# Patient Record
Sex: FEMALE | Race: BLACK OR AFRICAN AMERICAN | NOT HISPANIC OR LATINO
[De-identification: names, ages, dates, MRNs, and addresses within clinical notes are randomized per-mention and may not be internally consistent; named-entity substitution may affect disease eponyms.]

---

## 2017-01-19 ENCOUNTER — RX RENEWAL (OUTPATIENT)
Age: 67
End: 2017-01-19

## 2017-08-23 ENCOUNTER — APPOINTMENT (OUTPATIENT)
Dept: RHEUMATOLOGY | Facility: CLINIC | Age: 67
End: 2017-08-23
Payer: MEDICARE

## 2017-08-23 VITALS
HEIGHT: 68 IN | HEART RATE: 81 BPM | WEIGHT: 206 LBS | OXYGEN SATURATION: 98 % | BODY MASS INDEX: 31.22 KG/M2 | DIASTOLIC BLOOD PRESSURE: 81 MMHG | SYSTOLIC BLOOD PRESSURE: 139 MMHG

## 2017-08-23 PROCEDURE — 99214 OFFICE O/P EST MOD 30 MIN: CPT

## 2018-02-28 ENCOUNTER — APPOINTMENT (OUTPATIENT)
Dept: RHEUMATOLOGY | Facility: CLINIC | Age: 68
End: 2018-02-28
Payer: MEDICARE

## 2018-02-28 VITALS
WEIGHT: 204 LBS | DIASTOLIC BLOOD PRESSURE: 92 MMHG | HEART RATE: 77 BPM | OXYGEN SATURATION: 98 % | BODY MASS INDEX: 30.92 KG/M2 | HEIGHT: 68 IN | SYSTOLIC BLOOD PRESSURE: 145 MMHG

## 2018-02-28 PROCEDURE — 99214 OFFICE O/P EST MOD 30 MIN: CPT

## 2018-02-28 RX ORDER — BUDESONIDE AND FORMOTEROL FUMARATE DIHYDRATE 160; 4.5 UG/1; UG/1
160-4.5 AEROSOL RESPIRATORY (INHALATION)
Qty: 102 | Refills: 0 | Status: DISCONTINUED | COMMUNITY
Start: 2017-12-26

## 2018-02-28 RX ORDER — CEFUROXIME AXETIL 500 MG/1
500 TABLET ORAL
Qty: 20 | Refills: 0 | Status: DISCONTINUED | COMMUNITY
Start: 2017-11-16

## 2018-02-28 RX ORDER — AZITHROMYCIN 250 MG/1
250 TABLET, FILM COATED ORAL
Qty: 6 | Refills: 0 | Status: DISCONTINUED | COMMUNITY
Start: 2017-12-26

## 2018-02-28 RX ORDER — BUTALBITAL, ACETAMINOPHEN AND CAFFEINE 325; 50; 40 MG/1; MG/1; MG/1
50-325-40 TABLET ORAL
Qty: 10 | Refills: 0 | Status: DISCONTINUED | COMMUNITY
Start: 2018-02-13

## 2018-02-28 RX ORDER — ALBUTEROL SULFATE 90 UG/1
108 (90 BASE) AEROSOL, METERED RESPIRATORY (INHALATION)
Qty: 85 | Refills: 0 | Status: DISCONTINUED | COMMUNITY
Start: 2017-12-26

## 2018-03-20 ENCOUNTER — APPOINTMENT (OUTPATIENT)
Dept: FAMILY MEDICINE | Facility: CLINIC | Age: 68
End: 2018-03-20
Payer: MEDICARE

## 2018-03-20 VITALS
SYSTOLIC BLOOD PRESSURE: 136 MMHG | WEIGHT: 206 LBS | HEART RATE: 75 BPM | OXYGEN SATURATION: 97 % | BODY MASS INDEX: 31.22 KG/M2 | DIASTOLIC BLOOD PRESSURE: 80 MMHG | HEIGHT: 68 IN

## 2018-03-20 DIAGNOSIS — Z23 ENCOUNTER FOR IMMUNIZATION: ICD-10-CM

## 2018-03-20 DIAGNOSIS — K44.9 DIAPHRAGMATIC HERNIA W/OUT OBSTRUCTION OR GANGRENE: ICD-10-CM

## 2018-03-20 DIAGNOSIS — G43.909 MIGRAINE, UNSPECIFIED, NOT INTRACTABLE, W/OUT STATUS MIGRAINOSUS: ICD-10-CM

## 2018-03-20 PROCEDURE — G0439: CPT

## 2018-03-20 PROCEDURE — G0444 DEPRESSION SCREEN ANNUAL: CPT

## 2018-03-20 PROCEDURE — G0009: CPT

## 2018-03-20 PROCEDURE — 90670 PCV13 VACCINE IM: CPT

## 2018-03-20 RX ORDER — AMLODIPINE BESYLATE 5 MG/1
5 TABLET ORAL
Qty: 180 | Refills: 0 | Status: DISCONTINUED | COMMUNITY
Start: 2016-11-04 | End: 2018-03-20

## 2018-06-19 ENCOUNTER — APPOINTMENT (OUTPATIENT)
Dept: FAMILY MEDICINE | Facility: CLINIC | Age: 68
End: 2018-06-19
Payer: MEDICARE

## 2018-06-19 VITALS
HEIGHT: 68 IN | BODY MASS INDEX: 30.77 KG/M2 | WEIGHT: 203 LBS | SYSTOLIC BLOOD PRESSURE: 118 MMHG | HEART RATE: 89 BPM | DIASTOLIC BLOOD PRESSURE: 70 MMHG

## 2018-06-19 PROCEDURE — 99495 TRANSJ CARE MGMT MOD F2F 14D: CPT

## 2018-06-19 RX ORDER — NIFEDIPINE 30 MG/1
30 TABLET, FILM COATED, EXTENDED RELEASE ORAL
Qty: 14 | Refills: 0 | Status: DISCONTINUED | COMMUNITY
Start: 2018-03-02 | End: 2018-06-19

## 2018-06-25 NOTE — HISTORY OF PRESENT ILLNESS
[Post-hospitalization from ___ Hospital] : Post-hospitalization from [unfilled] Hospital [Admitted on: ___] : The patient was admitted on [unfilled] [Discharged on ___] : discharged on [unfilled] [FreeTextEntry2] : 67 y.o here for follow up \par patient had midabdominal/epigastric pain localizing under left breast\par had some difficulty breathing and shortness of breath\par went to [facility] ER\par had elevated /103\par syncopized in ER after 2 SL nitro\par \par she was ultimately iagnosed with CHF\par she has some meds changed \par off bystolic and nifedipine\par \par now on coreg 12.5 mg bis\par hydralazine 10 mg tid\par and isosorbide 10 mg tid\par \par

## 2018-06-26 ENCOUNTER — APPOINTMENT (OUTPATIENT)
Dept: FAMILY MEDICINE | Facility: CLINIC | Age: 68
End: 2018-06-26
Payer: MEDICARE

## 2018-06-26 VITALS
WEIGHT: 203 LBS | OXYGEN SATURATION: 96 % | DIASTOLIC BLOOD PRESSURE: 72 MMHG | SYSTOLIC BLOOD PRESSURE: 124 MMHG | HEART RATE: 80 BPM | HEIGHT: 68 IN | BODY MASS INDEX: 30.77 KG/M2 | TEMPERATURE: 98 F

## 2018-06-26 VITALS — DIASTOLIC BLOOD PRESSURE: 70 MMHG | SYSTOLIC BLOOD PRESSURE: 108 MMHG

## 2018-06-26 DIAGNOSIS — R09.82 POSTNASAL DRIP: ICD-10-CM

## 2018-06-26 DIAGNOSIS — Z87.09 PERSONAL HISTORY OF OTHER DISEASES OF THE RESPIRATORY SYSTEM: ICD-10-CM

## 2018-06-26 DIAGNOSIS — Z87.891 PERSONAL HISTORY OF NICOTINE DEPENDENCE: ICD-10-CM

## 2018-06-26 PROCEDURE — 99214 OFFICE O/P EST MOD 30 MIN: CPT | Mod: 25

## 2018-06-26 PROCEDURE — 69210 REMOVE IMPACTED EAR WAX UNI: CPT

## 2018-06-26 NOTE — PHYSICAL EXAM
[No Acute Distress] : no acute distress [Well Nourished] : well nourished [Well Developed] : well developed [EOMI] : extraocular movements intact [Supple] : supple [No Lymphadenopathy] : no lymphadenopathy [No Respiratory Distress] : no respiratory distress  [Clear to Auscultation] : lungs were clear to auscultation bilaterally [No Accessory Muscle Use] : no accessory muscle use [Normal Rate] : normal rate  [Regular Rhythm] : with a regular rhythm [Normal S1, S2] : normal S1 and S2 [No Edema] : there was no peripheral edema [No Extremity Clubbing/Cyanosis] : no extremity clubbing/cyanosis [No CVA Tenderness] : no CVA  tenderness [Grossly Normal Strength/Tone] : grossly normal strength/tone [No Rash] : no rash [Normal Gait] : normal gait [Coordination Grossly Intact] : coordination grossly intact [No Focal Deficits] : no focal deficits [Normal Affect] : the affect was normal [Normal Mood] : the mood was normal [Normal Insight/Judgement] : insight and judgment were intact [de-identified] : +PND post pharynx,  +cerumen impaction B/L auditory canals

## 2018-06-26 NOTE — PLAN
[FreeTextEntry1] : aggressive hydration!!!!!!!\par \par OTC Nasacort AQ  2 sp ea nostril qd \par \par see med orders\par \par \par pt unable to afford strongly recommended HAD at this time \par \par B/L ear irrigation performed

## 2018-06-26 NOTE — REVIEW OF SYSTEMS
[Hoarseness] : hoarseness [Cough] : cough [Negative] : Heme/Lymph [FreeTextEntry2] : see HPI  [FreeTextEntry4] : see HPI  [FreeTextEntry6] : see HPI

## 2018-06-26 NOTE — HISTORY OF PRESENT ILLNESS
[FreeTextEntry8] : cough/congestion/ loss of voice x3days \par \par 3 day of "laryngitis,"  +cough c yellow phlegm,   reports minimal relief c OTC Robitussin, no f/c/s no N/V/D +good appetite, +nasal congestion

## 2018-07-25 ENCOUNTER — RX RENEWAL (OUTPATIENT)
Age: 68
End: 2018-07-25

## 2018-07-25 ENCOUNTER — OTHER (OUTPATIENT)
Age: 68
End: 2018-07-25

## 2018-08-29 ENCOUNTER — APPOINTMENT (OUTPATIENT)
Dept: RHEUMATOLOGY | Facility: CLINIC | Age: 68
End: 2018-08-29
Payer: MEDICARE

## 2018-08-29 VITALS
WEIGHT: 200 LBS | HEIGHT: 68 IN | BODY MASS INDEX: 30.31 KG/M2 | SYSTOLIC BLOOD PRESSURE: 136 MMHG | OXYGEN SATURATION: 95 % | HEART RATE: 75 BPM | DIASTOLIC BLOOD PRESSURE: 84 MMHG

## 2018-08-29 DIAGNOSIS — Z81.8 FAMILY HISTORY OF OTHER MENTAL AND BEHAVIORAL DISORDERS: ICD-10-CM

## 2018-08-29 PROCEDURE — 99214 OFFICE O/P EST MOD 30 MIN: CPT

## 2018-10-11 ENCOUNTER — APPOINTMENT (OUTPATIENT)
Dept: FAMILY MEDICINE | Facility: CLINIC | Age: 68
End: 2018-10-11
Payer: MEDICARE

## 2018-10-11 ENCOUNTER — TRANSCRIPTION ENCOUNTER (OUTPATIENT)
Age: 68
End: 2018-10-11

## 2018-10-11 VITALS
DIASTOLIC BLOOD PRESSURE: 80 MMHG | HEIGHT: 68 IN | WEIGHT: 200 LBS | OXYGEN SATURATION: 96 % | SYSTOLIC BLOOD PRESSURE: 134 MMHG | HEART RATE: 84 BPM | BODY MASS INDEX: 30.31 KG/M2

## 2018-10-11 DIAGNOSIS — M25.569 PAIN IN UNSPECIFIED KNEE: ICD-10-CM

## 2018-10-11 PROCEDURE — 99214 OFFICE O/P EST MOD 30 MIN: CPT

## 2018-10-11 RX ORDER — AZITHROMYCIN 250 MG/1
250 TABLET, FILM COATED ORAL
Qty: 1 | Refills: 0 | Status: DISCONTINUED | COMMUNITY
Start: 2018-06-26 | End: 2018-10-11

## 2018-11-30 ENCOUNTER — APPOINTMENT (OUTPATIENT)
Dept: RHEUMATOLOGY | Facility: CLINIC | Age: 68
End: 2018-11-30
Payer: MEDICARE

## 2018-11-30 VITALS
HEIGHT: 68 IN | HEART RATE: 77 BPM | BODY MASS INDEX: 29.86 KG/M2 | DIASTOLIC BLOOD PRESSURE: 83 MMHG | WEIGHT: 197 LBS | SYSTOLIC BLOOD PRESSURE: 133 MMHG | OXYGEN SATURATION: 98 %

## 2018-11-30 DIAGNOSIS — E04.1 NONTOXIC SINGLE THYROID NODULE: ICD-10-CM

## 2018-11-30 DIAGNOSIS — M65.9 SYNOVITIS AND TENOSYNOVITIS, UNSPECIFIED: ICD-10-CM

## 2018-11-30 PROCEDURE — 99214 OFFICE O/P EST MOD 30 MIN: CPT | Mod: 25

## 2018-11-30 PROCEDURE — 20550 NJX 1 TENDON SHEATH/LIGAMENT: CPT

## 2019-04-01 ENCOUNTER — OTHER (OUTPATIENT)
Age: 69
End: 2019-04-01

## 2019-04-01 ENCOUNTER — APPOINTMENT (OUTPATIENT)
Dept: RHEUMATOLOGY | Facility: CLINIC | Age: 69
End: 2019-04-01

## 2019-04-12 ENCOUNTER — RX RENEWAL (OUTPATIENT)
Age: 69
End: 2019-04-12

## 2019-06-03 ENCOUNTER — APPOINTMENT (OUTPATIENT)
Dept: RHEUMATOLOGY | Facility: CLINIC | Age: 69
End: 2019-06-03
Payer: MEDICARE

## 2019-06-03 ENCOUNTER — RX RENEWAL (OUTPATIENT)
Age: 69
End: 2019-06-03

## 2019-06-03 VITALS
HEART RATE: 81 BPM | DIASTOLIC BLOOD PRESSURE: 85 MMHG | BODY MASS INDEX: 29.86 KG/M2 | SYSTOLIC BLOOD PRESSURE: 144 MMHG | WEIGHT: 197 LBS | HEIGHT: 68 IN | OXYGEN SATURATION: 97 %

## 2019-06-03 DIAGNOSIS — M54.5 LOW BACK PAIN: ICD-10-CM

## 2019-06-03 DIAGNOSIS — Z79.52 LONG TERM (CURRENT) USE OF SYSTEMIC STEROIDS: ICD-10-CM

## 2019-06-03 DIAGNOSIS — M25.50 PAIN IN UNSPECIFIED JOINT: ICD-10-CM

## 2019-06-03 PROCEDURE — 99214 OFFICE O/P EST MOD 30 MIN: CPT

## 2019-06-04 PROBLEM — Z79.52 CURRENT USE OF STEROID MEDICATION: Status: ACTIVE | Noted: 2019-06-04

## 2019-06-04 PROBLEM — M54.5 LOW BACK PAIN, EPISODIC: Status: ACTIVE | Noted: 2019-06-04

## 2019-06-04 NOTE — PHYSICAL EXAM
[General Appearance - Well Nourished] : well nourished [General Appearance - Well Developed] : well developed [Sclera] : the sclera and conjunctiva were normal [Respiration, Rhythm And Depth] : normal respiratory rhythm and effort [Neck Appearance] : the appearance of the neck was normal [Oropharynx] : the oropharynx was normal [Edema] : there was no peripheral edema [Auscultation Breath Sounds / Voice Sounds] : lungs were clear to auscultation bilaterally [Full Pulse] : the pedal pulses are present [Heart Sounds] : normal S1 and S2 [Cervical Lymph Nodes Enlarged Anterior Bilaterally] : anterior cervical [Abdomen Tenderness] : non-tender [No Spinal Tenderness] : no spinal tenderness [Supraclavicular Lymph Nodes Enlarged Bilaterally] : supraclavicular [Abnormal Walk] : normal gait [Musculoskeletal - Swelling] : no joint swelling seen [Motor Tone] : muscle strength and tone were normal [] : no rash [Deep Tendon Reflexes (DTR)] : deep tendon reflexes were 2+ and symmetric [FreeTextEntry1] : FROM neck, shoulders, elbows, wrists, hands, hips, knees, ankles and feet, including the small joints of the hands and feet without any evidence of inflammatory arthritis , No inflammatory arthritis or synovitis noted. No tender points noted. tightness hamstrings b/l [Motor Exam] : the motor exam was normal [Oriented To Time, Place, And Person] : oriented to person, place, and time

## 2019-06-04 NOTE — ASSESSMENT
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 \par \par \par rheumatoid arthritis-\par \par She was on combination of humira and MTX but was taken off the humira after her stroke. Concern was raised at that time that her neurological symptoms may have been exacerbated by humira. She did have a couple of TIAs several years ago but was APL negative. \par . She is currently on methotrexate 15 mg weekly monotherapy. \par - recent RA flare that responded to oral steroids\par \par Low back pain-\par to use oral prednisone for next 2 weeks, 10 mg for 1st week and then 5 mg for 2nd week\par -she is going to [place] for next 2 weeks\par - to have MRI LS spine when she returns, she has a h/o lumbar arthritis\par \par COPD-\par I ordered a CT chest, it shows stable emphysema.\par -She no longer smokes for many years.\par - under care of pul\par \par steroid use-\par Risks and benefits of steroids were d/w patient including but not limited to weight gain, diabetes, HTN, avascular necrosis, osteoporosis, glaucoma, cataract, infections and immunosuppression.\par \par Methotrexate use-\par -she is aware to hold medication while on antibiotics.\par \par \par She is aware to call if her sx worsen. followup 4 months.

## 2019-06-04 NOTE — HISTORY OF PRESENT ILLNESS
[FreeTextEntry1] : followup for rheumatoid arthritis. Precious returns after 6 months  today.  She is currently using methotrexate at 15 mg once a week. \par She called me several days ago with RA flare and is on tapering doses of steroids. \par She is currently on  methotrexate monotherapy at 15 mg. She is doing well. She did get a CT chest from May that showed emphysema unchanged from several years ago. It was noted that she had thyroid nodules, she is seeing pul now. \par She also c/o back pain and wants to know what she should do. \par She rates her joint pain at a 3/10 except her lower back which is a 10/10\par . She denies any recent infections. She saw her primary care physician. She denies fevers or chills or night sweats. She has a good appetite.\par She is otherwise up-to-date on her age-appropriate screenings. She denies fatigue.

## 2019-06-04 NOTE — REVIEW OF SYSTEMS
[Chills] : no chills [Fever] : no fever [Feeling Poorly] : not feeling poorly [Dry Eyes] : no dryness of the eyes [Feeling Tired] : not feeling tired [Shortness Of Breath] : no shortness of breath [Chest Pain] : no chest pain [Palpitations] : no palpitations [Cough] : no cough [SOB on Exertion] : no shortness of breath during exertion [Abdominal Pain] : no abdominal pain [Arthralgias] : arthralgias [Joint Swelling] : no joint swelling [Joint Pain] : joint pain [Joint Stiffness] : no joint stiffness [Skin Lesions] : no skin lesions [Difficulty Walking] : no difficulty walking [Depression] : no depression [Anxiety] : no anxiety [Negative] : Heme/Lymph

## 2019-06-19 ENCOUNTER — OTHER (OUTPATIENT)
Age: 69
End: 2019-06-19

## 2019-07-08 ENCOUNTER — APPOINTMENT (OUTPATIENT)
Dept: RHEUMATOLOGY | Facility: CLINIC | Age: 69
End: 2019-07-08

## 2019-09-03 NOTE — DISCUSSION/SUMMARY
[Hospital: _____] : Hospital:  [ED] : a call from ED [Follow Up Appointment] : follow up appointment with provider [Patient] : a call from patient [Home] : patient was discharged to home [FreeTextEntry3] : Pt scheduled for f/u appt on september 10th  [FreeTextEntry1] : Pt states she was discharged home on amlodipine for SBP >115. Pt reports compliance with hypertensive meds and amlodipine. Pt states cardiologist discontinued hydralazine however she is still taking carvedilol and isosorbide. Pt reports heaviness in head, Pt has an appt with neurologist today 9/3/19 and appt with cardiologist on friday. BP readings today have been Systolic <160 and diastolic <100. f/u appt with CHANDLER Gonsalez scheduled for 9/10/19

## 2019-09-10 ENCOUNTER — APPOINTMENT (OUTPATIENT)
Dept: FAMILY MEDICINE | Facility: CLINIC | Age: 69
End: 2019-09-10
Payer: MEDICARE

## 2019-09-10 VITALS
BODY MASS INDEX: 29.1 KG/M2 | HEIGHT: 68 IN | TEMPERATURE: 97.5 F | SYSTOLIC BLOOD PRESSURE: 108 MMHG | HEART RATE: 73 BPM | WEIGHT: 192 LBS | OXYGEN SATURATION: 98 % | DIASTOLIC BLOOD PRESSURE: 76 MMHG

## 2019-09-10 VITALS — DIASTOLIC BLOOD PRESSURE: 80 MMHG | SYSTOLIC BLOOD PRESSURE: 138 MMHG

## 2019-09-10 DIAGNOSIS — K21.9 GASTRO-ESOPHAGEAL REFLUX DISEASE W/OUT ESOPHAGITIS: ICD-10-CM

## 2019-09-10 PROCEDURE — 99214 OFFICE O/P EST MOD 30 MIN: CPT

## 2019-09-10 RX ORDER — PREDNISONE 10 MG/1
10 TABLET ORAL TWICE DAILY
Qty: 60 | Refills: 0 | Status: COMPLETED | COMMUNITY
Start: 2019-06-03 | End: 2019-09-10

## 2019-09-10 RX ORDER — AMLODIPINE BESYLATE 2.5 MG/1
2.5 TABLET ORAL
Refills: 0 | Status: COMPLETED | COMMUNITY

## 2019-09-10 NOTE — PHYSICAL EXAM
[No Acute Distress] : no acute distress [Well Nourished] : well nourished [Well Developed] : well developed [Well-Appearing] : well-appearing [EOMI] : extraocular movements intact [No JVD] : no jugular venous distention [Normal Outer Ear/Nose] : the outer ears and nose were normal in appearance [No Accessory Muscle Use] : no accessory muscle use [No Respiratory Distress] : no respiratory distress  [Normal Rate] : normal rate  [Clear to Auscultation] : lungs were clear to auscultation bilaterally [Regular Rhythm] : with a regular rhythm [Normal S1, S2] : normal S1 and S2 [No Carotid Bruits] : no carotid bruits [No Edema] : there was no peripheral edema [Non-distended] : non-distended [No CVA Tenderness] : no CVA  tenderness [Grossly Normal Strength/Tone] : grossly normal strength/tone [No Rash] : no rash [Coordination Grossly Intact] : coordination grossly intact [Normal Gait] : normal gait [No Focal Deficits] : no focal deficits [Normal Affect] : the affect was normal [Normal Insight/Judgement] : insight and judgment were intact [Normal Mood] : the mood was normal

## 2019-09-11 NOTE — ASSESSMENT
[FreeTextEntry1] : Cardiac w/u performed c Dr. Zarate  [facility] \par \par Nephrology consult c Dr. Foreman scheduled 10/4/19 \par \par Cardio consult to be obtained \par d/c summary x 2 from [facility]'s to be obtained c\par \par close f/u,  daily home BP monitoring to continue,  pt states checking BP q 2 hrs \par \par Addendum Note:   after review of hospital d/c summary,   +ve  R MOLLY recommend f/u c Dr. Pittman [phone] \par (Vascular)

## 2019-09-11 NOTE — HISTORY OF PRESENT ILLNESS
[Post-hospitalization from ___ Hospital] : Post-hospitalization from [unfilled] Hospital [Discharged on ___] : discharged on [unfilled] [Admitted on: ___] : The patient was admitted on [unfilled] [FreeTextEntry2] : Pt was admitted into [facility] twice x Elevated blood pressure readings, pt is currently on amlodipine \par \par 69 yo female presents to office s/p [facility] Admission  on 8/26/19-8/29/19 secondary to elevated BP.  pt states home BP= 169/115.   pt states had blurred vision, fatigue, and heaviness in head.   pt called EMS and relative.    pt states does not recall events until waking up in ICU the next am.  BP in hospital was "220" systolic and "100"  diastolic.   \par pt states was NOT Dx'ed c CVA  and/or MI.    BP w/u performed over 4 days at [facility]\par \par pt states was d/c'ed  on 8/29/19 and 2 days later similar symptoms occurred, pt transported via son.     pt admitted x 2 days   8/29/19-8/31/19 \par

## 2019-09-11 NOTE — HEALTH RISK ASSESSMENT
[Patient declined bone density test] : Patient declined bone density test [Patient reported colonoscopy was normal] : Patient reported colonoscopy was normal [MammogramDate] : 11/18 [MammogramComments] : Uday [ColonoscopyComments] : Cologaurd  [ColonoscopyDate] : 06/18

## 2019-09-23 ENCOUNTER — RX RENEWAL (OUTPATIENT)
Age: 69
End: 2019-09-23

## 2019-09-25 ENCOUNTER — RX RENEWAL (OUTPATIENT)
Age: 69
End: 2019-09-25

## 2019-11-08 ENCOUNTER — APPOINTMENT (OUTPATIENT)
Dept: FAMILY MEDICINE | Facility: CLINIC | Age: 69
End: 2019-11-08
Payer: MEDICARE

## 2019-11-08 VITALS
HEIGHT: 68 IN | SYSTOLIC BLOOD PRESSURE: 104 MMHG | OXYGEN SATURATION: 96 % | HEART RATE: 77 BPM | WEIGHT: 191 LBS | DIASTOLIC BLOOD PRESSURE: 62 MMHG | TEMPERATURE: 97.7 F | BODY MASS INDEX: 28.95 KG/M2

## 2019-11-08 DIAGNOSIS — Z86.69 PERSONAL HISTORY OF OTHER DISEASES OF THE NERVOUS SYSTEM AND SENSE ORGANS: ICD-10-CM

## 2019-11-08 DIAGNOSIS — H61.20 IMPACTED CERUMEN, UNSPECIFIED EAR: ICD-10-CM

## 2019-11-08 PROCEDURE — 99214 OFFICE O/P EST MOD 30 MIN: CPT

## 2019-11-08 NOTE — END OF VISIT
[FreeTextEntry3] : Medical record entries made by the scribe today today, were at my direction and personally dictated to them by me, Dr. Sarah Gardner on Nov 08, 2019. I have reviewed the chart and agree that the record accurately reflects my personal performance of the history, physical exam, assessment, and plan.\par \par

## 2019-11-08 NOTE — PLAN
[FreeTextEntry1] : - Likely cold in eye\par - Eyedrops ordered in case of worsening \par - Augmentin 500 mg ordered for 10 days \par - Take with food/probiotics\par - Use Debrox drops to loosen cerumen\par - RTO for ear lavage next week

## 2019-11-08 NOTE — HISTORY OF PRESENT ILLNESS
[FreeTextEntry8] : JAVED is a 69 year female here c/o RT eye redness/itching/white mucus. States last night she was itching her eye and white mucus was coming out. States this morning she woke up and there was discharge in the eye. Had a sinus infection x2 weeks ago. Was rx Augmentin 500 mg for x5 days. Still feels congested. States she got sick after flu shot in October. \par \par

## 2019-11-08 NOTE — ADDENDUM
[FreeTextEntry1] : I, Estella Weinberg acting as a scribe for Dr. Sarah Gardner on Nov 08, 2019  at 10:19 AM\par

## 2019-11-08 NOTE — REVIEW OF SYSTEMS
[Discharge] : discharge [Redness] : redness [Itching] : itching [Nasal Discharge] : nasal discharge [Negative] : Psychiatric [FreeTextEntry4] : congestion

## 2019-11-08 NOTE — PHYSICAL EXAM
[No Acute Distress] : no acute distress [Well Nourished] : well nourished [Well Developed] : well developed [Normal Sclera/Conjunctiva] : normal sclera/conjunctiva [Well-Appearing] : well-appearing [PERRL] : pupils equal round and reactive to light [EOMI] : extraocular movements intact [Normal Outer Ear/Nose] : the outer ears and nose were normal in appearance [Normal Oropharynx] : the oropharynx was normal [No JVD] : no jugular venous distention [No Lymphadenopathy] : no lymphadenopathy [Supple] : supple [Thyroid Normal, No Nodules] : the thyroid was normal and there were no nodules present [No Respiratory Distress] : no respiratory distress  [No Accessory Muscle Use] : no accessory muscle use [Clear to Auscultation] : lungs were clear to auscultation bilaterally [Normal Rate] : normal rate  [Regular Rhythm] : with a regular rhythm [No Murmur] : no murmur heard [Normal S1, S2] : normal S1 and S2 [No Abdominal Bruit] : a ~M bruit was not heard ~T in the abdomen [No Carotid Bruits] : no carotid bruits [No Varicosities] : no varicosities [Pedal Pulses Present] : the pedal pulses are present [No Edema] : there was no peripheral edema [No Palpable Aorta] : no palpable aorta [No Extremity Clubbing/Cyanosis] : no extremity clubbing/cyanosis [Soft] : abdomen soft [Non Tender] : non-tender [No Masses] : no abdominal mass palpated [Non-distended] : non-distended [No HSM] : no HSM [Normal Posterior Cervical Nodes] : no posterior cervical lymphadenopathy [Normal Bowel Sounds] : normal bowel sounds [Normal Anterior Cervical Nodes] : no anterior cervical lymphadenopathy [No CVA Tenderness] : no CVA  tenderness [No Spinal Tenderness] : no spinal tenderness [No Joint Swelling] : no joint swelling [Grossly Normal Strength/Tone] : grossly normal strength/tone [No Rash] : no rash [Coordination Grossly Intact] : coordination grossly intact [No Focal Deficits] : no focal deficits [Deep Tendon Reflexes (DTR)] : deep tendon reflexes were 2+ and symmetric [Normal Gait] : normal gait [Normal Insight/Judgement] : insight and judgment were intact [Normal Affect] : the affect was normal [de-identified] : \par yellow mucus mixed with crusted blood b/l

## 2019-11-15 ENCOUNTER — APPOINTMENT (OUTPATIENT)
Dept: FAMILY MEDICINE | Facility: CLINIC | Age: 69
End: 2019-11-15
Payer: MEDICARE

## 2019-11-15 VITALS
WEIGHT: 191 LBS | HEART RATE: 80 BPM | DIASTOLIC BLOOD PRESSURE: 80 MMHG | HEIGHT: 68 IN | OXYGEN SATURATION: 98 % | BODY MASS INDEX: 28.95 KG/M2 | SYSTOLIC BLOOD PRESSURE: 128 MMHG

## 2019-11-15 PROCEDURE — 69210 REMOVE IMPACTED EAR WAX UNI: CPT

## 2019-11-15 PROCEDURE — 36415 COLL VENOUS BLD VENIPUNCTURE: CPT

## 2019-11-15 PROCEDURE — 99214 OFFICE O/P EST MOD 30 MIN: CPT | Mod: 25

## 2019-11-15 NOTE — END OF VISIT
[FreeTextEntry3] : Medical record entries made by the scribe today today, were at my direction and personally dictated to them by me, Dr. Sarah Gardner on Nov 15, 2019. I have reviewed the chart and agree that the record accurately reflects my personal performance of the history, physical exam, assessment, and plan.\par \par

## 2019-11-15 NOTE — PHYSICAL EXAM
[No Acute Distress] : no acute distress [Well Nourished] : well nourished [Well Developed] : well developed [Well-Appearing] : well-appearing [Normal Sclera/Conjunctiva] : normal sclera/conjunctiva [PERRL] : pupils equal round and reactive to light [EOMI] : extraocular movements intact [Normal Outer Ear/Nose] : the outer ears and nose were normal in appearance [Normal Oropharynx] : the oropharynx was normal [No JVD] : no jugular venous distention [Supple] : supple [No Lymphadenopathy] : no lymphadenopathy [Thyroid Normal, No Nodules] : the thyroid was normal and there were no nodules present [No Respiratory Distress] : no respiratory distress  [Clear to Auscultation] : lungs were clear to auscultation bilaterally [No Accessory Muscle Use] : no accessory muscle use [Normal Rate] : normal rate  [Regular Rhythm] : with a regular rhythm [Normal S1, S2] : normal S1 and S2 [No Murmur] : no murmur heard [No Carotid Bruits] : no carotid bruits [No Abdominal Bruit] : a ~M bruit was not heard ~T in the abdomen [No Varicosities] : no varicosities [Pedal Pulses Present] : the pedal pulses are present [No Edema] : there was no peripheral edema [No Palpable Aorta] : no palpable aorta [No Extremity Clubbing/Cyanosis] : no extremity clubbing/cyanosis [Soft] : abdomen soft [Non Tender] : non-tender [Non-distended] : non-distended [No Masses] : no abdominal mass palpated [No HSM] : no HSM [Normal Bowel Sounds] : normal bowel sounds [Normal Posterior Cervical Nodes] : no posterior cervical lymphadenopathy [Normal Anterior Cervical Nodes] : no anterior cervical lymphadenopathy [No CVA Tenderness] : no CVA  tenderness [No Spinal Tenderness] : no spinal tenderness [No Joint Swelling] : no joint swelling [Grossly Normal Strength/Tone] : grossly normal strength/tone [No Rash] : no rash [Coordination Grossly Intact] : coordination grossly intact [No Focal Deficits] : no focal deficits [Normal Gait] : normal gait [Deep Tendon Reflexes (DTR)] : deep tendon reflexes were 2+ and symmetric [Normal Insight/Judgement] : insight and judgment were intact [Normal Affect] : the affect was normal

## 2019-11-15 NOTE — PLAN
[FreeTextEntry1] : - B/l ear lavage in office today \par - Reports minor hearing improvement \par - Encouraged hearing aids\par - Encouraged to use Debrox drops x1 week

## 2019-11-15 NOTE — HISTORY OF PRESENT ILLNESS
[FreeTextEntry1] : f/u x b/l ear lavage [de-identified] : JAVED is a 69 year female here for f/u. Pt is here for b/l ear lavage. Has been using Debrox drops to soften cerumen. Mood is good. Pt has no c/o. Pt reports she has b/l hearing loss. Used to wear hearing aids but has not in awhile. States that they are very expensive.

## 2019-11-15 NOTE — ADDENDUM
[FreeTextEntry1] : I, Estella Weinberg acting as a scribe for Dr. Sarah Gardner on Nov 15, 2019  at 2:38 PM\par

## 2020-01-03 ENCOUNTER — RX RENEWAL (OUTPATIENT)
Age: 70
End: 2020-01-03

## 2020-03-29 ENCOUNTER — RX RENEWAL (OUTPATIENT)
Age: 70
End: 2020-03-29

## 2020-05-21 RX ORDER — SOFT LENS DISINFECTANT
SOLUTION, NON-ORAL MISCELLANEOUS
Qty: 1 | Refills: 0 | Status: ACTIVE | COMMUNITY
Start: 2020-05-21 | End: 1900-01-01

## 2020-05-21 RX ORDER — NEBULIZER ACCESSORIES
KIT MISCELLANEOUS
Qty: 1 | Refills: 5 | Status: ACTIVE | COMMUNITY
Start: 2020-05-21 | End: 1900-01-01

## 2020-05-27 ENCOUNTER — APPOINTMENT (OUTPATIENT)
Dept: FAMILY MEDICINE | Facility: CLINIC | Age: 70
End: 2020-05-27
Payer: MEDICARE

## 2020-05-27 ENCOUNTER — LABORATORY RESULT (OUTPATIENT)
Age: 70
End: 2020-05-27

## 2020-05-27 VITALS
WEIGHT: 186 LBS | OXYGEN SATURATION: 99 % | TEMPERATURE: 98 F | SYSTOLIC BLOOD PRESSURE: 132 MMHG | HEIGHT: 68 IN | BODY MASS INDEX: 28.19 KG/M2 | HEART RATE: 69 BPM | DIASTOLIC BLOOD PRESSURE: 88 MMHG

## 2020-05-27 DIAGNOSIS — Z20.828 CONTACT WITH AND (SUSPECTED) EXPOSURE TO OTHER VIRAL COMMUNICABLE DISEASES: ICD-10-CM

## 2020-05-27 DIAGNOSIS — I31.9 DISEASE OF PERICARDIUM, UNSPECIFIED: ICD-10-CM

## 2020-05-27 PROCEDURE — 99496 TRANSJ CARE MGMT HIGH F2F 7D: CPT

## 2020-05-27 PROCEDURE — 99358 PROLONG SERVICE W/O CONTACT: CPT | Mod: CS

## 2020-05-27 NOTE — END OF VISIT
[FreeTextEntry3] : Medical record entries made by the scribe today today, were at my direction and personally dictated to them by me, Dr. Sarah Gardner on May 27, 2020. I have reviewed the chart and agree that the record accurately reflects my personal performance of the history, physical exam, assessment, and plan.\par \par

## 2020-05-27 NOTE — ADDENDUM
[FreeTextEntry1] : I, Estella Weinberg acting as a scribe for Dr. Sarah Gardner on May 27, 2020  at 10:13 AM\par \par \par \par I spent a total of 20 minutes reviewing the discharge summary from [facility] including consult notes, labs and imaging

## 2020-05-27 NOTE — PHYSICAL EXAM
[No Acute Distress] : no acute distress [Well Developed] : well developed [Well Nourished] : well nourished [Well-Appearing] : well-appearing [Normal Sclera/Conjunctiva] : normal sclera/conjunctiva [PERRL] : pupils equal round and reactive to light [EOMI] : extraocular movements intact [Normal Oropharynx] : the oropharynx was normal [Normal Outer Ear/Nose] : the outer ears and nose were normal in appearance [No JVD] : no jugular venous distention [Supple] : supple [No Lymphadenopathy] : no lymphadenopathy [No Respiratory Distress] : no respiratory distress  [Thyroid Normal, No Nodules] : the thyroid was normal and there were no nodules present [Clear to Auscultation] : lungs were clear to auscultation bilaterally [No Accessory Muscle Use] : no accessory muscle use [Normal Rate] : normal rate  [Normal S1, S2] : normal S1 and S2 [Regular Rhythm] : with a regular rhythm [No Murmur] : no murmur heard [No Carotid Bruits] : no carotid bruits [No Abdominal Bruit] : a ~M bruit was not heard ~T in the abdomen [No Varicosities] : no varicosities [Pedal Pulses Present] : the pedal pulses are present [No Edema] : there was no peripheral edema [No Palpable Aorta] : no palpable aorta [Soft] : abdomen soft [No Extremity Clubbing/Cyanosis] : no extremity clubbing/cyanosis [Non Tender] : non-tender [No Masses] : no abdominal mass palpated [Non-distended] : non-distended [No HSM] : no HSM [Normal Posterior Cervical Nodes] : no posterior cervical lymphadenopathy [Normal Bowel Sounds] : normal bowel sounds [Normal Anterior Cervical Nodes] : no anterior cervical lymphadenopathy [No CVA Tenderness] : no CVA  tenderness [No Spinal Tenderness] : no spinal tenderness [Grossly Normal Strength/Tone] : grossly normal strength/tone [No Joint Swelling] : no joint swelling [No Rash] : no rash [Coordination Grossly Intact] : coordination grossly intact [No Focal Deficits] : no focal deficits [Normal Gait] : normal gait [Normal Affect] : the affect was normal [Deep Tendon Reflexes (DTR)] : deep tendon reflexes were 2+ and symmetric [Normal Insight/Judgement] : insight and judgment were intact

## 2020-05-27 NOTE — ASSESSMENT
[FreeTextEntry1] : Suspected COVID-19 due to lung CT results\par No cough/fever before going into hospital

## 2020-05-27 NOTE — HISTORY OF PRESENT ILLNESS
[Post-hospitalization from ___ Hospital] : Post-hospitalization from [unfilled] Hospital [Admitted on: ___] : The patient was admitted on [unfilled] [Discharged on ___] : discharged on [unfilled] [Discharge Summary] : discharge summary [Discharge Med List] : discharge medication list [Radiology Findings] : radiology findings [Pertinent Labs] : pertinent labs [Med Reconciliation] : medication reconciliation has been completed [Patient Contacted By: ____] : and contacted by [unfilled] [FreeTextEntry2] : JAVED is a 69 year female here for HFU. Pt reports she went to ER 5/17/20 due to CP/SOB. Pt had EKG, Doppler, CT, labs. All evaluations were negative for heart attack/blood clots. During evaluation, lungs showed ground glass opacities as well as multinodular thyroid that is pressing on her trachea. (-) COVID nasopharyngeal swab. She saw  in hospital and he felt it was pericarditis. She was discharged 5/20/20 with Colchicine and continuous home oxygen 2L via nasal cannula. States today is her last day on the medication and all pain is gone. Pt reports that she is following up with  6/15/20. Pt states she is experiencing thick yellow sputum when coughing. Has been doing nebulizer treatments as needed which brings great relief. States she lives with family who has been going out during pandemic. No one has been sick that she is aware of. Denies cough/fever before going in to hospital. \par Due to follow up with pulmonologist , will be scheduled.  \par \par FHx of thyroid cancer: Father had total thyroidectomy.

## 2020-05-27 NOTE — PLAN
[FreeTextEntry1] : - Advised to address thyroid nodule, US ordered \par - Pt to follow up with specialist to evaluate thyroid \par - COVID nasopharyngeal swab in office today \par - Follow up with pulm 6/1/20

## 2020-07-20 ENCOUNTER — APPOINTMENT (OUTPATIENT)
Dept: FAMILY MEDICINE | Facility: CLINIC | Age: 70
End: 2020-07-20
Payer: MEDICARE

## 2020-07-20 VITALS — DIASTOLIC BLOOD PRESSURE: 80 MMHG | SYSTOLIC BLOOD PRESSURE: 118 MMHG

## 2020-07-20 VITALS
OXYGEN SATURATION: 98 % | BODY MASS INDEX: 26.67 KG/M2 | WEIGHT: 176 LBS | TEMPERATURE: 97.7 F | HEART RATE: 78 BPM | HEIGHT: 68 IN | DIASTOLIC BLOOD PRESSURE: 82 MMHG | SYSTOLIC BLOOD PRESSURE: 120 MMHG

## 2020-07-20 DIAGNOSIS — Z86.79 PERSONAL HISTORY OF OTHER DISEASES OF THE CIRCULATORY SYSTEM: ICD-10-CM

## 2020-07-20 DIAGNOSIS — I70.1 ATHEROSCLEROSIS OF RENAL ARTERY: ICD-10-CM

## 2020-07-20 PROCEDURE — 99495 TRANSJ CARE MGMT MOD F2F 14D: CPT | Mod: 25

## 2020-07-20 PROCEDURE — 36415 COLL VENOUS BLD VENIPUNCTURE: CPT

## 2020-07-20 RX ORDER — AMLODIPINE BESYLATE 5 MG/1
5 TABLET ORAL DAILY
Qty: 90 | Refills: 1 | Status: DISCONTINUED | COMMUNITY
Start: 2019-09-10 | End: 2020-07-20

## 2020-07-20 RX ORDER — AMOXICILLIN AND CLAVULANATE POTASSIUM 500; 125 MG/1; MG/1
500-125 TABLET, FILM COATED ORAL TWICE DAILY
Qty: 20 | Refills: 0 | Status: DISCONTINUED | COMMUNITY
Start: 2019-11-08 | End: 2020-07-20

## 2020-07-20 RX ORDER — ISOSORBIDE DINITRATE 10 MG/1
10 TABLET ORAL
Qty: 150 | Refills: 0 | Status: DISCONTINUED | COMMUNITY
Start: 2018-06-08 | End: 2020-07-20

## 2020-07-20 NOTE — REVIEW OF SYSTEMS
[Negative] : Heme/Lymph [FreeTextEntry6] : chronic SOB  [FreeTextEntry7] : see HPI  [FreeTextEntry9] : arthralgias secondary to hx of RA

## 2020-07-20 NOTE — HISTORY OF PRESENT ILLNESS
[Post-hospitalization from ___ Hospital] : Post-hospitalization from [unfilled] Hospital [Admitted on: ___] : The patient was admitted on [unfilled] [Discharged on ___] : discharged on [unfilled] [FreeTextEntry2] : Pt was admitted x bowel obstruction \par \par 70 yo female s/p [facility] admission 6/20/20-7/9/20 secondary to SBO likely secondary to adhesions. \par SBO repaired laparoscopically.   Post-op f/u c Dr. Mitchell 7/16/20.  Pt denies abdominal pain at time of visit.  Last BM last night, however, decreased in volume.  Denies bloody/black stools  normal in consistency.     Average BM frequency qod with decreased volume,  however, pt admits to decreased dietary intake.  no N/V  \par \par no urinary complaints Denies CP unchanged chronic SOB secondary to hx of COPD

## 2020-07-20 NOTE — PHYSICAL EXAM
[Well Nourished] : well nourished [No Acute Distress] : no acute distress [Well Developed] : well developed [Well-Appearing] : well-appearing [EOMI] : extraocular movements intact [Normal Outer Ear/Nose] : the outer ears and nose were normal in appearance [No JVD] : no jugular venous distention [Clear to Auscultation] : lungs were clear to auscultation bilaterally [No Respiratory Distress] : no respiratory distress  [Normal Rate] : normal rate  [Regular Rhythm] : with a regular rhythm [Normal S1, S2] : normal S1 and S2 [No Edema] : there was no peripheral edema [No CVA Tenderness] : no CVA  tenderness [No Focal Deficits] : no focal deficits [Coordination Grossly Intact] : coordination grossly intact [Normal Mood] : the mood was normal [Normal Affect] : the affect was normal [Normal Insight/Judgement] : insight and judgment were intact [de-identified] : minimal accessory muscle use  [de-identified] : altered gait secondary to arthralgias

## 2020-07-21 LAB
ALBUMIN SERPL ELPH-MCNC: 3.8 G/DL
ALP BLD-CCNC: 108 U/L
ALT SERPL-CCNC: 10 U/L
ANION GAP SERPL CALC-SCNC: 14 MMOL/L
AST SERPL-CCNC: 16 U/L
BASOPHILS # BLD AUTO: 0.02 K/UL
BASOPHILS NFR BLD AUTO: 0.4 %
BILIRUB SERPL-MCNC: 0.2 MG/DL
BUN SERPL-MCNC: 9 MG/DL
CALCIUM SERPL-MCNC: 9.4 MG/DL
CHLORIDE SERPL-SCNC: 106 MMOL/L
CO2 SERPL-SCNC: 23 MMOL/L
CREAT SERPL-MCNC: 0.96 MG/DL
EOSINOPHIL # BLD AUTO: 0.18 K/UL
EOSINOPHIL NFR BLD AUTO: 3.9 %
GLUCOSE SERPL-MCNC: 112 MG/DL
HCT VFR BLD CALC: 32.4 %
HGB BLD-MCNC: 10.1 G/DL
IMM GRANULOCYTES NFR BLD AUTO: 0.2 %
LYMPHOCYTES # BLD AUTO: 1.23 K/UL
LYMPHOCYTES NFR BLD AUTO: 26.7 %
MAN DIFF?: NORMAL
MCHC RBC-ENTMCNC: 27 PG
MCHC RBC-ENTMCNC: 31.2 GM/DL
MCV RBC AUTO: 86.6 FL
MONOCYTES # BLD AUTO: 0.45 K/UL
MONOCYTES NFR BLD AUTO: 9.8 %
NEUTROPHILS # BLD AUTO: 2.71 K/UL
NEUTROPHILS NFR BLD AUTO: 59 %
PLATELET # BLD AUTO: 514 K/UL
POTASSIUM SERPL-SCNC: 4.4 MMOL/L
PROT SERPL-MCNC: 7.1 G/DL
RBC # BLD: 3.74 M/UL
RBC # FLD: 16 %
SODIUM SERPL-SCNC: 143 MMOL/L
WBC # FLD AUTO: 4.6 K/UL

## 2020-07-31 ENCOUNTER — APPOINTMENT (OUTPATIENT)
Dept: FAMILY MEDICINE | Facility: CLINIC | Age: 70
End: 2020-07-31
Payer: MEDICARE

## 2020-07-31 DIAGNOSIS — R71.0 PRECIPITOUS DROP IN HEMATOCRIT: ICD-10-CM

## 2020-07-31 PROCEDURE — 36415 COLL VENOUS BLD VENIPUNCTURE: CPT

## 2020-08-05 ENCOUNTER — TRANSCRIPTION ENCOUNTER (OUTPATIENT)
Age: 70
End: 2020-08-05

## 2020-08-05 LAB
BASOPHILS # BLD AUTO: 0.03 K/UL
BASOPHILS NFR BLD AUTO: 0.5 %
EOSINOPHIL # BLD AUTO: 0.25 K/UL
EOSINOPHIL NFR BLD AUTO: 4.5 %
HCT VFR BLD CALC: 31.5 %
HGB BLD-MCNC: 9.4 G/DL
IMM GRANULOCYTES NFR BLD AUTO: 0.2 %
LYMPHOCYTES # BLD AUTO: 1.29 K/UL
LYMPHOCYTES NFR BLD AUTO: 23.3 %
MAN DIFF?: NORMAL
MCHC RBC-ENTMCNC: 27 PG
MCHC RBC-ENTMCNC: 29.8 GM/DL
MCV RBC AUTO: 90.5 FL
MONOCYTES # BLD AUTO: 0.63 K/UL
MONOCYTES NFR BLD AUTO: 11.4 %
NEUTROPHILS # BLD AUTO: 3.33 K/UL
NEUTROPHILS NFR BLD AUTO: 60.1 %
PLATELET # BLD AUTO: 417 K/UL
RBC # BLD: 3.48 M/UL
RBC # FLD: 17 %
WBC # FLD AUTO: 5.54 K/UL

## 2020-08-07 ENCOUNTER — APPOINTMENT (OUTPATIENT)
Dept: FAMILY MEDICINE | Facility: CLINIC | Age: 70
End: 2020-08-07
Payer: MEDICARE

## 2020-08-07 PROCEDURE — 36415 COLL VENOUS BLD VENIPUNCTURE: CPT

## 2020-08-10 ENCOUNTER — APPOINTMENT (OUTPATIENT)
Dept: FAMILY MEDICINE | Facility: CLINIC | Age: 70
End: 2020-08-10
Payer: MEDICARE

## 2020-08-10 VITALS
TEMPERATURE: 97.6 F | HEIGHT: 68 IN | SYSTOLIC BLOOD PRESSURE: 120 MMHG | HEART RATE: 83 BPM | WEIGHT: 172 LBS | OXYGEN SATURATION: 98 % | BODY MASS INDEX: 26.07 KG/M2 | DIASTOLIC BLOOD PRESSURE: 82 MMHG

## 2020-08-10 DIAGNOSIS — H61.23 IMPACTED CERUMEN, BILATERAL: ICD-10-CM

## 2020-08-10 DIAGNOSIS — H91.93 UNSPECIFIED HEARING LOSS, BILATERAL: ICD-10-CM

## 2020-08-10 LAB
BASOPHILS # BLD AUTO: 0.03 K/UL
BASOPHILS NFR BLD AUTO: 0.5 %
EOSINOPHIL # BLD AUTO: 0.13 K/UL
EOSINOPHIL NFR BLD AUTO: 2.1 %
HCT VFR BLD CALC: 28.9 %
HGB BLD-MCNC: 8.8 G/DL
IMM GRANULOCYTES NFR BLD AUTO: 0.2 %
LYMPHOCYTES # BLD AUTO: 1.42 K/UL
LYMPHOCYTES NFR BLD AUTO: 22.9 %
MAN DIFF?: NORMAL
MCHC RBC-ENTMCNC: 26.9 PG
MCHC RBC-ENTMCNC: 30.4 GM/DL
MCV RBC AUTO: 88.4 FL
MONOCYTES # BLD AUTO: 0.54 K/UL
MONOCYTES NFR BLD AUTO: 8.7 %
NEUTROPHILS # BLD AUTO: 4.08 K/UL
NEUTROPHILS NFR BLD AUTO: 65.6 %
PLATELET # BLD AUTO: 452 K/UL
RBC # BLD: 3.27 M/UL
RBC # FLD: 16.7 %
WBC # FLD AUTO: 6.21 K/UL

## 2020-08-10 PROCEDURE — 36415 COLL VENOUS BLD VENIPUNCTURE: CPT

## 2020-08-10 PROCEDURE — 99214 OFFICE O/P EST MOD 30 MIN: CPT | Mod: 25

## 2020-08-10 PROCEDURE — 69210 REMOVE IMPACTED EAR WAX UNI: CPT

## 2020-08-10 NOTE — HISTORY OF PRESENT ILLNESS
[FreeTextEntry8] : 70 yo female presents to office c Hx of chronic ear wax buildup requesting removal/irrigation.  pt also admits to feeling \par cold more easily as well as bruising easily.  Pt c recent H/H check on 8/7/20 showing continual drop.  \par \par Pt denies gross hematuria, hemoptysis, hematemesis, bloody stools, and/or melena.  States stools are dark but NOT black \par Pt denies epistaxis \par \par **pt presents to office using supplemental O2 currently on 2L via NC**

## 2020-08-10 NOTE — PHYSICAL EXAM
[No Acute Distress] : no acute distress [EOMI] : extraocular movements intact [No JVD] : no jugular venous distention [Normal Rate] : normal rate  [Regular Rhythm] : with a regular rhythm [No Edema] : there was no peripheral edema [Normal S1, S2] : normal S1 and S2 [No CVA Tenderness] : no CVA  tenderness [Non-distended] : non-distended [Coordination Grossly Intact] : coordination grossly intact [No Rash] : no rash [No Focal Deficits] : no focal deficits [Normal Gait] : normal gait [Normal Affect] : the affect was normal [Normal Insight/Judgement] : insight and judgment were intact [Normal Mood] : the mood was normal [de-identified] : +ve cerumen impaction B/L  [de-identified] : decreased clear BS B/L

## 2020-08-10 NOTE — ASSESSMENT
[FreeTextEntry1] : B/L ear irrigation and cerumen removal performed \par \par pt scheduled for B/L HAD placement tomorrow 8/11/20  \par \par see lab orders \par Start OTC FeSO4 325mg bid c Vit C 1000mg qd effective immediately \par Hematology and GI consults scheduled on pt's behalf at time of visit \par \par pt advised to call 911 if SOB and/or weakness rapidly progresses

## 2020-08-11 LAB
ALBUMIN SERPL ELPH-MCNC: 4 G/DL
ALP BLD-CCNC: 126 U/L
ALT SERPL-CCNC: 12 U/L
ANION GAP SERPL CALC-SCNC: 12 MMOL/L
AST SERPL-CCNC: 24 U/L
BASOPHILS # BLD AUTO: 0.02 K/UL
BASOPHILS NFR BLD AUTO: 0.5 %
BILIRUB SERPL-MCNC: 0.2 MG/DL
BUN SERPL-MCNC: 14 MG/DL
CALCIUM SERPL-MCNC: 9.3 MG/DL
CHLORIDE SERPL-SCNC: 108 MMOL/L
CO2 SERPL-SCNC: 22 MMOL/L
CREAT SERPL-MCNC: 0.96 MG/DL
EOSINOPHIL # BLD AUTO: 0.15 K/UL
EOSINOPHIL NFR BLD AUTO: 3.8 %
FERRITIN SERPL-MCNC: 21 NG/ML
FOLATE SERPL-MCNC: >20 NG/ML
GLUCOSE SERPL-MCNC: 123 MG/DL
HCT VFR BLD CALC: 28.4 %
HGB BLD-MCNC: 8.6 G/DL
IMM GRANULOCYTES NFR BLD AUTO: 0.3 %
IRON SATN MFR SERPL: 14 %
IRON SERPL-MCNC: 42 UG/DL
LYMPHOCYTES # BLD AUTO: 0.75 K/UL
LYMPHOCYTES NFR BLD AUTO: 19 %
MAN DIFF?: NORMAL
MCHC RBC-ENTMCNC: 26.6 PG
MCHC RBC-ENTMCNC: 30.3 GM/DL
MCV RBC AUTO: 87.9 FL
MONOCYTES # BLD AUTO: 0.49 K/UL
MONOCYTES NFR BLD AUTO: 12.4 %
NEUTROPHILS # BLD AUTO: 2.52 K/UL
NEUTROPHILS NFR BLD AUTO: 64 %
PLATELET # BLD AUTO: 400 K/UL
POTASSIUM SERPL-SCNC: 4.3 MMOL/L
PROT SERPL-MCNC: 7 G/DL
RBC # BLD: 3.23 M/UL
RBC # FLD: 16.4 %
SODIUM SERPL-SCNC: 141 MMOL/L
TIBC SERPL-MCNC: 306 UG/DL
TRANSFERRIN SERPL-MCNC: 276 MG/DL
UIBC SERPL-MCNC: 264 UG/DL
VIT B12 SERPL-MCNC: 357 PG/ML
WBC # FLD AUTO: 3.94 K/UL

## 2020-08-12 DIAGNOSIS — Z98.890 OTHER SPECIFIED POSTPROCEDURAL STATES: ICD-10-CM

## 2020-08-17 ENCOUNTER — APPOINTMENT (OUTPATIENT)
Dept: GASTROENTEROLOGY | Facility: CLINIC | Age: 70
End: 2020-08-17
Payer: MEDICARE

## 2020-08-17 ENCOUNTER — OUTPATIENT (OUTPATIENT)
Dept: OUTPATIENT SERVICES | Facility: HOSPITAL | Age: 70
LOS: 1 days | Discharge: HOME | End: 2020-08-17

## 2020-08-17 VITALS
SYSTOLIC BLOOD PRESSURE: 136 MMHG | OXYGEN SATURATION: 92 % | DIASTOLIC BLOOD PRESSURE: 82 MMHG | WEIGHT: 168 LBS | HEIGHT: 68 IN | BODY MASS INDEX: 25.46 KG/M2 | TEMPERATURE: 96.8 F | HEART RATE: 87 BPM | RESPIRATION RATE: 14 BRPM

## 2020-08-17 DIAGNOSIS — D64.9 ANEMIA, UNSPECIFIED: ICD-10-CM

## 2020-08-17 PROCEDURE — 99204 OFFICE O/P NEW MOD 45 MIN: CPT

## 2020-08-17 NOTE — PHYSICAL EXAM
[General Appearance - Alert] : alert [General Appearance - In No Acute Distress] : in no acute distress [General Appearance - Well Nourished] : well nourished [General Appearance - Well Developed] : well developed [Sclera] : the sclera and conjunctiva were normal [Outer Ear] : the ears and nose were normal in appearance [Neck Appearance] : the appearance of the neck was normal [] : no respiratory distress [Respiration, Rhythm And Depth] : normal respiratory rhythm and effort [Exaggerated Use Of Accessory Muscles For Inspiration] : no accessory muscle use [Auscultation Breath Sounds / Voice Sounds] : lungs were clear to auscultation bilaterally [Heart Rate And Rhythm] : heart rate was normal and rhythm regular [Apical Impulse] : the apical impulse was normal [Heart Sounds] : normal S1 and S2 [Bowel Sounds] : normal bowel sounds [Abdomen Soft] : soft [Abdomen Tenderness] : non-tender [Normal Sphincter Tone] : normal sphincter tone [No Rectal Mass] : no rectal mass [Femoral Lymph Nodes Enlarged Bilaterally] : femoral [Skin Color & Pigmentation] : normal skin color and pigmentation [Abnormal Walk] : normal gait [Oriented To Time, Place, And Person] : oriented to person, place, and time [Affect] : the affect was normal [Impaired Insight] : insight and judgment were intact [Internal Hemorrhoid] : no internal hemorrhoids [FreeTextEntry1] : p [External Hemorrhoid] : no external hemorrhoids [Occult Blood Positive] : stool was negative for occult blood

## 2020-08-17 NOTE — HISTORY OF PRESENT ILLNESS
[de-identified] : Patient is here for evaluation of anemia. The patient has an extensive history of oxygen-dependent COPD since May. He is currently on 3 L. She has a history of hypertension, renal artery stenosis, rheumatoid arthritis, pacemaker, pericarditis, and TIA. She has required a segmental stent and states she is on high-dose Plavix at 150 mg. She has a remote history of heartburn which is controlled on Pepcid.\par       The patient was recently admitted to [facility] from June 21 of July 19. She states she presented with diarrhea vomiting abdominal pain. She was found to have a small bowel obstruction secondary to adhesions from a colectomy hysterectomy. Her history it sounds that she needed a laparoscopic lysis of adhesions. Unknown if there is a small bowel resection. Prior to her small bowel obstruction she was getting heartburn and regurgitation 3 years. Symptoms were mild to moderate. Symptoms occurred intermittently. He was eating. Controlled with Pepcid. She has not had any heartburn or regurgitation since his recent surgery. The patient states she has not had a colonoscopy in many years. She has no constipation diarrhea. No melena no rectal bleeding. Stools have been thoracocentesis started iron pills for anemia. She has no weight loss or change in bowel habits. She is has a long-standing history of intermittent anemia as a child. States that she needed injections as a child for her anemia.\par     Most recently the patient had a hemoglobin of 10.1 on July 21. On July 10 she had a hemoglobin of 8.6. MCV is normal. Iron studies are normal.No family history of colon cancer colon polyps.

## 2020-08-17 NOTE — ASSESSMENT
[FreeTextEntry1] : A/P\par New onset anemia - recent SIA in july 2020\par midl GERD, no lower GI symptoms\par COPD- now on O2 since may\par TIA, PPM, cerebral stent - on plavix\par  OP report, pathology D/C summary and CT report requested from [facility]\par pepcid changed to prilosec 20 mg qd\par Needs EGD / Colon at [facility]\par needs clearance from pulmonary, cardiology and neurology. Need to see if plavix  can be stopped 5 days befoe procedures.  ( DR quinteros cardiology, Dr loya pulmonary, Dr magana neruology)\par \par  I discussed the risks and benefits of colonoscopy and patient was given opportunity to ask questions. Colonoscopy to r/o colon cancer, polyps, AVM's. Patient is medically optimized for the procedure\par  I discussed the risks and benefits of EGD and patient was given opportunity to ask questions. EGD to r/o Chacko's  esophagus, PUD, mass, AVM'S. Pt is medically optimized for EGD\par

## 2020-08-19 ENCOUNTER — OUTPATIENT (OUTPATIENT)
Dept: OUTPATIENT SERVICES | Facility: HOSPITAL | Age: 70
LOS: 1 days | End: 2020-08-19

## 2020-08-19 ENCOUNTER — APPOINTMENT (OUTPATIENT)
Dept: CT IMAGING | Facility: CLINIC | Age: 70
End: 2020-08-19

## 2020-08-19 DIAGNOSIS — D64.9 ANEMIA, UNSPECIFIED: ICD-10-CM

## 2020-08-19 DIAGNOSIS — K56.609 UNSPECIFIED INTESTINAL OBSTRUCTION, UNSPECIFIED AS TO PARTIAL VERSUS COMPLETE OBSTRUCTION: ICD-10-CM

## 2020-08-19 DIAGNOSIS — Z98.890 OTHER SPECIFIED POSTPROCEDURAL STATES: ICD-10-CM

## 2020-08-20 ENCOUNTER — APPOINTMENT (OUTPATIENT)
Dept: FAMILY MEDICINE | Facility: CLINIC | Age: 70
End: 2020-08-20
Payer: MEDICARE

## 2020-08-20 VITALS
TEMPERATURE: 98.2 F | SYSTOLIC BLOOD PRESSURE: 128 MMHG | HEART RATE: 73 BPM | BODY MASS INDEX: 25.46 KG/M2 | WEIGHT: 168 LBS | OXYGEN SATURATION: 98 % | HEIGHT: 68 IN | DIASTOLIC BLOOD PRESSURE: 80 MMHG

## 2020-08-20 PROCEDURE — 99214 OFFICE O/P EST MOD 30 MIN: CPT | Mod: 25

## 2020-08-20 PROCEDURE — 36415 COLL VENOUS BLD VENIPUNCTURE: CPT

## 2020-08-20 RX ORDER — PEAK FLOW METER
EACH MISCELLANEOUS
Qty: 1 | Refills: 0 | Status: COMPLETED | COMMUNITY
Start: 2020-05-21

## 2020-08-20 RX ORDER — OFLOXACIN 3 MG/ML
0.3 SOLUTION/ DROPS OPHTHALMIC
Qty: 1 | Refills: 1 | Status: COMPLETED | COMMUNITY
Start: 2019-11-08 | End: 2020-08-20

## 2020-08-20 RX ORDER — IPRATROPIUM BROMIDE AND ALBUTEROL SULFATE 2.5; .5 MG/3ML; MG/3ML
0.5-2.5 (3) SOLUTION RESPIRATORY (INHALATION)
Qty: 360 | Refills: 0 | Status: COMPLETED | COMMUNITY
Start: 2020-05-19

## 2020-08-20 RX ORDER — TOPIRAMATE 50 MG/1
50 TABLET, FILM COATED ORAL
Qty: 60 | Refills: 0 | Status: COMPLETED | COMMUNITY
Start: 2019-12-10

## 2020-08-20 RX ORDER — COLCHICINE 0.6 MG/1
0.6 TABLET, FILM COATED ORAL
Qty: 21 | Refills: 0 | Status: COMPLETED | COMMUNITY
Start: 2020-06-15

## 2020-08-20 RX ORDER — ALBUTEROL SULFATE 2.5 MG/3ML
(2.5 MG/3ML) SOLUTION RESPIRATORY (INHALATION)
Qty: 300 | Refills: 0 | Status: COMPLETED | COMMUNITY
Start: 2020-07-23

## 2020-08-20 NOTE — HISTORY OF PRESENT ILLNESS
[FreeTextEntry1] : follow up [de-identified] : 68 yo female for f/u on anemia.  pt is s/p GI consult c Dr. Tucker,  EGD/colonoscopy to be scheduled pending Neuro/Pulmonary/Cardiac clearance.  Pt scheduled for Haem/Onc eval earlier today, HOWEVER, forgot about appt.

## 2020-08-20 NOTE — PHYSICAL EXAM
[No Acute Distress] : no acute distress [Well Nourished] : well nourished [Well Developed] : well developed [Well-Appearing] : well-appearing [Normal Sclera/Conjunctiva] : normal sclera/conjunctiva [Normal Outer Ear/Nose] : the outer ears and nose were normal in appearance [No JVD] : no jugular venous distention [No Respiratory Distress] : no respiratory distress  [No Accessory Muscle Use] : no accessory muscle use [Clear to Auscultation] : lungs were clear to auscultation bilaterally [Regular Rhythm] : with a regular rhythm [Normal Rate] : normal rate  [Normal S1, S2] : normal S1 and S2 [No Carotid Bruits] : no carotid bruits [No Edema] : there was no peripheral edema [Non-distended] : non-distended [Normal Posterior Cervical Nodes] : no posterior cervical lymphadenopathy [Normal Anterior Cervical Nodes] : no anterior cervical lymphadenopathy [No CVA Tenderness] : no CVA  tenderness [Grossly Normal Strength/Tone] : grossly normal strength/tone [No Rash] : no rash [Coordination Grossly Intact] : coordination grossly intact [No Focal Deficits] : no focal deficits [Normal Gait] : normal gait [Normal Affect] : the affect was normal [Normal Mood] : the mood was normal [Normal Insight/Judgement] : insight and judgment were intact

## 2020-08-20 NOTE — ASSESSMENT
[FreeTextEntry1] : see lab orders \par \par await specialist(s) clearance for EGD/Colonoscopy \par \par +ve compliance c FeSO4 325mg bid c Vit C 1000mg qd  and increase dietary intake of Fe

## 2020-08-21 ENCOUNTER — APPOINTMENT (OUTPATIENT)
Dept: CT IMAGING | Facility: CLINIC | Age: 70
End: 2020-08-21
Payer: MEDICARE

## 2020-08-21 ENCOUNTER — OUTPATIENT (OUTPATIENT)
Dept: OUTPATIENT SERVICES | Facility: HOSPITAL | Age: 70
LOS: 1 days | End: 2020-08-21

## 2020-08-21 DIAGNOSIS — K56.609 UNSPECIFIED INTESTINAL OBSTRUCTION, UNSPECIFIED AS TO PARTIAL VERSUS COMPLETE OBSTRUCTION: ICD-10-CM

## 2020-08-21 PROCEDURE — 74177 CT ABD & PELVIS W/CONTRAST: CPT | Mod: 26

## 2020-08-25 LAB
BASOPHILS # BLD AUTO: 0.03 K/UL
BASOPHILS NFR BLD AUTO: 0.6 %
EOSINOPHIL # BLD AUTO: 0.24 K/UL
EOSINOPHIL NFR BLD AUTO: 5.1 %
HCT VFR BLD CALC: 27.3 %
HGB BLD-MCNC: 8.1 G/DL
IMM GRANULOCYTES NFR BLD AUTO: 0.2 %
LYMPHOCYTES # BLD AUTO: 1.09 K/UL
LYMPHOCYTES NFR BLD AUTO: 23.1 %
MAN DIFF?: NORMAL
MCHC RBC-ENTMCNC: 27.2 PG
MCHC RBC-ENTMCNC: 29.7 GM/DL
MCV RBC AUTO: 91.6 FL
MONOCYTES # BLD AUTO: 0.73 K/UL
MONOCYTES NFR BLD AUTO: 15.5 %
NEUTROPHILS # BLD AUTO: 2.61 K/UL
NEUTROPHILS NFR BLD AUTO: 55.5 %
PLATELET # BLD AUTO: 414 K/UL
RBC # BLD: 2.98 M/UL
RBC # FLD: 19.1 %
WBC # FLD AUTO: 4.71 K/UL

## 2020-08-26 ENCOUNTER — TRANSCRIPTION ENCOUNTER (OUTPATIENT)
Age: 70
End: 2020-08-26

## 2020-09-15 ENCOUNTER — APPOINTMENT (OUTPATIENT)
Dept: HEMATOLOGY ONCOLOGY | Facility: CLINIC | Age: 70
End: 2020-09-15
Payer: MEDICARE

## 2020-09-15 ENCOUNTER — RESULT REVIEW (OUTPATIENT)
Age: 70
End: 2020-09-15

## 2020-09-15 VITALS
HEART RATE: 82 BPM | OXYGEN SATURATION: 98 % | TEMPERATURE: 96.9 F | WEIGHT: 166.05 LBS | SYSTOLIC BLOOD PRESSURE: 167 MMHG | DIASTOLIC BLOOD PRESSURE: 89 MMHG | HEIGHT: 68 IN | BODY MASS INDEX: 25.17 KG/M2

## 2020-09-15 LAB
BASOPHILS # BLD AUTO: 0 K/UL — SIGNIFICANT CHANGE UP (ref 0–0.2)
BASOPHILS NFR BLD AUTO: 0.7 % — SIGNIFICANT CHANGE UP (ref 0–2)
EOSINOPHIL # BLD AUTO: 0.2 K/UL — SIGNIFICANT CHANGE UP (ref 0–0.5)
EOSINOPHIL NFR BLD AUTO: 4.7 % — SIGNIFICANT CHANGE UP (ref 0–6)
HAPTOGLOB SERPL-MCNC: 109 MG/DL
HCT VFR BLD CALC: 34.2 % — LOW (ref 34.5–45)
HGB BLD-MCNC: 10.1 G/DL — LOW (ref 11.5–15.5)
LYMPHOCYTES # BLD AUTO: 0.7 K/UL — LOW (ref 1–3.3)
LYMPHOCYTES # BLD AUTO: 15.3 % — SIGNIFICANT CHANGE UP (ref 13–44)
MCHC RBC-ENTMCNC: 26.6 PG — LOW (ref 27–34)
MCHC RBC-ENTMCNC: 29.6 G/DL — LOW (ref 32–36)
MCV RBC AUTO: 89.9 FL — SIGNIFICANT CHANGE UP (ref 80–100)
MONOCYTES # BLD AUTO: 0.4 K/UL — SIGNIFICANT CHANGE UP (ref 0–0.9)
MONOCYTES NFR BLD AUTO: 8.3 % — SIGNIFICANT CHANGE UP (ref 2–14)
NEUTROPHILS # BLD AUTO: 3.1 K/UL — SIGNIFICANT CHANGE UP (ref 1.8–7.4)
NEUTROPHILS NFR BLD AUTO: 70.9 % — SIGNIFICANT CHANGE UP (ref 43–77)
PLATELET # BLD AUTO: 414 K/UL — HIGH (ref 150–400)
RBC # BLD: 3.66 M/UL
RBC # BLD: 3.81 M/UL — SIGNIFICANT CHANGE UP (ref 3.8–5.2)
RBC # FLD: 16 % — HIGH (ref 10.3–14.5)
RETICS # AUTO: 2.2 %
RETICS AGGREG/RBC NFR: 82 K/UL
WBC # BLD: 4.4 K/UL — SIGNIFICANT CHANGE UP (ref 3.8–10.5)
WBC # FLD AUTO: 4.4 K/UL — SIGNIFICANT CHANGE UP (ref 3.8–10.5)

## 2020-09-15 PROCEDURE — 99203 OFFICE O/P NEW LOW 30 MIN: CPT

## 2020-09-15 NOTE — REVIEW OF SYSTEMS
[Fever] : no fever [Fatigue] : fatigue [Shortness Of Breath] : shortness of breath [Night Sweats] : no night sweats [Joint Pain] : joint pain [FreeTextEntry7] : GERD [Negative] : Heme/Lymph [FreeTextEntry6] : COPD, with portable nasal O2 [FreeTextEntry9] : Rheumatoid arthritis affects mainly the low back

## 2020-09-15 NOTE — HISTORY OF PRESENT ILLNESS
[de-identified] : This 70-year-old black woman is referred for anemia.  On August 20 her hemoglobin was 8.1, hematocrit 27.3, MCV 91.  White count was 4.7 and platelets 414,000\par Serum ferritin was 21 serum iron 42 and total iron-binding capacity 306.\par She has been seen by gastroenterology.  A long hospitalization at [facility] this summer for small bowel obstruction secondary to adhesions from a prior colectomy and hysterectomy included laparoscopic lysis of adhesions.\par She has been on oral iron, tolerating it well, and in our office today the hemoglobin was back to 10.1, hematocrit 34.2.\par There is no history of GI bleeding.\par The past history is pertinent for rheumatoid arthritis and medications include methotrexate 15 mg weekly.

## 2020-09-15 NOTE — ASSESSMENT
[FreeTextEntry1] : Normochromic normocytic anemia, with a normal reticulocyte count and MCV.\par Etiology here appears to be multifactorial, with anemia of chronic disease secondary to rheumatoid arthritis, and a recent response to oral iron consistent with iron deficiency.\par Suggest continuing oral iron.\par No additional hematologic work-up or therapy required\par Available PRN

## 2020-10-09 ENCOUNTER — APPOINTMENT (OUTPATIENT)
Dept: RHEUMATOLOGY | Facility: CLINIC | Age: 70
End: 2020-10-09
Payer: MEDICARE

## 2020-10-09 VITALS
OXYGEN SATURATION: 99 % | WEIGHT: 170 LBS | TEMPERATURE: 97.9 F | HEART RATE: 84 BPM | BODY MASS INDEX: 25.85 KG/M2 | DIASTOLIC BLOOD PRESSURE: 74 MMHG | SYSTOLIC BLOOD PRESSURE: 122 MMHG

## 2020-10-09 PROCEDURE — 99214 OFFICE O/P EST MOD 30 MIN: CPT

## 2020-10-09 NOTE — REVIEW OF SYSTEMS
[Fever] : no fever [Chills] : no chills [Feeling Poorly] : not feeling poorly [Feeling Tired] : not feeling tired [Dry Eyes] : no dryness of the eyes [Chest Pain] : no chest pain [Palpitations] : no palpitations [Shortness Of Breath] : no shortness of breath [Cough] : no cough [SOB on Exertion] : no shortness of breath during exertion [Abdominal Pain] : no abdominal pain [Arthralgias] : arthralgias [Joint Pain] : joint pain [Joint Swelling] : no joint swelling [Joint Stiffness] : no joint stiffness [Skin Lesions] : no skin lesions [Difficulty Walking] : no difficulty walking [Anxiety] : no anxiety [Depression] : no depression [Negative] : Heme/Lymph

## 2020-10-09 NOTE — ASSESSMENT
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 \par \par rheumatoid arthritis-\par \par She was on combination of humira and MTX but was taken off the humira after her stroke. Concern was raised at that time that her neurological symptoms may have been exacerbated by humira. She did have a couple of TIAs several years ago but was APL negative. \par . She is currently on methotrexate 15 mg weekly monotherapy. \par - recent labs are wnl\par \par \par COPD-\par -She no longer smokes for many years.\par - under care of pul\par \par Methotrexate use-\par -she is aware to hold medication while on antibiotics.\par To have BMD\par \par She is aware to call if her sx worsen. followup 6 months.

## 2020-10-09 NOTE — HISTORY OF PRESENT ILLNESS
[FreeTextEntry1] : followup for rheumatoid arthritis. Precious returns after 1 year  today.  She is currently using methotrexate at 15 mg once a week. \par \par She is currently on  methotrexate monotherapy at 15 mg. She is doing well. She did get a CT chest  that showed emphysema unchanged from several years ago. It was noted that she had thyroid nodules, she sees endo. \par She did have SBO secondary to adhesions states she was at [facility] and r/o for malignancy. She has seen oncology as well. \par She rates her joint pain at a 3/10 except her lower back which is a 10/10\par . She denies any recent infections. She saw her primary care physician. She denies fevers or chills or night sweats. She has a good appetite.\par She is otherwise up-to-date on her age-appropriate screenings. She denies fatigue.

## 2020-10-09 NOTE — QUALITY MEASURES
[TB screening was performed 12] : patient underwent TB screening that was performed 12 months prior to receiving their first course of biologic DMARD

## 2020-10-09 NOTE — PHYSICAL EXAM
[General Appearance - Well Nourished] : well nourished [General Appearance - Well Developed] : well developed [Sclera] : the sclera and conjunctiva were normal [Oropharynx] : the oropharynx was normal [Neck Appearance] : the appearance of the neck was normal [Respiration, Rhythm And Depth] : normal respiratory rhythm and effort [Auscultation Breath Sounds / Voice Sounds] : lungs were clear to auscultation bilaterally [Heart Sounds] : normal S1 and S2 [Full Pulse] : the pedal pulses are present [Edema] : there was no peripheral edema [Abdomen Tenderness] : non-tender [Cervical Lymph Nodes Enlarged Anterior Bilaterally] : anterior cervical [Supraclavicular Lymph Nodes Enlarged Bilaterally] : supraclavicular [No Spinal Tenderness] : no spinal tenderness [Abnormal Walk] : normal gait [Musculoskeletal - Swelling] : no joint swelling seen [Motor Tone] : muscle strength and tone were normal [FreeTextEntry1] : FROM neck, shoulders, elbows, wrists, hands, hips, knees, ankles and feet, including the small joints of the hands and feet without any evidence of inflammatory arthritis , No inflammatory arthritis or synovitis noted. No tender points noted. tightness hamstrings b/l [] : no rash [Deep Tendon Reflexes (DTR)] : deep tendon reflexes were 2+ and symmetric [Motor Exam] : the motor exam was normal [Oriented To Time, Place, And Person] : oriented to person, place, and time

## 2020-10-15 ENCOUNTER — TRANSCRIPTION ENCOUNTER (OUTPATIENT)
Age: 70
End: 2020-10-15

## 2020-10-15 DIAGNOSIS — Z12.39 ENCOUNTER FOR OTHER SCREENING FOR MALIGNANT NEOPLASM OF BREAST: ICD-10-CM

## 2020-10-20 ENCOUNTER — INPATIENT (INPATIENT)
Facility: HOSPITAL | Age: 70
LOS: 3 days | Discharge: HOME | DRG: 378 | End: 2020-10-24
Attending: HOSPITALIST | Admitting: INTERNAL MEDICINE
Payer: MEDICARE

## 2020-10-20 VITALS
TEMPERATURE: 98 F | HEIGHT: 68 IN | HEART RATE: 100 BPM | WEIGHT: 164.91 LBS | OXYGEN SATURATION: 100 % | SYSTOLIC BLOOD PRESSURE: 132 MMHG | RESPIRATION RATE: 18 BRPM | DIASTOLIC BLOOD PRESSURE: 74 MMHG

## 2020-10-20 DIAGNOSIS — Z90.710 ACQUIRED ABSENCE OF BOTH CERVIX AND UTERUS: Chronic | ICD-10-CM

## 2020-10-20 DIAGNOSIS — K92.1 MELENA: ICD-10-CM

## 2020-10-20 DIAGNOSIS — Z87.09 PERSONAL HISTORY OF OTHER DISEASES OF THE RESPIRATORY SYSTEM: ICD-10-CM

## 2020-10-20 DIAGNOSIS — Z86.79 PERSONAL HISTORY OF OTHER DISEASES OF THE CIRCULATORY SYSTEM: ICD-10-CM

## 2020-10-20 DIAGNOSIS — K62.5 HEMORRHAGE OF ANUS AND RECTUM: ICD-10-CM

## 2020-10-20 DIAGNOSIS — I10 ESSENTIAL (PRIMARY) HYPERTENSION: ICD-10-CM

## 2020-10-20 DIAGNOSIS — Z90.49 ACQUIRED ABSENCE OF OTHER SPECIFIED PARTS OF DIGESTIVE TRACT: Chronic | ICD-10-CM

## 2020-10-20 DIAGNOSIS — Z98.890 OTHER SPECIFIED POSTPROCEDURAL STATES: Chronic | ICD-10-CM

## 2020-10-20 DIAGNOSIS — Z95.0 PRESENCE OF CARDIAC PACEMAKER: ICD-10-CM

## 2020-10-20 LAB
ABO RH CONFIRMATION: SIGNIFICANT CHANGE UP
ALBUMIN SERPL ELPH-MCNC: 3.4 G/DL — SIGNIFICANT CHANGE UP (ref 3.3–5.2)
ALP SERPL-CCNC: 104 U/L — SIGNIFICANT CHANGE UP (ref 40–120)
ALT FLD-CCNC: 14 U/L — SIGNIFICANT CHANGE UP
ANION GAP SERPL CALC-SCNC: 10 MMOL/L — SIGNIFICANT CHANGE UP (ref 5–17)
ANISOCYTOSIS BLD QL: SLIGHT — SIGNIFICANT CHANGE UP
APTT BLD: 22.9 SEC — LOW (ref 27.5–35.5)
AST SERPL-CCNC: 18 U/L — SIGNIFICANT CHANGE UP
BASOPHILS # BLD AUTO: 0.02 K/UL — SIGNIFICANT CHANGE UP (ref 0–0.2)
BASOPHILS NFR BLD AUTO: 0.4 % — SIGNIFICANT CHANGE UP (ref 0–2)
BILIRUB SERPL-MCNC: 0.3 MG/DL — LOW (ref 0.4–2)
BLD GP AB SCN SERPL QL: SIGNIFICANT CHANGE UP
BUN SERPL-MCNC: 19 MG/DL — SIGNIFICANT CHANGE UP (ref 8–20)
CALCIUM SERPL-MCNC: 8.5 MG/DL — LOW (ref 8.6–10.2)
CHLORIDE SERPL-SCNC: 108 MMOL/L — HIGH (ref 98–107)
CO2 SERPL-SCNC: 23 MMOL/L — SIGNIFICANT CHANGE UP (ref 22–29)
CREAT SERPL-MCNC: 0.85 MG/DL — SIGNIFICANT CHANGE UP (ref 0.5–1.3)
ELLIPTOCYTES BLD QL SMEAR: SLIGHT — SIGNIFICANT CHANGE UP
EOSINOPHIL # BLD AUTO: 0.07 K/UL — SIGNIFICANT CHANGE UP (ref 0–0.5)
EOSINOPHIL NFR BLD AUTO: 1.4 % — SIGNIFICANT CHANGE UP (ref 0–6)
GLUCOSE SERPL-MCNC: 114 MG/DL — HIGH (ref 70–99)
HCT VFR BLD CALC: 21.9 % — LOW (ref 34.5–45)
HGB BLD-MCNC: 6.7 G/DL — CRITICAL LOW (ref 11.5–15.5)
HYPOCHROMIA BLD QL: SLIGHT — SIGNIFICANT CHANGE UP
IMM GRANULOCYTES NFR BLD AUTO: 0.2 % — SIGNIFICANT CHANGE UP (ref 0–1.5)
INR BLD: 1.21 RATIO — HIGH (ref 0.88–1.16)
LACTATE BLDV-MCNC: 1.9 MMOL/L — SIGNIFICANT CHANGE UP (ref 0.5–2)
LYMPHOCYTES # BLD AUTO: 0.91 K/UL — LOW (ref 1–3.3)
LYMPHOCYTES # BLD AUTO: 18.1 % — SIGNIFICANT CHANGE UP (ref 13–44)
MACROCYTES BLD QL: SLIGHT — SIGNIFICANT CHANGE UP
MANUAL SMEAR VERIFICATION: SIGNIFICANT CHANGE UP
MCHC RBC-ENTMCNC: 28.2 PG — SIGNIFICANT CHANGE UP (ref 27–34)
MCHC RBC-ENTMCNC: 30.6 GM/DL — LOW (ref 32–36)
MCV RBC AUTO: 92 FL — SIGNIFICANT CHANGE UP (ref 80–100)
MONOCYTES # BLD AUTO: 0.42 K/UL — SIGNIFICANT CHANGE UP (ref 0–0.9)
MONOCYTES NFR BLD AUTO: 8.4 % — SIGNIFICANT CHANGE UP (ref 2–14)
NEUTROPHILS # BLD AUTO: 3.59 K/UL — SIGNIFICANT CHANGE UP (ref 1.8–7.4)
NEUTROPHILS NFR BLD AUTO: 71.5 % — SIGNIFICANT CHANGE UP (ref 43–77)
OVALOCYTES BLD QL SMEAR: SLIGHT — SIGNIFICANT CHANGE UP
PLAT MORPH BLD: NORMAL — SIGNIFICANT CHANGE UP
PLATELET # BLD AUTO: 316 K/UL — SIGNIFICANT CHANGE UP (ref 150–400)
POIKILOCYTOSIS BLD QL AUTO: SLIGHT — SIGNIFICANT CHANGE UP
POLYCHROMASIA BLD QL SMEAR: SLIGHT — SIGNIFICANT CHANGE UP
POTASSIUM SERPL-MCNC: 4.5 MMOL/L — SIGNIFICANT CHANGE UP (ref 3.5–5.3)
POTASSIUM SERPL-SCNC: 4.5 MMOL/L — SIGNIFICANT CHANGE UP (ref 3.5–5.3)
PROT SERPL-MCNC: 5.9 G/DL — LOW (ref 6.6–8.7)
PROTHROM AB SERPL-ACNC: 13.9 SEC — HIGH (ref 10.6–13.6)
RBC # BLD: 2.38 M/UL — LOW (ref 3.8–5.2)
RBC # FLD: 15.5 % — HIGH (ref 10.3–14.5)
RBC BLD AUTO: ABNORMAL
SCHISTOCYTES BLD QL AUTO: SLIGHT — SIGNIFICANT CHANGE UP
SODIUM SERPL-SCNC: 140 MMOL/L — SIGNIFICANT CHANGE UP (ref 135–145)
TROPONIN T SERPL-MCNC: <0.01 NG/ML — SIGNIFICANT CHANGE UP (ref 0–0.06)
WBC # BLD: 5.02 K/UL — SIGNIFICANT CHANGE UP (ref 3.8–10.5)
WBC # FLD AUTO: 5.02 K/UL — SIGNIFICANT CHANGE UP (ref 3.8–10.5)

## 2020-10-20 PROCEDURE — 99223 1ST HOSP IP/OBS HIGH 75: CPT

## 2020-10-20 PROCEDURE — 74174 CTA ABD&PLVS W/CONTRAST: CPT | Mod: 26

## 2020-10-20 PROCEDURE — 71045 X-RAY EXAM CHEST 1 VIEW: CPT | Mod: 26

## 2020-10-20 PROCEDURE — 99285 EMERGENCY DEPT VISIT HI MDM: CPT

## 2020-10-20 RX ORDER — ONDANSETRON 8 MG/1
4 TABLET, FILM COATED ORAL ONCE
Refills: 0 | Status: COMPLETED | OUTPATIENT
Start: 2020-10-20 | End: 2020-10-20

## 2020-10-20 RX ORDER — ONDANSETRON 8 MG/1
4 TABLET, FILM COATED ORAL EVERY 6 HOURS
Refills: 0 | Status: DISCONTINUED | OUTPATIENT
Start: 2020-10-20 | End: 2020-10-24

## 2020-10-20 RX ORDER — PANTOPRAZOLE SODIUM 20 MG/1
40 TABLET, DELAYED RELEASE ORAL
Refills: 0 | Status: DISCONTINUED | OUTPATIENT
Start: 2020-10-20 | End: 2020-10-23

## 2020-10-20 RX ORDER — CARVEDILOL PHOSPHATE 80 MG/1
12.5 CAPSULE, EXTENDED RELEASE ORAL EVERY 12 HOURS
Refills: 0 | Status: DISCONTINUED | OUTPATIENT
Start: 2020-10-20 | End: 2020-10-24

## 2020-10-20 RX ORDER — SODIUM CHLORIDE 9 MG/ML
1000 INJECTION INTRAMUSCULAR; INTRAVENOUS; SUBCUTANEOUS
Refills: 0 | Status: COMPLETED | OUTPATIENT
Start: 2020-10-20 | End: 2020-10-21

## 2020-10-20 RX ORDER — ALBUTEROL 90 UG/1
2 AEROSOL, METERED ORAL EVERY 6 HOURS
Refills: 0 | Status: DISCONTINUED | OUTPATIENT
Start: 2020-10-20 | End: 2020-10-24

## 2020-10-20 RX ORDER — SODIUM CHLORIDE 9 MG/ML
500 INJECTION, SOLUTION INTRAVENOUS ONCE
Refills: 0 | Status: COMPLETED | OUTPATIENT
Start: 2020-10-20 | End: 2020-10-20

## 2020-10-20 RX ADMIN — SODIUM CHLORIDE 500 MILLILITER(S): 9 INJECTION, SOLUTION INTRAVENOUS at 15:38

## 2020-10-20 RX ADMIN — PANTOPRAZOLE SODIUM 40 MILLIGRAM(S): 20 TABLET, DELAYED RELEASE ORAL at 17:08

## 2020-10-20 RX ADMIN — ONDANSETRON 4 MILLIGRAM(S): 8 TABLET, FILM COATED ORAL at 14:38

## 2020-10-20 RX ADMIN — SODIUM CHLORIDE 500 MILLILITER(S): 9 INJECTION, SOLUTION INTRAVENOUS at 14:38

## 2020-10-20 NOTE — ED ADULT NURSE REASSESSMENT NOTE - NS ED NURSE REASSESS COMMENT FT1
GI consult at bedside at this time. pt discussing plan of care. all questions and concerns addressed at this time.

## 2020-10-20 NOTE — H&P ADULT - NSHPPHYSICALEXAM_GEN_ALL_CORE
General: Well developed female lying in bed not in distress.   HEENT: AT, NC. PERRL. intact EOM. no throat erythema or exudate.   Neck: supple. no JVD.   Chest: CTA bilaterally, no w /r/r.   Heart: S1,S2. RRR. no heart murmur. no edema.   Abdomen: soft. non-tender. non-distended. + BS.   rectal : deferred by pt, had one done by er physician, melena reported , pt. has reported BRB with clots.   Ext: no calf tenderness, ROM of all ext. intact. distal pulses intact.   Neuro: AAO x3. no focal weakness. no speech disorder. Cns ii to xii intact. motor 5/5, sensory intact, reflexes 2 +.   Skin: no rash noted, facial skin pallor noted, no diaphoresis.   Psych : normal affect, no si/hi.

## 2020-10-20 NOTE — H&P ADULT - ASSESSMENT
In summary pt. is a 71 y/o female with h/o HTN, Renal artery stenosis, TIA's and Placement of a left cerebral artery stent in 2014 ( done in [place] ) and on chronic Aspirin / Plavix ( as per pt. she is on 150 mg of plavix ) since then,  COPD on chronic home O2.  RA and Osteopenia, HLD and S/p Cardiac arrest and placement of PPM about 2016, No h/o cardiac stents came to the ER  as she developed sudden onset of foul smelling diarrhea associated with  painless rectal  bleed, BRB with clots, noted to be anemic Hb 6.7 will be admitted for blood transfusion , GI evaluated pt., recommending 2 units of prbc, will need cardio, pulmonology and neurology clearance before colonoscopy/ EGD.

## 2020-10-20 NOTE — H&P ADULT - NSICDXPASTMEDICALHX_GEN_ALL_CORE_FT
PAST MEDICAL HISTORY:  COPD without exacerbation     H/O cerebral artery stenosis left cerebral artery stent per history in 2014.    HTN (hypertension)     Pacemaker     Rheumatoid arthritis

## 2020-10-20 NOTE — ED ADULT NURSE REASSESSMENT NOTE - NS ED NURSE REASSESS COMMENT FT1
assumed care of pt @ 1930, report received from Yadira TAVERAS. pt AOx4 in no acute distress. pt receiving PRBC transfusion, consent scanned in chart. pt denies any s/sx of blood transfusion reaction at this time, Vital Signs Stable. respirations even and unlabored, O2 sat WNL on 3L NC. pt denies any chest discomfort. pt having frequent dark stools x2 days. pt denies any abd pain, abd soft and nondistended. denies nausea/vomiting. skin appears pale. admission dispo received. all questions and concerns addressed. safety maintained.

## 2020-10-20 NOTE — H&P ADULT - PROBLEM SELECTOR PLAN 1
Possible diverticular bleed, ct angio abdomen with no active Gi bleed or bowl inflammation.   hold asa / plavix for now, GI recommending same.   blood loss anemia, will give 2 units of prbc and follow repeat cbc.  stool studies and c-diff for reported diarrhea.

## 2020-10-20 NOTE — ED ADULT NURSE NOTE - NSIMPLEMENTINTERV_GEN_ALL_ED
Implemented All Fall Risk Interventions:  [place] to call system. Call bell, personal items and telephone within reach. Instruct patient to call for assistance. Room bathroom lighting operational. Non-slip footwear when patient is off stretcher. Physically safe environment: no spills, clutter or unnecessary equipment. Stretcher in lowest position, wheels locked, appropriate side rails in place. Provide visual cue, wrist band, yellow gown, etc. Monitor gait and stability. Monitor for mental status changes and reorient to person, place, and time. Review medications for side effects contributing to fall risk. Reinforce activity limits and safety measures with patient and family.

## 2020-10-20 NOTE — CONSULT NOTE ADULT - ASSESSMENT
Diarrhea followed by profuse rectal bleeding.  No colitis on CT Scan.  No active bleeding on CTA.  Possible diverticular bleed.  Unlikely hemorrhoidal or neoplasia.    Symptomatic anemia.  Consider transfusion of 2 units PRBC's.  Monitor Hgb.  Transfuse to maintain Hgb>8.    Hold Aspirin/ Plavix for now.  Will need Cardiac, Pulmonary and neurology consults for clearance prior to Colonoscopy/ EGD.  OK for CLD.  Last doses of aspirin / Plavix were yesterday 10/19/20.    Send stool for C Diff and C/s.  Colonoscopy,, procedure, risks and benefits were reviewed with pt who understands and agrees to proceed.    Clear liquid diet for now.

## 2020-10-20 NOTE — ED ADULT NURSE NOTE - ED STAT RN HANDOFF DETAILS
pt handed off to DARCY Ritchie in stable condition. Pt oriented to unit, plan of care explained. RN reeducated pt on call bell system. no apparent distress noted at this time.

## 2020-10-20 NOTE — ED PROVIDER NOTE - OBJECTIVE STATEMENT
70 year old female with PMH intracranial stent on plavix, iron def anemia presents with bloody diarrhea. Pt reports tat her Sx strated 5 days ago with diarrhea. She states that over the past 2 days the diarrhea has become frankly bloody. She denies abd pain, rectal pain, chest syed, SOB, fever, chills. However, she states that she is now fatigued, mildly SOB on exertion, and lightheaded upon standing. She denies feeling off balance, slurred speech, facial droop, blurry vision.

## 2020-10-20 NOTE — ED ADULT TRIAGE NOTE - CHIEF COMPLAINT QUOTE
pt BIBA for reports of rectal bleeding x few days, pt reports BRBPR. no abd pain no distress, intermittent dizziness that started today. pt on Plavix.

## 2020-10-20 NOTE — H&P ADULT - NSICDXPASTSURGICALHX_GEN_ALL_CORE_FT
PAST SURGICAL HISTORY:  H/O exploratory laparotomy     S/P cholecystectomy     S/P hysterectomy

## 2020-10-20 NOTE — ED ADULT NURSE NOTE - OBJECTIVE STATEMENT
assumed pt care at 1355. Pt A&OX 4 states she has been having multiple episodes of BRB stools x 1 week. Pt also c/o feeling weak. Pt denies any chest pain, SOB, N/V, HA, dizziness, blurred vision. numbness/tingling,  symptoms. No s/s of respiratory distress noted- respirations even & unlabored. Safety maintained. Will continue to monitor.

## 2020-10-20 NOTE — H&P ADULT - PROBLEM SELECTOR PLAN 5
s/p pacemaker, no h/o cad or coronary artery stents, follows with dr. Arguelles [facility] cardiology will request for clearance for possible colonoscopy/ EGD.

## 2020-10-20 NOTE — H&P ADULT - HISTORY OF PRESENT ILLNESS
pt. is a 71 y/o female with h/o HTN, Renal artery stenosis, TIA's and Placement of a left cerebral artery stent in 2014 and on chronic Aspirin / Plavix since then,  COPD on chronic home O2.  RA and Osteopenia, HLD and S/p Cardiac arrest and placement of PPM about 2016, No h/o cardiac stents came to the ER for painless rectal  bleed, BRB with clots, several episodes for past 2 days, no n/v. no hematemesis, no hemoptysis no dizziness, no syncopy, no cp, no HA, no sob. no cough, no fever. pt. is a 69 y/o female with h/o HTN, Renal artery stenosis, TIA's and Placement of a left cerebral artery stent in 2014 ( done in [place] ) and on chronic Aspirin / Plavix ( as per pt. she is on 150 mg of plavix ) since then,  COPD on chronic home O2.  RA and Osteopenia, HLD and S/p Cardiac arrest and placement of PPM about 2016, No h/o cardiac stents came to the ER for painless rectal  bleed, BRB with clots, several episodes for past 2 days, no n/v. no hematemesis, no hemoptysis no dizziness, no syncopy, no cp, no HA, no sob. no cough, no fever. no active rectal bleed at the time of admission.   As per pt. she is on methotrexate for her RA by mouth weekly and last dose was 2 days ago. pt. is a 71 y/o female with h/o HTN, Renal artery stenosis, TIA's and Placement of a left cerebral artery stent in 2014 ( done in [place] ) and on chronic Aspirin / Plavix ( as per pt. she is on 150 mg of plavix ) since then,  COPD on chronic home O2.  RA and Osteopenia, HLD and S/p Cardiac arrest and placement of PPM about 2016, No h/o cardiac stents came to the ER  as she developed sudden onset of foul smelling diarrhea associated with  painless rectal  bleed, BRB with clots, several episodes for past 2 days, no n/v. no hematemesis, no hemoptysis no dizziness, no syncopy, no cp, no HA, no sob. no cough, no fever. no active rectal bleed or diarrhea at the time of admission. no recent antibiotic use reported.    As per pt. she is on methotrexate for her RA by mouth weekly and last dose was 2 days ago. pt. is a 71 y/o female with h/o HTN, Renal artery stenosis, TIA's and Placement of a left cerebral artery stent in 2014 ( done in [place] ) and on chronic Aspirin / Plavix ( as per pt. she is on 150 mg of plavix ) since then,  COPD on chronic home O2.  RA and Osteopenia, HLD and S/p Cardiac arrest and placement of PPM about 2016, No h/o cardiac stents came to the ER  as she developed sudden onset of foul smelling diarrhea associated with  painless rectal  bleed, BRB with clots, several episodes for past 2 days, no n/v. no hematemesis, no hemoptysis no dizziness, no syncopy, no cp, no HA, no sob. no cough, no fever. no active rectal bleed or diarrhea at the time of admission. no recent antibiotic use reported, no recent travel, no sick contact.   As per pt. she is on methotrexate for her RA by mouth weekly and last dose was 2 days ago. pt. is a 71 y/o female with h/o HTN, Renal artery stenosis, TIA's and Placement of a left cerebral artery stent in 2014 ( done in [place] ) and on chronic Aspirin / Plavix ( as per pt. she is on 150 mg of plavix ) since then,  COPD on chronic home O2.  RA and Osteopenia, HLD and S/p Cardiac arrest and placement of PPM about 2016, No h/o cardiac stents came to the ER  as she developed sudden onset of foul smelling diarrhea associated with  painless rectal  bleed, BRB with clots, several episodes for past 2 days, no n/v. no hematemesis, no hemoptysis no dizziness, no syncopy, no cp, no HA, no sob. no cough, no fever. no active rectal bleed or diarrhea at the time of admission. no recent antibiotic use reported, no recent travel, no sick contact. never had colonoscopy in the past.   As per pt. she is on methotrexate for her RA by mouth weekly and last dose was 2 days ago.

## 2020-10-20 NOTE — H&P ADULT - PROBLEM SELECTOR PLAN 3
asa/ plavix on hold for now due to rectal bleed and blood loss anemia, will request for neurology clearance [place] for colonoscopy/ EGD as recommended by GI.

## 2020-10-20 NOTE — H&P ADULT - PROBLEM SELECTOR PLAN 4
not in copd exacerbation, o2 support and albuterol mdi when covid-19 negative can be switched to albuterol neb prn. will request for pulmonology clearance.

## 2020-10-20 NOTE — CONSULT NOTE ADULT - SUBJECTIVE AND OBJECTIVE BOX
HPI:  71 yo F with HTN, Renal artery stenosis.  Distant TIA's and Placement of a left cerebral artery stent in 2014 and on chronic Aspirin / Plavix ever since.  Known to have COPD on chronic home O2.  RA and Osteopenia, HLD and S/p Cardiac arrest and placement of PPM about 2016.  No hx of CAD or cardiac stents.  Multiple prior surgeries:  GB, Hyst, Breast biopsies,   Ex Lap for SBO and SIA in 6/30/2020 at [facility].  No p[rior colonoscopy exams.  Neg FH for CRCa.  + FH for thyroid (Father) and prostate ca.    2 day ago developed sudden onset of diarrhea, foul smelling which eventually is accompanied by passage of blood: BRB c Clots several episodes.  Had initially noted vague lower abdominal discomfort.  No real focal pain.  Denies fever, Travel or recent antibiotics.  No Hx of covid.  No DM.  No prior colonoscopy.  Known to have a chronic Hiatus hernia.    On chronic Aspirin, Plavix, Hydralazine, Atorvastatin, Coreg, Omeprazole, Iron, Folate,  Vit C,  MTX,  Coreg; Imdur, Fioricet.  Inhalers.        PAST MEDICAL & SURGICAL HISTORY:  RA Stenosis; HTN; HLD; COPD, TIA,  Cerebral stent.  Diverticulosis.  Multi nodular thyroid. HH.     GB, Hyst, Thyroid bx, Exlap for SBO/ SIA.          ROS:  No Heartburn, regurgitation, dysphagia, odynophagia.  No dyspepsia  No abdominal pain.    No Nausea, vomiting.  No Bleeding.  No hematemesis.   + diarrhea.    + hematochezia.  No weight loss, anorexia.  No edema.      MEDICATIONS  (STANDING):  pantoprazole  Injectable 40 milliGRAM(s) IV Push two times a day    MEDICATIONS  (PRN):      Allergies  No Known Allergies        SOCIAL HISTORY:  Distant smoker    FAMILY HISTORY:  Prostate Ca;  Thyroid Ca.        Vital Signs Last 24 Hrs  T(C): 36.6 (20 Oct 2020 18:51), Max: 36.9 (20 Oct 2020 16:04)  T(F): 97.8 (20 Oct 2020 18:51), Max: 98.4 (20 Oct 2020 16:04)  HR: 88 (20 Oct 2020 18:51) (77 - 100)  BP: 100/66 (20 Oct 2020 18:51) (100/66 - 132/74)  BP(mean): --  RR: 20 (20 Oct 2020 18:51) (18 - 20)  SpO2: 97% (20 Oct 2020 18:51) (97% - 100%)    PHYSICAL EXAM:    GENERAL: NAD, well-groomed, well-developed  HEAD:  Atraumatic, Normocephalic  EYES: EOMI, PERRLA, conjunctiva and sclera clear  ENMT: No tonsillar erythema, exudates, or enlargement; Moist mucous membranes, Good dentition, No lesions  NECK: Supple, No JVD, Normal thyroid  CHEST/LUNG: Clear to percussion bilaterally; No rales, rhonchi, wheezing, or rubs  HEART: Regular rate and rhythm; No murmurs, rubs, or gallops  ABDOMEN: Soft, Nontender, Nondistended; Bowel sounds present;  Multiple old scars.  No HSM.  No MTR  ADRIAN:  Done earlier by ED staff>  Melena;  Pt reporting brown stool mixed with BRB and Clots.    EXTREMITIES:  2+ Peripheral Pulses, No clubbing, cyanosis, or edema  LYMPH: No lymphadenopathy noted  SKIN: No rashes or lesions      LABS:                        6.7    5.02  )-----------( 316      ( 20 Oct 2020 14:42 )             21.9     10-20    140  |  108<H>  |  19.0  ----------------------------<  114<H>  4.5   |  23.0  |  0.85    Ca    8.5<L>      20 Oct 2020 14:42    TPro  5.9<L>  /  Alb  3.4  /  TBili  0.3<L>  /  DBili  x   /  AST  18  /  ALT  14  /  AlkPhos  104  10-20    PT/INR - ( 20 Oct 2020 14:42 )   PT: 13.9 sec;   INR: 1.21 ratio         PTT - ( 20 Oct 2020 14:42 )  PTT:22.9 sec       LIVER FUNCTIONS - ( 20 Oct 2020 14:42 )  Alb: 3.4 g/dL / Pro: 5.9 g/dL / ALK PHOS: 104 U/L / ALT: 14 U/L / AST: 18 U/L / GGT: x               RADIOLOGY & ADDITIONAL STUDIES:  CTA A/P:  Diverticulosis coli.  No active bleeding.  S/p Rain, Hyst.  RLQ clips.

## 2020-10-21 LAB
ANION GAP SERPL CALC-SCNC: 11 MMOL/L — SIGNIFICANT CHANGE UP (ref 5–17)
BASOPHILS # BLD AUTO: 0.02 K/UL — SIGNIFICANT CHANGE UP (ref 0–0.2)
BASOPHILS NFR BLD AUTO: 0.3 % — SIGNIFICANT CHANGE UP (ref 0–2)
BUN SERPL-MCNC: 20 MG/DL — SIGNIFICANT CHANGE UP (ref 8–20)
C DIFF BY PCR RESULT: SIGNIFICANT CHANGE UP
C DIFF TOX GENS STL QL NAA+PROBE: SIGNIFICANT CHANGE UP
CALCIUM SERPL-MCNC: 8.3 MG/DL — LOW (ref 8.6–10.2)
CHLORIDE SERPL-SCNC: 110 MMOL/L — HIGH (ref 98–107)
CO2 SERPL-SCNC: 23 MMOL/L — SIGNIFICANT CHANGE UP (ref 22–29)
CREAT SERPL-MCNC: 0.98 MG/DL — SIGNIFICANT CHANGE UP (ref 0.5–1.3)
CULTURE RESULTS: SIGNIFICANT CHANGE UP
EOSINOPHIL # BLD AUTO: 0.04 K/UL — SIGNIFICANT CHANGE UP (ref 0–0.5)
EOSINOPHIL NFR BLD AUTO: 0.6 % — SIGNIFICANT CHANGE UP (ref 0–6)
GLUCOSE SERPL-MCNC: 98 MG/DL — SIGNIFICANT CHANGE UP (ref 70–99)
HCT VFR BLD CALC: 25.5 % — LOW (ref 34.5–45)
HCT VFR BLD CALC: 26.1 % — LOW (ref 34.5–45)
HCV AB S/CO SERPL IA: 0.14 S/CO — SIGNIFICANT CHANGE UP (ref 0–0.99)
HCV AB SERPL-IMP: SIGNIFICANT CHANGE UP
HGB BLD-MCNC: 8.1 G/DL — LOW (ref 11.5–15.5)
HGB BLD-MCNC: 8.2 G/DL — LOW (ref 11.5–15.5)
IMM GRANULOCYTES NFR BLD AUTO: 0.4 % — SIGNIFICANT CHANGE UP (ref 0–1.5)
LYMPHOCYTES # BLD AUTO: 1.24 K/UL — SIGNIFICANT CHANGE UP (ref 1–3.3)
LYMPHOCYTES # BLD AUTO: 18.2 % — SIGNIFICANT CHANGE UP (ref 13–44)
MCHC RBC-ENTMCNC: 29.2 PG — SIGNIFICANT CHANGE UP (ref 27–34)
MCHC RBC-ENTMCNC: 31.4 GM/DL — LOW (ref 32–36)
MCV RBC AUTO: 92.9 FL — SIGNIFICANT CHANGE UP (ref 80–100)
MONOCYTES # BLD AUTO: 0.68 K/UL — SIGNIFICANT CHANGE UP (ref 0–0.9)
MONOCYTES NFR BLD AUTO: 10 % — SIGNIFICANT CHANGE UP (ref 2–14)
NEUTROPHILS # BLD AUTO: 4.82 K/UL — SIGNIFICANT CHANGE UP (ref 1.8–7.4)
NEUTROPHILS NFR BLD AUTO: 70.5 % — SIGNIFICANT CHANGE UP (ref 43–77)
PLATELET # BLD AUTO: 257 K/UL — SIGNIFICANT CHANGE UP (ref 150–400)
POTASSIUM SERPL-MCNC: 4.4 MMOL/L — SIGNIFICANT CHANGE UP (ref 3.5–5.3)
POTASSIUM SERPL-SCNC: 4.4 MMOL/L — SIGNIFICANT CHANGE UP (ref 3.5–5.3)
RBC # BLD: 2.81 M/UL — LOW (ref 3.8–5.2)
RBC # FLD: 14.8 % — HIGH (ref 10.3–14.5)
SARS-COV-2 IGG SERPL QL IA: NEGATIVE — SIGNIFICANT CHANGE UP
SARS-COV-2 IGM SERPL IA-ACNC: 6.13 AU/ML — SIGNIFICANT CHANGE UP
SARS-COV-2 RNA SPEC QL NAA+PROBE: SIGNIFICANT CHANGE UP
SODIUM SERPL-SCNC: 144 MMOL/L — SIGNIFICANT CHANGE UP (ref 135–145)
SPECIMEN SOURCE: SIGNIFICANT CHANGE UP
WBC # BLD: 6.83 K/UL — SIGNIFICANT CHANGE UP (ref 3.8–10.5)
WBC # FLD AUTO: 6.83 K/UL — SIGNIFICANT CHANGE UP (ref 3.8–10.5)

## 2020-10-21 PROCEDURE — 99223 1ST HOSP IP/OBS HIGH 75: CPT

## 2020-10-21 PROCEDURE — 99233 SBSQ HOSP IP/OBS HIGH 50: CPT

## 2020-10-21 PROCEDURE — 93010 ELECTROCARDIOGRAM REPORT: CPT

## 2020-10-21 PROCEDURE — 99232 SBSQ HOSP IP/OBS MODERATE 35: CPT

## 2020-10-21 PROCEDURE — 99222 1ST HOSP IP/OBS MODERATE 55: CPT

## 2020-10-21 RX ORDER — SODIUM CHLORIDE 9 MG/ML
1000 INJECTION INTRAMUSCULAR; INTRAVENOUS; SUBCUTANEOUS
Refills: 0 | Status: DISCONTINUED | OUTPATIENT
Start: 2020-10-21 | End: 2020-10-24

## 2020-10-21 RX ORDER — ATORVASTATIN CALCIUM 80 MG/1
80 TABLET, FILM COATED ORAL AT BEDTIME
Refills: 0 | Status: DISCONTINUED | OUTPATIENT
Start: 2020-10-21 | End: 2020-10-24

## 2020-10-21 RX ADMIN — SODIUM CHLORIDE 40 MILLILITER(S): 9 INJECTION INTRAMUSCULAR; INTRAVENOUS; SUBCUTANEOUS at 11:15

## 2020-10-21 RX ADMIN — PANTOPRAZOLE SODIUM 40 MILLIGRAM(S): 20 TABLET, DELAYED RELEASE ORAL at 18:14

## 2020-10-21 RX ADMIN — SODIUM CHLORIDE 100 MILLILITER(S): 9 INJECTION INTRAMUSCULAR; INTRAVENOUS; SUBCUTANEOUS at 02:00

## 2020-10-21 RX ADMIN — ATORVASTATIN CALCIUM 80 MILLIGRAM(S): 80 TABLET, FILM COATED ORAL at 21:15

## 2020-10-21 RX ADMIN — PANTOPRAZOLE SODIUM 40 MILLIGRAM(S): 20 TABLET, DELAYED RELEASE ORAL at 06:00

## 2020-10-21 NOTE — CONSULT NOTE ADULT - SUBJECTIVE AND OBJECTIVE BOX
PULMONARY CONSULT NOTE      JAVED CHIN  MRN-[id number]    Patient is a 70y old  Female who presents with a chief complaint of Anemia, symptomatic. (20 Oct 2020 21:03)      HISTORY OF PRESENT ILLNESS:  70F PMH HTN, renal artery stenosis, TIA s/p L cerebral artery stent (2014) on ASA/Plavix, COPD on 2-3L home O2 who presents with BRBPR. Last episode the AM of admission to ED. Denies lightheadedness. Denying SOB, cough. She is a 1-1.5ppd smoker x 40 years, quit in 2014. She is on Breo ellipta at home. No fevers, no chills. No N/V. No chest pain.    Pulmonary consulted for clearance for colonoscopy.    MEDICATIONS  (STANDING):  atorvastatin 80 milliGRAM(s) Oral at bedtime  carvedilol 12.5 milliGRAM(s) Oral every 12 hours  pantoprazole  Injectable 40 milliGRAM(s) IV Push two times a day  sodium chloride 0.9%. 1000 milliLiter(s) (40 mL/Hr) IV Continuous <Continuous>    MEDICATIONS  (PRN):  ALBUTerol    90 MICROgram(s) HFA Inhaler 2 Puff(s) Inhalation every 6 hours PRN Shortness of Breath and/or Wheezing  ondansetron Injectable 4 milliGRAM(s) IV Push every 6 hours PRN Nausea and/or Vomiting    Allergies    No Known Allergies    Intolerances      PAST MEDICAL & SURGICAL HISTORY:  H/O cerebral artery stenosis  left cerebral artery stent per history in 2014.    Pacemaker    HTN (hypertension)    COPD without exacerbation    Rheumatoid arthritis    H/O exploratory laparotomy    S/P cholecystectomy    S/P hysterectomy      FAMILY HISTORY:  FH: dementia  mother          SOCIAL HISTORY  Smoking History:   1-1.5ppd x 40 years, quit in 2014    REVIEW OF SYSTEMS:  CONSTITUTIONAL:  No fevers, chills  HEENT:  No headache, blurry vision, epistaxis, rhinorrhea  CARDIOVASCULAR:  No chest pain, no palpitations  RESPIRATORY:  As per HPI  GASTROINTESTINAL:  No abdominal pain, N/V/D, + BRBPR  GENITOURINARY:  No dysuria, frequency or urgency  NEUROLOGIC:  No seizures or headaches  PSYCHIATRIC:  No disorder of thought or mood      Vital Signs Last 24 Hrs  T(C): 36.4 (21 Oct 2020 11:24), Max: 36.9 (20 Oct 2020 16:04)  T(F): 97.5 (21 Oct 2020 11:24), Max: 98.4 (20 Oct 2020 16:04)  HR: 75 (21 Oct 2020 11:24) (75 - 91)  BP: 107/70 (21 Oct 2020 11:24) (100/66 - 121/75)  BP(mean): --  RR: 16 (21 Oct 2020 11:24) (15 - 20)  SpO2: 100% (21 Oct 2020 11:24) (96% - 100%)      PHYSICAL EXAMINATION:  GENERAL: In no apparent distress  HEENT: NC/AT  NECK: Supple, non-tender   LUNGS: CTA B/L, good inspiratory effort, non-labored breathing  CV: +S1, S2  ABDOMEN: Soft  EXTREMITIES: No rashes, lesions, edema  NEUROLOGIC: Grossly non-focal  PSYCH: Normal affect      LABS:                        8.2    6.83  )-----------( 257      ( 21 Oct 2020 03:16 )             26.1     10-21    144  |  110<H>  |  20.0  ----------------------------<  98  4.4   |  23.0  |  0.98    Ca    8.3<L>      21 Oct 2020 03:16    TPro  5.9<L>  /  Alb  3.4  /  TBili  0.3<L>  /  DBili  x   /  AST  18  /  ALT  14  /  AlkPhos  104  10-20    PT/INR - ( 20 Oct 2020 14:42 )   PT: 13.9 sec;   INR: 1.21 ratio         PTT - ( 20 Oct 2020 14:42 )  PTT:22.9 sec      CARDIAC MARKERS ( 20 Oct 2020 14:42 )  x     / <0.01 ng/mL / x     / x     / x                    MICROBIOLOGY:  Clostridium difficile Toxin by PCR (10.21.20 @ 01:31)    Clostridium difficile Toxin by PCR: RESULT INTERPRETATION:    Not detected - No Clostridium difficile toxins detected by amplified DNA  PCR.    C. difficile PCR test results should be interpreted only with  consideration of the patient's clinical situation and history.  This test  willdetect the presence of toxigenic C. difficile.  However it cannot be  used as the sole criteria for the diagnosis of antibiotic associated  diarrhea, antibiotic associated colitis, or pseudomembranous colitis.  Colonization with C. difficile may exceed 20% in hospital patients, the  majority of whom are without Toxigenic Clostridium Difficile disease.  Testing is generally not recommended in children below the age of 1 year,  as up to half of healthy infants are asymptomatically colonized with C.  difficile.  In addition, C. difficile PCR testing cannot be used as a  "test of cure" as dead organism nucleic acids will persist and be  detected after treatment.  Successful treatment of C. difficile disease  is determined by resolution of clinical symptoms.    C Diff by PCR Result: NotDetec          RADIOLOGY & ADDITIONAL STUDIES:    COVID-19 PCR . (10.20.20 @ 20:31)    COVID-19 PCR: NotDetec: Testing is performed using polymerase chain reaction (PCR) or  transcription mediated amplification (TMA). This COVID-19 (SARS-CoV-2)  nucleic acid amplification test was validated by [employer] and is  in use under the FDA Emergency Use Authorization (EUA) for clinical labs  CLIA-certified to perform high complexity testing. Test results should be  correlated with clinical presentation, patient history, and epidemiology.

## 2020-10-21 NOTE — CONSULT NOTE ADULT - ATTENDING COMMENTS
A total of 50 minutes were spent on the entire encounter. Greater than 50% of the time was spent reviewing labs, notes, orders, radiographic studies, as well as counseling and coordinating care with the relevant multidisciplinary team, including with the primary and consulting providers.

## 2020-10-21 NOTE — PROGRESS NOTE ADULT - SUBJECTIVE AND OBJECTIVE BOX
Pt seen and examined f/u for bloody diarrhea    This morning she has no abdominal pain but still with blood in stool. Rectal exam by me just now shows soft very dark brwon stool with fair amount of dark marroon blood admixed. She got 2 units blood last night and hgb now 8.2. CTA negative for active bleed or colitis. On clear liquids and IV pantoprazole  REVIEW OF SYSTEMS:    CONSTITUTIONAL: No fever, weight loss, or fatigue  EYES: No eye pain, visual disturbances, or discharge  ENMT:  No difficulty hearing, tinnitus, vertigo; No sinus or throat pain  RESPIRATORY: No cough, wheezing, chills or hemoptysis; No shortness of breath  CARDIOVASCULAR: No chest pain, palpitations, dizziness, or leg swelling  GASTROINTESTINAL: as above    MEDICATIONS:  MEDICATIONS  (STANDING):  carvedilol 12.5 milliGRAM(s) Oral every 12 hours  pantoprazole  Injectable 40 milliGRAM(s) IV Push two times a day    MEDICATIONS  (PRN):  ALBUTerol    90 MICROgram(s) HFA Inhaler 2 Puff(s) Inhalation every 6 hours PRN Shortness of Breath and/or Wheezing  ondansetron Injectable 4 milliGRAM(s) IV Push every 6 hours PRN Nausea and/or Vomiting      Allergies    No Known Allergies    Intolerances        Vital Signs Last 24 Hrs  T(C): 36.8 (21 Oct 2020 04:31), Max: 36.9 (20 Oct 2020 16:04)  T(F): 98.2 (21 Oct 2020 04:31), Max: 98.4 (20 Oct 2020 16:04)  HR: 79 (21 Oct 2020 04:31) (77 - 100)  BP: 115/77 (21 Oct 2020 04:31) (100/66 - 132/74)  BP(mean): --  RR: 16 (21 Oct 2020 04:31) (15 - 20)  SpO2: 100% (21 Oct 2020 04:31) (96% - 100%)      PHYSICAL EXAM:    General: Well developed; well nourished; in no acute distress  HEENT: MMM, conjunctiva and sclera clear  Gastrointestinal:Abdomen: Soft non-tender non-distended; Normal bowel sounds; No hepatosplenomegaly  Extremities: no cyanosis, clubbing or edema.  Skin: Warm and dry. No obvious rash  ADRIAN: some soft very dark brown stool with maroon colored blood admixed    LABS:      CBC Full  -  ( 21 Oct 2020 03:16 )  WBC Count : 6.83 K/uL  RBC Count : 2.81 M/uL  Hemoglobin : 8.2 g/dL  Hematocrit : 26.1 %  Platelet Count - Automated : 257 K/uL  Mean Cell Volume : 92.9 fl  Mean Cell Hemoglobin : 29.2 pg  Mean Cell Hemoglobin Concentration : 31.4 gm/dL  Auto Neutrophil # : 4.82 K/uL  Auto Lymphocyte # : 1.24 K/uL  Auto Monocyte # : 0.68 K/uL  Auto Eosinophil # : 0.04 K/uL  Auto Basophil # : 0.02 K/uL  Auto Neutrophil % : 70.5 %  Auto Lymphocyte % : 18.2 %  Auto Monocyte % : 10.0 %  Auto Eosinophil % : 0.6 %  Auto Basophil % : 0.3 %    10-21    144  |  110<H>  |  20.0  ----------------------------<  98  4.4   |  23.0  |  0.98    Ca    8.3<L>      21 Oct 2020 03:16    TPro  5.9<L>  /  Alb  3.4  /  TBili  0.3<L>  /  DBili  x   /  AST  18  /  ALT  14  /  AlkPhos  104  10-20    PT/INR - ( 20 Oct 2020 14:42 )   PT: 13.9 sec;   INR: 1.21 ratio         PTT - ( 20 Oct 2020 14:42 )  PTT:22.9 sec

## 2020-10-21 NOTE — CONSULT NOTE ADULT - SUBJECTIVE AND OBJECTIVE BOX
[facility]                                                               Office: [address] [address]                                                              Telephone: [phone]. Fax:[phone]                                                                        CARDIOLOGY CONSULTATION NOTE                                                                                            Chief complaint:  bright red blood per rectum      HPI:  70F h/o HTN, HLD, recurrent TIA, bilateral vertebral artery stenosis (L-occluded, R-stented 2014 remains on DAPT with clopidogrel 150mg per outside neurologist at [place]), bradyarrthymic cardiac arrest after LHC (in [place]) s/p [employer] PPM in 2016, renal artery stenosis and COPD (home oxygen) s/p recently hospitalized at [facility] 3 months ago for PE rule out and Ex-Lap for SBO, had TTE done reported hyperdynamic LV systolic function, planning for outpatient endoscopies/colonoscopies, p/w foul smelling diarrhea and bright red blood per rectum, Hb 6.7, cardiology consulted for preprocedural cardiac risk evaluation.     Patient follows with [facility] cardiology group (Dr. Zarate) was just seen recently, baseline denies any chest pain or shortness of breath. Reportedly told by outside neurologist to keep on higher dose of clopidogrel as reportedly prior cerebral angiogram with abnormal blood flow? Reportedly been having intermittent blood in the stool but not as severe as this past Sunday. Currently on clear liquid diet.         REVIEW OF SYMPTOMS:     CONSTITUTIONAL: No fever, weight loss, or fatigue  ENMT:  No difficulty hearing, tinnitus, vertigo; No sinus or throat pain  NECK: No pain or stiffness  CARDIOVASCULAR: as per HPI  RESPIRATORY: No Dyspnea on exertion, Shortness of breath, cough, wheezing  : No dysuria, no hematuria   GI: No dark color stool, no melena, + diarrhea, no constipation, no abdominal pain   NEURO: No headache, no dizziness, no slurred speech   MUSCULOSKELETAL: No joint pain or swelling; No muscle, back, or extremity pain  PSYCH: No agitation, no anxiety.    ALL OTHER REVIEW OF SYSTEMS ARE NEGATIVE.      PREVIOUS DIAGNOSTIC TESTING    ECHO FINDINGS:  per record done outside 3 months ago normal LV EF    STRESS FINDINGS:      CATHETERIZATION FINDINGS:   prior done in 2015      ALLERGIES: Allergies    No Known Allergies        PAST MEDICAL HISTORY  see above      PAST SURGICAL HISTORY  vertebral artery stenting  Ex-Lap   cholecystectomy  Hysterectomy    FAMILY HISTORY:  No CAD       SOCIAL HISTORY:    CIGARETTES:   former smoker  ALCOHOL: denies  DRUGS: denies       CURRENT MEDICATIONS:  MEDICATIONS  (STANDING):  carvedilol 12.5 milliGRAM(s) Oral every 12 hours  pantoprazole  Injectable 40 milliGRAM(s) IV Push two times a day  sodium chloride 0.9%. 1000 milliLiter(s) (40 mL/Hr) IV Continuous <Continuous>    MEDICATIONS  (PRN):  ALBUTerol    90 MICROgram(s) HFA Inhaler 2 Puff(s) Inhalation every 6 hours PRN Shortness of Breath and/or Wheezing  ondansetron Injectable 4 milliGRAM(s) IV Push every 6 hours PRN Nausea and/or Vomiting         HOME MEDICATIONS:  reviewed      Vital Signs Last 24 Hrs  T(C): 36.9 (10-21-20 @ 08:12), Max: 36.9 (10-20-20 @ 16:04)  T(F): 98.4 (10-21-20 @ 08:12), Max: 98.4 (10-20-20 @ 16:04)  HR: 91 (10-21-20 @ 08:12) (77 - 100)  BP: 110/70 (10-21-20 @ 08:12) (100/66 - 132/74)  BP(mean): --  RR: 16 (10-21-20 @ 08:12) (15 - 20)  SpO2: 100% (10-21-20 @ 08:12) (96% - 100%)  I&O's Summary      Appearance: Comfortable. No acute distress  HEENT:  Head and neck: Atraumatic. Normocephalic.  Normal oral mucosa, PERRL, Neck is supple. No JVD, No carotid bruit.   Neurologic: A&Ox 3, no focal deficits. EOMI, Cranial nerves are intact.  Lymphatic: No cervical lymphadenopathy  Cardiovascular: Normal S1 S2, RRR, No murmur, rubs/gallops. No JVD, No edema, L-chest PPM site intact  Respiratory: Lungs clear to auscultation  Gastrointestinal:  Soft, Non-tender, + BS  Lower Extremities: No edema  Psychiatry: Patient is calm. No agitation. Mood & affect appropriate  Skin: No rashes/ecchymoses/cyanosis/ulcers visualized on the face, hands or feet.      Labs:                         8.2    6.83  )-----------( 257      ( 21 Oct 2020 03:16 )             26.1     10-21    144  |  110<H>  |  20.0  ----------------------------<  98  4.4   |  23.0  |  0.98    Ca    8.3<L>      21 Oct 2020 03:16    TPro  5.9<L>  /  Alb  3.4  /  TBili  0.3<L>  /  DBili  x   /  AST  18  /  ALT  14  /  AlkPhos  104  10-20     20 Oct 2020 14:42 Troponin <0.01 ng/mL / Creatine Kinase x     /  CKMB x     / CPK Mass Assay % x                  TELEMETRY: Siinus 70s with 5 beats PATs  ECG:  Sinus 78

## 2020-10-21 NOTE — CONSULT NOTE ADULT - ASSESSMENT
70F PMH HTN, renal artery stenosis, TIA s/p L cerebral artery stent (2014) on ASA/Plavix, COPD on 2-3L home O2 who presents with BRBPR, pulmonary consulted for optimization    Impression:  - BRBPR  - COPD on 2-3L home O2 - not in exacerbation  - H/o TIA s/p L cerebral artery stent (2014) on DAPT    Recommendations:  - DuoNebs Q6H PRN  - Monitor O2 requirements, she is on 2-3L home O2  - Goal SpO2 88-92% in COPD patients  - Recommend ritchie-operative DuoNebs  - Extubate to BPAP if requires intubation  - Pt is on Breo ellipta at home; if not available in-house, may do Symbicort while in-house instaed  - Pt is otherwise moderate pulmonary risk for procedures  - No pulmonary contraindication for endoscopy at this time  - Rest of care as per primary team  - Pts outpatient pulmonologist is Dr. Kolby Hilton  - Thank you for this consult    Merlin Gupta M.D.  Pulmonary & Critical Care Medicine  [facility]  Pulmonary Medicine at [place]  [address] [place]  [place], [place] [address]  T: [phone]  F: [phone]

## 2020-10-21 NOTE — CONSULT NOTE ADULT - ASSESSMENT
The patient is a 70y Female who is followed by neurology because she needs neurology clearance for colonoscopy    Intracranial stenosis and stent  placed in 2014 in [place]  followed locally by Dr Willard at [place]  Was told that she is at high risk of stent thrombosis if off DAPT  Is off now because of LGIB with drop in Hgb and need for RBC transfusion    She is cleared for colonoscopy, should be done ASAP to find and treat source of bleeding so ASA/plavix can be restarted  Her blood pressure should not be allowed to drop during the procedure, hypotension will possibly increase risk of CVA  She should remain on atorvastatin 80 mg daily and resume ASA 81/plavix 150 ASAP in order to prevent stent thrombosis and stroke    will follow with you    Kenroy Brownlee MD PhD   [id number]

## 2020-10-21 NOTE — CONSULT NOTE ADULT - SUBJECTIVE AND OBJECTIVE BOX
[facility]                                     Neurology at [place]                                 Torrie Baker, & Rambo                                  [address]                                        [place], [place], [address]                                             [phone]    CC:  HPI:  The patient is a 70y Female who presented with LGIB, s/p pRBC transfusion.  She had a stent placed in her cerebral vasculature in 2014, I assume on right ICA or MCA as it was done for recurrent left sided weakness/TIAs and what sounds like severe intracranial stenosis.  She has been on ASA 81 and plavix 150 since.  She says she was told that she needs to be on higher dose of plavix by Dr Willard at [facility] because at 75 mg daily she had signs of stent thrombosis.  She needs a colonsocopy to determine the cause of bleeding and neurology is asked to provide clearance    PAST MEDICAL & SURGICAL HISTORY:  H/O cerebral artery stenosis  left cerebral artery stent per history in 2014.    Pacemaker    HTN (hypertension)    COPD without exacerbation    Rheumatoid arthritis    H/O exploratory laparotomy    S/P cholecystectomy    S/P hysterectomy        MEDICATIONS  (STANDING):  atorvastatin 80 milliGRAM(s) Oral at bedtime  carvedilol 12.5 milliGRAM(s) Oral every 12 hours  pantoprazole  Injectable 40 milliGRAM(s) IV Push two times a day  sodium chloride 0.9%. 1000 milliLiter(s) (40 mL/Hr) IV Continuous <Continuous>    MEDICATIONS  (PRN):  ALBUTerol    90 MICROgram(s) HFA Inhaler 2 Puff(s) Inhalation every 6 hours PRN Shortness of Breath and/or Wheezing  ondansetron Injectable 4 milliGRAM(s) IV Push every 6 hours PRN Nausea and/or Vomiting      Allergies    No Known Allergies    Intolerances        SOCIAL HISTORY:  no tob,   no alcohol   no drugs    FAMILY HISTORY:  FH: dementia  mother  no stroke in parents      ROS: 14 point ROS negative other than what is present in HPI or below  LGIB    Vital Signs Last 24 Hrs  T(C): 36.4 (21 Oct 2020 11:24), Max: 36.9 (20 Oct 2020 16:04)  T(F): 97.5 (21 Oct 2020 11:24), Max: 98.4 (20 Oct 2020 16:04)  HR: 75 (21 Oct 2020 11:24) (75 - 100)  BP: 107/70 (21 Oct 2020 11:24) (100/66 - 132/74)  BP(mean): --  RR: 16 (21 Oct 2020 11:24) (15 - 20)  SpO2: 100% (21 Oct 2020 11:24) (96% - 100%)      General: NAD    Detailed Neurologic Exam:    Mental status: The patient is awake and alert and has normal attention span.  The patient is fully oriented in 3 spheres. The patient is oriented to current events. The patient is able to name objects, follow commands, repeat sentences.    Cranial nerves: Pupils equal and react symmetrically to light. There is no visual field deficit to confrontation. Extraocular motion is full with no nystagmus. There is no ptosis. Facial sensation is intact. Facial musculature is symmetric. Palate elevates symmetrically. Shoulder shrug is normal. Tongue is midline.    Motor: There is normal bulk and tone.  There is no tremor.  Strength is 5/5 in the right arm and leg.   Strength is 5/5 in the left arm and leg.    Sensation: Intact to light touch and pin in 4 extremities    Reflexes: 2+ throughout and plantar responses are flexor.    Cerebellar: There is no dysmetria on finger to nose testing.    Gait : deferred    LABS:                         8.2    6.83  )-----------( 257      ( 21 Oct 2020 03:16 )             26.1       10-21    144  |  110<H>  |  20.0  ----------------------------<  98  4.4   |  23.0  |  0.98    Ca    8.3<L>      21 Oct 2020 03:16    TPro  5.9<L>  /  Alb  3.4  /  TBili  0.3<L>  /  DBili  x   /  AST  18  /  ALT  14  /  AlkPhos  104  10-20      PT/INR - ( 20 Oct 2020 14:42 )   PT: 13.9 sec;   INR: 1.21 ratio         PTT - ( 20 Oct 2020 14:42 )  PTT:22.9 sec    RADIOLOGY & ADDITIONAL STUDIES (independently reviewed unless otherwise noted):  no neuro studies

## 2020-10-21 NOTE — PROGRESS NOTE ADULT - SUBJECTIVE AND OBJECTIVE BOX
JAVED CHIN Patient is a 70y old  Female who presents with a chief complaint of Anemia, symptomatic. (20 Oct 2020 21:03)     HPI:  pt. is a 69 y/o female with h/o HTN, Renal artery stenosis, TIA's and Placement of a left cerebral artery stent in 2014 ( done in [place] ) and on chronic Aspirin / Plavix ( as per pt. she is on 150 mg of plavix ) since then,  COPD on chronic home O2.  RA and Osteopenia, HLD and S/p Cardiac arrest and placement of PPM about 2016, No h/o cardiac stents came to the ER  as she developed sudden onset of foul smelling diarrhea associated with  painless rectal  bleed, BRB with clots, several episodes for past 2 days, no n/v. no hematemesis, no hemoptysis no dizziness, no syncopy, no cp, no HA, no sob. no cough, no fever. no active rectal bleed or diarrhea at the time of admission. no recent antibiotic use reported, no recent travel, no sick contact. never had colonoscopy in the past.   As per pt. she is on methotrexate for her RA by mouth weekly and last dose was 2 days ago. (20 Oct 2020 22:14)    The patient was seen and evaluated   The patient is in no acute distress.  Denied any fever chest pain, palpitations, shortness of breath, abdominal pain, fever, dysuria, cough, edema   Complains of the painless rectal bleeding  ,     I&O's Summary    Allergies    No Known Allergies    Intolerances      HEALTH ISSUES - PROBLEM Dx:  Pacemaker  Pacemaker    History of COPD  History of COPD    H/O cerebral artery stenosis  H/O cerebral artery stenosis    Essential hypertension  Essential hypertension    Rectal bleeding  Rectal bleeding          PAST MEDICAL & SURGICAL HISTORY:  H/O cerebral artery stenosis  left cerebral artery stent per history in 2014.    Pacemaker    HTN (hypertension)    COPD without exacerbation    Rheumatoid arthritis    H/O exploratory laparotomy    S/P cholecystectomy    S/P hysterectomy            Vital Signs Last 24 Hrs  T(C): 36.7 (21 Oct 2020 15:22), Max: 36.9 (21 Oct 2020 08:12)  T(F): 98.1 (21 Oct 2020 15:22), Max: 98.4 (21 Oct 2020 08:12)  HR: 95 (21 Oct 2020 15:22) (75 - 95)  BP: 129/68 (21 Oct 2020 15:22) (107/70 - 129/68)  BP(mean): --  RR: 20 (21 Oct 2020 15:22) (15 - 20)  SpO2: 94% (21 Oct 2020 15:22) (94% - 100%)T(C): 36.7 (10-21-20 @ 15:22), Max: 36.9 (10-21-20 @ 08:12)  HR: 95 (10-21-20 @ 15:22) (75 - 95)  BP: 129/68 (10-21-20 @ 15:22) (107/70 - 129/68)  RR: 20 (10-21-20 @ 15:22) (15 - 20)  SpO2: 94% (10-21-20 @ 15:22) (94% - 100%)  Wt(kg): --    PHYSICAL EXAM:    GENERAL: NAD, well-groomed, well-developed  HEAD:  Atraumatic, Normocephalic  EYES: EOMI, PERRL  NERVOUS SYSTEM:  Alert & Oriented X3,  Moves upper and lower extremities; CNS-II-XII  CHEST/LUNG: Clear to auscultation bilaterally; No rales, rhonchi, wheezing,   HEART: Regular rate and rhythm; No murmurs,   ABDOMEN: Soft, Nontender, Nondistended; Bowel sounds present  EXTREMITIES:  Peripheral Pulses, No  cyanosis, or edemaP  psychiatry- mood and affect approprite, Insight and judgement intact     ALBUTerol    90 MICROgram(s) HFA Inhaler 2 Puff(s) Inhalation every 6 hours PRN  atorvastatin 80 milliGRAM(s) Oral at bedtime  carvedilol 12.5 milliGRAM(s) Oral every 12 hours  ondansetron Injectable 4 milliGRAM(s) IV Push every 6 hours PRN  pantoprazole  Injectable 40 milliGRAM(s) IV Push two times a day  sodium chloride 0.9%. 1000 milliLiter(s) IV Continuous <Continuous>      LABS:                          8.1    x     )-----------( x        ( 21 Oct 2020 19:24 )             25.5     10-21    144  |  110<H>  |  20.0  ----------------------------<  98  4.4   |  23.0  |  0.98    Ca    8.3<L>      21 Oct 2020 03:16    TPro  5.9<L>  /  Alb  3.4  /  TBili  0.3<L>  /  DBili  x   /  AST  18  /  ALT  14  /  AlkPhos  104  10-20    LIVER FUNCTIONS - ( 20 Oct 2020 14:42 )  Alb: 3.4 g/dL / Pro: 5.9 g/dL / ALK PHOS: 104 U/L / ALT: 14 U/L / AST: 18 U/L / GGT: x           PT/INR - ( 20 Oct 2020 14:42 )   PT: 13.9 sec;   INR: 1.21 ratio         PTT - ( 20 Oct 2020 14:42 )  PTT:22.9 sec  CARDIAC MARKERS ( 20 Oct 2020 14:42 )  x     / <0.01 ng/mL / x     / x     / x            CAPILLARY BLOOD GLUCOSE          RADIOLOGY & ADDITIONAL TESTS:      Consultant notes reviewed    Case discussed with consultant/provider/ family /patient

## 2020-10-21 NOTE — PROGRESS NOTE ADULT - ASSESSMENT
70F h/o HTN, HLD, recurrent TIA, bilateral vertebral artery stenosis (L-occluded, R-stented 2014 remains on DAPT with clopidogrel 150mg per outside neurologist at [place]), bradyarrthymic cardiac arrest after LHC (in [place]) s/p Medtr[employer] PPM in 2016, renal artery stenosis and COPD (home oxygen) s/p recently hospitalized at [facility] 3 months ago for PE rule out and Ex-Lap for SBO, had TTE done reported hyperdynamic LV systolic function, planning for outpatient endoscopies/colonoscopies, p/w foul smelling diarrhea and bright red blood per rectum, Hb 6.7, cardiology consulted for pre procedural cardiac risk evaluation.     Denies cardiac complains, no unstable cardiac issues for the overall low risk procedure, no further cardiac testing indicated for now.     1. Acute blood loss anemia- GI endoscopies without cardiac contraindication. Replete Hb >8; Aspirin/clopidogrel been on hold,      2. h/o PPM- not pacing on monitor, device interrogation with dual chamber device with 4 episodes of SVT longest 2 minutes yesterday .     3. HTN- continue carvedilol 12.5mg BID give also with episodes of SVT.     4. HLD/TIA- continue high dose atorvastatin 80mg.    Rectal bleeding.  Plan: Diverticular bleed or UGI source but no active bleed on CTA.  No Plavix for 48 hours so far.   diet and therapy. Will need EGD/colonoscopy but need cardiac, pulmonary and neuro clearance prior. Monitor H/H.

## 2020-10-21 NOTE — CONSULT NOTE ADULT - ASSESSMENT
70F h/o HTN, HLD, recurrent TIA, bilateral vertebral artery stenosis (L-occluded, R-stented 2014 remains on DAPT with clopidogrel 150mg per outside neurologist at [place]), bradyarrthymic cardiac arrest after C (in [place]) s/p Medtronic PPM in 2016, renal artery stenosis and COPD (home oxygen) s/p recently hospitalized at [facility] 3 months ago for PE rule out and Ex-Lap for SBO, had TTE done reported hyperdynamic LV systolic function, planning for outpatient endoscopies/colonoscopies, p/w foul smelling diarrhea and bright red blood per rectum, Hb 6.7, cardiology consulted for preprocedural cardiac risk evaluation.     Denies cardiac complains, no unstable cardiac issues for the overall low risk procedure, no further cardiac testing indicated for now.     1. Acute blood loss anemia- proceed with GI endoscopies without cardiac contraindication. Replete Hb >8; Aspirin/clopidogrel been on hold, defer decision on resumption and if need clopidogre dose change to neurology given prior vertebral artery stent.     2. h/o PPM- not pacing on monitor, device interrogation with dual chamber device with 4 episodes of SVT longest 2 minutes yesterday .     3. HTN- continue carvedilol 12.5mg BID give also with episodes of SVT.     4. HLD/TIA- not on any statin, clarify home  medication.       Siddharth Lee DO, [facility]  Faculty Non-Invasive Cardiologist  [phone]   70F h/o HTN, HLD, recurrent TIA, bilateral vertebral artery stenosis (L-occluded, R-stented 2014 remains on DAPT with clopidogrel 150mg per outside neurologist at [place]), bradyarrthymic cardiac arrest after C (in [place]) s/p Medtronic PPM in 2016, renal artery stenosis and COPD (home oxygen) s/p recently hospitalized at [facility] 3 months ago for PE rule out and Ex-Lap for SBO, had TTE done reported hyperdynamic LV systolic function, planning for outpatient endoscopies/colonoscopies, p/w foul smelling diarrhea and bright red blood per rectum, Hb 6.7, cardiology consulted for preprocedural cardiac risk evaluation.     Denies cardiac complains, no unstable cardiac issues for the overall low risk procedure, no further cardiac testing indicated for now.     1. Acute blood loss anemia- proceed with GI endoscopies without cardiac contraindication. Replete Hb >8; Aspirin/clopidogrel been on hold, defer decision on resumption and if need clopidogrel dose change to neurology given prior vertebral artery stent.     2. h/o PPM- not pacing on monitor, device interrogation with dual chamber device with 4 episodes of SVT longest 2 minutes yesterday .     3. HTN- continue carvedilol 12.5mg BID give also with episodes of SVT.     4. HLD/TIA- continue high dose atorvastatin 80mg.      Siddharth Lee DO, [facility]  Faculty Non-Invasive Cardiologist  [phone]

## 2020-10-21 NOTE — PROGRESS NOTE ADULT - PROBLEM SELECTOR PLAN 1
unclear if diverticular bleed or UGI source but no active bleed on  CTA. No plavix for 48 hours so far. Suggest continue current diet and therapy. Will need EGD/colonoscopy but need cardiac, pulmonary and neuro clearance prior. Moniter H/H

## 2020-10-22 ENCOUNTER — TRANSCRIPTION ENCOUNTER (OUTPATIENT)
Age: 70
End: 2020-10-22

## 2020-10-22 LAB
ANION GAP SERPL CALC-SCNC: 11 MMOL/L — SIGNIFICANT CHANGE UP (ref 5–17)
BUN SERPL-MCNC: 23 MG/DL — HIGH (ref 8–20)
CALCIUM SERPL-MCNC: 8.4 MG/DL — LOW (ref 8.6–10.2)
CHLORIDE SERPL-SCNC: 109 MMOL/L — HIGH (ref 98–107)
CO2 SERPL-SCNC: 21 MMOL/L — LOW (ref 22–29)
CREAT SERPL-MCNC: 0.82 MG/DL — SIGNIFICANT CHANGE UP (ref 0.5–1.3)
CULTURE RESULTS: SIGNIFICANT CHANGE UP
GLUCOSE SERPL-MCNC: 91 MG/DL — SIGNIFICANT CHANGE UP (ref 70–99)
HCT VFR BLD CALC: 22.2 % — LOW (ref 34.5–45)
HGB BLD-MCNC: 7 G/DL — CRITICAL LOW (ref 11.5–15.5)
MAGNESIUM SERPL-MCNC: 1.7 MG/DL — SIGNIFICANT CHANGE UP (ref 1.6–2.6)
MCHC RBC-ENTMCNC: 29.8 PG — SIGNIFICANT CHANGE UP (ref 27–34)
MCHC RBC-ENTMCNC: 31.5 GM/DL — LOW (ref 32–36)
MCV RBC AUTO: 94.5 FL — SIGNIFICANT CHANGE UP (ref 80–100)
PHOSPHATE SERPL-MCNC: 3.5 MG/DL — SIGNIFICANT CHANGE UP (ref 2.4–4.7)
PLATELET # BLD AUTO: 233 K/UL — SIGNIFICANT CHANGE UP (ref 150–400)
POTASSIUM SERPL-MCNC: 4.1 MMOL/L — SIGNIFICANT CHANGE UP (ref 3.5–5.3)
POTASSIUM SERPL-SCNC: 4.1 MMOL/L — SIGNIFICANT CHANGE UP (ref 3.5–5.3)
RBC # BLD: 2.35 M/UL — LOW (ref 3.8–5.2)
RBC # FLD: 14.9 % — HIGH (ref 10.3–14.5)
SODIUM SERPL-SCNC: 141 MMOL/L — SIGNIFICANT CHANGE UP (ref 135–145)
SPECIMEN SOURCE: SIGNIFICANT CHANGE UP
WBC # BLD: 7.1 K/UL — SIGNIFICANT CHANGE UP (ref 3.8–10.5)
WBC # FLD AUTO: 7.1 K/UL — SIGNIFICANT CHANGE UP (ref 3.8–10.5)

## 2020-10-22 PROCEDURE — 99233 SBSQ HOSP IP/OBS HIGH 50: CPT

## 2020-10-22 PROCEDURE — 99232 SBSQ HOSP IP/OBS MODERATE 35: CPT

## 2020-10-22 PROCEDURE — 43235 EGD DIAGNOSTIC BRUSH WASH: CPT

## 2020-10-22 RX ORDER — SOD SULF/SODIUM/NAHCO3/KCL/PEG
4000 SOLUTION, RECONSTITUTED, ORAL ORAL ONCE
Refills: 0 | Status: COMPLETED | OUTPATIENT
Start: 2020-10-22 | End: 2020-10-22

## 2020-10-22 RX ORDER — LIDOCAINE 4 G/100G
1 CREAM TOPICAL DAILY
Refills: 0 | Status: DISCONTINUED | OUTPATIENT
Start: 2020-10-22 | End: 2020-10-24

## 2020-10-22 RX ADMIN — PANTOPRAZOLE SODIUM 40 MILLIGRAM(S): 20 TABLET, DELAYED RELEASE ORAL at 17:18

## 2020-10-22 RX ADMIN — SODIUM CHLORIDE 40 MILLILITER(S): 9 INJECTION INTRAMUSCULAR; INTRAVENOUS; SUBCUTANEOUS at 23:21

## 2020-10-22 RX ADMIN — LIDOCAINE 1 PATCH: 4 CREAM TOPICAL at 22:32

## 2020-10-22 RX ADMIN — ATORVASTATIN CALCIUM 80 MILLIGRAM(S): 80 TABLET, FILM COATED ORAL at 22:33

## 2020-10-22 RX ADMIN — CARVEDILOL PHOSPHATE 12.5 MILLIGRAM(S): 80 CAPSULE, EXTENDED RELEASE ORAL at 17:18

## 2020-10-22 RX ADMIN — LIDOCAINE 1 PATCH: 4 CREAM TOPICAL at 15:42

## 2020-10-22 RX ADMIN — Medication 4000 MILLILITER(S): at 17:48

## 2020-10-22 RX ADMIN — PANTOPRAZOLE SODIUM 40 MILLIGRAM(S): 20 TABLET, DELAYED RELEASE ORAL at 05:34

## 2020-10-22 NOTE — BRIEF OPERATIVE NOTE - OPERATION/FINDINGS
There was  a 2 cm hiatal hernia. GE junction otherwise normal. Cardia, fundus body normal. There was mild antral erythema. D1, D2 normal. No biopsies done as Plavix was just held

## 2020-10-22 NOTE — BRIEF OPERATIVE NOTE - NSICDXBRIEFPOSTOP_GEN_ALL_CORE_FT
POST-OP DIAGNOSIS:  Hiatal hernia 22-Oct-2020 10:13:11  Shanon Tucker  Antral gastritis 22-Oct-2020 10:12:55  Shanon Tucker

## 2020-10-22 NOTE — PROGRESS NOTE ADULT - SUBJECTIVE AND OBJECTIVE BOX
JAVED CHIN Patient is a 70y old  Female who presents with a chief complaint of GI bleed (22 Oct 2020 15:18)     HPI:  pt. is a 69 y/o female with h/o HTN, Renal artery stenosis, TIA's and Placement of a left cerebral artery stent in 2014 ( done in [place] ) and on chronic Aspirin / Plavix ( as per pt. she is on 150 mg of plavix ) since then,  COPD on chronic home O2.  RA and Osteopenia, HLD and S/p Cardiac arrest and placement of PPM about 2016, No h/o cardiac stents came to the ER  as she developed sudden onset of foul smelling diarrhea associated with  painless rectal  bleed, BRB with clots, several episodes for past 2 days, no n/v. no hematemesis, no hemoptysis no dizziness, no syncopy, no cp, no HA, no sob. no cough, no fever. no active rectal bleed or diarrhea at the time of admission. no recent antibiotic use reported, no recent travel, no sick contact. never had colonoscopy in the past.   As per pt. she is on methotrexate for her RA by mouth weekly and last dose was 2 days ago. (20 Oct 2020 22:14)    The patient was seen and evaluated post EGD- aware of results- drinking prep for colnoscopy  The patient is in no acute distress.  Denied any fever chest pain, palpitations, shortness of breath, abdominal pain, fever, dysuria, cough, edema       I&O's Summary    Allergies    No Known Allergies    Intolerances      HEALTH ISSUES - PROBLEM Dx:  Pacemaker  Pacemaker    History of COPD  History of COPD    H/O cerebral artery stenosis  H/O cerebral artery stenosis    Essential hypertension  Essential hypertension    Rectal bleeding  Rectal bleeding          PAST MEDICAL & SURGICAL HISTORY:  H/O cerebral artery stenosis  left cerebral artery stent per history in 2014.    Pacemaker    HTN (hypertension)    COPD without exacerbation    Rheumatoid arthritis    H/O exploratory laparotomy    S/P cholecystectomy    S/P hysterectomy            Vital Signs Last 24 Hrs  T(C): 36.6 (22 Oct 2020 15:43), Max: 36.8 (22 Oct 2020 04:25)  T(F): 97.9 (22 Oct 2020 15:43), Max: 98.2 (22 Oct 2020 04:25)  HR: 85 (22 Oct 2020 15:43) (70 - 86)  BP: 125/74 (22 Oct 2020 15:43) (101/64 - 144/82)  BP(mean): --  RR: 18 (22 Oct 2020 15:31) (18 - 20)  SpO2: 99% (22 Oct 2020 15:31) (98% - 100%)T(C): 36.6 (10-22-20 @ 15:43), Max: 36.8 (10-22-20 @ 04:25)  HR: 85 (10-22-20 @ 15:43) (70 - 86)  BP: 125/74 (10-22-20 @ 15:43) (101/64 - 144/82)  RR: 18 (10-22-20 @ 15:31) (18 - 20)  SpO2: 99% (10-22-20 @ 15:31) (98% - 100%)  Wt(kg): --    PHYSICAL EXAM:    GENERAL: NAD, well-groomed, well-developed  HEAD:  Atraumatic, Normocephalic  EYES: EOMI, PERRL  NERVOUS SYSTEM:  Alert & Oriented X3,  Moves upper and lower extremities; CNS-II-XII  CHEST/LUNG: Clear to auscultation bilaterally; No rales, rhonchi, wheezing,   HEART: Regular rate and rhythm; No murmurs,   ABDOMEN: Soft, Nontender, Nondistended; Bowel sounds present  EXTREMITIES:  Peripheral Pulses, No  cyanosis, or edema  psychiatry- mood and affect approprite, Insight and judgement intact     ALBUTerol    90 MICROgram(s) HFA Inhaler 2 Puff(s) Inhalation every 6 hours PRN  atorvastatin 80 milliGRAM(s) Oral at bedtime  carvedilol 12.5 milliGRAM(s) Oral every 12 hours  lidocaine   Patch 1 Patch Transdermal daily  ondansetron Injectable 4 milliGRAM(s) IV Push every 6 hours PRN  pantoprazole  Injectable 40 milliGRAM(s) IV Push two times a day  polyethylene glycol/electrolyte Solution. 4000 milliLiter(s) Oral once  sodium chloride 0.9%. 1000 milliLiter(s) IV Continuous <Continuous>      LABS:                          7.0    7.10  )-----------( 233      ( 22 Oct 2020 07:59 )             22.2     10-22    141  |  109<H>  |  23.0<H>  ----------------------------<  91  4.1   |  21.0<L>  |  0.82    Ca    8.4<L>      22 Oct 2020 07:59  Phos  3.5     10-22  Mg     1.7     10-22                CAPILLARY BLOOD GLUCOSE          RADIOLOGY & ADDITIONAL TESTS:      Consultant notes reviewed    Case discussed with consultant/provider/ family /patient

## 2020-10-22 NOTE — PROGRESS NOTE ADULT - SUBJECTIVE AND OBJECTIVE BOX
[place] [facility]                                                               Office: [address] [address]                                                              Telephone: [phone]. Fax:[phone]                                                                             PROGRESS NOTE  Reason for follow up: PSVT  Overnight: No new events.   Update: reports still passing clots through rectum and Hb dropped 7.0, CTA no active bleeding source, underwent EGD found with hiatal hernia and no upper GI bleeding source; pending bowel prep for colonoscopy tomorrow; short episode of PSVT asymptomatic.       Review of symptoms:   Cardiac:  No chest pain. No dyspnea. No palpitations.  Respiratory: No cough. No dyspnea  Gastrointestinal: No diarrhea. No abdominal pain. No bleeding.     Past medical history: No updates.   	  Vital Signs Last 24 Hrs  T(C): 36.4 (10-22-20 @ 14:40), Max: 36.8 (10-22-20 @ 04:25)  T(F): 97.6 (10-22-20 @ 14:40), Max: 98.2 (10-22-20 @ 04:25)  HR: 86 (10-22-20 @ 14:40) (70 - 95)  BP: 144/82 (10-22-20 @ 14:40) (101/64 - 144/82)  BP(mean): --  RR: 18 (10-22-20 @ 14:40) (18 - 20)  SpO2: 98% (10-22-20 @ 14:40) (94% - 100%)  I&O's Summary        PHYSICAL EXAM:  Appearance: Comfortable. No acute distress  HEENT:  Head and neck: Atraumatic. Normocephalic.  Normal oral mucosa, PERRL, Neck is supple. No JVD, No carotid bruit.   Neurologic: A&Ox 3, no focal deficits. EOMI, Cranial nerves are intact.  Lymphatic: No cervical lymphadenopathy  Cardiovascular: Normal S1 S2, No murmur, rubs/gallops. No JVD, No edema; L-PPM site intact  Respiratory: Lungs clear to auscultation  Gastrointestinal:  Soft, Non-tender, + BS  Lower Extremities: No edema  Psychiatry: Patient is calm. No agitation. Mood & affect appropriate  Skin: No rashes/ecchymoses/cyanosis/ulcers visualized on the face, hands or feet.      CURRENT MEDICATIONS:  MEDICATIONS  (STANDING):  atorvastatin 80 milliGRAM(s) Oral at bedtime  carvedilol 12.5 milliGRAM(s) Oral every 12 hours  lidocaine   Patch 1 Patch Transdermal daily  pantoprazole  Injectable 40 milliGRAM(s) IV Push two times a day  polyethylene glycol/electrolyte Solution. 4000 milliLiter(s) Oral once  sodium chloride 0.9%. 1000 milliLiter(s) (40 mL/Hr) IV Continuous <Continuous>    MEDICATIONS  (PRN):  ALBUTerol    90 MICROgram(s) HFA Inhaler 2 Puff(s) Inhalation every 6 hours PRN Shortness of Breath and/or Wheezing  ondansetron Injectable 4 milliGRAM(s) IV Push every 6 hours PRN Nausea and/or Vomiting      DIAGNOSTIC TESTING:  [ ] Echocardiogram:   [ ]  Catheterization:  [ ] Stress Test:      Labs:                        7.0    7.10  )-----------( 233      ( 22 Oct 2020 07:59 )             22.2     10-22    141  |  109<H>  |  23.0<H>  ----------------------------<  91  4.1   |  21.0<L>  |  0.82    Ca    8.4<L>      22 Oct 2020 07:59  Phos  3.5     10-22  Mg     1.7     10-22       20 Oct 2020 14:42 Troponin <0.01 ng/mL / Creatine Kinase x     /  CKMB x     / CPK Mass Assay % x          TELEMETRY: sinus rhythm, PSVT 8-12 beats

## 2020-10-22 NOTE — PROGRESS NOTE ADULT - ASSESSMENT
The patient is a 70y Female who is followed by neurology because she needs neurology clearance for colonoscopy    Intracranial stenosis and stent  placed in 2014 in [place]  followed locally by Dr Willard at [place]  Was told that she is at high risk of stent thrombosis if off DAPT  Is off now because of LGIB with drop in Hgb and need for RBC transfusion    She is cleared for colonoscopy, should be done ASAP to find and treat source of bleeding so ASA/plavix can be restarted  Her blood pressure should not be allowed to drop during the procedure, hypotension will possibly increase risk of CVA  She should remain on atorvastatin 80 mg daily and resume ASA 81/plavix 150 ASAP in order to prevent stent thrombosis and stroke    will follow with you    Kenroy Brownlee MD PhD   [id number]

## 2020-10-22 NOTE — PROGRESS NOTE ADULT - ASSESSMENT
70F h/o HTN, HLD, recurrent TIA, bilateral vertebral artery stenosis (L-occluded, R-stented 2014 remains on DAPT with clopidogrel 150mg per outside neurologist at [place]), bradyarrthymic cardiac arrest after C (in [place]) s/p Medtronic PPM in 2016, renal artery stenosis and COPD (home oxygen) s/p recently hospitalized at [facility] 3 months ago for PE rule out and Ex-Lap for SBO, had TTE done reported hyperdynamic LV systolic function, planning for outpatient endoscopies/colonoscopies, p/w foul smelling diarrhea and bright red blood per rectum, Hb 6.7, cardiology consulted for preprocedural cardiac risk evaluation.     Denies cardiac complains, no unstable cardiac issues for the overall low risk procedure, no further cardiac testing indicated for now.     1. Acute blood loss anemia- proceed with colonoscopy without cardiac contraindication. Replete Hb >7; Aspirin/clopidogrel been on hold, need to resume clopidogrel as soon as possible per neurology given prior vertebral artery stent with unilateral occlusion      2. h/o PPM- not pacing on monitor, device interrogation with dual chamber device with episodes of PSVT and noted on telemetry as well.     3. HTN- continue carvedilol 12.5mg BID give also with episodes of PSVT.     4. HLD/TIA- continue high dose atorvastatin 80mg.    Siddharth Lee DO, [facility]  Faculty Non-Invasive Cardiologist

## 2020-10-22 NOTE — PROGRESS NOTE ADULT - SUBJECTIVE AND OBJECTIVE BOX
[facility]                                     Neurology at [place]                                 Torrie Baker, & Rambo                                  [address]                                        [place], [place], [address]                                             [phone]    CC:  HPI:  The patient is a 70y Female who presented with LGIB, s/p pRBC transfusion.  She had a stent placed in her cerebral vasculature in 2014, I assume on right ICA or MCA as it was done for recurrent left sided weakness/TIAs and what sounds like severe intracranial stenosis.  She has been on ASA 81 and plavix 150 since.  She says she was told that she needs to be on higher dose of plavix by Dr Willard at [place] because at 75 mg daily she had signs of stent thrombosis.  She needs a colonsocopy to determine the cause of bleeding and neurology is asked to provide clearance    Interval history: no new neuro problems    Review of systems (neurology): Denies headache or dizziness. Denies weakness/numbness.  Denies speech/language deficits. Denies diplopia/blurred vision.  Denies confusion    MEDICATIONS  (STANDING):  atorvastatin 80 milliGRAM(s) Oral at bedtime  carvedilol 12.5 milliGRAM(s) Oral every 12 hours  lidocaine   Patch 1 Patch Transdermal daily  pantoprazole  Injectable 40 milliGRAM(s) IV Push two times a day  polyethylene glycol/electrolyte Solution. 4000 milliLiter(s) Oral once  sodium chloride 0.9%. 1000 milliLiter(s) (40 mL/Hr) IV Continuous <Continuous>    MEDICATIONS  (PRN):  ALBUTerol    90 MICROgram(s) HFA Inhaler 2 Puff(s) Inhalation every 6 hours PRN Shortness of Breath and/or Wheezing  ondansetron Injectable 4 milliGRAM(s) IV Push every 6 hours PRN Nausea and/or Vomiting      Vital Signs Last 24 Hrs  T(C): 36.8 (22 Oct 2020 16:51), Max: 36.8 (22 Oct 2020 04:25)  T(F): 98.2 (22 Oct 2020 16:51), Max: 98.2 (22 Oct 2020 04:25)  HR: 76 (22 Oct 2020 16:51) (70 - 86)  BP: 136/84 (22 Oct 2020 16:51) (101/64 - 144/82)  BP(mean): --  RR: 18 (22 Oct 2020 16:51) (18 - 20)  SpO2: 99% (22 Oct 2020 16:51) (98% - 100%)      Detailed Neurologic Exam:    Mental status: The patient is awake and alert and has normal attention span.  The patient is fully oriented in 3 spheres. The patient is oriented to current events. The patient is able to name objects, follow commands, repeat sentences.    Cranial nerves: Pupils equal and react symmetrically to light. There is no visual field deficit to confrontation. Extraocular motion is full with no nystagmus. There is no ptosis. Facial sensation is intact. Facial musculature is symmetric. Palate elevates symmetrically. Shoulder shrug is normal. Tongue is midline.    Motor: There is normal bulk and tone.  There is no tremor.  Strength is 5/5 in the right arm and leg.   Strength is 5/5 in the left arm and leg.    Sensation: Intact to light touch and pin in 4 extremities    Reflexes: 2+ throughout and plantar responses are flexor.    Cerebellar: There is no dysmetria on finger to nose testing.    Gait : deferred    LABS:                           7.0    7.10  )-----------( 233      ( 22 Oct 2020 07:59 )             22.2     10-22    141  |  109<H>  |  23.0<H>  ----------------------------<  91  4.1   |  21.0<L>  |  0.82    Ca    8.4<L>      22 Oct 2020 07:59  Phos  3.5     10-22  Mg     1.7     10-22        RADIOLOGY & ADDITIONAL STUDIES (independently reviewed unless otherwise noted):  no neuro studies

## 2020-10-22 NOTE — PROGRESS NOTE ADULT - ASSESSMENT
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     Denies cardiac complains, no unstable cardiac issues for the overall low risk procedure, no further cardiac testing indicated for now.     1. Acute blood loss anemia- GI endoscopies- showed gastritis and Hiatal hernia - colonoscopy in AM     NO  cardiac contraindication. Replete Hb >8; Aspirin/clopidogrel been on hold,      2. h/o PPM- not pacing on monitor, device interrogation with dual chamber device with 4 episodes of SVT longest 2 minutes yesterday .     3. HTN- continue carvedilol 12.5mg BID give also with episodes of SVT.     4. HLD/TIA- continue high dose atorvastatin 80mg.    Rectal bleeding.  Plan: Diverticular bleed or UGI source but no active bleed on CTA.  No Plavix for 48 hours so far.   diet and therapy. Will need EGD/colonoscopy but need cardiac, pulmonary and neuro clearance prior. Monitor H/H.

## 2020-10-22 NOTE — BRIEF OPERATIVE NOTE - COMMENTS
A/P    Mild antral gastritis , hiatal hernia. No bleeding noted    Hgb 7 . needs 2 units PRBC     Will do colonoscopy in am

## 2020-10-22 NOTE — BRIEF OPERATIVE NOTE - NSICDXBRIEFPREOP_GEN_ALL_CORE_FT
PRE-OP DIAGNOSIS:  Acute blood loss anemia 22-Oct-2020 10:12:42  Shanon Tucker  Rectal bleeding 22-Oct-2020 10:12:33  Shanon Tucker

## 2020-10-23 ENCOUNTER — RESULT REVIEW (OUTPATIENT)
Age: 70
End: 2020-10-23

## 2020-10-23 DIAGNOSIS — J44.9 CHRONIC OBSTRUCTIVE PULMONARY DISEASE, UNSPECIFIED: ICD-10-CM

## 2020-10-23 LAB
CULTURE RESULTS: SIGNIFICANT CHANGE UP
HCT VFR BLD CALC: 30.3 % — LOW (ref 34.5–45)
HGB BLD-MCNC: 10 G/DL — LOW (ref 11.5–15.5)
SPECIMEN SOURCE: SIGNIFICANT CHANGE UP

## 2020-10-23 PROCEDURE — 99232 SBSQ HOSP IP/OBS MODERATE 35: CPT

## 2020-10-23 PROCEDURE — 99233 SBSQ HOSP IP/OBS HIGH 50: CPT

## 2020-10-23 PROCEDURE — 45385 COLONOSCOPY W/LESION REMOVAL: CPT

## 2020-10-23 PROCEDURE — 88305 TISSUE EXAM BY PATHOLOGIST: CPT | Mod: 26

## 2020-10-23 RX ORDER — ASPIRIN/CALCIUM CARB/MAGNESIUM 324 MG
1 TABLET ORAL
Qty: 0 | Refills: 0 | DISCHARGE

## 2020-10-23 RX ORDER — ATORVASTATIN CALCIUM 80 MG/1
1 TABLET, FILM COATED ORAL
Qty: 30 | Refills: 0
Start: 2020-10-23 | End: 2020-11-21

## 2020-10-23 RX ORDER — PANTOPRAZOLE SODIUM 20 MG/1
40 TABLET, DELAYED RELEASE ORAL
Refills: 0 | Status: DISCONTINUED | OUTPATIENT
Start: 2020-10-23 | End: 2020-10-24

## 2020-10-23 RX ORDER — LIDOCAINE 4 G/100G
1 CREAM TOPICAL
Qty: 10 | Refills: 0
Start: 2020-10-23 | End: 2020-11-01

## 2020-10-23 RX ORDER — CLOPIDOGREL BISULFATE 75 MG/1
0 TABLET, FILM COATED ORAL
Qty: 0 | Refills: 0 | DISCHARGE

## 2020-10-23 RX ORDER — ISOSORBIDE MONONITRATE 60 MG/1
0 TABLET, EXTENDED RELEASE ORAL
Qty: 0 | Refills: 0 | DISCHARGE

## 2020-10-23 RX ADMIN — ATORVASTATIN CALCIUM 80 MILLIGRAM(S): 80 TABLET, FILM COATED ORAL at 21:48

## 2020-10-23 RX ADMIN — LIDOCAINE 1 PATCH: 4 CREAM TOPICAL at 04:14

## 2020-10-23 RX ADMIN — PANTOPRAZOLE SODIUM 40 MILLIGRAM(S): 20 TABLET, DELAYED RELEASE ORAL at 18:14

## 2020-10-23 RX ADMIN — LIDOCAINE 1 PATCH: 4 CREAM TOPICAL at 19:58

## 2020-10-23 RX ADMIN — CARVEDILOL PHOSPHATE 12.5 MILLIGRAM(S): 80 CAPSULE, EXTENDED RELEASE ORAL at 05:33

## 2020-10-23 RX ADMIN — PANTOPRAZOLE SODIUM 40 MILLIGRAM(S): 20 TABLET, DELAYED RELEASE ORAL at 05:33

## 2020-10-23 RX ADMIN — LIDOCAINE 1 PATCH: 4 CREAM TOPICAL at 13:06

## 2020-10-23 NOTE — PROGRESS NOTE ADULT - SUBJECTIVE AND OBJECTIVE BOX
PULMONARY PROGRESS NOTE      JAVED CHINMARIYA-[id number]    Patient is a 70y old  Female who presents with a chief complaint of GI bleed (22 Oct 2020 15:18)      INTERVAL HPI/OVERNIGHT EVENTS:    MEDICATIONS  (STANDING):  atorvastatin 80 milliGRAM(s) Oral at bedtime  carvedilol 12.5 milliGRAM(s) Oral every 12 hours  lidocaine   Patch 1 Patch Transdermal daily  pantoprazole  Injectable 40 milliGRAM(s) IV Push two times a day  sodium chloride 0.9%. 1000 milliLiter(s) (40 mL/Hr) IV Continuous <Continuous>      MEDICATIONS  (PRN):  ALBUTerol    90 MICROgram(s) HFA Inhaler 2 Puff(s) Inhalation every 6 hours PRN Shortness of Breath and/or Wheezing  ondansetron Injectable 4 milliGRAM(s) IV Push every 6 hours PRN Nausea and/or Vomiting      Allergies    No Known Allergies    Intolerances        PAST MEDICAL & SURGICAL HISTORY:  H/O cerebral artery stenosis  left cerebral artery stent per history in 2014.    Pacemaker    HTN (hypertension)    COPD without exacerbation    Rheumatoid arthritis    H/O exploratory laparotomy    S/P cholecystectomy    S/P hysterectomy        SOCIAL HISTORY  Smoking History:       REVIEW OF SYSTEMS:    CONSTITUTIONAL:  No distress    HEENT:  Eyes:  No diplopia or blurred vision. ENT:  No earache, sore throat or runny nose.    CARDIOVASCULAR:  No pressure, squeezing, tightness, heaviness or aching about the chest; no palpitations.    RESPIRATORY:  No cough, shortness of breath, PND or orthopnea. Mild SOBOE    GASTROINTESTINAL:  No nausea, vomiting or diarrhea.    GENITOURINARY:  No dysuria, frequency or urgency.    MUSCULOSKELETAL:  No joint pain    SKIN:  No new lesions.    NEUROLOGIC:  No paresthesias, fasciculations, seizures or weakness.    PSYCHIATRIC:  No disorder of thought or mood.    ENDOCRINE:  No heat or cold intolerance, polyuria or polydipsia.    HEMATOLOGICAL:  No easy bruising or bleeding.     Vital Signs Last 24 Hrs  T(C): 36.7 (23 Oct 2020 15:20), Max: 36.8 (22 Oct 2020 16:51)  T(F): 98 (23 Oct 2020 15:20), Max: 98.2 (22 Oct 2020 16:51)  HR: 80 (23 Oct 2020 15:20) (61 - 87)  BP: 129/76 (23 Oct 2020 15:20) (93/54 - 142/83)  BP(mean): --  RR: 18 (23 Oct 2020 07:30) (18 - 18)  SpO2: 99% (23 Oct 2020 07:30) (99% - 100%)    PHYSICAL EXAMINATION:    GENERAL: The patient is awake and alert in no apparent distress.     HEENT: Head is normocephalic and atraumatic. Extraocular muscles are intact. Mucous membranes are moist.    NECK: Supple.    LUNGS: Clear to auscultation without wheezing, rales or rhonchi; respirations unlabored    HEART: Regular rate and rhythm without murmur.    ABDOMEN: Soft, nontender, and nondistended.      EXTREMITIES: Without any cyanosis, clubbing, rash, lesions or edema.    NEUROLOGIC: Grossly intact.    SKIN: No ulceration or induration present.      LABS:                        10.0   x     )-----------( x        ( 23 Oct 2020 01:10 )             30.3     10-22    141  |  109<H>  |  23.0<H>  ----------------------------<  91  4.1   |  21.0<L>  |  0.82    Ca    8.4<L>      22 Oct 2020 07:59  Phos  3.5     10-22  Mg     1.7     10-22                          MICROBIOLOGY:    RADIOLOGY & ADDITIONAL STUDIES:

## 2020-10-23 NOTE — PROGRESS NOTE ADULT - ASSESSMENT
70F PMH HTN, renal artery stenosis, TIA s/p L cerebral artery stent (2014) on ASA/Plavix, COPD on 2-3L home O2 who presents with BRBPR, pulmonary consulted for clearance.   She had her colonoscopy which was uncomplicated from a pulmonary standpoint    Recommend continue drug nebs with duoneb until discharge  Resume Breo at home and f/u by primary pulmonary physician

## 2020-10-23 NOTE — PROGRESS NOTE ADULT - ASSESSMENT
70F h/o HTN, HLD, recurrent TIA, bilateral vertebral artery stenosis (L-occluded, R-stented 2014 remains on DAPT with clopidogrel 150mg per outside neurologist at [place]), bradyarrhythmic cardiac arrest after C (in [place]) s/p Medtronic PPM in 2016, renal artery stenosis and COPD (home oxygen) s/p recently hospitalized at [facility] 3 months ago for PE rule out and Ex-Lap for SBO, had TTE done reported hyperdynamic LV systolic function, planning for outpatient endoscopies/colonoscopies, p/w foul smelling diarrhea and bright red blood per rectum, Hb 6.7, cardiology consulted for pre procedural cardiac risk evaluation.     Denies cardiac complains, no unstable cardiac issues for the overall low risk procedure, no further cardiac testing indicated for now.     1. Acute blood loss anemia- GI endoscopies- showed gastritis and Hiatal hernia - colonoscopy -full of blood suctioned blood- as per Dr anderson- possible Dc in AM ,restart Motrin on Sunday and Plavix in one week on Friday     2. h/o PPM- not pacing on monitor, device interrogation with dual chamber device with 4 episodes of SVT longest 2 minutes yesterday .     3. HTN- continue carvedilol 12.5mg BID give also with episodes of SVT.     4. HLD/TIA- continue high dose atorvastatin 80mg.    Rectal bleeding.  Plan: Diverticular bleed

## 2020-10-23 NOTE — BRIEF OPERATIVE NOTE - OPERATION/FINDINGS
Old blood throughout the entire colon.  Scope passed to the cecum.  TI never entered.  No active bleeding.  Descending colon sessile 1 cm polyp removed with hot snare and recovered.  Sigmoid colon 1.5 cm pedunculated polyp removed with hot snare and recovered.  Both sites clean/ dry.  Left sided diverticulosis,  no AVM's or colitis or hemorrhoids.

## 2020-10-23 NOTE — PROGRESS NOTE ADULT - SUBJECTIVE AND OBJECTIVE BOX
JAVED CHIN Patient is a 70y old  Female who presents with a chief complaint of GI bleed (22 Oct 2020 15:18)     HPI:  pt. is a 69 y/o female with h/o HTN, Renal artery stenosis, TIA's and Placement of a left cerebral artery stent in 2014 ( done in [place] ) and on chronic Aspirin / Plavix ( as per pt. she is on 150 mg of plavix ) since then,  COPD on chronic home O2.  RA and Osteopenia, HLD and S/p Cardiac arrest and placement of PPM about 2016, No h/o cardiac stents came to the ER  as she developed sudden onset of foul smelling diarrhea associated with  painless rectal  bleed, BRB with clots, several episodes for past 2 days, no n/v. no hematemesis, no hemoptysis no dizziness, no syncopy, no cp, no HA, no sob. no cough, no fever. no active rectal bleed or diarrhea at the time of admission. no recent antibiotic use reported, no recent travel, no sick contact. never had colonoscopy in the past.   As per pt. she is on methotrexate for her RA by mouth weekly and last dose was 2 days ago. (20 Oct 2020 22:14)    The patient was seen and evaluated   The patient is in no acute distress.  Denied any fever chest pain, palpitations, shortness of breath, abdominal pain, fever, dysuria, cough, edema   Complains of     I&O's Summary    Allergies    No Known Allergies    Intolerances      HEALTH ISSUES - PROBLEM Dx:  Pacemaker  Pacemaker    History of COPD  History of COPD    H/O cerebral artery stenosis  H/O cerebral artery stenosis    Essential hypertension  Essential hypertension    Rectal bleeding  Rectal bleeding          PAST MEDICAL & SURGICAL HISTORY:  H/O cerebral artery stenosis  left cerebral artery stent per history in 2014.    Pacemaker    HTN (hypertension)    COPD without exacerbation    Rheumatoid arthritis    H/O exploratory laparotomy    S/P cholecystectomy    S/P hysterectomy            Vital Signs Last 24 Hrs  T(C): 36.6 (23 Oct 2020 07:30), Max: 36.8 (22 Oct 2020 16:51)  T(F): 97.8 (23 Oct 2020 07:30), Max: 98.2 (22 Oct 2020 16:51)  HR: 61 (23 Oct 2020 07:30) (61 - 87)  BP: 106/62 (23 Oct 2020 08:17) (93/54 - 142/83)  BP(mean): --  RR: 18 (23 Oct 2020 07:30) (18 - 18)  SpO2: 99% (23 Oct 2020 07:30) (99% - 100%)T(C): 36.6 (10-23-20 @ 07:30), Max: 36.8 (10-22-20 @ 16:51)  HR: 61 (10-23-20 @ 07:30) (61 - 87)  BP: 106/62 (10-23-20 @ 08:17) (93/54 - 142/83)  RR: 18 (10-23-20 @ 07:30) (18 - 18)  SpO2: 99% (10-23-20 @ 07:30) (99% - 100%)  Wt(kg): --    PHYSICAL EXAM:    GENERAL: NAD,tired post colonoscopy   HEAD:  Atraumatic, Normocephalic  EYES: EOMI, PERRL  NERVOUS SYSTEM:  Alert & Oriented X3,  Moves upper and lower extremities; CNS-II-XII  CHEST/LUNG: Clear to auscultation bilaterally; No rales, rhonchi, wheezing,   HEART: Regular rate and rhythm; No murmurs,   ABDOMEN: Soft, Nontender, Nondistended; Bowel sounds present  EXTREMITIES:  Peripheral Pulses, No  cyanosis, or edema  psychiatry- mood and affect approprite, Insight and judgement intact     ALBUTerol    90 MICROgram(s) HFA Inhaler 2 Puff(s) Inhalation every 6 hours PRN  atorvastatin 80 milliGRAM(s) Oral at bedtime  carvedilol 12.5 milliGRAM(s) Oral every 12 hours  lidocaine   Patch 1 Patch Transdermal daily  ondansetron Injectable 4 milliGRAM(s) IV Push every 6 hours PRN  pantoprazole  Injectable 40 milliGRAM(s) IV Push two times a day  sodium chloride 0.9%. 1000 milliLiter(s) IV Continuous <Continuous>      LABS:                          10.0   x     )-----------( x        ( 23 Oct 2020 01:10 )             30.3     10-22    141  |  109<H>  |  23.0<H>  ----------------------------<  91  4.1   |  21.0<L>  |  0.82    Ca    8.4<L>      22 Oct 2020 07:59  Phos  3.5     10-22  Mg     1.7     10-22                CAPILLARY BLOOD GLUCOSE          RADIOLOGY & ADDITIONAL TESTS:      Consultant notes reviewed    Case discussed with consultant/provider/ family /patient

## 2020-10-23 NOTE — BRIEF OPERATIVE NOTE - COMMENTS
Stable.  Tolerated well.  Hold DAPT x 1 week.  Check path;  Transfuse to maintain hgb at 8.  High fiber diet.

## 2020-10-23 NOTE — BRIEF OPERATIVE NOTE - NSICDXBRIEFPOSTOP_GEN_ALL_CORE_FT
POST-OP DIAGNOSIS:  Polyp of descending colon 23-Oct-2020 11:20:41  Bud Loera  Polyp, sigmoid colon 23-Oct-2020 11:20:30  Bud Loera  Diverticulosis of colon 23-Oct-2020 11:20:14  Bud Loera

## 2020-10-24 ENCOUNTER — TRANSCRIPTION ENCOUNTER (OUTPATIENT)
Age: 70
End: 2020-10-24

## 2020-10-24 VITALS
SYSTOLIC BLOOD PRESSURE: 115 MMHG | DIASTOLIC BLOOD PRESSURE: 70 MMHG | TEMPERATURE: 97 F | OXYGEN SATURATION: 96 % | HEART RATE: 80 BPM | RESPIRATION RATE: 18 BRPM

## 2020-10-24 PROCEDURE — 99232 SBSQ HOSP IP/OBS MODERATE 35: CPT

## 2020-10-24 PROCEDURE — 85730 THROMBOPLASTIN TIME PARTIAL: CPT

## 2020-10-24 PROCEDURE — 86901 BLOOD TYPING SEROLOGIC RH(D): CPT

## 2020-10-24 PROCEDURE — 86900 BLOOD TYPING SEROLOGIC ABO: CPT

## 2020-10-24 PROCEDURE — 99285 EMERGENCY DEPT VISIT HI MDM: CPT | Mod: 25

## 2020-10-24 PROCEDURE — 86803 HEPATITIS C AB TEST: CPT

## 2020-10-24 PROCEDURE — 96361 HYDRATE IV INFUSION ADD-ON: CPT

## 2020-10-24 PROCEDURE — 84100 ASSAY OF PHOSPHORUS: CPT

## 2020-10-24 PROCEDURE — P9016: CPT

## 2020-10-24 PROCEDURE — P9040: CPT

## 2020-10-24 PROCEDURE — 86850 RBC ANTIBODY SCREEN: CPT

## 2020-10-24 PROCEDURE — U0003: CPT

## 2020-10-24 PROCEDURE — 80053 COMPREHEN METABOLIC PANEL: CPT

## 2020-10-24 PROCEDURE — 87177 OVA AND PARASITES SMEARS: CPT

## 2020-10-24 PROCEDURE — 83605 ASSAY OF LACTIC ACID: CPT

## 2020-10-24 PROCEDURE — 99239 HOSP IP/OBS DSCHRG MGMT >30: CPT

## 2020-10-24 PROCEDURE — 85018 HEMOGLOBIN: CPT

## 2020-10-24 PROCEDURE — 86769 SARS-COV-2 COVID-19 ANTIBODY: CPT

## 2020-10-24 PROCEDURE — 74174 CTA ABD&PLVS W/CONTRAST: CPT

## 2020-10-24 PROCEDURE — 83735 ASSAY OF MAGNESIUM: CPT

## 2020-10-24 PROCEDURE — 87507 IADNA-DNA/RNA PROBE TQ 12-25: CPT

## 2020-10-24 PROCEDURE — 85014 HEMATOCRIT: CPT

## 2020-10-24 PROCEDURE — 80048 BASIC METABOLIC PNL TOTAL CA: CPT

## 2020-10-24 PROCEDURE — 96374 THER/PROPH/DIAG INJ IV PUSH: CPT

## 2020-10-24 PROCEDURE — 86923 COMPATIBILITY TEST ELECTRIC: CPT

## 2020-10-24 PROCEDURE — 84484 ASSAY OF TROPONIN QUANT: CPT

## 2020-10-24 PROCEDURE — 82272 OCCULT BLD FECES 1-3 TESTS: CPT

## 2020-10-24 PROCEDURE — 85610 PROTHROMBIN TIME: CPT

## 2020-10-24 PROCEDURE — 87045 FECES CULTURE AEROBIC BACT: CPT

## 2020-10-24 PROCEDURE — 71045 X-RAY EXAM CHEST 1 VIEW: CPT

## 2020-10-24 PROCEDURE — 36415 COLL VENOUS BLD VENIPUNCTURE: CPT

## 2020-10-24 PROCEDURE — 36430 TRANSFUSION BLD/BLD COMPNT: CPT

## 2020-10-24 PROCEDURE — 87046 STOOL CULTR AEROBIC BACT EA: CPT

## 2020-10-24 PROCEDURE — 99233 SBSQ HOSP IP/OBS HIGH 50: CPT

## 2020-10-24 PROCEDURE — 87493 C DIFF AMPLIFIED PROBE: CPT

## 2020-10-24 PROCEDURE — 93005 ELECTROCARDIOGRAM TRACING: CPT

## 2020-10-24 PROCEDURE — 88305 TISSUE EXAM BY PATHOLOGIST: CPT

## 2020-10-24 PROCEDURE — 85025 COMPLETE CBC W/AUTO DIFF WBC: CPT

## 2020-10-24 PROCEDURE — 96375 TX/PRO/DX INJ NEW DRUG ADDON: CPT

## 2020-10-24 PROCEDURE — 85027 COMPLETE CBC AUTOMATED: CPT

## 2020-10-24 RX ORDER — HYDRALAZINE HCL 50 MG
0 TABLET ORAL
Qty: 0 | Refills: 0 | DISCHARGE

## 2020-10-24 RX ORDER — OMEPRAZOLE 10 MG/1
0 CAPSULE, DELAYED RELEASE ORAL
Qty: 0 | Refills: 0 | DISCHARGE

## 2020-10-24 RX ORDER — PANTOPRAZOLE SODIUM 20 MG/1
1 TABLET, DELAYED RELEASE ORAL
Qty: 30 | Refills: 0
Start: 2020-10-24 | End: 2020-11-07

## 2020-10-24 RX ADMIN — CARVEDILOL PHOSPHATE 12.5 MILLIGRAM(S): 80 CAPSULE, EXTENDED RELEASE ORAL at 05:41

## 2020-10-24 RX ADMIN — LIDOCAINE 1 PATCH: 4 CREAM TOPICAL at 01:35

## 2020-10-24 RX ADMIN — PANTOPRAZOLE SODIUM 40 MILLIGRAM(S): 20 TABLET, DELAYED RELEASE ORAL at 05:41

## 2020-10-24 NOTE — DISCHARGE NOTE NURSING/CASE MANAGEMENT/SOCIAL WORK - PATIENT PORTAL LINK FT
You can access the FollowMyHealth Patient Portal offered by [facility] by registering at the following website: [email or web address]. By joining [employer]’s FollowMyHealth portal, you will also be able to view your health information using other applications (apps) compatible with our system.

## 2020-10-24 NOTE — PROGRESS NOTE ADULT - ASSESSMENT
The patient is a 70y Female who is followed by neurology because she needs neurology clearance for colonoscopy    Intracranial stenosis and stent  placed in 2014 in [place]  followed locally by Dr Willard at [place]  Was told that she is at high risk of stent thrombosis if off DAPT  Is off now because of LGIB with drop in Hgb and need for RBC transfusion    She is s/p colonoscopy with no clear active bleeding noted.  She should remain on atorvastatin 80 mg daily  She will need to restart ASA/plavix as soon as cleared by GI  She will need to follow up with Dr Willard for her repeat cerebral angiogram    She is neuro cleared to be d/c home    Thank you for allowing me to participate in the care of your patient    Kenroy Brownlee MD, PhD   [id number]

## 2020-10-24 NOTE — DISCHARGE NOTE PROVIDER - CARE PROVIDERS DIRECT ADDRESSES
,[email or web address],DirectAddress_Unknown,DirectAddress_Unknown

## 2020-10-24 NOTE — DISCHARGE NOTE PROVIDER - PROVIDER TOKENS
PROVIDER:[TOKEN:[[id number]]],FREE:[LAST:[PMD],PHONE:[(   )    -],FAX:[(   )    -],ADDRESS:[in 1 week, please call the office and get your appiotment]],FREE:[LAST:[Pulmonary and Cardiology office],PHONE:[(   )    -],FAX:[(   )    -],ADDRESS:[As scheduled]]

## 2020-10-24 NOTE — DISCHARGE NOTE PROVIDER - NSDCFUADDINST_GEN_ALL_CORE_FT
please show your XRay report to your primary medical doctor given to you and get your TSH level checked and CT scan of the chest to better under stand the situation.   your CT of the abdomen showed 2.2 cm indeterminate right adrenal nodule.  Characterization with abdominal MRI is suggested, she was given report to show to PCP.

## 2020-10-24 NOTE — DISCHARGE NOTE PROVIDER - NSDCMRMEDTOKEN_GEN_ALL_CORE_FT
albuterol:   Aspirin Enteric Coated 81 mg oral delayed release tablet: 1 tab(s) orally once a day restart on sunday in two days   atorvastatin 80 mg oral tablet: 1 tab(s) orally once a day (at bedtime)  Breo Ellipta 100 mcg-25 mcg/inh inhalation powder: 1 puff(s) inhaled once a day  Coreg 25 mg oral tablet: 1 tab(s) orally 2 times a day  DuoNeb 0.5 mg-2.5 mg/3 mL inhalation solution: 3 milliliter(s) inhaled 4 times a day, As Needed  ferrous sulfate 324 mg (65 mg elemental iron) oral delayed release tablet: 1 tab(s) orally 2 times a day  folic acid:   lidocaine 5% topical film: Apply topically to affected area once a day MDD:1  methotrexate:   pantoprazole 40 mg oral delayed release tablet: 1 tab(s) orally 2 times a day  Plavix: 75 milligram(s) orally once a day restart in one week  Vitamin C 100 mg oral tablet, chewable: 1 tab(s) orally once a day

## 2020-10-24 NOTE — DISCHARGE NOTE PROVIDER - NSDCCPCAREPLAN_GEN_ALL_CORE_FT
PRINCIPAL DISCHARGE DIAGNOSIS  Diagnosis: Gastrointestinal hemorrhage with melena  Assessment and Plan of Treatment:       SECONDARY DISCHARGE DIAGNOSES  Diagnosis: Acute blood loss anemia  Assessment and Plan of Treatment:     Diagnosis: Diverticulosis  Assessment and Plan of Treatment:

## 2020-10-24 NOTE — PROGRESS NOTE ADULT - PROBLEM SELECTOR PLAN 1
Resolved. Likely diverticular in etiology. OK for discharge home today from a GI standpoint with OPT f/u in our office in 2 to 3 weeks. Repeat colonoscopy in 3 to 5 years for continued colon polyp surveillance. Signing off. Reconsult in house as needed. Thank you. Resolved. Likely diverticular in etiology. OK for discharge home today from a GI standpoint with OPT f/u with Dr. Loera in our office in 2 to 3 weeks. Repeat colonoscopy in 3 to 5 years for continued colon polyp surveillance. Signing off. Reconsult in house as needed. Thank you.

## 2020-10-24 NOTE — DISCHARGE NOTE PROVIDER - CARE PROVIDER_API CALL
Bud Loera  GASTROENTEROLOGY  [address], [address]  [place] [address]  Phone: [phone]  Fax: [phone]  Follow Up Time:     PMD,   in 1 week, please call the office and get your appiotment  Phone: (   )    -  Fax: (   )    -  Follow Up Time:     Pulmonary and Cardiology office,   As scheduled  Phone: (   )    -  Fax: (   )    -  Follow Up Time:

## 2020-10-24 NOTE — DISCHARGE NOTE PROVIDER - HOSPITAL COURSE
70 F h/o HTN, HLD, recurrent TIA, bilateral vertebral artery stenosis (L-occluded, R-stented 2014 remains on DAPT with clopidogrel 150mg per outside neurologist at [place]), bradyarrhythmic cardiac arrest after LHC (in [place]) s/p Medtronic PPM in 2016, renal artery stenosis and COPD (home oxygen) s/p recently hospitalized at [facility] 3 months ago for PE rule out and Ex-Lap for SBO, had TTE done reported hyperdynamic LV systolic function, planning for outpatient endoscopies/colonoscopies, p/w foul smelling diarrhea and bright red blood per rectum, Hb 6.7, she was admitted under medicine and was typed and cross matched and was started on IV fluids and Protonix and was transfused with PRBCs, she got total of 4 units of PRBCs, her H7H monitored, her H&H improved, she was seen and followed by GI and she is s/p GI endoscopies- showed gastritis and Hiatal hernia and Colonoscopy showed old blood through out the colon,  desc Colon polyp;   2) Sigmoid colon polyp, polyps removed and sent for the biopsy, that will be followed by  GI team as out patient, she did not have mush of the bleeding over the course, GI recommended restart Motrin on Sunday and Plavix in one week on Friday, as per GI today  Likely diverticular in etiology, OK for discharge home today from a GI standpoint with OPT f/u with Dr. Loera in our office in 2 to 3 weeks. Repeat colonoscopy in 3 to 5 years for continued colon polyp surveillance.   Patient has Hx of  PPM- not pacing on monitor, device interrogation with dual chamber device with 4 episodes of SVT longest 2 minutes, as per Cardiology continue carvedilol 12.5mg BID give also with episodes of PSVT, patient was also continued with statins for Hx of HLD/TIA- continue high dose atorvastatin 80mg.  patient's CXR noted to have some deviation of trachea, as per patient she has Hx of thyroid nodular goiter and she follows with her Doctors and she is well aware of her situation, she was told to get her TSH level checked and CT scan of the chest to better under stand the situation.   hr previous CT of the abdomen showed 2.2 cm indeterminate right adrenal nodule.  Characterization with abdominal MRI is suggested, she was given report to show to PCP.     Patient is doing ok, her symptoms has been resolved, she is being discharged home in a stable condition.    Vital Signs Last 24 Hrs  T(C): 36.3 (24 Oct 2020 07:30), Max: 36.7 (23 Oct 2020 15:20)  T(F): 97.3 (24 Oct 2020 07:30), Max: 98 (23 Oct 2020 15:20)  HR: 80 (24 Oct 2020 07:30) (75 - 96)  BP: 115/70 (24 Oct 2020 07:30) (102/76 - 150/60)  RR: 18 (24 Oct 2020 07:30) (18 - 18)  SpO2: 96% (24 Oct 2020 07:30) (96% - 97%)    PHYSICAL EXAM:    GENERAL: Elderly female looking comfortable   HEENT: PERRL, +EOMI  NECK: soft, Supple, No JVD,   CHEST/LUNG: Clear to auscultate bilaterally; No wheezing  HEART: S1S2+, Regular rate and rhythm; No murmurs, rubs, or gallops  ABDOMEN: Soft, Nontender, Nondistended; Bowel sounds present  EXTREMITIES:  2+ Peripheral Pulses, No edema  SKIN: No rashes or lesions  NEURO: AAOX3, no focal deficits, no motor r sensory loss  PSYCH: normal mood    Total time spent 36minutes

## 2020-10-24 NOTE — PROGRESS NOTE ADULT - SUBJECTIVE AND OBJECTIVE BOX
Pt seen and examined. No recurrent hematochezia with a rise in her Hb to 10 grams this AM. She is s/p a colonoscopy with polypectomy of two sessile adenomatous polyps in the left colon yesterday. She had colonic diverticulosis and old blood in the colon and likely had a diverticular bleed, Tolerating solid food w/o recurrent hematochezia. No GI complaints presently.    REVIEW OF SYSTEMS:  Constitutional: No fever, weight loss or fatigue  Cardiovascular: No chest pain, palpitations, dizziness or leg swelling  Gastrointestinal: As noted above. No abdominal or epigastric pain. No nausea, vomiting or hematemesis; No diarrhea or constipation. No melena or hematochezia.  Skin: No itching, burning, rashes or lesions       MEDICATIONS:  MEDICATIONS  (STANDING):  atorvastatin 80 milliGRAM(s) Oral at bedtime  carvedilol 12.5 milliGRAM(s) Oral every 12 hours  lidocaine   Patch 1 Patch Transdermal daily  pantoprazole    Tablet 40 milliGRAM(s) Oral two times a day  sodium chloride 0.9%. 1000 milliLiter(s) (40 mL/Hr) IV Continuous <Continuous>    MEDICATIONS  (PRN):  ALBUTerol    90 MICROgram(s) HFA Inhaler 2 Puff(s) Inhalation every 6 hours PRN Shortness of Breath and/or Wheezing  ondansetron Injectable 4 milliGRAM(s) IV Push every 6 hours PRN Nausea and/or Vomiting      Allergies    No Known Allergies    Intolerances        Vital Signs Last 24 Hrs  T(C): 36.3 (24 Oct 2020 07:30), Max: 36.7 (23 Oct 2020 15:20)  T(F): 97.3 (24 Oct 2020 07:30), Max: 98 (23 Oct 2020 15:20)  HR: 80 (24 Oct 2020 07:30) (75 - 96)  BP: 115/70 (24 Oct 2020 07:30) (102/76 - 150/60)  BP(mean): --  RR: 18 (24 Oct 2020 07:30) (18 - 18)  SpO2: 96% (24 Oct 2020 07:30) (96% - 97%)      PHYSICAL EXAM:    General: Well developed; well nourished; in no acute distress  HEENT: MMM, conjunctiva pink and sclera anicteric.  Lungs: clear to auscultation bilaterally.  Cor: RRR S1, S2 only.  Gastrointestinal: Abdomen: Soft non-tender non-distended; Normal bowel sounds; No hepatosplenomegaly.  Extremities: no cyanosis, clubbing or edema.  Skin: Warm and dry. No obvious rash  Neuro: Pt. a + o x 3.    LABS:  CBC Full  -  ( 23 Oct 2020 01:10 )  WBC Count : x  RBC Count : x  Hemoglobin : 10.0 g/dL  Hematocrit : 30.3 %  Platelet Count - Automated : x  Mean Cell Volume : x  Mean Cell Hemoglobin : x  Mean Cell Hemoglobin Concentration : x  Auto Neutrophil # : x  Auto Lymphocyte # : x  Auto Monocyte # : x  Auto Eosinophil # : x  Auto Basophil # : x  Auto Neutrophil % : x  Auto Lymphocyte % : x  Auto Monocyte % : x  Auto Eosinophil % : x  Auto Basophil % : x                            RADIOLOGY & ADDITIONAL STUDIES (The following images were personally reviewed):

## 2020-10-24 NOTE — DISCHARGE NOTE NURSING/CASE MANAGEMENT/SOCIAL WORK - NSDCPEPTCAREGIVEDUMATLIST _GEN_ALL_CORE
I sent her a message on esolidar regarding lab results. All labs looked fine. We will know more after the ultrasound is completed. No need for her to come back sooner unless symptoms are worsening.    Influenza Vaccination

## 2020-10-24 NOTE — PROGRESS NOTE ADULT - SUBJECTIVE AND OBJECTIVE BOX
[facility]                                     Neurology at [place]                                 Torrie Baker, & Rambo                                  [address]                                        [place], [place], [address]                                             [phone]    CC:  HPI:  The patient is a 70y Female who presented with LGIB, s/p pRBC transfusion.  She had a stent placed in her cerebral vasculature in 2014, I assume on right ICA or MCA as it was done for recurrent left sided weakness/TIAs and what sounds like severe intracranial stenosis.  She has been on ASA 81 and plavix 150 since.  She says she was told that she needs to be on higher dose of plavix by Dr Willard at [facility] because at 75 mg daily she had signs of stent thrombosis.  She needs a colonsocopy to determine the cause of bleeding and neurology is asked to provide clearance    Interval history: no new neuro problems, feels well    Review of systems (neurology): Denies headache or dizziness. Denies weakness/numbness.  Denies speech/language deficits. Denies diplopia/blurred vision.  Denies confusion    MEDICATIONS  (STANDING):  atorvastatin 80 milliGRAM(s) Oral at bedtime  carvedilol 12.5 milliGRAM(s) Oral every 12 hours  lidocaine   Patch 1 Patch Transdermal daily  pantoprazole    Tablet 40 milliGRAM(s) Oral two times a day  sodium chloride 0.9%. 1000 milliLiter(s) (40 mL/Hr) IV Continuous <Continuous>    MEDICATIONS  (PRN):  ALBUTerol    90 MICROgram(s) HFA Inhaler 2 Puff(s) Inhalation every 6 hours PRN Shortness of Breath and/or Wheezing  ondansetron Injectable 4 milliGRAM(s) IV Push every 6 hours PRN Nausea and/or Vomiting      Vital Signs Last 24 Hrs  T(C): 36.3 (24 Oct 2020 07:30), Max: 36.7 (23 Oct 2020 15:20)  T(F): 97.3 (24 Oct 2020 07:30), Max: 98 (23 Oct 2020 15:20)  HR: 80 (24 Oct 2020 07:30) (75 - 96)  BP: 115/70 (24 Oct 2020 07:30) (102/76 - 150/60)  BP(mean): --  RR: 18 (24 Oct 2020 07:30) (18 - 18)  SpO2: 96% (24 Oct 2020 07:30) (96% - 97%)    Detailed Neurologic Exam:    Mental status: The patient is awake and alert and has normal attention span.  The patient is fully oriented in 3 spheres. The patient is oriented to current events. The patient is able to name objects, follow commands, repeat sentences.    Cranial nerves: Pupils equal and react symmetrically to light. There is no visual field deficit to confrontation. Extraocular motion is full with no nystagmus. There is no ptosis. Facial sensation is intact. Facial musculature is symmetric. Palate elevates symmetrically. Shoulder shrug is normal. Tongue is midline.    Motor: There is normal bulk and tone.  There is no tremor.  Strength is 5/5 in the right arm and leg.   Strength is 5/5 in the left arm and leg.    Sensation: Intact to light touch and pin in 4 extremities    Reflexes: 2+ throughout and plantar responses are flexor.    Cerebellar: There is no dysmetria on finger to nose testing.    Gait : deferred    LABS:                           10.0   x     )-----------( x        ( 23 Oct 2020 01:10 )             30.3       RADIOLOGY & ADDITIONAL STUDIES (independently reviewed unless otherwise noted):  no neuro studies

## 2020-10-26 PROBLEM — I10 ESSENTIAL (PRIMARY) HYPERTENSION: Chronic | Status: ACTIVE | Noted: 2020-10-20

## 2020-10-26 PROBLEM — J44.9 CHRONIC OBSTRUCTIVE PULMONARY DISEASE, UNSPECIFIED: Chronic | Status: ACTIVE | Noted: 2020-10-20

## 2020-10-26 PROBLEM — Z95.0 PRESENCE OF CARDIAC PACEMAKER: Chronic | Status: ACTIVE | Noted: 2020-10-20

## 2020-10-26 PROBLEM — M06.9 RHEUMATOID ARTHRITIS, UNSPECIFIED: Chronic | Status: ACTIVE | Noted: 2020-10-20

## 2020-10-26 PROBLEM — Z86.79 PERSONAL HISTORY OF OTHER DISEASES OF THE CIRCULATORY SYSTEM: Chronic | Status: ACTIVE | Noted: 2020-10-20

## 2020-10-27 ENCOUNTER — NON-APPOINTMENT (OUTPATIENT)
Age: 70
End: 2020-10-27

## 2020-10-27 LAB — SURGICAL PATHOLOGY STUDY: SIGNIFICANT CHANGE UP

## 2020-10-30 ENCOUNTER — APPOINTMENT (OUTPATIENT)
Dept: FAMILY MEDICINE | Facility: CLINIC | Age: 70
End: 2020-10-30
Payer: MEDICARE

## 2020-10-30 VITALS
BODY MASS INDEX: 25.46 KG/M2 | DIASTOLIC BLOOD PRESSURE: 74 MMHG | OXYGEN SATURATION: 98 % | SYSTOLIC BLOOD PRESSURE: 122 MMHG | TEMPERATURE: 97.6 F | HEIGHT: 68 IN | WEIGHT: 168 LBS | HEART RATE: 85 BPM

## 2020-10-30 DIAGNOSIS — E27.8 OTHER SPECIFIED DISORDERS OF ADRENAL GLAND: ICD-10-CM

## 2020-10-30 DIAGNOSIS — Z87.19 PERSONAL HISTORY OF OTHER DISEASES OF THE DIGESTIVE SYSTEM: ICD-10-CM

## 2020-10-30 PROCEDURE — 99496 TRANSJ CARE MGMT HIGH F2F 7D: CPT

## 2020-10-30 PROCEDURE — 99072 ADDL SUPL MATRL&STAF TM PHE: CPT

## 2020-10-30 NOTE — PLAN
[FreeTextEntry1] : - Blood work today \par - MRI ordered to evaluate adrenal nodule \par - Follow up with GI\par - Follow up with Neuro

## 2020-10-30 NOTE — END OF VISIT
[FreeTextEntry3] : Medical record entries made by the scribe today today, were at my direction and personally dictated to them by me, Dr. Sarah Gardner on Oct 30, 2020. I have reviewed the chart and agree that the record accurately reflects my personal performance of the history, physical exam, assessment, and plan.\par \par

## 2020-10-30 NOTE — HISTORY OF PRESENT ILLNESS
[Post-hospitalization from ___ Hospital] : Post-hospitalization from [unfilled] Hospital [Admitted on: ___] : The patient was admitted on [unfilled] [Discharged on ___] : discharged on [unfilled] [Discharge Summary] : discharge summary [Pertinent Labs] : pertinent labs [Radiology Findings] : radiology findings [Discharge Med List] : discharge medication list [Med Reconciliation] : medication reconciliation has been completed [Patient Contacted By: ____] : and contacted by [unfilled] [FreeTextEntry2] : JAVED is a 70 year female here for HFU. Pt was admitted to [facility] 10/20/20 due to rectal bleeding. Pt went by ambulance due to weakness. Evaluations included CT and colonoscopy which revealed GI bleed and 2 polyps which were removed in the process. Labs revealed HGB of 6. She was given 4 units of blood. Followed up with Pulmonology/Neurology in the hospital for evaluations. Pt is following up with GI  next week. Currently taking Plavix due to stent in head. Restarted it 10/25/20 with permission. Had some minor rectal bleeding that she was informed she would have for a few days. Denies melena currently. Reports that if she eats rich foods, she does experience diarrhea. Tries to eat bland in order to keep formed stools. Currently taking iron supplementation. When walking using 3L O2 instead of 2L. \par Follows up with Neurologist Dr. Jairo Limon. \par Pt reports LT axillary breast nodule, pending imaging.

## 2020-10-30 NOTE — ADDENDUM
[FreeTextEntry1] : I, Estella Weinberg acting as a scribe for Dr. Sarah Gardner on Oct 30, 2020  at 8:48 AM\par

## 2020-11-04 LAB
ALBUMIN SERPL ELPH-MCNC: 3.6 G/DL
ALP BLD-CCNC: 108 U/L
ALT SERPL-CCNC: 16 U/L
ANION GAP SERPL CALC-SCNC: 12 MMOL/L
AST SERPL-CCNC: 18 U/L
BASOPHILS # BLD AUTO: 0.03 K/UL
BASOPHILS NFR BLD AUTO: 0.5 %
BILIRUB SERPL-MCNC: <0.2 MG/DL
BUN SERPL-MCNC: 7 MG/DL
CALCIUM SERPL-MCNC: 8.9 MG/DL
CHLORIDE SERPL-SCNC: 110 MMOL/L
CO2 SERPL-SCNC: 22 MMOL/L
CREAT SERPL-MCNC: 0.98 MG/DL
EOSINOPHIL # BLD AUTO: 0.16 K/UL
EOSINOPHIL NFR BLD AUTO: 2.4 %
FERRITIN SERPL-MCNC: 39 NG/ML
GLUCOSE SERPL-MCNC: 98 MG/DL
HCT VFR BLD CALC: 29.1 %
HGB BLD-MCNC: 8.8 G/DL
IMM GRANULOCYTES NFR BLD AUTO: 0.3 %
IRON SATN MFR SERPL: 6 %
IRON SERPL-MCNC: 18 UG/DL
LYMPHOCYTES # BLD AUTO: 0.87 K/UL
LYMPHOCYTES NFR BLD AUTO: 13.2 %
MAN DIFF?: NORMAL
MCHC RBC-ENTMCNC: 28.8 PG
MCHC RBC-ENTMCNC: 30.2 GM/DL
MCV RBC AUTO: 95.1 FL
MONOCYTES # BLD AUTO: 0.53 K/UL
MONOCYTES NFR BLD AUTO: 8 %
NEUTROPHILS # BLD AUTO: 4.99 K/UL
NEUTROPHILS NFR BLD AUTO: 75.6 %
PLATELET # BLD AUTO: 428 K/UL
POTASSIUM SERPL-SCNC: 4.2 MMOL/L
PROT SERPL-MCNC: 5.9 G/DL
RBC # BLD: 3.06 M/UL
RBC # FLD: 15.4 %
SODIUM SERPL-SCNC: 144 MMOL/L
TIBC SERPL-MCNC: 282 UG/DL
UIBC SERPL-MCNC: 264 UG/DL
WBC # FLD AUTO: 6.6 K/UL

## 2020-11-05 ENCOUNTER — APPOINTMENT (OUTPATIENT)
Dept: FAMILY MEDICINE | Facility: CLINIC | Age: 70
End: 2020-11-05
Payer: MEDICARE

## 2020-11-05 VITALS
BODY MASS INDEX: 25.46 KG/M2 | HEART RATE: 85 BPM | WEIGHT: 168 LBS | DIASTOLIC BLOOD PRESSURE: 72 MMHG | TEMPERATURE: 98 F | HEIGHT: 68 IN | OXYGEN SATURATION: 96 % | SYSTOLIC BLOOD PRESSURE: 124 MMHG

## 2020-11-05 LAB
BASOPHILS # BLD AUTO: 0.02 K/UL
BASOPHILS NFR BLD AUTO: 0.3 %
EOSINOPHIL # BLD AUTO: 0.23 K/UL
EOSINOPHIL NFR BLD AUTO: 2.9 %
HCT VFR BLD CALC: 27.1 %
HGB BLD-MCNC: 8.5 G/DL
IMM GRANULOCYTES NFR BLD AUTO: 0.1 %
LYMPHOCYTES # BLD AUTO: 1.06 K/UL
LYMPHOCYTES NFR BLD AUTO: 13.5 %
MAN DIFF?: NORMAL
MCHC RBC-ENTMCNC: 28.5 PG
MCHC RBC-ENTMCNC: 31.4 GM/DL
MCV RBC AUTO: 90.9 FL
MONOCYTES # BLD AUTO: 0.63 K/UL
MONOCYTES NFR BLD AUTO: 8 %
NEUTROPHILS # BLD AUTO: 5.88 K/UL
NEUTROPHILS NFR BLD AUTO: 75.2 %
PLATELET # BLD AUTO: 468 K/UL
RBC # BLD: 2.98 M/UL
RBC # FLD: 15.1 %
WBC # FLD AUTO: 7.83 K/UL

## 2020-11-05 PROCEDURE — 99214 OFFICE O/P EST MOD 30 MIN: CPT

## 2020-11-05 PROCEDURE — 99072 ADDL SUPL MATRL&STAF TM PHE: CPT

## 2020-11-05 NOTE — ADDENDUM
[FreeTextEntry1] : I, Carol Rajan acting as a scribe for Dr. Sarah Gardner on Nov 05, 2020  at 11:29 AM\par

## 2020-11-05 NOTE — PLAN
[FreeTextEntry1] : \par - Repeat blood work today\par - Pt has appt with GI Dr. Tucker 11/16/2020 \par - Based on labs will discuss need for blood thinner with neurology and GI \par - Advised using  Ice 20 min 3x a day and using an ace wrap  \par

## 2020-11-05 NOTE — HISTORY OF PRESENT ILLNESS
[FreeTextEntry1] : follow up; discuss labs  [de-identified] : JAVED is a 70 year female here to follow up on low blood counts. Pt denies SOB, lightheadedness, dizziness. Reports she is still noticing blood in stools with associated abdominal cramping. States that stool is dark. Last BM was this morning. Notes she had diarrhea last night and had to take OTC medication. Takes a Iron supplement plus vitamin C. Pt is currently taking a blood thinner. Follows up with neurologist Dr. Jairo Limon. Reports she hit her left elbow while in the shower yesterday and noticed some swelling. \par \par

## 2020-11-05 NOTE — END OF VISIT
[FreeTextEntry3] : Medical record entries made by the scribe today today, were at my direction and personally dictated to them by me, Dr. Sarah Gardner on Nov 05, 2020. I have reviewed the chart and agree that the record accurately reflects my personal performance of the history, physical exam, assessment, and plan.\par

## 2020-11-06 ENCOUNTER — INPATIENT (INPATIENT)
Facility: HOSPITAL | Age: 70
LOS: 7 days | Discharge: HOME | DRG: 378 | End: 2020-11-14
Attending: STUDENT IN AN ORGANIZED HEALTH CARE EDUCATION/TRAINING PROGRAM | Admitting: INTERNAL MEDICINE
Payer: MEDICARE

## 2020-11-06 VITALS
TEMPERATURE: 98 F | RESPIRATION RATE: 18 BRPM | HEART RATE: 102 BPM | OXYGEN SATURATION: 100 % | DIASTOLIC BLOOD PRESSURE: 77 MMHG | HEIGHT: 68 IN | SYSTOLIC BLOOD PRESSURE: 136 MMHG

## 2020-11-06 DIAGNOSIS — K92.1 MELENA: ICD-10-CM

## 2020-11-06 DIAGNOSIS — K62.5 HEMORRHAGE OF ANUS AND RECTUM: ICD-10-CM

## 2020-11-06 DIAGNOSIS — Z98.890 OTHER SPECIFIED POSTPROCEDURAL STATES: Chronic | ICD-10-CM

## 2020-11-06 DIAGNOSIS — Z90.49 ACQUIRED ABSENCE OF OTHER SPECIFIED PARTS OF DIGESTIVE TRACT: Chronic | ICD-10-CM

## 2020-11-06 DIAGNOSIS — Z90.710 ACQUIRED ABSENCE OF BOTH CERVIX AND UTERUS: Chronic | ICD-10-CM

## 2020-11-06 LAB
ALBUMIN SERPL ELPH-MCNC: 3.1 G/DL — LOW (ref 3.3–5.2)
ALP SERPL-CCNC: 98 U/L — SIGNIFICANT CHANGE UP (ref 40–120)
ALT FLD-CCNC: 10 U/L — SIGNIFICANT CHANGE UP
ANION GAP SERPL CALC-SCNC: 10 MMOL/L — SIGNIFICANT CHANGE UP (ref 5–17)
ANISOCYTOSIS BLD QL: SLIGHT — SIGNIFICANT CHANGE UP
APPEARANCE UR: CLEAR — SIGNIFICANT CHANGE UP
AST SERPL-CCNC: 19 U/L — SIGNIFICANT CHANGE UP
BASOPHILS # BLD AUTO: 0 K/UL — SIGNIFICANT CHANGE UP (ref 0–0.2)
BASOPHILS NFR BLD AUTO: 0 % — SIGNIFICANT CHANGE UP (ref 0–2)
BILIRUB SERPL-MCNC: <0.2 MG/DL — LOW (ref 0.4–2)
BILIRUB UR-MCNC: NEGATIVE — SIGNIFICANT CHANGE UP
BLD GP AB SCN SERPL QL: SIGNIFICANT CHANGE UP
BUN SERPL-MCNC: 15 MG/DL — SIGNIFICANT CHANGE UP (ref 8–20)
CALCIUM SERPL-MCNC: 8.1 MG/DL — LOW (ref 8.6–10.2)
CHLORIDE SERPL-SCNC: 108 MMOL/L — HIGH (ref 98–107)
CO2 SERPL-SCNC: 24 MMOL/L — SIGNIFICANT CHANGE UP (ref 22–29)
COLOR SPEC: YELLOW — SIGNIFICANT CHANGE UP
CREAT SERPL-MCNC: 0.91 MG/DL — SIGNIFICANT CHANGE UP (ref 0.5–1.3)
DIFF PNL FLD: ABNORMAL
ELLIPTOCYTES BLD QL SMEAR: SLIGHT — SIGNIFICANT CHANGE UP
EOSINOPHIL # BLD AUTO: 0.22 K/UL — SIGNIFICANT CHANGE UP (ref 0–0.5)
EOSINOPHIL NFR BLD AUTO: 2.6 % — SIGNIFICANT CHANGE UP (ref 0–6)
EPI CELLS # UR: SIGNIFICANT CHANGE UP
GIANT PLATELETS BLD QL SMEAR: PRESENT — SIGNIFICANT CHANGE UP
GLUCOSE SERPL-MCNC: 89 MG/DL — SIGNIFICANT CHANGE UP (ref 70–99)
GLUCOSE UR QL: NEGATIVE MG/DL — SIGNIFICANT CHANGE UP
HCT VFR BLD CALC: 22.1 % — LOW (ref 34.5–45)
HGB BLD-MCNC: 7.1 G/DL — LOW (ref 11.5–15.5)
HYPOCHROMIA BLD QL: SLIGHT — SIGNIFICANT CHANGE UP
INR BLD: 1.14 RATIO — SIGNIFICANT CHANGE UP (ref 0.88–1.16)
KETONES UR-MCNC: NEGATIVE — SIGNIFICANT CHANGE UP
LEUKOCYTE ESTERASE UR-ACNC: ABNORMAL
LIDOCAIN IGE QN: 22 U/L — SIGNIFICANT CHANGE UP (ref 22–51)
LYMPHOCYTES # BLD AUTO: 1.17 K/UL — SIGNIFICANT CHANGE UP (ref 1–3.3)
LYMPHOCYTES # BLD AUTO: 13.8 % — SIGNIFICANT CHANGE UP (ref 13–44)
MACROCYTES BLD QL: SLIGHT — SIGNIFICANT CHANGE UP
MANUAL SMEAR VERIFICATION: SIGNIFICANT CHANGE UP
MCHC RBC-ENTMCNC: 29.5 PG — SIGNIFICANT CHANGE UP (ref 27–34)
MCHC RBC-ENTMCNC: 32.1 GM/DL — SIGNIFICANT CHANGE UP (ref 32–36)
MCV RBC AUTO: 91.7 FL — SIGNIFICANT CHANGE UP (ref 80–100)
MONOCYTES # BLD AUTO: 0.36 K/UL — SIGNIFICANT CHANGE UP (ref 0–0.9)
MONOCYTES NFR BLD AUTO: 4.3 % — SIGNIFICANT CHANGE UP (ref 2–14)
NEUTROPHILS # BLD AUTO: 6.72 K/UL — SIGNIFICANT CHANGE UP (ref 1.8–7.4)
NEUTROPHILS NFR BLD AUTO: 79.3 % — HIGH (ref 43–77)
NITRITE UR-MCNC: NEGATIVE — SIGNIFICANT CHANGE UP
OB PNL STL: POSITIVE
PH UR: 5 — SIGNIFICANT CHANGE UP (ref 5–8)
PLAT MORPH BLD: NORMAL — SIGNIFICANT CHANGE UP
PLATELET # BLD AUTO: 411 K/UL — HIGH (ref 150–400)
POLYCHROMASIA BLD QL SMEAR: SLIGHT — SIGNIFICANT CHANGE UP
POTASSIUM SERPL-MCNC: 4.2 MMOL/L — SIGNIFICANT CHANGE UP (ref 3.5–5.3)
POTASSIUM SERPL-SCNC: 4.2 MMOL/L — SIGNIFICANT CHANGE UP (ref 3.5–5.3)
PROT SERPL-MCNC: 5.8 G/DL — LOW (ref 6.6–8.7)
PROT UR-MCNC: NEGATIVE MG/DL — SIGNIFICANT CHANGE UP
PROTHROM AB SERPL-ACNC: 13.1 SEC — SIGNIFICANT CHANGE UP (ref 10.6–13.6)
RBC # BLD: 2.41 M/UL — LOW (ref 3.8–5.2)
RBC # FLD: 15.1 % — HIGH (ref 10.3–14.5)
RBC BLD AUTO: ABNORMAL
RBC CASTS # UR COMP ASSIST: SIGNIFICANT CHANGE UP /HPF (ref 0–4)
SARS-COV-2 RNA SPEC QL NAA+PROBE: SIGNIFICANT CHANGE UP
SODIUM SERPL-SCNC: 142 MMOL/L — SIGNIFICANT CHANGE UP (ref 135–145)
SP GR SPEC: 1.02 — SIGNIFICANT CHANGE UP (ref 1.01–1.02)
TROPONIN T SERPL-MCNC: <0.01 NG/ML — SIGNIFICANT CHANGE UP (ref 0–0.06)
UROBILINOGEN FLD QL: NEGATIVE MG/DL — SIGNIFICANT CHANGE UP
WBC # BLD: 8.47 K/UL — SIGNIFICANT CHANGE UP (ref 3.8–10.5)
WBC # FLD AUTO: 8.47 K/UL — SIGNIFICANT CHANGE UP (ref 3.8–10.5)
WBC UR QL: SIGNIFICANT CHANGE UP

## 2020-11-06 PROCEDURE — 99285 EMERGENCY DEPT VISIT HI MDM: CPT

## 2020-11-06 PROCEDURE — 71045 X-RAY EXAM CHEST 1 VIEW: CPT | Mod: 26

## 2020-11-06 PROCEDURE — 74174 CTA ABD&PLVS W/CONTRAST: CPT | Mod: 26

## 2020-11-06 PROCEDURE — 99223 1ST HOSP IP/OBS HIGH 75: CPT

## 2020-11-06 RX ORDER — IPRATROPIUM/ALBUTEROL SULFATE 18-103MCG
3 AEROSOL WITH ADAPTER (GRAM) INHALATION EVERY 4 HOURS
Refills: 0 | Status: DISCONTINUED | OUTPATIENT
Start: 2020-11-06 | End: 2020-11-14

## 2020-11-06 RX ORDER — ASCORBIC ACID 60 MG
500 TABLET,CHEWABLE ORAL DAILY
Refills: 0 | Status: DISCONTINUED | OUTPATIENT
Start: 2020-11-06 | End: 2020-11-14

## 2020-11-06 RX ORDER — ASPIRIN/CALCIUM CARB/MAGNESIUM 324 MG
81 TABLET ORAL DAILY
Refills: 0 | Status: DISCONTINUED | OUTPATIENT
Start: 2020-11-06 | End: 2020-11-14

## 2020-11-06 RX ORDER — CARVEDILOL PHOSPHATE 80 MG/1
25 CAPSULE, EXTENDED RELEASE ORAL EVERY 12 HOURS
Refills: 0 | Status: DISCONTINUED | OUTPATIENT
Start: 2020-11-06 | End: 2020-11-06

## 2020-11-06 RX ORDER — FOLIC ACID 0.8 MG
1 TABLET ORAL DAILY
Refills: 0 | Status: DISCONTINUED | OUTPATIENT
Start: 2020-11-06 | End: 2020-11-14

## 2020-11-06 RX ORDER — ATORVASTATIN CALCIUM 80 MG/1
80 TABLET, FILM COATED ORAL AT BEDTIME
Refills: 0 | Status: DISCONTINUED | OUTPATIENT
Start: 2020-11-06 | End: 2020-11-14

## 2020-11-06 RX ORDER — PANTOPRAZOLE SODIUM 20 MG/1
40 TABLET, DELAYED RELEASE ORAL
Refills: 0 | Status: DISCONTINUED | OUTPATIENT
Start: 2020-11-06 | End: 2020-11-09

## 2020-11-06 RX ORDER — SODIUM CHLORIDE 9 MG/ML
1000 INJECTION INTRAMUSCULAR; INTRAVENOUS; SUBCUTANEOUS
Refills: 0 | Status: DISCONTINUED | OUTPATIENT
Start: 2020-11-06 | End: 2020-11-09

## 2020-11-06 RX ORDER — ALBUTEROL 90 UG/1
2 AEROSOL, METERED ORAL EVERY 6 HOURS
Refills: 0 | Status: DISCONTINUED | OUTPATIENT
Start: 2020-11-06 | End: 2020-11-14

## 2020-11-06 RX ORDER — CLOPIDOGREL BISULFATE 75 MG/1
75 TABLET, FILM COATED ORAL DAILY
Refills: 0 | Status: DISCONTINUED | OUTPATIENT
Start: 2020-11-06 | End: 2020-11-06

## 2020-11-06 NOTE — ED PROVIDER NOTE - PHYSICAL EXAMINATION
General:     NAD, well-nourished, well-appearing  Head:     NC/AT, EOMI, oral mucosa moist  Neck:     trachea midline  Lungs:     CTA b/l, no w/r/r  CVS:     S1S2, RRR, no m/g/r  Abd:     +BS, s/nt/nd, no organomegaly  Rectal Exam:  dark maroon stool  Ext:    2+ radial and pedal pulses, no c/c/e  Neuro: AAOx3, no sensory/motor deficits

## 2020-11-06 NOTE — ED PROVIDER NOTE - PMH
COPD without exacerbation    H/O cerebral artery stenosis  left cerebral artery stent per history in 2014.  HTN (hypertension)    Pacemaker    Rheumatoid arthritis

## 2020-11-06 NOTE — H&P ADULT - NSICDXPASTSURGICALHX_GEN_ALL_CORE_FT
PAST SURGICAL HISTORY:  H/O exploratory laparotomy     S/P cholecystectomy     S/P hysterectomy     
6

## 2020-11-06 NOTE — CONSULT NOTE ADULT - PROBLEM SELECTOR RECOMMENDATION 9
recurrant and probably diverticular. Poor candidate at this time for plavix. Awaiting blood tests but if BUN and creatinine are OK would get CTA. Keep NPO except meds for now. Check PT, CBC, CMP and hold plavix. If CTA positive would get IR for embolization.

## 2020-11-06 NOTE — ED ADULT NURSE NOTE - CHIEF COMPLAINT QUOTE
Bloody diarrhea. HX of bowel obstruction. Had similar bleeding per rectum which required transfusion 3 weeks ago.  Hbg checked at doctor yesterday 8.5 down from her usual 10. Patient is [place].

## 2020-11-06 NOTE — CONSULT NOTE ADULT - SUBJECTIVE AND OBJECTIVE BOX
Patient is a 70y old  Female who presents with a chief complaint of loose bloody stools    HPI:      REVIEW OF SYSTEMS:    CONSTITUTIONAL: No fever, weight loss, or fatigue  EYES: No eye pain, visual disturbances, or discharge  ENMT:  No difficulty hearing, tinnitus, vertigo; No sinus or throat pain  NECK: No pain or stiffness  RESPIRATORY: No cough, wheezing, chills or hemoptysis; No shortness of breath  CARDIOVASCULAR: No chest pain, palpitations, dizziness, or leg swelling  GASTROINTESTINAL: No abdominal or epigastric pain. No nausea, vomiting, or hematemesis; No diarrhea or constipation. No melena or hematochezia.  NEUROLOGICAL: No headaches, memory loss, loss of strength, numbness, or tremors  SKIN: No itching, burning, rashes, or lesions   LYMPH NODES: No enlarged glands  MUSCULOSKELETAL: No joint pain or swelling; No muscle, back, or extremity pain  PSYCHIATRIC: No depression, anxiety, mood swings, or difficulty sleeping  HEME/LYMPH: No easy bruising, or bleeding gums  ALLERY AND IMMUNOLOGIC: No hives or eczema      PAST MEDICAL & SURGICAL HISTORY:  H/O cerebral artery stenosis  left cerebral artery stent per history in 2014.    Pacemaker    HTN (hypertension)    COPD without exacerbation    Rheumatoid arthritis    H/O exploratory laparotomy    S/P cholecystectomy    S/P hysterectomy        FAMILY HISTORY:  FH: dementia  mother        SOCIAL HISTORY:  Smoking Status: [ ] Current, [ ] Former, [ ] Never  Pack Years:  Alcohol Use:    Home Medications:  albuterol:  (20 Oct 2020 22:23)  Aspirin Enteric Coated 81 mg oral delayed release tablet: 1 tab(s) orally once a day restart on sunday in two days  (23 Oct 2020 14:19)  Breo Ellipta 100 mcg-25 mcg/inh inhalation powder: 1 puff(s) inhaled once a day (24 Oct 2020 11:48)  Coreg 25 mg oral tablet: 1 tab(s) orally 2 times a day (20 Oct 2020 22:22)  DuoNeb 0.5 mg-2.5 mg/3 mL inhalation solution: 3 milliliter(s) inhaled 4 times a day, As Needed (24 Oct 2020 11:48)  ferrous sulfate 324 mg (65 mg elemental iron) oral delayed release tablet: 1 tab(s) orally 2 times a day (24 Oct 2020 11:46)  folic acid:  (20 Oct 2020 22:22)  methotrexate:  (20 Oct 2020 22:22)  Plavix: 75 milligram(s) orally once a day restart in one week (23 Oct 2020 14:19)  Vitamin C 100 mg oral tablet, chewable: 1 tab(s) orally once a day (24 Oct 2020 11:46)      MEDICATIONS:  MEDICATIONS  (STANDING):    MEDICATIONS  (PRN):      Allergies    No Known Allergies    Intolerances        Vital Signs Last 24 Hrs  T(C): 36.7 (06 Nov 2020 14:21), Max: 36.7 (06 Nov 2020 14:21)  T(F): 98 (06 Nov 2020 14:21), Max: 98 (06 Nov 2020 14:21)  HR: 102 (06 Nov 2020 14:21) (102 - 102)  BP: 136/77 (06 Nov 2020 14:21) (136/77 - 136/77)  BP(mean): --  RR: 18 (06 Nov 2020 14:21) (18 - 18)  SpO2: 100% (06 Nov 2020 14:21) (100% - 100%)        PHYSICAL EXAM:    General: Well developed; well nourished; in no acute distress  HEENT: MMM, conjunctiva and sclera clear  Lungs: Clear  Heart: Rhythm Regular, No Murmurs  Gastrointestinal: Soft, non-tender non-distended; Normal bowel sounds; No rebound or guarding; No Organomegaly & No Masses  Extremities: Normal range of motion, No clubbing, cyanosis or edema  Neurological: Alert and oriented x3, Non-focal  Skin: Warm and dry. No obvious rash  Rectal: No Masses      LABS:                RADIOLOGY & ADDITIONAL STUDIES: Patient is a 70y old  Female who presents with a chief complaint of loose bloody stools    HPI: 71 yo F with HTN, Renal artery stenosis.  Distant TIA's and Placement of a left cerebral artery stent in 2014 and on chronic Aspirin / Plavix ever since.  Known to have COPD on chronic home O2.  RA and Osteopenia, HLD and S/p Cardiac arrest and placement of PPM about 2016.  No hx of CAD or cardiac stents.  Multiple prior surgeries:  GB, Hyst, Breast biopsies,   Ex Lap for SBO and SIA in 6/30/2020 at [facility].  Neg FH for CRCa.  + FH for thyroid (Father) and prostate ca.  She was admitted here two weeks ago for loose bloody stools exactly like this presentation. On 10/22 she underwent an EGD that showed a hiatal hernia and minimal antral gastritis. The next day she underwent a colonoscopy with the removal of a 1cm polyp from the descending colon and a 1.5cm polyp from the simoid colon. There were left sided diverticuli. There was old blood throughout the colon. Ever since she has continue to see small amount s of blood in the stool which became formed. But yesterday she again noted onset of loose stools, about 4-5 BMs yesterday and 4 today and each accompanied by  red blood tinged stool. She has some mild lower abdominal discomfort just before the BM only. No nausea or vomiting        REVIEW OF SYSTEMS:    CONSTITUTIONAL: No fever, weight loss, or fatigue  EYES: No eye pain, visual disturbances, or discharge  ENMT:  No difficulty hearing, tinnitus, vertigo; No sinus or throat pain  NECK: No pain or stiffness  RESPIRATORY: No cough, wheezing, chills or hemoptysis; No shortness of breath  CARDIOVASCULAR: No chest pain, palpitations, dizziness, or leg swelling  GASTROINTESTINAL: as above  NEUROLOGICAL: No headaches, memory loss, loss of strength, numbness, or tremors  SKIN: No itching, burning, rashes, or lesions   LYMPH NODES: No enlarged glands  MUSCULOSKELETAL: No joint pain or swelling; No muscle, back, or extremity pain  PSYCHIATRIC: No depression, anxiety, mood swings, or difficulty sleeping  HEME/LYMPH: No easy bruising, or bleeding gums  ALLERY AND IMMUNOLOGIC: No hives or eczema      PAST MEDICAL & SURGICAL HISTORY:  H/O cerebral artery stenosis  left cerebral artery stent per history in 2014.    Pacemaker    HTN (hypertension)    COPD without exacerbation    Rheumatoid arthritis    H/O exploratory laparotomy    S/P cholecystectomy    S/P hysterectomy        FAMILY HISTORY:  FH: dementia  mother        SOCIAL HISTORY:  Smoking Status: [ ] Current, [ ] Former, [ ] Never  Pack Years:  Alcohol Use:    Home Medications:  albuterol:  (20 Oct 2020 22:23)  Aspirin Enteric Coated 81 mg oral delayed release tablet: 1 tab(s) orally once a day restart on sunday in two days  (23 Oct 2020 14:19)  Breo Ellipta 100 mcg-25 mcg/inh inhalation powder: 1 puff(s) inhaled once a day (24 Oct 2020 11:48)  Coreg 25 mg oral tablet: 1 tab(s) orally 2 times a day (20 Oct 2020 22:22)  DuoNeb 0.5 mg-2.5 mg/3 mL inhalation solution: 3 milliliter(s) inhaled 4 times a day, As Needed (24 Oct 2020 11:48)  ferrous sulfate 324 mg (65 mg elemental iron) oral delayed release tablet: 1 tab(s) orally 2 times a day (24 Oct 2020 11:46)  folic acid:  (20 Oct 2020 22:22)  methotrexate:  (20 Oct 2020 22:22)  Plavix: 75 milligram(s) orally once a day restart in one week (23 Oct 2020 14:19)  Vitamin C 100 mg oral tablet, chewable: 1 tab(s) orally once a day (24 Oct 2020 11:46)      MEDICATIONS:  MEDICATIONS  (STANDING):    MEDICATIONS  (PRN):      Allergies    No Known Allergies    Intolerances        Vital Signs Last 24 Hrs  T(C): 36.7 (06 Nov 2020 14:21), Max: 36.7 (06 Nov 2020 14:21)  T(F): 98 (06 Nov 2020 14:21), Max: 98 (06 Nov 2020 14:21)  HR: 102 (06 Nov 2020 14:21) (102 - 102)  BP: 136/77 (06 Nov 2020 14:21) (136/77 - 136/77)  BP(mean): --  RR: 18 (06 Nov 2020 14:21) (18 - 18)  SpO2: 100% (06 Nov 2020 14:21) (100% - 100%)        PHYSICAL EXAM:    General: Well developed; well nourished; in no acute distress  HEENT: MMM, conjunctiva and sclera clear  Lungs: Clear  Heart: Rhythm Regular, No Murmurs  Gastrointestinal: Soft, non-tender non-distended; Normal bowel sounds; No rebound or guarding; No Organomegaly & No Masses  Extremities: Normal range of motion, No clubbing, cyanosis or edema  Neurological: Alert and oriented x3, Non-focal  Skin: Warm and dry. No obvious rash  Rectal: No Masses, brown dark red stained soft stool      LABS:                RADIOLOGY & ADDITIONAL STUDIES:

## 2020-11-06 NOTE — ED PROVIDER NOTE - NS ED ROS FT
Constitutional: (-) fever  (-)chills  (-)sweats  Eyes/ENT: (-)   Cardiovascular: (-) chest pain, (-) palpitations (-) edema   Respiratory: (-) cough, (-) shortness of breath   Gastrointestinal: (-)nausea  (-)vomiting, (-) diarrhea  (-) abdominal pain   +melena  :  (-)dysuria, (-)frequency, (-)urgency, (-)hematuria  Musculoskeletal: (-) neck pain, (-) back pain, (-) joint pain  Integumentary: (-) rash, (-) edema  Neurological: (-) headache, (-) altered mental status  (-)LOC

## 2020-11-06 NOTE — ED PROVIDER NOTE - OBJECTIVE STATEMENT
70yoF; with pmh signif for HTN, HLD, CAD on Aspirin and Plavix, s/p PPM, COPD, RA; now p/w dark stool. patient had colonoscopy about 2 weeks ago and returned to ED and found to have acute GI bleed from polypectomy site at that time. was transfused 2 units.  patient reports in the past 3-4 days, worsening dark stools with clots, x4-5 episodes daily. c/o mild guadarrama. denies cp/palp/diaphoresis/nausea.  denies abd pain.  PMH: HTN, HLD, CAD on Aspirin and Plavix, s/p PPM, COPD, RA  SOCIAL: No tobacco/illicit substance use

## 2020-11-06 NOTE — ED ADULT TRIAGE NOTE - CHIEF COMPLAINT QUOTE
Bloody diarrhea. HX of bowel obstruction. Had similar bleeding per rectum which required transfusion 3 weeks ago.  Hbg checked at doctor yesterday 8.5 down from her usual 10. Patient is [place].

## 2020-11-06 NOTE — ED PROVIDER NOTE - CLINICAL SUMMARY MEDICAL DECISION MAKING FREE TEXT BOX
patient with GI bleed, labs show drop of 3 points, will transfuse, paged GI(Dr. Tucker), signed out pending CT. patient with GI bleed, labs show drop of 3 points, will transfuse, paged GI(Dr. Tucker). found to have acute cecal bleed, spoke with IR who states to medically manage patient at this time and can call back if bleeding persistent. will admit to medicine for further treatement.

## 2020-11-06 NOTE — ED ADULT NURSE NOTE - OBJECTIVE STATEMENT
Pt is here with c/o diarrhea and blood in her stool since leaving [facility] , pt has hx of blood transfusion d/t rectal bleeding.  Pt also c/o abd pain and states that her hgb was 8.5 yesterday at her PMD offfice.

## 2020-11-06 NOTE — H&P ADULT - ASSESSMENT
incomplete 69 yo incomplete HTN, Renal artery stenosis, prior TIA's with Placement of a left cerebral artery stent, COPD using 2L home O2 at baseline, RA, Osteopenia, HLD and S/p Cardiac arrest s/p angiography with placement of PPM, recent admission 2 weeks ago for GI bleeding, presenting today and found to have recurrent lower GI, cecal bleeding complicated with acute blood loss anemia for which patient to be admitted     Admit to Medicine, Dr. Holguin  Monitored Bed  NPO   Vitals per routine  Ambulate as tolerated     Lower GI Bleed complicated Acute Blood Loss Anemia  Hb 7.1 noted in ED  type and screen  2 units prbcs and 2units plts ordered  repeat cbc post transfusion ordered  GI consult appreciated ---> plavix held  CTA abd/pelvis noted for active cecal bleeding --> IR notified and advised no acute intervention, medically manage at this time  protonix 40 iv bid ordered      COPD  not in acute exacerbation  c/w albuterol and duonebs prn  supplemental o2 via nasal cannula     Intracranial stenosis and stent  patient takes 150mg plavix with asa daily  holding plavix per GI recs   lipitor 80mg qhs    HTN  c/w home medications  coreg   if patient becomes hemodynamically unstable in setting of blood loss, will hold antihypertensive agents at that time.       DVT PPx: holding chemical AC in setting of active GI bleeding  GOC: Full Code 69 yo incomplete HTN, Renal artery stenosis, prior TIA's with Placement of a left cerebral artery stent, COPD using 2L home O2 at baseline, RA, Osteopenia, HLD and S/p Cardiac arrest s/p angiography with placement of PPM, recent admission 2 weeks ago for GI bleeding, presenting today and found to have recurrent lower GI, cecal bleeding complicated with acute blood loss anemia for which patient to be admitted     Admit to Medicine, Dr. Holguin  Monitored Bed  NPO   Vitals per routine  Ambulate as tolerated     Lower GI Bleed complicated Acute Blood Loss Anemia  Hb 7.1 noted in ED  type and screen  2 units prbcs ordered  repeat cbc post transfusion ordered  GI consult appreciated ---> plavix held  CTA abd/pelvis noted for active cecal bleeding --> IR notified and advised no acute intervention, medically manage at this time  protonix 40 iv bid ordered      COPD  not in acute exacerbation  c/w albuterol and duonebs prn  supplemental o2 via nasal cannula     Intracranial stenosis and stent  patient takes 150mg plavix with asa daily  holding plavix per GI recs   lipitor 80mg qhs    HTN  c/w home medications  coreg   if patient becomes hemodynamically unstable in setting of blood loss, will hold antihypertensive agents at that time.       DVT PPx: holding chemical AC in setting of active GI bleeding  GOC: Full Code

## 2020-11-06 NOTE — H&P ADULT - NSHPPHYSICALEXAM_GEN_ALL_CORE
Vital Signs Last 24 Hrs  T(F): 98 (06 Nov 2020 14:21), Max: 98 (06 Nov 2020 14:21)  HR: 102 (06 Nov 2020 14:21) (102 - 102)  BP: 136/77 (06 Nov 2020 14:21) (136/77 - 136/77)  RR: 18 (06 Nov 2020 14:21) (18 - 18)  SpO2: 100% (06 Nov 2020 14:21) (100% - 100%)    Physical Exam:  Constitutional: NAD, awake and alert, well-developed  Eyes: EOMI, PERRL  Mouth: moist oral mucosa, pallor noted   Neck: Soft and supple, No LAD  Respiratory: cta b/l no wheezing no rhonchi noted  Cardiovascular: +s1/s2 RRR, PPM palpated on chest wall, no edema b/l le  Gastrointestinal: soft nt nd bs+  Vascular: 2+ peripheral pulses, no calf tenderness.   Neurological: A/O x 3, sensation equal b/l. CN 2-12 intact. No focal deficits  Musculoskeletal: 5/5 strength b/l upper and lower extremities  Skin: exposed areas warm dry without any rashes or lesions noted.   Psych: normal affect, appropriate mood.

## 2020-11-06 NOTE — H&P ADULT - HISTORY OF PRESENT ILLNESS
71 yo female, who has a PMH of HTN, Renal artery stenosis, prior TIA's with Placement of a left cerebral artery stent in 2014 in [facility], and on chronic Aspirin / Plavix since. Also with COPD using 2L home O2 at baseline. Hx of RA and Osteopenia, HLD and S/p Cardiac arrest s/p angiography with placement of PPM about 2016.  No hx of CAD or cardiac stents.  She presents to ED this evening, transported by car, for episode of loose blood bowel movements today. 2 weeks ago, she was admitted for loose BMs with blood streaks noted on EGD to have a hiatal hernia and with colonoscopy to have polyp x1 in descending colon and x1 in sigmoid colon with diverticuli in left side and old blood noted throughout the colon as per notes. No active signs of blood         69 yo female, who has a PMH of HTN, Renal artery stenosis, prior TIA's with Placement of a left cerebral artery stent in 2014 in [facility], and on chronic Aspirin / Plavix since. Also with COPD using 2L home O2 at baseline. Hx of RA and Osteopenia, HLD and S/p Cardiac arrest s/p angiography with placement of PPM about 2016.  No hx of CAD or cardiac stents.  She presents to ED this evening, transported by car, for episode of loose blood bowel movements today. 2 weeks ago, she was admitted for loose BMs with blood streaks noted on EGD to have a hiatal hernia and with colonoscopy to have polyp x1 in descending colon and x1 in sigmoid colon with diverticuli in left side and old blood noted throughout the colon as per notes. No active signs of bleeding noted at that time. Since discharge, says that she had been experiencing small amounts of bleeding with her bowel movements and was advised that could happen following the polyp resections. Was restarted on her aspirin/plavix regimen 1 week after discharge. Says that today specifically, she had a raisin bagel which often causes her to have loose BMs at which point patient says that she started to experience loose BMs with bright red blood per rectum noted over bowel and upon wiping. This became alarming to her and she opted to come to ER after discussing with her son. Transported self to hospital.  Endorses mild dyspnea requiring increase in her home O2 use to 3L up from 2, especially upon exertion. Denied any fevers, chills, headaches, visual disturbances, sore throat, neck pain, chest pain, palpitations, abdominal pain, nausea, vomiting, dysuria, hematuria, numbness, weakness, lower extremity swelling, skin rashes.     In ED: Noted to have Hb 7.1, occult positive, 2 units prbcs and plts ordered. CTA abd/pelvis noted active bleed in cecum. GI consulted, advised holding plavix. IR consulted by ED, no acute intervention ---> medical management. Admission called to medicine.

## 2020-11-06 NOTE — H&P ADULT - NSHPSOCIALHISTORY_GEN_ALL_CORE
Lives at home with son and his family  Ex smoker, 40 pack years, quit in 2014  no etoh or drug use  baseline ADLs completed without use of RW or cane  uses 2L home O2

## 2020-11-07 DIAGNOSIS — J44.9 CHRONIC OBSTRUCTIVE PULMONARY DISEASE, UNSPECIFIED: ICD-10-CM

## 2020-11-07 DIAGNOSIS — D62 ACUTE POSTHEMORRHAGIC ANEMIA: ICD-10-CM

## 2020-11-07 DIAGNOSIS — I10 ESSENTIAL (PRIMARY) HYPERTENSION: ICD-10-CM

## 2020-11-07 DIAGNOSIS — K92.2 GASTROINTESTINAL HEMORRHAGE, UNSPECIFIED: ICD-10-CM

## 2020-11-07 LAB
ANION GAP SERPL CALC-SCNC: 11 MMOL/L — SIGNIFICANT CHANGE UP (ref 5–17)
BUN SERPL-MCNC: 15 MG/DL — SIGNIFICANT CHANGE UP (ref 8–20)
CALCIUM SERPL-MCNC: 8.6 MG/DL — SIGNIFICANT CHANGE UP (ref 8.6–10.2)
CHLORIDE SERPL-SCNC: 111 MMOL/L — HIGH (ref 98–107)
CO2 SERPL-SCNC: 23 MMOL/L — SIGNIFICANT CHANGE UP (ref 22–29)
CREAT SERPL-MCNC: 1 MG/DL — SIGNIFICANT CHANGE UP (ref 0.5–1.3)
GLUCOSE SERPL-MCNC: 92 MG/DL — SIGNIFICANT CHANGE UP (ref 70–99)
HCT VFR BLD CALC: 26.6 % — LOW (ref 34.5–45)
HCT VFR BLD CALC: 29.9 % — LOW (ref 34.5–45)
HGB BLD-MCNC: 8.2 G/DL — LOW (ref 11.5–15.5)
HGB BLD-MCNC: 9.5 G/DL — LOW (ref 11.5–15.5)
MCHC RBC-ENTMCNC: 28.8 PG — SIGNIFICANT CHANGE UP (ref 27–34)
MCHC RBC-ENTMCNC: 31.8 GM/DL — LOW (ref 32–36)
MCV RBC AUTO: 90.6 FL — SIGNIFICANT CHANGE UP (ref 80–100)
PLATELET # BLD AUTO: 367 K/UL — SIGNIFICANT CHANGE UP (ref 150–400)
POTASSIUM SERPL-MCNC: 4.3 MMOL/L — SIGNIFICANT CHANGE UP (ref 3.5–5.3)
POTASSIUM SERPL-SCNC: 4.3 MMOL/L — SIGNIFICANT CHANGE UP (ref 3.5–5.3)
RBC # BLD: 3.3 M/UL — LOW (ref 3.8–5.2)
RBC # FLD: 15.4 % — HIGH (ref 10.3–14.5)
SARS-COV-2 IGG SERPL QL IA: NEGATIVE — SIGNIFICANT CHANGE UP
SARS-COV-2 IGM SERPL IA-ACNC: 0.09 INDEX — SIGNIFICANT CHANGE UP
SODIUM SERPL-SCNC: 144 MMOL/L — SIGNIFICANT CHANGE UP (ref 135–145)
WBC # BLD: 5.92 K/UL — SIGNIFICANT CHANGE UP (ref 3.8–10.5)
WBC # FLD AUTO: 5.92 K/UL — SIGNIFICANT CHANGE UP (ref 3.8–10.5)

## 2020-11-07 PROCEDURE — 99233 SBSQ HOSP IP/OBS HIGH 50: CPT

## 2020-11-07 RX ORDER — ACETAMINOPHEN 500 MG
650 TABLET ORAL EVERY 6 HOURS
Refills: 0 | Status: DISCONTINUED | OUTPATIENT
Start: 2020-11-07 | End: 2020-11-14

## 2020-11-07 RX ORDER — CARVEDILOL PHOSPHATE 80 MG/1
12.5 CAPSULE, EXTENDED RELEASE ORAL EVERY 12 HOURS
Refills: 0 | Status: DISCONTINUED | OUTPATIENT
Start: 2020-11-07 | End: 2020-11-14

## 2020-11-07 RX ADMIN — SODIUM CHLORIDE 80 MILLILITER(S): 9 INJECTION INTRAMUSCULAR; INTRAVENOUS; SUBCUTANEOUS at 11:24

## 2020-11-07 RX ADMIN — SODIUM CHLORIDE 80 MILLILITER(S): 9 INJECTION INTRAMUSCULAR; INTRAVENOUS; SUBCUTANEOUS at 18:44

## 2020-11-07 RX ADMIN — Medication 500 MILLIGRAM(S): at 11:24

## 2020-11-07 RX ADMIN — PANTOPRAZOLE SODIUM 40 MILLIGRAM(S): 20 TABLET, DELAYED RELEASE ORAL at 05:38

## 2020-11-07 RX ADMIN — Medication 1 MILLIGRAM(S): at 11:24

## 2020-11-07 RX ADMIN — Medication 650 MILLIGRAM(S): at 21:09

## 2020-11-07 RX ADMIN — Medication 650 MILLIGRAM(S): at 22:00

## 2020-11-07 RX ADMIN — SODIUM CHLORIDE 80 MILLILITER(S): 9 INJECTION INTRAMUSCULAR; INTRAVENOUS; SUBCUTANEOUS at 05:39

## 2020-11-07 RX ADMIN — Medication 81 MILLIGRAM(S): at 11:25

## 2020-11-07 RX ADMIN — ATORVASTATIN CALCIUM 80 MILLIGRAM(S): 80 TABLET, FILM COATED ORAL at 21:08

## 2020-11-07 RX ADMIN — PANTOPRAZOLE SODIUM 40 MILLIGRAM(S): 20 TABLET, DELAYED RELEASE ORAL at 17:25

## 2020-11-07 RX ADMIN — CARVEDILOL PHOSPHATE 12.5 MILLIGRAM(S): 80 CAPSULE, EXTENDED RELEASE ORAL at 05:39

## 2020-11-07 RX ADMIN — CARVEDILOL PHOSPHATE 12.5 MILLIGRAM(S): 80 CAPSULE, EXTENDED RELEASE ORAL at 17:25

## 2020-11-07 NOTE — PROGRESS NOTE ADULT - PROBLEM SELECTOR PLAN 1
possibly cecal AVM or small ulcer-may have stopped as no BM since yesterday. Continue to hold plavix and moniter H/H. Continue clear liquids. Will plan on repeat colonoscopy on Monday as long as she remain stable. possibly cecal AVM or small ulcer-may have stopped as no BM since yesterday. Continue to hold plavix and moniter H/H. Continue clear liquids. Will plan on repeat colonoscopy on Monday as long as she remain stable. Will also give one unit platelets due to presence of dysfunctional platelets due to plavix.

## 2020-11-07 NOTE — PROGRESS NOTE ADULT - ASSESSMENT
71 yo with PMH significant for HTN, Renal artery stenosis, prior TIA's with Placement of a left cerebral artery stent in 2014, COPD with emphysema  using 2L home O2 at baseline, RA, Osteopenia, HLD and S/p Cardiac arrest s/p angiography with placement of PPM, recent admission 2 weeks ago for GI bleeding, presenting today and found to have recurrent lower GI wtih cecal bleeding on CTA abdomen/pelvis this time in ED.     #Lower GI Bleed  in setting of diverticulosis,  complicated by Acute Blood Loss Anemia, s/p 2 RBC units   - hgb stable 9.5/29 this morning, will continue to trende Cbc daily for now, unless she has again RBPR  - appreciate GI consult appreciated ---> colon prep tomorrow with colonoscopy on Monday  - holding plavix, c/w Aspirin  - c/w Protonix 40 iv bid for now    #COPD with emphysema on 2 l NC at home  -not in acute exacerbation  -c/w albuterol and duonebs prn  - supplemental o2 via nasal cannula, titrating down to home base line with SpO2 goal > 89    #Intracranial stenosis and stent placement in 2014  # CAD w/o angina  # HLD  # SSS s/p Pacemaker placement  -patient takes 150mg plavix with asa daily  -holding plavix per GI recs   -lipitor 80mg qhs, c/w Coreg 12.5 bid     #HTN  c/w home medications  coreg   if patient becomes hemodynamically unstable in setting of blood loss, will hold antihypertensive agents at that time.       DVT PPx: holding chemical AC in setting of active GI bleeding  GOC: Full Code  LOS - will stay till [place]-Tuesday for sure, given planned colonoscopy on Monday

## 2020-11-07 NOTE — PROGRESS NOTE ADULT - SUBJECTIVE AND OBJECTIVE BOX
Pt seen and examined f/u rectal bleeding    This AM she feels well with no complaints. No BM since arrival yesterday. Hgb was 7.1 and she got 2 units-repeat H/H pending. CTA showed active bleed in cecum but no abnormalities seen in cecum on recent colonopscopy.    REVIEW OF SYSTEMS:    CONSTITUTIONAL: No fever, weight loss, or fatigue  EYES: No eye pain, visual disturbances, or discharge  ENMT:  No difficulty hearing, tinnitus, vertigo; No sinus or throat pain  RESPIRATORY: No cough, wheezing, chills or hemoptysis; No shortness of breath  CARDIOVASCULAR: No chest pain, palpitations, dizziness, or leg swelling  GASTROINTESTINAL: No abdominal or epigastric pain. No nausea, vomiting, or hematemesis; No diarrhea or constipation. No melena or hematochezia.    MEDICATIONS:  MEDICATIONS  (STANDING):  ascorbic acid 500 milliGRAM(s) Oral daily  aspirin enteric coated 81 milliGRAM(s) Oral daily  atorvastatin 80 milliGRAM(s) Oral at bedtime  carvedilol 12.5 milliGRAM(s) Oral every 12 hours  folic acid 1 milliGRAM(s) Oral daily  pantoprazole  Injectable 40 milliGRAM(s) IV Push two times a day  sodium chloride 0.9%. 1000 milliLiter(s) (80 mL/Hr) IV Continuous <Continuous>    MEDICATIONS  (PRN):  acetaminophen   Tablet .. 650 milliGRAM(s) Oral every 6 hours PRN Temp greater or equal to 38C (100.4F), Mild Pain (1 - 3)  ALBUTerol    90 MICROgram(s) HFA Inhaler 2 Puff(s) Inhalation every 6 hours PRN Shortness of Breath and/or Wheezing  albuterol/ipratropium for Nebulization. 3 milliLiter(s) Nebulizer every 4 hours PRN Shortness of Breath and/or Wheezing      Allergies    No Known Allergies    Intolerances        Vital Signs Last 24 Hrs  T(C): 36.6 (2020 04:40), Max: 36.8 (2020 01:10)  T(F): 97.9 (2020 04:40), Max: 98.3 (2020 01:10)  HR: 67 (2020 05:25) (60 - 102)  BP: 126/78 (2020 05:25) (101/65 - 136/77)  BP(mean): 94 (2020 05:25) (77 - 94)  RR: 18 (2020 04:40) (18 - 18)  SpO2: 100% (2020 04:40) (99% - 100%)      PHYSICAL EXAM:    General: Well developed; well nourished; in no acute distress  HEENT: MMM, conjunctiva and sclera clear  Gastrointestinal:Abdomen: Soft non-tender non-distended; Normal bowel sounds; No hepatosplenomegaly  Extremities: no cyanosis, clubbing or edema.  Skin: Warm and dry. No obvious rash    LABS:      CBC Full  -  ( 2020 16:09 )  WBC Count : 8.47 K/uL  RBC Count : 2.41 M/uL  Hemoglobin : 7.1 g/dL  Hematocrit : 22.1 %  Platelet Count - Automated : 411 K/uL  Mean Cell Volume : 91.7 fl  Mean Cell Hemoglobin : 29.5 pg  Mean Cell Hemoglobin Concentration : 32.1 gm/dL  Auto Neutrophil # : 6.72 K/uL  Auto Lymphocyte # : 1.17 K/uL  Auto Monocyte # : 0.36 K/uL  Auto Eosinophil # : 0.22 K/uL  Auto Basophil # : 0.00 K/uL  Auto Neutrophil % : 79.3 %  Auto Lymphocyte % : 13.8 %  Auto Monocyte % : 4.3 %  Auto Eosinophil % : 2.6 %  Auto Basophil % : 0.0 %        142  |  108<H>  |  15.0  ----------------------------<  89  4.2   |  24.0  |  0.91    Ca    8.1<L>      2020 16:09    TPro  5.8<L>  /  Alb  3.1<L>  /  TBili  <0.2<L>  /  DBili  x   /  AST  19  /  ALT  10  /  AlkPhos  98  11-06    PT/INR - ( 2020 20:10 )   PT: 13.1 sec;   INR: 1.14 ratio               Urinalysis Basic - ( 2020 20:07 )    Color: Yellow / Appearance: Clear / S.025 / pH: x  Gluc: x / Ketone: Negative  / Bili: Negative / Urobili: Negative mg/dL   Blood: x / Protein: Negative mg/dL / Nitrite: Negative   Leuk Esterase: Trace / RBC: 0-2 /HPF / WBC 0-2   Sq Epi: x / Non Sq Epi: Occasional / Bacteria: x                RADIOLOGY & ADDITIONAL STUDIES (The following images were personally reviewed):  < from: CT Angio Abdomen and Pelvis w/ IV Cont (20 @ 21:17) >  INTERPRETATION:  CLINICAL INFORMATION: Bloody stools.    COMPARISON: CT abdomen pelvis 10/20/2020    PROCEDURE:  CT of the Abdomen and Pelvis was performed with and without intravenous contrast.  Precontrast, Arterial and Delayed phases were performed.  Intravenous contrast: 94 ml Omnipaque 350. 0 ml discarded.  Oral contrast: None.  Sagittal and coronal reformats were performed.    FINDINGS:  LOWERCHEST: Pacemaker lead tip in the right ventricle. Subsegmental right lower lobe atelectasis.    LIVER: Within normal limits.  BILE DUCTS: Nonspecific dilatation of the intra and extrahepatic biliary tree in the setting of prior cholecystectomy.  GALLBLADDER: Cholecystectomy.  SPLEEN: Within normal limits.  PANCREAS: Within normal limits.  ADRENALS: Within normal limits.  KIDNEYS/URETERS: Stable right adrenal adenoma. Nodular thickening of the adrenal glands.    BLADDER: Within normal limits.  REPRODUCTIVE ORGANS: Hysterectomy.    BOWEL: Focus of active hemorrhage in the cecum. Appendix is normal. No bowel obstruction. Diverticulosis coli.  PERITONEUM: No ascites.  VESSELS: Atherosclerotic changes.  RETROPERITONEUM/LYMPH NODES: No lymphadenopathy.  ABDOMINAL WALL: Within normal limits.  BONES: Within normal limits.    IMPRESSION:  Active GI bleeding in the cecum.    Findings were discussed with Dr. KEILA YANG [phone] 2020 9:29 PM by Dr. Guevara with read back confirmation.              ANGIE GUEVARA MD; Attending Radiologist  This document has been electronically signed. 2020  9:33PM    < end of copied text >

## 2020-11-07 NOTE — PROGRESS NOTE ADULT - SUBJECTIVE AND OBJECTIVE BOX
[facility] Division of Hospital Medicine    Chief Complaint:  LGIB    SUBJECTIVE: reports feeling better after trasfusion, no bloody or melena stool since yesterday     OVERNIGHT EVENTS: none reported     Patient denies chest pain, abd pain, N/V, fever, chills, dysuria or any other complaints. Endorses: on adn off GERD burning, base line SOB; All remainder ROS negative.      MEDICATIONS  (STANDING):  ascorbic acid 500 milliGRAM(s) Oral daily  aspirin enteric coated 81 milliGRAM(s) Oral daily  atorvastatin 80 milliGRAM(s) Oral at bedtime  carvedilol 12.5 milliGRAM(s) Oral every 12 hours  folic acid 1 milliGRAM(s) Oral daily  pantoprazole  Injectable 40 milliGRAM(s) IV Push two times a day  sodium chloride 0.9%. 1000 milliLiter(s) (80 mL/Hr) IV Continuous <Continuous>    MEDICATIONS  (PRN):  acetaminophen   Tablet .. 650 milliGRAM(s) Oral every 6 hours PRN Temp greater or equal to 38C (100.4F), Mild Pain (1 - 3)  ALBUTerol    90 MICROgram(s) HFA Inhaler 2 Puff(s) Inhalation every 6 hours PRN Shortness of Breath and/or Wheezing  albuterol/ipratropium for Nebulization. 3 milliLiter(s) Nebulizer every 4 hours PRN Shortness of Breath and/or Wheezing        I&O's Summary      PHYSICAL EXAM:  Vital Signs Last 24 Hrs  T(C): 36.4 (2020 07:30), Max: 36.8 (2020 01:10)  T(F): 97.6 (2020 07:30), Max: 98.3 (2020 01:10)  HR: 68 (2020 07:30) (60 - 102)  BP: 96/57 (2020 07:30) (96/57 - 136/77)  BP(mean): 94 (2020 05:25) (77 - 94)  RR: 18 (2020 07:30) (18 - 18)  SpO2: 100% (2020 07:30) (99% - 100%)        CONSTITUTIONAL: NAD, well-developed, well-groomed  ENMT: Moist oral mucosa, no pharyngeal injection or exudates; normal dentition; No JVD  RESPIRATORY: Normal respiratory effort; lungs are clear to auscultation bilaterally  CARDIOVASCULAR: Regular rate and rhythm, normal S1 and S2, no murmur/rub/gallop; No lower extremity edema; Peripheral pulses are 2+ bilaterally, L side chest  pacemaker   ABDOMEN: Nontender to palpation, normoactive bowel sounds, no rebound/guarding; No hepatosplenomegaly  MUSCLOSKELETAL:  Normal gait; no clubbing or cyanosis of digits; no joint swelling or tenderness to palpation  PSYCH: A+O to person, place, and time; affect appropriate  NEUROLOGY: CN 2-12 are intact and symmetric; no gross sensory deficits; was observed moving all 4 ext against gravity cooperating with exam.   SKIN: No rashes; no palpable lesions    LABS:                        9.5    5.92  )-----------( 367      ( 2020 07:07 )             29.9     11-07    144  |  111<H>  |  15.0  ----------------------------<  92  4.3   |  23.0  |  1.00    Ca    8.6      2020 07:07    TPro  5.8<L>  /  Alb  3.1<L>  /  TBili  <0.2<L>  /  DBili  x   /  AST  19  /  ALT  10  /  AlkPhos  98  11-06    PT/INR - ( 2020 20:10 )   PT: 13.1 sec;   INR: 1.14 ratio           CARDIAC MARKERS ( 2020 16:09 )  x     / <0.01 ng/mL / x     / x     / x          Urinalysis Basic - ( 2020 20:07 )    Color: Yellow / Appearance: Clear / S.025 / pH: x  Gluc: x / Ketone: Negative  / Bili: Negative / Urobili: Negative mg/dL   Blood: x / Protein: Negative mg/dL / Nitrite: Negative   Leuk Esterase: Trace / RBC: 0-2 /HPF / WBC 0-2   Sq Epi: x / Non Sq Epi: Occasional / Bacteria: x        CAPILLARY BLOOD GLUCOSE            RADIOLOGY & ADDITIONAL TESTS:  Results Reviewed:   Imaging Personally Reviewed:  Electrocardiogram Personally Reviewed:

## 2020-11-08 ENCOUNTER — TRANSCRIPTION ENCOUNTER (OUTPATIENT)
Age: 70
End: 2020-11-08

## 2020-11-08 LAB
ANION GAP SERPL CALC-SCNC: 8 MMOL/L — SIGNIFICANT CHANGE UP (ref 5–17)
BUN SERPL-MCNC: 22 MG/DL — HIGH (ref 8–20)
CALCIUM SERPL-MCNC: 8.4 MG/DL — LOW (ref 8.6–10.2)
CHLORIDE SERPL-SCNC: 111 MMOL/L — HIGH (ref 98–107)
CO2 SERPL-SCNC: 24 MMOL/L — SIGNIFICANT CHANGE UP (ref 22–29)
CREAT SERPL-MCNC: 0.89 MG/DL — SIGNIFICANT CHANGE UP (ref 0.5–1.3)
CULTURE RESULTS: SIGNIFICANT CHANGE UP
GLUCOSE SERPL-MCNC: 93 MG/DL — SIGNIFICANT CHANGE UP (ref 70–99)
HCT VFR BLD CALC: 23.9 % — LOW (ref 34.5–45)
HCT VFR BLD CALC: 33.5 % — LOW (ref 34.5–45)
HGB BLD-MCNC: 10.6 G/DL — LOW (ref 11.5–15.5)
HGB BLD-MCNC: 7.4 G/DL — LOW (ref 11.5–15.5)
MCHC RBC-ENTMCNC: 28.4 PG — SIGNIFICANT CHANGE UP (ref 27–34)
MCHC RBC-ENTMCNC: 29.4 PG — SIGNIFICANT CHANGE UP (ref 27–34)
MCHC RBC-ENTMCNC: 31 GM/DL — LOW (ref 32–36)
MCHC RBC-ENTMCNC: 31.6 GM/DL — LOW (ref 32–36)
MCV RBC AUTO: 91.6 FL — SIGNIFICANT CHANGE UP (ref 80–100)
MCV RBC AUTO: 92.8 FL — SIGNIFICANT CHANGE UP (ref 80–100)
PLATELET # BLD AUTO: 322 K/UL — SIGNIFICANT CHANGE UP (ref 150–400)
PLATELET # BLD AUTO: 361 K/UL — SIGNIFICANT CHANGE UP (ref 150–400)
POTASSIUM SERPL-MCNC: 4.4 MMOL/L — SIGNIFICANT CHANGE UP (ref 3.5–5.3)
POTASSIUM SERPL-SCNC: 4.4 MMOL/L — SIGNIFICANT CHANGE UP (ref 3.5–5.3)
RBC # BLD: 2.61 M/UL — LOW (ref 3.8–5.2)
RBC # BLD: 3.61 M/UL — LOW (ref 3.8–5.2)
RBC # FLD: 14.6 % — HIGH (ref 10.3–14.5)
RBC # FLD: 15.6 % — HIGH (ref 10.3–14.5)
SODIUM SERPL-SCNC: 143 MMOL/L — SIGNIFICANT CHANGE UP (ref 135–145)
SPECIMEN SOURCE: SIGNIFICANT CHANGE UP
WBC # BLD: 10.56 K/UL — HIGH (ref 3.8–10.5)
WBC # BLD: 6.31 K/UL — SIGNIFICANT CHANGE UP (ref 3.8–10.5)
WBC # FLD AUTO: 10.56 K/UL — HIGH (ref 3.8–10.5)
WBC # FLD AUTO: 6.31 K/UL — SIGNIFICANT CHANGE UP (ref 3.8–10.5)

## 2020-11-08 PROCEDURE — 99233 SBSQ HOSP IP/OBS HIGH 50: CPT

## 2020-11-08 RX ORDER — SOD SULF/SODIUM/NAHCO3/KCL/PEG
4000 SOLUTION, RECONSTITUTED, ORAL ORAL ONCE
Refills: 0 | Status: COMPLETED | OUTPATIENT
Start: 2020-11-08 | End: 2020-11-08

## 2020-11-08 RX ORDER — METHOTREXATE 2.5 MG/1
15 TABLET ORAL
Refills: 0 | Status: DISCONTINUED | OUTPATIENT
Start: 2020-11-08 | End: 2020-11-14

## 2020-11-08 RX ORDER — LIDOCAINE 4 G/100G
1 CREAM TOPICAL DAILY
Refills: 0 | Status: DISCONTINUED | OUTPATIENT
Start: 2020-11-08 | End: 2020-11-14

## 2020-11-08 RX ADMIN — SODIUM CHLORIDE 80 MILLILITER(S): 9 INJECTION INTRAMUSCULAR; INTRAVENOUS; SUBCUTANEOUS at 18:25

## 2020-11-08 RX ADMIN — LIDOCAINE 1 PATCH: 4 CREAM TOPICAL at 18:24

## 2020-11-08 RX ADMIN — Medication 1 MILLIGRAM(S): at 11:26

## 2020-11-08 RX ADMIN — Medication 81 MILLIGRAM(S): at 11:26

## 2020-11-08 RX ADMIN — CARVEDILOL PHOSPHATE 12.5 MILLIGRAM(S): 80 CAPSULE, EXTENDED RELEASE ORAL at 18:24

## 2020-11-08 RX ADMIN — Medication 650 MILLIGRAM(S): at 11:55

## 2020-11-08 RX ADMIN — ATORVASTATIN CALCIUM 80 MILLIGRAM(S): 80 TABLET, FILM COATED ORAL at 20:22

## 2020-11-08 RX ADMIN — PANTOPRAZOLE SODIUM 40 MILLIGRAM(S): 20 TABLET, DELAYED RELEASE ORAL at 05:18

## 2020-11-08 RX ADMIN — LIDOCAINE 1 PATCH: 4 CREAM TOPICAL at 20:28

## 2020-11-08 RX ADMIN — Medication 650 MILLIGRAM(S): at 05:17

## 2020-11-08 RX ADMIN — Medication 4000 MILLILITER(S): at 18:25

## 2020-11-08 RX ADMIN — Medication 500 MILLIGRAM(S): at 11:27

## 2020-11-08 RX ADMIN — PANTOPRAZOLE SODIUM 40 MILLIGRAM(S): 20 TABLET, DELAYED RELEASE ORAL at 18:24

## 2020-11-08 RX ADMIN — CARVEDILOL PHOSPHATE 12.5 MILLIGRAM(S): 80 CAPSULE, EXTENDED RELEASE ORAL at 05:18

## 2020-11-08 RX ADMIN — Medication 650 MILLIGRAM(S): at 11:25

## 2020-11-08 NOTE — PROGRESS NOTE ADULT - SUBJECTIVE AND OBJECTIVE BOX
[facility] Division of Hospital Medicine    Chief Complaint:  LGIB    SUBJECTIVE: reports feeling ok, no other complains except chronic shoulder pain in L shoulder    OVERNIGHT EVENTS: none reported     Patient denies chest pain, abd pain, N/V, fever, chills, dysuria or any other complaints. Endorses: on adn off GERD burning, base line SOB; All remainder ROS negative.      MEDICATIONS  (STANDING):  ascorbic acid 500 milliGRAM(s) Oral daily  aspirin enteric coated 81 milliGRAM(s) Oral daily  atorvastatin 80 milliGRAM(s) Oral at bedtime  carvedilol 12.5 milliGRAM(s) Oral every 12 hours  folic acid 1 milliGRAM(s) Oral daily  pantoprazole  Injectable 40 milliGRAM(s) IV Push two times a day  polyethylene glycol/electrolyte Solution. 4000 milliLiter(s) Oral once  sodium chloride 0.9%. 1000 milliLiter(s) (80 mL/Hr) IV Continuous <Continuous>    MEDICATIONS  (PRN):  acetaminophen   Tablet .. 650 milliGRAM(s) Oral every 6 hours PRN Temp greater or equal to 38C (100.4F), Mild Pain (1 - 3)  ALBUTerol    90 MICROgram(s) HFA Inhaler 2 Puff(s) Inhalation every 6 hours PRN Shortness of Breath and/or Wheezing  albuterol/ipratropium for Nebulization. 3 milliLiter(s) Nebulizer every 4 hours PRN Shortness of Breath and/or Wheezing          I&O's Summary      PHYSICAL EXAM:  Vital Signs Last 24 Hrs  T(C): 36.4 (2020 05:14), Max: 36.9 (2020 15:35)  T(F): 97.5 (2020 05:14), Max: 98.4 (2020 15:35)  HR: 62 (2020 05:14) (61 - 77)  BP: 124/64 (2020 05:14) (100/56 - 148/64)  BP(mean): 71 (2020 23:22) (71 - 71)  RR: 18 (2020 05:14) (16 - 18)  SpO2: 96% (2020 05:14) (94% - 100%)        CONSTITUTIONAL: NAD, well-developed, well-groomed  ENMT: Moist oral mucosa, no pharyngeal injection or exudates; normal dentition; No JVD  RESPIRATORY: Normal respiratory effort; lungs are clear to auscultation bilaterally  CARDIOVASCULAR: Regular rate and rhythm, normal S1 and S2, no murmur/rub/gallop; No lower extremity edema; Peripheral pulses are 2+ bilaterally, L side chest  pacemaker   ABDOMEN: Nontender to palpation, normoactive bowel sounds, no rebound/guarding; No hepatosplenomegaly  MUSCLOSKELETAL:  Normal gait; no clubbing or cyanosis of digits; no joint swelling or tenderness to palpation  PSYCH: A+O to person, place, and time; affect appropriate  NEUROLOGY: CN 2-12 are intact and symmetric; no gross sensory deficits; was observed moving all 4 ext against gravity cooperating with exam.   SKIN: No rashes; no palpable lesions  LABS:                        7.4    6.31  )-----------( 322      ( 2020 06:58 )             23.9     11-08    143  |  111<H>  |  22.0<H>  ----------------------------<  93  4.4   |  24.0  |  0.89    Ca    8.4<L>      2020 06:58    TPro  5.8<L>  /  Alb  3.1<L>  /  TBili  <0.2<L>  /  DBili  x   /  AST  19  /  ALT  10  /  AlkPhos  98  11-06    PT/INR - ( 2020 20:10 )   PT: 13.1 sec;   INR: 1.14 ratio           CARDIAC MARKERS ( 2020 16:09 )  x     / <0.01 ng/mL / x     / x     / x          Urinalysis Basic - ( 2020 20:07 )    Color: Yellow / Appearance: Clear / S.025 / pH: x  Gluc: x / Ketone: Negative  / Bili: Negative / Urobili: Negative mg/dL   Blood: x / Protein: Negative mg/dL / Nitrite: Negative   Leuk Esterase: Trace / RBC: 0-2 /HPF / WBC 0-2   Sq Epi: x / Non Sq Epi: Occasional / Bacteria: x        Culture - Urine (collected 2020 00:50)  Source: .Urine Clean Catch (Midstream)  Final Report (2020 08:13):    >=3 organisms. Probable collection contamination.      CAPILLARY BLOOD GLUCOSE            RADIOLOGY & ADDITIONAL TESTS:  Results Reviewed:   Imaging Personally Reviewed:  Electrocardiogram Personally Reviewed:

## 2020-11-08 NOTE — PROGRESS NOTE ADULT - SUBJECTIVE AND OBJECTIVE BOX
Pt seen and examined f/u painless hematochezia with positive CTA for cecal bleed on plavix    This morning the hgb has trended down to 7.4. No BM since yesterday morning which had still contained some blood. VSS. No abdominal pain, nausea or vomiting. Given one unit platelets yesterday.    REVIEW OF SYSTEMS:    CONSTITUTIONAL: No fever, weight loss, or fatigue  EYES: No eye pain, visual disturbances, or discharge  ENMT:  No difficulty hearing, tinnitus, vertigo; No sinus or throat pain  RESPIRATORY: No cough, wheezing, chills or hemoptysis; No shortness of breath  CARDIOVASCULAR: No chest pain, palpitations, dizziness, or leg swelling  GASTROINTESTINAL: as above    MEDICATIONS:  MEDICATIONS  (STANDING):  ascorbic acid 500 milliGRAM(s) Oral daily  aspirin enteric coated 81 milliGRAM(s) Oral daily  atorvastatin 80 milliGRAM(s) Oral at bedtime  carvedilol 12.5 milliGRAM(s) Oral every 12 hours  folic acid 1 milliGRAM(s) Oral daily  pantoprazole  Injectable 40 milliGRAM(s) IV Push two times a day  polyethylene glycol/electrolyte Solution. 4000 milliLiter(s) Oral once  sodium chloride 0.9%. 1000 milliLiter(s) (80 mL/Hr) IV Continuous <Continuous>    MEDICATIONS  (PRN):  acetaminophen   Tablet .. 650 milliGRAM(s) Oral every 6 hours PRN Temp greater or equal to 38C (100.4F), Mild Pain (1 - 3)  ALBUTerol    90 MICROgram(s) HFA Inhaler 2 Puff(s) Inhalation every 6 hours PRN Shortness of Breath and/or Wheezing  albuterol/ipratropium for Nebulization. 3 milliLiter(s) Nebulizer every 4 hours PRN Shortness of Breath and/or Wheezing      Allergies    No Known Allergies    Intolerances        Vital Signs Last 24 Hrs  T(C): 36.4 (2020 05:14), Max: 36.9 (2020 15:35)  T(F): 97.5 (2020 05:14), Max: 98.4 (2020 15:35)  HR: 62 (2020 05:14) (61 - 77)  BP: 124/64 (2020 05:14) (100/56 - 148/64)  BP(mean): 71 (2020 23:22) (71 - 71)  RR: 18 (2020 05:14) (16 - 18)  SpO2: 96% (2020 05:14) (94% - 100%)     @ 07:01  -   @ 07:00  --------------------------------------------------------  IN: 1400 mL / OUT: 300 mL / NET: 1100 mL        PHYSICAL EXAM:    General: Well developed; well nourished; in no acute distress  HEENT: MMM, conjunctiva and sclera clear  Gastrointestinal:Abdomen: Soft non-tender non-distended; Normal bowel sounds; No hepatosplenomegaly  Extremities: no cyanosis, clubbing or edema.  Skin: Warm and dry. No obvious rash    LABS:      CBC Full  -  ( 2020 06:58 )  WBC Count : 6.31 K/uL  RBC Count : 2.61 M/uL  Hemoglobin : 7.4 g/dL  Hematocrit : 23.9 %  Platelet Count - Automated : 322 K/uL  Mean Cell Volume : 91.6 fl  Mean Cell Hemoglobin : 28.4 pg  Mean Cell Hemoglobin Concentration : 31.0 gm/dL  Auto Neutrophil # : x  Auto Lymphocyte # : x  Auto Monocyte # : x  Auto Eosinophil # : x  Auto Basophil # : x  Auto Neutrophil % : x  Auto Lymphocyte % : x  Auto Monocyte % : x  Auto Eosinophil % : x  Auto Basophil % : x        143  |  111<H>  |  22.0<H>  ----------------------------<  93  4.4   |  24.0  |  0.89    Ca    8.4<L>      2020 06:58    TPro  5.8<L>  /  Alb  3.1<L>  /  TBili  <0.2<L>  /  DBili  x   /  AST  19  /  ALT  10  /  AlkPhos  98  11-06    PT/INR - ( 2020 20:10 )   PT: 13.1 sec;   INR: 1.14 ratio               Urinalysis Basic - ( 2020 20:07 )    Color: Yellow / Appearance: Clear / S.025 / pH: x  Gluc: x / Ketone: Negative  / Bili: Negative / Urobili: Negative mg/dL   Blood: x / Protein: Negative mg/dL / Nitrite: Negative   Leuk Esterase: Trace / RBC: 0-2 /HPF / WBC 0-2   Sq Epi: x / Non Sq Epi: Occasional / Bacteria: x

## 2020-11-08 NOTE — PROGRESS NOTE ADULT - ASSESSMENT
71 yo with PMH significant for HTN, Renal artery stenosis, prior TIA's with Placement of a left cerebral artery stent in 2014, COPD with emphysema  using 2L home O2 at baseline, RA, Osteopenia, HLD and S/p Cardiac arrest s/p angiography with placement of PPM, recent admission 2 weeks ago for GI bleeding, presenting today and found to have recurrent lower GI wtih cecal bleeding on CTA abdomen/pelvis this time in ED.     #Lower GI Bleed  in setting of diverticulosis,  complicated by Acute Blood Loss Anemia, s/p 2 RBC units   - hgb downtrending today 7.4>> 2 prbc ordered  - appreciate GI consult appreciated ---> colon prep today with colonoscopy on Monday  - holding plavix, c/w Aspirin  - c/w Protonix 40 iv bid for now    #COPD with emphysema on 2 l NC at home  -not in acute exacerbation  -c/w albuterol and duonebs prn  - supplemental o2 via nasal cannula, titrating down to home base line with SpO2 goal > 89    #Intracranial stenosis and stent placement in 2014  # CAD w/o angina  # HLD  # SSS s/p Pacemaker placement  -patient takes 150mg plavix with asa daily  -holding plavix per GI recs   -lipitor 80mg qhs, c/w Coreg 12.5 bid     #HTN  c/w home medications  coreg   if patient becomes hemodynamically unstable in setting of blood loss, will hold antihypertensive agents at that time.       DVT PPx: holding chemical AC in setting of active GI bleeding  GOC: Full Code  LOS - will stay till [place]-Tuesday for sure, given planned colonoscopy on Monday

## 2020-11-09 LAB
ANION GAP SERPL CALC-SCNC: 9 MMOL/L — SIGNIFICANT CHANGE UP (ref 5–17)
BUN SERPL-MCNC: 15 MG/DL — SIGNIFICANT CHANGE UP (ref 8–20)
CALCIUM SERPL-MCNC: 8.1 MG/DL — LOW (ref 8.6–10.2)
CHLORIDE SERPL-SCNC: 110 MMOL/L — HIGH (ref 98–107)
CO2 SERPL-SCNC: 24 MMOL/L — SIGNIFICANT CHANGE UP (ref 22–29)
CREAT SERPL-MCNC: 0.79 MG/DL — SIGNIFICANT CHANGE UP (ref 0.5–1.3)
GLUCOSE SERPL-MCNC: 82 MG/DL — SIGNIFICANT CHANGE UP (ref 70–99)
HCT VFR BLD CALC: 28.3 % — LOW (ref 34.5–45)
HGB BLD-MCNC: 9.2 G/DL — LOW (ref 11.5–15.5)
MCHC RBC-ENTMCNC: 29.4 PG — SIGNIFICANT CHANGE UP (ref 27–34)
MCHC RBC-ENTMCNC: 32.5 GM/DL — SIGNIFICANT CHANGE UP (ref 32–36)
MCV RBC AUTO: 90.4 FL — SIGNIFICANT CHANGE UP (ref 80–100)
PLATELET # BLD AUTO: 312 K/UL — SIGNIFICANT CHANGE UP (ref 150–400)
POTASSIUM SERPL-MCNC: 4 MMOL/L — SIGNIFICANT CHANGE UP (ref 3.5–5.3)
POTASSIUM SERPL-SCNC: 4 MMOL/L — SIGNIFICANT CHANGE UP (ref 3.5–5.3)
RBC # BLD: 3.13 M/UL — LOW (ref 3.8–5.2)
RBC # FLD: 14.7 % — HIGH (ref 10.3–14.5)
SODIUM SERPL-SCNC: 143 MMOL/L — SIGNIFICANT CHANGE UP (ref 135–145)
WBC # BLD: 10.55 K/UL — HIGH (ref 3.8–10.5)
WBC # FLD AUTO: 10.55 K/UL — HIGH (ref 3.8–10.5)

## 2020-11-09 PROCEDURE — 99233 SBSQ HOSP IP/OBS HIGH 50: CPT

## 2020-11-09 PROCEDURE — 45378 DIAGNOSTIC COLONOSCOPY: CPT

## 2020-11-09 RX ORDER — PANTOPRAZOLE SODIUM 20 MG/1
40 TABLET, DELAYED RELEASE ORAL
Refills: 0 | Status: DISCONTINUED | OUTPATIENT
Start: 2020-11-09 | End: 2020-11-14

## 2020-11-09 RX ADMIN — Medication 81 MILLIGRAM(S): at 09:18

## 2020-11-09 RX ADMIN — CARVEDILOL PHOSPHATE 12.5 MILLIGRAM(S): 80 CAPSULE, EXTENDED RELEASE ORAL at 17:26

## 2020-11-09 RX ADMIN — PANTOPRAZOLE SODIUM 40 MILLIGRAM(S): 20 TABLET, DELAYED RELEASE ORAL at 05:44

## 2020-11-09 RX ADMIN — CARVEDILOL PHOSPHATE 12.5 MILLIGRAM(S): 80 CAPSULE, EXTENDED RELEASE ORAL at 05:44

## 2020-11-09 RX ADMIN — LIDOCAINE 1 PATCH: 4 CREAM TOPICAL at 09:18

## 2020-11-09 RX ADMIN — ATORVASTATIN CALCIUM 80 MILLIGRAM(S): 80 TABLET, FILM COATED ORAL at 21:40

## 2020-11-09 RX ADMIN — PANTOPRAZOLE SODIUM 40 MILLIGRAM(S): 20 TABLET, DELAYED RELEASE ORAL at 17:26

## 2020-11-09 RX ADMIN — Medication 500 MILLIGRAM(S): at 09:18

## 2020-11-09 RX ADMIN — Medication 1 MILLIGRAM(S): at 09:18

## 2020-11-09 RX ADMIN — LIDOCAINE 1 PATCH: 4 CREAM TOPICAL at 05:51

## 2020-11-09 RX ADMIN — METHOTREXATE 15 MILLIGRAM(S): 2.5 TABLET ORAL at 18:43

## 2020-11-09 NOTE — BRIEF OPERATIVE NOTE - OPERATION/FINDINGS
Colonoscopy: Multiple medium caliber diverticula all over the colon. Old blood. No active bleeding noted.

## 2020-11-09 NOTE — PROGRESS NOTE ADULT - ASSESSMENT
69 yo with PMH significant for HTN, Renal artery stenosis, prior TIA's with Placement of a left cerebral artery stent in 2014, COPD with emphysema  using 2L home O2 at baseline, RA, Osteopenia, HLD and S/p Cardiac arrest s/p angiography with placement of PPM, recent admission 2 weeks ago for GI bleeding, presenting today and found to have recurrent lower GI wtih cecal bleeding on CTA abdomen/pelvis this time in ED.     #Lower GI Bleed  in setting of diverticulosis,  complicated by Acute Blood Loss Anemia, s/p 4 RBC units total   - hgb downtrending today 7.4>> 2 prbc >> 10.6>>9.2  - Colonoscopy today is unrevealing  - discussed with Dr. Higgins >> resume diet and ok to try resume Plavix tomorrow  - c/w Aspirin  - changed Protonix 40 po bid    #COPD with emphysema on 2 l NC at home  -not in acute exacerbation  -c/w albuterol and duonebs prn  - supplemental o2 via nasal cannula, titrating down to home base line with SpO2 goal > 89    #Intracranial stenosis and stent placement in 2014   # CAD w/o angina  # HLD  # SSS s/p Pacemaker placement  -patient takes 150mg plavix ( holding for now)  with asa daily, see above  -Lipitor 80mg qhs, c/w Coreg 12.5 bid    #HTN  c/w home medications  coreg       DVT PPx: holding chemical AC in setting of active GI bleeding  GOC: Full Code  LOS - will stay  for 1-2 days, depends on if she can tolerate DAPT chalange next 24-48hr

## 2020-11-09 NOTE — PROGRESS NOTE ADULT - SUBJECTIVE AND OBJECTIVE BOX
[facility] Division of Hospital Medicine    Chief Complaint:  LGIB    SUBJECTIVE: reports feeling ok at the momenent, reports seen a lot of blood while taking prep over night     OVERNIGHT EVENTS: as above    Patient denies chest pain, abd pain, N/V, fever, chills, dysuria or any other complaints.      MEDICATIONS  (STANDING):  ascorbic acid 500 milliGRAM(s) Oral daily  aspirin enteric coated 81 milliGRAM(s) Oral daily  atorvastatin 80 milliGRAM(s) Oral at bedtime  carvedilol 12.5 milliGRAM(s) Oral every 12 hours  folic acid 1 milliGRAM(s) Oral daily  lidocaine   Patch 1 Patch Transdermal daily  methotrexate 15 milliGRAM(s) Oral every week  pantoprazole  Injectable 40 milliGRAM(s) IV Push two times a day    MEDICATIONS  (PRN):  acetaminophen   Tablet .. 650 milliGRAM(s) Oral every 6 hours PRN Temp greater or equal to 38C (100.4F), Mild Pain (1 - 3)  ALBUTerol    90 MICROgram(s) HFA Inhaler 2 Puff(s) Inhalation every 6 hours PRN Shortness of Breath and/or Wheezing  albuterol/ipratropium for Nebulization. 3 milliLiter(s) Nebulizer every 4 hours PRN Shortness of Breath and/or Wheezing            I&O's Summary      PHYSICAL EXAM:  Vital Signs Last 24 Hrs  T(C): 36.4 (09 Nov 2020 15:11), Max: 36.8 (09 Nov 2020 13:18)  T(F): 97.6 (09 Nov 2020 15:11), Max: 98.2 (09 Nov 2020 13:18)  HR: 71 (09 Nov 2020 17:23) (60 - 76)  BP: 172/78 (09 Nov 2020 17:23) (119/74 - 172/78)  BP(mean): --  RR: 18 (09 Nov 2020 15:11) (18 - 18)  SpO2: 99% (09 Nov 2020 15:11) (97% - 100%)        CONSTITUTIONAL: NAD, well-developed, well-groomed  ENMT: Moist oral mucosa, no pharyngeal injection or exudates; normal dentition; No JVD  RESPIRATORY: Normal respiratory effort; lungs are clear to auscultation bilaterally  CARDIOVASCULAR: Regular rate and rhythm, normal S1 and S2, no murmur/rub/gallop; No lower extremity edema; Peripheral pulses are 2+ bilaterally, L side chest  pacemaker   ABDOMEN: Nontender to palpation, normoactive bowel sounds, no rebound/guarding; No hepatosplenomegaly  MUSCLOSKELETAL:  Normal gait; no clubbing or cyanosis of digits; no joint swelling or tenderness to palpation  PSYCH: A+O to person, place, and time; affect appropriate  NEUROLOGY: CN 2-12 are intact and symmetric; no gross sensory deficits; was observed moving all 4 ext against gravity cooperating with exam.   SKIN: No rashes; no palpable lesions    LABS:                        9.2    10.55 )-----------( 312      ( 09 Nov 2020 06:46 )             28.3     11-09    143  |  110<H>  |  15.0  ----------------------------<  82  4.0   |  24.0  |  0.79    Ca    8.1<L>      09 Nov 2020 06:46                Culture - Urine (collected 07 Nov 2020 00:50)  Source: .Urine Clean Catch (Midstream)  Final Report (08 Nov 2020 08:13):    >=3 organisms. Probable collection contamination.      CAPILLARY BLOOD GLUCOSE            RADIOLOGY & ADDITIONAL TESTS:  Results Reviewed:   Imaging Personally Reviewed:  Electrocardiogram Personally Reviewed:

## 2020-11-10 DIAGNOSIS — K92.1 MELENA: ICD-10-CM

## 2020-11-10 LAB
HCT VFR BLD CALC: 30.3 % — LOW (ref 34.5–45)
HGB BLD-MCNC: 9.8 G/DL — LOW (ref 11.5–15.5)
MCHC RBC-ENTMCNC: 29.6 PG — SIGNIFICANT CHANGE UP (ref 27–34)
MCHC RBC-ENTMCNC: 32.3 GM/DL — SIGNIFICANT CHANGE UP (ref 32–36)
MCV RBC AUTO: 91.5 FL — SIGNIFICANT CHANGE UP (ref 80–100)
PLATELET # BLD AUTO: 329 K/UL — SIGNIFICANT CHANGE UP (ref 150–400)
RBC # BLD: 3.31 M/UL — LOW (ref 3.8–5.2)
RBC # FLD: 14.7 % — HIGH (ref 10.3–14.5)
WBC # BLD: 8.5 K/UL — SIGNIFICANT CHANGE UP (ref 3.8–10.5)
WBC # FLD AUTO: 8.5 K/UL — SIGNIFICANT CHANGE UP (ref 3.8–10.5)

## 2020-11-10 PROCEDURE — 99233 SBSQ HOSP IP/OBS HIGH 50: CPT

## 2020-11-10 RX ORDER — CLOPIDOGREL BISULFATE 75 MG/1
75 TABLET, FILM COATED ORAL DAILY
Refills: 0 | Status: DISCONTINUED | OUTPATIENT
Start: 2020-11-10 | End: 2020-11-13

## 2020-11-10 RX ADMIN — LIDOCAINE 1 PATCH: 4 CREAM TOPICAL at 19:11

## 2020-11-10 RX ADMIN — CARVEDILOL PHOSPHATE 12.5 MILLIGRAM(S): 80 CAPSULE, EXTENDED RELEASE ORAL at 05:27

## 2020-11-10 RX ADMIN — Medication 81 MILLIGRAM(S): at 08:48

## 2020-11-10 RX ADMIN — PANTOPRAZOLE SODIUM 40 MILLIGRAM(S): 20 TABLET, DELAYED RELEASE ORAL at 05:27

## 2020-11-10 RX ADMIN — Medication 1 MILLIGRAM(S): at 08:47

## 2020-11-10 RX ADMIN — ATORVASTATIN CALCIUM 80 MILLIGRAM(S): 80 TABLET, FILM COATED ORAL at 21:02

## 2020-11-10 RX ADMIN — CARVEDILOL PHOSPHATE 12.5 MILLIGRAM(S): 80 CAPSULE, EXTENDED RELEASE ORAL at 17:47

## 2020-11-10 RX ADMIN — CLOPIDOGREL BISULFATE 75 MILLIGRAM(S): 75 TABLET, FILM COATED ORAL at 11:22

## 2020-11-10 RX ADMIN — LIDOCAINE 1 PATCH: 4 CREAM TOPICAL at 20:08

## 2020-11-10 RX ADMIN — LIDOCAINE 1 PATCH: 4 CREAM TOPICAL at 08:46

## 2020-11-10 RX ADMIN — Medication 500 MILLIGRAM(S): at 08:47

## 2020-11-10 RX ADMIN — LIDOCAINE 1 PATCH: 4 CREAM TOPICAL at 08:47

## 2020-11-10 NOTE — PROGRESS NOTE ADULT - PROBLEM SELECTOR PLAN 1
Now most likely resolved as colonoscopy revealed multiple medium caliber diverticula all over the colon, old blood, no active bleeding noted and patient is verbalizing brown soft stool this am.  Hemoglobin stable trending between 9-10  Since hemoglobin is stable and no signs of overt bleeding it is ok to restart patient on half dose AC and ASA.  Dr Sanderson will discusse reduction of AC with [facility] Dr Chris.  Continue Pantoprazole BID for cytoprotection.  Monitor Hemoglobin in AM. Now most likely resolved as colonoscopy revealed multiple medium caliber diverticula all over the colon, old blood, no active bleeding noted and patient is verbalizing brown soft stool this am.  Hemoglobin stable trending between 9-10  Since hemoglobin is stable and no signs of overt bleeding it is ok to restart patient on half dose AC and ASA.  Dr Sanderson will discuss reduction of AC with [facility] Dr Chris.  Continue Pantoprazole BID for cytoprotection.  Monitor Hemoglobin daily

## 2020-11-10 NOTE — PROGRESS NOTE ADULT - SUBJECTIVE AND OBJECTIVE BOX
Chief Complaint: This is a 70y old woman patient being seen in follow-up consultation for hematochezia.    HPI / 24H events:  Patient seeing and evaluated at bedside, reporting no complains, tolerating oral intake, reporting 1 brown soft BM this am. Colonoscopy revealed multiple medium caliber diverticula all over the colon, old blood, and no active bleeding noted Dr Sanderson at bedside to restart patient on AC. Denies nausea, vomiting, abdominal pain, chest pain, shortness of breath, hematemesis, hematochezia, melena.    ROS: A 14-point review of systems was reviewed and was otherwise negative save what was reported in the HPI.    PAST MEDICAL/SURGICAL HISTORY:  H/O cerebral artery stenosis  left cerebral artery stent per history in 2014.    Pacemaker    HTN (hypertension)    COPD without exacerbation    Rheumatoid arthritis    H/O exploratory laparotomy    S/P cholecystectomy    S/P hysterectomy      MEDICATIONS  (STANDING):  ascorbic acid 500 milliGRAM(s) Oral daily  aspirin enteric coated 81 milliGRAM(s) Oral daily  atorvastatin 80 milliGRAM(s) Oral at bedtime  carvedilol 12.5 milliGRAM(s) Oral every 12 hours  clopidogrel Tablet 75 milliGRAM(s) Oral daily  folic acid 1 milliGRAM(s) Oral daily  lidocaine   Patch 1 Patch Transdermal daily  methotrexate 15 milliGRAM(s) Oral every week  pantoprazole    Tablet 40 milliGRAM(s) Oral before breakfast    MEDICATIONS  (PRN):  acetaminophen   Tablet .. 650 milliGRAM(s) Oral every 6 hours PRN Temp greater or equal to 38C (100.4F), Mild Pain (1 - 3)  ALBUTerol    90 MICROgram(s) HFA Inhaler 2 Puff(s) Inhalation every 6 hours PRN Shortness of Breath and/or Wheezing  albuterol/ipratropium for Nebulization. 3 milliLiter(s) Nebulizer every 4 hours PRN Shortness of Breath and/or Wheezing    No Known Allergies    T(C): 36.6 (11-10-20 @ 10:00), Max: 36.8 (11-09-20 @ 13:18)  HR: 65 (11-10-20 @ 10:00) (65 - 76)  BP: 111/70 (11-10-20 @ 10:00) (105/57 - 172/78)  RR: 18 (11-10-20 @ 10:00) (18 - 18)  SpO2: 100% (11-10-20 @ 10:00) (99% - 100%)    I&O's Summary    09 Nov 2020 07:01  -  10 Nov 2020 07:00  --------------------------------------------------------  IN: 260 mL / OUT: 0 mL / NET: 260 mL      PHYSICAL EXAM:  Constitutional: Well-developed, well-nourished, in no apparent distress  Eyes: Sclerae anicteric, conjunctivae normal  ENMT: Mucus membranes moist, no oropharyngeal thrush noted  Respiratory: Breathing nonlabored; clear to auscultation  Cardiovascular: Regular rate and rhythm  Gastrointestinal: Soft, nontender, nondistended, normoactive bowel sounds.  Extremities: No clubbing, cyanosis or edema  Neurological: Alert and oriented to person, place and time.  Skin: No jaundice  Musculoskeletal: No significant peripheral atrophy  Psychiatric: Affect and mood appropriate                   9.8    8.50  )-----------( 329      ( 11-10 @ 06:10 )             30.3                9.2    10.55 )-----------( 312      ( 11-09 @ 06:46 )             28.3                10.6   10.56 )-----------( 361      ( 11-08 @ 21:52 )             33.5                7.4    6.31  )-----------( 322      ( 11-08 @ 06:58 )             23.9                8.2    x     )-----------( x        ( 11-07 @ 18:30 )             26.6       11-09    143  |  110<H>  |  15.0  ----------------------------<  82  4.0   |  24.0  |  0.79    Ca    8.1<L>      09 Nov 2020 06:46    Lipase, Serum: 22 U/L (11-06-20 @ 16:09)  IMAGING: I personally reviewed the CT of abdomen and pelvis , and I agree with the radiologist's interpretation as described below:  FINDINGS:  LOWERCHEST: Pacemaker lead tip in the right ventricle. Subsegmental right lower lobe atelectasis.    LIVER: Within normal limits.  BILE DUCTS: Nonspecific dilatation of the intra and extrahepatic biliary tree in the setting of prior cholecystectomy.  GALLBLADDER: Cholecystectomy.  SPLEEN: Within normal limits.  PANCREAS: Within normal limits.  ADRENALS: Within normal limits.  KIDNEYS/URETERS: Stable right adrenal adenoma. Nodular thickening of the adrenal glands.    BLADDER: Within normal limits.  REPRODUCTIVE ORGANS: Hysterectomy.    BOWEL: Focus of active hemorrhage in the cecum. Appendix is normal. No bowel obstruction. Diverticulosis coli.  PERITONEUM: No ascites.  VESSELS: Atherosclerotic changes.  RETROPERITONEUM/LYMPH NODES: No lymphadenopathy.  ABDOMINAL WALL: Within normal limits.  BONES: Within normal limits.    IMPRESSION:  Active GI bleeding in the cecum.    Findings were discussed with Dr. KEILA YANG [phone] 11/6/2020 9:29 PM by Dr. May with read back confirmation.   Chief Complaint: This is a 70y old woman patient being seen in follow-up consultation for hematochezia.    HPI / 24H events:  Patient seeing and evaluated at bedside, reporting no complains, tolerating oral intake, reporting 1 brown soft BM this am. Colonoscopy revealed multiple medium caliber diverticula all over the colon, old blood, and no active bleeding noted Dr Sanderson at bedside to restart patient on AC. Denies nausea, vomiting, abdominal pain, chest pain, shortness of breath, hematemesis, hematochezia, melena.    ROS: A 14-point review of systems was reviewed and was otherwise negative save what was reported in the HPI.    PAST MEDICAL/SURGICAL HISTORY:  H/O cerebral artery stenosis  left cerebral artery stent per history in 2014.  Pacemaker  HTN (hypertension)  COPD without exacerbation  Rheumatoid arthritis  H/O exploratory laparotomy  S/P cholecystectomy  S/P hysterectomy    MEDICATIONS  (STANDING):  ascorbic acid 500 milliGRAM(s) Oral daily  aspirin enteric coated 81 milliGRAM(s) Oral daily  atorvastatin 80 milliGRAM(s) Oral at bedtime  carvedilol 12.5 milliGRAM(s) Oral every 12 hours  clopidogrel Tablet 75 milliGRAM(s) Oral daily  folic acid 1 milliGRAM(s) Oral daily  lidocaine   Patch 1 Patch Transdermal daily  methotrexate 15 milliGRAM(s) Oral every week  pantoprazole    Tablet 40 milliGRAM(s) Oral before breakfast    MEDICATIONS  (PRN):  acetaminophen   Tablet .. 650 milliGRAM(s) Oral every 6 hours PRN Temp greater or equal to 38C (100.4F), Mild Pain (1 - 3)  ALBUTerol    90 MICROgram(s) HFA Inhaler 2 Puff(s) Inhalation every 6 hours PRN Shortness of Breath and/or Wheezing  albuterol/ipratropium for Nebulization. 3 milliLiter(s) Nebulizer every 4 hours PRN Shortness of Breath and/or Wheezing    No Known Allergies    T(C): 36.6 (11-10-20 @ 10:00), Max: 36.8 (11-09-20 @ 13:18)  HR: 65 (11-10-20 @ 10:00) (65 - 76)  BP: 111/70 (11-10-20 @ 10:00) (105/57 - 172/78)  RR: 18 (11-10-20 @ 10:00) (18 - 18)  SpO2: 100% (11-10-20 @ 10:00) (99% - 100%)    I&O's Summary    09 Nov 2020 07:01  -  10 Nov 2020 07:00  --------------------------------------------------------  IN: 260 mL / OUT: 0 mL / NET: 260 mL      PHYSICAL EXAM:  Constitutional: Well-developed, well-nourished, in no apparent distress  Eyes: Sclerae anicteric, conjunctivae normal  ENMT: Mucus membranes moist, no oropharyngeal thrush noted  Respiratory: Breathing nonlabored; clear to auscultation  Cardiovascular: Regular rate and rhythm  Gastrointestinal: Soft, nontender, nondistended, normoactive bowel sounds.  Extremities: No clubbing, cyanosis or edema  Neurological: Alert and oriented to person, place and time.  Skin: No jaundice  Musculoskeletal: No significant peripheral atrophy  Psychiatric: Affect and mood appropriate                   9.8    8.50  )-----------( 329      ( 11-10 @ 06:10 )             30.3                9.2    10.55 )-----------( 312      ( 11-09 @ 06:46 )             28.3                10.6   10.56 )-----------( 361      ( 11-08 @ 21:52 )             33.5                7.4    6.31  )-----------( 322      ( 11-08 @ 06:58 )             23.9                8.2    x     )-----------( x        ( 11-07 @ 18:30 )             26.6       11-09    143  |  110<H>  |  15.0  ----------------------------<  82  4.0   |  24.0  |  0.79    Ca    8.1<L>      09 Nov 2020 06:46    Lipase, Serum: 22 U/L (11-06-20 @ 16:09)  IMAGING: I personally reviewed the CT of abdomen and pelvis , and I agree with the radiologist's interpretation as described below:  FINDINGS:  LOWERCHEST: Pacemaker lead tip in the right ventricle. Subsegmental right lower lobe atelectasis.    LIVER: Within normal limits.  BILE DUCTS: Nonspecific dilatation of the intra and extrahepatic biliary tree in the setting of prior cholecystectomy.  GALLBLADDER: Cholecystectomy.  SPLEEN: Within normal limits.  PANCREAS: Within normal limits.  ADRENALS: Within normal limits.  KIDNEYS/URETERS: Stable right adrenal adenoma. Nodular thickening of the adrenal glands.    BLADDER: Within normal limits.  REPRODUCTIVE ORGANS: Hysterectomy.    BOWEL: Focus of active hemorrhage in the cecum. Appendix is normal. No bowel obstruction. Diverticulosis coli.  PERITONEUM: No ascites.  VESSELS: Atherosclerotic changes.  RETROPERITONEUM/LYMPH NODES: No lymphadenopathy.  ABDOMINAL WALL: Within normal limits.  BONES: Within normal limits.    IMPRESSION:  Active GI bleeding in the cecum.    Findings were discussed with Dr. KEILA YANG [phone] 11/6/2020 9:29 PM by Dr. May with read back confirmation.

## 2020-11-10 NOTE — PROGRESS NOTE ADULT - ASSESSMENT
69 yo with PMH significant for HTN, Renal artery stenosis, prior TIA's with Placement of a left cerebral artery stent in 2014, COPD with emphysema  using 2L home O2 at baseline, RA, Osteopenia, HLD and S/p Cardiac arrest s/p angiography with placement of PPM, recent admission 2 weeks ago for GI bleeding, presenting today and found to have recurrent lower GI wtih cecal bleeding on CTA abdomen/pelvis this time in ED.     #Lower GI Bleed  in setting of diverticulosis,  complicated by Acute Blood Loss Anemia, s/p 4 RBC units total   - hgb  is stable  - Colonoscopy on 11/09 today is unrevealing  - c/w Aspirin, started Plavix 75 daily, she is on 150 mg of plavix at home for stent in cerbral artery placed in 2015, will dicuss with Sammy Stover ( [phone]) if we he is ok to cut down plavix to 75 mg, patient is agreeable with plan above.  - changed Protonix 40 po bid  - if rebleads again will do bleeding scan and involve IR    #COPD with emphysema on 2 l NC at home  -not in acute exacerbation  -c/w albuterol and duonebs prn  - supplemental o2 via nasal cannula, titrating down to home base line with SpO2 goal > 89    #Intracranial stenosis and stent placement in 2014   # CAD w/o angina  # HLD  # SSS s/p Pacemaker placement  -patient takes 150mg plavix ( holding for now)  with asa daily, see above  -Lipitor 80mg qhs, c/w Coreg 12.5 bid    #HTN  c/w home medications  coreg       DVT PPx: holding chemical AC in setting of active GI bleeding  GOC: Full Code  LOS - will stay  for 1-2 days, depends on if she can tolerate DAPT chalange next 24-48hr

## 2020-11-10 NOTE — PROGRESS NOTE ADULT - SUBJECTIVE AND OBJECTIVE BOX
[facility] Division of Hospital Medicine    Chief Complaint:  LGIB    SUBJECTIVE: reports feeling ok at the momenent, reports no blood in stool since colonoscopy    OVERNIGHT EVENTS: none    Patient denies chest pain, abd pain, N/V, fever, chills, dysuria or any other complaints.      MEDICATIONS  (STANDING):  ascorbic acid 500 milliGRAM(s) Oral daily  aspirin enteric coated 81 milliGRAM(s) Oral daily  atorvastatin 80 milliGRAM(s) Oral at bedtime  carvedilol 12.5 milliGRAM(s) Oral every 12 hours  clopidogrel Tablet 75 milliGRAM(s) Oral daily  folic acid 1 milliGRAM(s) Oral daily  lidocaine   Patch 1 Patch Transdermal daily  methotrexate 15 milliGRAM(s) Oral every week  pantoprazole    Tablet 40 milliGRAM(s) Oral before breakfast    MEDICATIONS  (PRN):  acetaminophen   Tablet .. 650 milliGRAM(s) Oral every 6 hours PRN Temp greater or equal to 38C (100.4F), Mild Pain (1 - 3)  ALBUTerol    90 MICROgram(s) HFA Inhaler 2 Puff(s) Inhalation every 6 hours PRN Shortness of Breath and/or Wheezing  albuterol/ipratropium for Nebulization. 3 milliLiter(s) Nebulizer every 4 hours PRN Shortness of Breath and/or Wheezing        I&O's Summary      PHYSICAL EXAM:  Vital Signs Last 24 Hrs  T(C): 36.6 (10 Nov 2020 10:00), Max: 36.8 (09 Nov 2020 13:18)  T(F): 97.9 (10 Nov 2020 10:00), Max: 98.2 (09 Nov 2020 13:18)  HR: 65 (10 Nov 2020 10:00) (65 - 76)  BP: 111/70 (10 Nov 2020 10:00) (105/57 - 172/78)  BP(mean): --  RR: 18 (10 Nov 2020 10:00) (18 - 18)  SpO2: 100% (10 Nov 2020 10:00) (99% - 100%)        CONSTITUTIONAL: NAD, well-developed, well-groomed  ENMT: Moist oral mucosa, no pharyngeal injection or exudates; normal dentition; No JVD  RESPIRATORY: Normal respiratory effort; lungs are clear to auscultation bilaterally  CARDIOVASCULAR: Regular rate and rhythm, normal S1 and S2, no murmur/rub/gallop; No lower extremity edema; Peripheral pulses are 2+ bilaterally, L side chest  pacemaker   ABDOMEN: Nontender to palpation, normoactive bowel sounds, no rebound/guarding; No hepatosplenomegaly  MUSCLOSKELETAL:  Normal gait; no clubbing or cyanosis of digits; no joint swelling or tenderness to palpation  PSYCH: A+O to person, place, and time; affect appropriate  NEUROLOGY: CN 2-12 are intact and symmetric; no gross sensory deficits; was observed moving all 4 ext against gravity cooperating with exam.   SKIN: No rashes; no palpable lesion      LABS:                        9.8    8.50  )-----------( 329      ( 10 Nov 2020 06:10 )             30.3     11-09    143  |  110<H>  |  15.0  ----------------------------<  82  4.0   |  24.0  |  0.79    Ca    8.1<L>      09 Nov 2020 06:46                CAPILLARY BLOOD GLUCOSE            RADIOLOGY & ADDITIONAL TESTS:  Results Reviewed:   Imaging Personally Reviewed:  Electrocardiogram Personally Reviewed:

## 2020-11-11 LAB
HCT VFR BLD CALC: 30.2 % — LOW (ref 34.5–45)
HGB BLD-MCNC: 9.7 G/DL — LOW (ref 11.5–15.5)
MCHC RBC-ENTMCNC: 29.1 PG — SIGNIFICANT CHANGE UP (ref 27–34)
MCHC RBC-ENTMCNC: 32.1 GM/DL — SIGNIFICANT CHANGE UP (ref 32–36)
MCV RBC AUTO: 90.7 FL — SIGNIFICANT CHANGE UP (ref 80–100)
PLATELET # BLD AUTO: 304 K/UL — SIGNIFICANT CHANGE UP (ref 150–400)
RBC # BLD: 3.33 M/UL — LOW (ref 3.8–5.2)
RBC # FLD: 14.3 % — SIGNIFICANT CHANGE UP (ref 10.3–14.5)
WBC # BLD: 7.63 K/UL — SIGNIFICANT CHANGE UP (ref 3.8–10.5)
WBC # FLD AUTO: 7.63 K/UL — SIGNIFICANT CHANGE UP (ref 3.8–10.5)

## 2020-11-11 PROCEDURE — 99232 SBSQ HOSP IP/OBS MODERATE 35: CPT

## 2020-11-11 PROCEDURE — 93971 EXTREMITY STUDY: CPT | Mod: 26,LT

## 2020-11-11 RX ADMIN — ATORVASTATIN CALCIUM 80 MILLIGRAM(S): 80 TABLET, FILM COATED ORAL at 21:08

## 2020-11-11 RX ADMIN — LIDOCAINE 1 PATCH: 4 CREAM TOPICAL at 10:55

## 2020-11-11 RX ADMIN — LIDOCAINE 1 PATCH: 4 CREAM TOPICAL at 22:45

## 2020-11-11 RX ADMIN — PANTOPRAZOLE SODIUM 40 MILLIGRAM(S): 20 TABLET, DELAYED RELEASE ORAL at 05:12

## 2020-11-11 RX ADMIN — CARVEDILOL PHOSPHATE 12.5 MILLIGRAM(S): 80 CAPSULE, EXTENDED RELEASE ORAL at 05:12

## 2020-11-11 RX ADMIN — Medication 650 MILLIGRAM(S): at 13:25

## 2020-11-11 RX ADMIN — LIDOCAINE 1 PATCH: 4 CREAM TOPICAL at 19:40

## 2020-11-11 RX ADMIN — CLOPIDOGREL BISULFATE 75 MILLIGRAM(S): 75 TABLET, FILM COATED ORAL at 10:55

## 2020-11-11 RX ADMIN — Medication 500 MILLIGRAM(S): at 10:55

## 2020-11-11 RX ADMIN — CARVEDILOL PHOSPHATE 12.5 MILLIGRAM(S): 80 CAPSULE, EXTENDED RELEASE ORAL at 18:18

## 2020-11-11 RX ADMIN — Medication 650 MILLIGRAM(S): at 14:24

## 2020-11-11 RX ADMIN — Medication 1 MILLIGRAM(S): at 10:55

## 2020-11-11 RX ADMIN — Medication 81 MILLIGRAM(S): at 10:55

## 2020-11-11 NOTE — PROGRESS NOTE ADULT - SUBJECTIVE AND OBJECTIVE BOX
Chief Complaint: This is a 70y old woman patient being seen in follow-up consultation for GIB.    HPI / 24H events:  Patient seeing and evaluated at bedside, reporting no complains, tolerating oral intake. hemoglobin stable, did not move bowels today. Denies nausea, vomiting, abdominal pain, chest pain, shortness of breath, hematemesis, hematochezia, melena.  ROS: A 14-point review of systems was reviewed and was otherwise negative save what was reported in the HPI.    PAST MEDICAL/SURGICAL HISTORY:  H/O cerebral artery stenosis  left cerebral artery stent per history in 2014.    Pacemaker    HTN (hypertension)    COPD without exacerbation    Rheumatoid arthritis    H/O exploratory laparotomy    S/P cholecystectomy    S/P hysterectomy      MEDICATIONS  (STANDING):  ascorbic acid 500 milliGRAM(s) Oral daily  aspirin enteric coated 81 milliGRAM(s) Oral daily  atorvastatin 80 milliGRAM(s) Oral at bedtime  carvedilol 12.5 milliGRAM(s) Oral every 12 hours  clopidogrel Tablet 75 milliGRAM(s) Oral daily  folic acid 1 milliGRAM(s) Oral daily  lidocaine   Patch 1 Patch Transdermal daily  methotrexate 15 milliGRAM(s) Oral every week  pantoprazole    Tablet 40 milliGRAM(s) Oral before breakfast    MEDICATIONS  (PRN):  acetaminophen   Tablet .. 650 milliGRAM(s) Oral every 6 hours PRN Temp greater or equal to 38C (100.4F), Mild Pain (1 - 3)  ALBUTerol    90 MICROgram(s) HFA Inhaler 2 Puff(s) Inhalation every 6 hours PRN Shortness of Breath and/or Wheezing  albuterol/ipratropium for Nebulization. 3 milliLiter(s) Nebulizer every 4 hours PRN Shortness of Breath and/or Wheezing    No Known Allergies    T(C): 36.7 (11-11-20 @ 08:22), Max: 36.7 (11-10-20 @ 15:35)  HR: 70 (11-11-20 @ 08:22) (64 - 75)  BP: 137/84 (11-11-20 @ 08:22) (130/71 - 156/92)  RR: 19 (11-11-20 @ 08:22) (16 - 19)  SpO2: 98% (11-11-20 @ 08:22) (98% - 100%)    I&O's Summary    10 Nov 2020 07:01  -  11 Nov 2020 07:00  --------------------------------------------------------  IN: 720 mL / OUT: 0 mL / NET: 720 mL      PHYSICAL EXAM:  Constitutional: Well-developed, well-nourished, in no apparent distress  Eyes: Sclerae anicteric, conjunctivae normal  ENMT: Mucus membranes moist, no oropharyngeal thrush noted  Respiratory: Breathing nonlabored; clear to auscultation  Cardiovascular: Regular rate and rhythm  Gastrointestinal: Soft, nontender, nondistended, normoactive bowel sounds.  Extremities: No clubbing, cyanosis or edema  Neurological: Alert and oriented to person, place and time; no asterixis  Skin: No jaundice  Musculoskeletal: No significant peripheral atrophy  Psychiatric: Affect and mood appropriate                   9.7    7.63  )-----------( 304      ( 11-11 @ 06:54 )             30.2                9.8    8.50  )-----------( 329      ( 11-10 @ 06:10 )             30.3                9.2    10.55 )-----------( 312      ( 11-09 @ 06:46 )             28.3                10.6   10.56 )-----------( 361      ( 11-08 @ 21:52 )             33.5     IMAGING: I personally reviewed the CT of abdomen and pelvis, and I agree with the radiologist's interpretation as described below:    FINDINGS:  LOWERCHEST: Pacemaker lead tip in the right ventricle. Subsegmental right lower lobe atelectasis.    LIVER: Within normal limits.  BILE DUCTS: Nonspecific dilatation of the intra and extrahepatic biliary tree in the setting of prior cholecystectomy.  GALLBLADDER: Cholecystectomy.  SPLEEN: Within normal limits.  PANCREAS: Within normal limits.  ADRENALS: Within normal limits.  KIDNEYS/URETERS: Stable right adrenal adenoma. Nodular thickening of the adrenal glands.    BLADDER: Within normal limits.  REPRODUCTIVE ORGANS: Hysterectomy.    BOWEL: Focus of active hemorrhage in the cecum. Appendix is normal. No bowel obstruction. Diverticulosis coli.  PERITONEUM: No ascites.  VESSELS: Atherosclerotic changes.  RETROPERITONEUM/LYMPH NODES: No lymphadenopathy.  ABDOMINAL WALL: Within normal limits.  BONES: Within normal limits.    IMPRESSION:  Active GI bleeding in the cecum.    Findings were discussed with Dr. KEILA YANG [phone] 11/6/2020 9:29 PM by Dr. May with read back confirmation.   Chief Complaint: This is a 70y old woman patient being seen in follow-up consultation for GIB.    HPI / 24H events:  Patient seeing and evaluated at bedside, reporting no complains, tolerating oral intake. hemoglobin stable, did not move bowels today. Denies nausea, vomiting, abdominal pain, chest pain, shortness of breath, hematemesis, hematochezia, melena.    ROS: A 14-point review of systems was reviewed and was otherwise negative save what was reported in the HPI.    PAST MEDICAL/SURGICAL HISTORY:  H/O cerebral artery stenosis  left cerebral artery stent per history in 2014.  Pacemaker  HTN (hypertension)  COPD without exacerbation  Rheumatoid arthritis  H/O exploratory laparotomy  S/P cholecystectomy  S/P hysterectomy    MEDICATIONS  (STANDING):  ascorbic acid 500 milliGRAM(s) Oral daily  aspirin enteric coated 81 milliGRAM(s) Oral daily  atorvastatin 80 milliGRAM(s) Oral at bedtime  carvedilol 12.5 milliGRAM(s) Oral every 12 hours  clopidogrel Tablet 75 milliGRAM(s) Oral daily  folic acid 1 milliGRAM(s) Oral daily  lidocaine   Patch 1 Patch Transdermal daily  methotrexate 15 milliGRAM(s) Oral every week  pantoprazole    Tablet 40 milliGRAM(s) Oral before breakfast    MEDICATIONS  (PRN):  acetaminophen   Tablet .. 650 milliGRAM(s) Oral every 6 hours PRN Temp greater or equal to 38C (100.4F), Mild Pain (1 - 3)  ALBUTerol    90 MICROgram(s) HFA Inhaler 2 Puff(s) Inhalation every 6 hours PRN Shortness of Breath and/or Wheezing  albuterol/ipratropium for Nebulization. 3 milliLiter(s) Nebulizer every 4 hours PRN Shortness of Breath and/or Wheezing    No Known Allergies    T(C): 36.7 (11-11-20 @ 08:22), Max: 36.7 (11-10-20 @ 15:35)  HR: 70 (11-11-20 @ 08:22) (64 - 75)  BP: 137/84 (11-11-20 @ 08:22) (130/71 - 156/92)  RR: 19 (11-11-20 @ 08:22) (16 - 19)  SpO2: 98% (11-11-20 @ 08:22) (98% - 100%)    I&O's Summary    10 Nov 2020 07:01  -  11 Nov 2020 07:00  --------------------------------------------------------  IN: 720 mL / OUT: 0 mL / NET: 720 mL      PHYSICAL EXAM:  Constitutional: Well-developed, well-nourished, in no apparent distress  Eyes: Sclerae anicteric, conjunctivae normal  ENMT: Mucus membranes moist, no oropharyngeal thrush noted  Respiratory: Breathing nonlabored; clear to auscultation  Cardiovascular: Regular rate and rhythm  Gastrointestinal: Soft, nontender, nondistended, normoactive bowel sounds.  Extremities: No clubbing, cyanosis or edema  Neurological: Alert and oriented to person, place and time; no asterixis  Skin: No jaundice  Musculoskeletal: No significant peripheral atrophy  Psychiatric: Affect and mood appropriate                   9.7    7.63  )-----------( 304      ( 11-11 @ 06:54 )             30.2                9.8    8.50  )-----------( 329      ( 11-10 @ 06:10 )             30.3                9.2    10.55 )-----------( 312      ( 11-09 @ 06:46 )             28.3                10.6   10.56 )-----------( 361      ( 11-08 @ 21:52 )             33.5     IMAGING: I personally reviewed the CT of abdomen and pelvis, and I agree with the radiologist's interpretation as described below:    FINDINGS:  LOWERCHEST: Pacemaker lead tip in the right ventricle. Subsegmental right lower lobe atelectasis.    LIVER: Within normal limits.  BILE DUCTS: Nonspecific dilatation of the intra and extrahepatic biliary tree in the setting of prior cholecystectomy.  GALLBLADDER: Cholecystectomy.  SPLEEN: Within normal limits.  PANCREAS: Within normal limits.  ADRENALS: Within normal limits.  KIDNEYS/URETERS: Stable right adrenal adenoma. Nodular thickening of the adrenal glands.    BLADDER: Within normal limits.  REPRODUCTIVE ORGANS: Hysterectomy.    BOWEL: Focus of active hemorrhage in the cecum. Appendix is normal. No bowel obstruction. Diverticulosis coli.  PERITONEUM: No ascites.  VESSELS: Atherosclerotic changes.  RETROPERITONEUM/LYMPH NODES: No lymphadenopathy.  ABDOMINAL WALL: Within normal limits.  BONES: Within normal limits.    IMPRESSION:  Active GI bleeding in the cecum.    Findings were discussed with Dr. KEILA YANG [phone] 11/6/2020 9:29 PM by Dr. May with read back confirmation.

## 2020-11-11 NOTE — PROGRESS NOTE ADULT - ASSESSMENT
71 yo with PMH significant for HTN, Renal artery stenosis, prior TIA's with Placement of a left cerebral artery stent in 2014, COPD with emphysema  using 2L home O2 at baseline, RA, Osteopenia, HLD and S/p Cardiac arrest s/p angiography with placement of PPM, recent admission 2 weeks ago for GI bleeding, presenting today and found to have recurrent lower GI wtih cecal bleeding on CTA abdomen/pelvis this time in ED. Colonoscopy on 11/09 was unrevealing.     #Lower GI Bleed  in setting of diverticulosis,  complicated by Acute Blood Loss Anemia, s/p 4 RBC units total   - hgb  is stable  - Colonoscopy on 11/09 today is unrevealing  - c/w Aspirin adn Plavix 75 daily, she is on 150 mg of plavix at home for stent in cerbral artery placed in 2015, called [facility] office again ( [phone])  - c/w Protonix 40 po bid  - if rebleads again will do bleeding scan and involve IR  - cont'e to observe today    #COPD with emphysema on 2 l NC at home  -not in acute exacerbation  -c/w albuterol and duonebs prn  - supplemental o2 via nasal cannula, titrating down to home base line with SpO2 goal > 89    #Intracranial stenosis and stent placement in 2015   # CAD w/o angina  # HLD  # SSS s/p Pacemaker placement  -patient takes 150mg plavix ( holding for now)  with asa daily, see above  -Lipitor 80mg qhs, c/w Coreg 12.5 bid    #HTN  c/w home medications  coreg       DVT PPx: holding chemical AC in setting of active GI bleeding  GOC: Full Code  LOS - anticipating d/c tomorrow

## 2020-11-11 NOTE — PROGRESS NOTE ADULT - PROBLEM SELECTOR PLAN 1
GIB most likely resolved as patient with stable hemoglobin no signs of overt bleeding.  Colonoscopy on 11/09 today is unrevealing  Continue Aspirin and Plavix 75 daily,  for stent in cerebral artery placed in 2015, Dr Quach attempting to reach [facility] office again ( [phone]) to discuss reduction of Plavix dosage  Continue Protonix 40 po bid for cytoprotection.  If patient rebleeds we will suggest  bleeding scan and involve IR GIB most likely secondary to diverticular bleed.  Currently resolved as patient with stable hemoglobin no signs of overt bleeding.  No contraindications to antiplatelet or anticoagulation if needed  Continue Protonix 40 po bid for cytoprotection.      If patient rebleeds, recommend IR consultation for angiography and embolization.  Repeat colonoscopy would be of little utility and would only delay treatment.

## 2020-11-11 NOTE — PROGRESS NOTE ADULT - SUBJECTIVE AND OBJECTIVE BOX
[facility] Division of Hospital Medicine    Chief Complaint:  LGIB    SUBJECTIVE: reports no blood in stool since colonoscopy, didn't move bowel today yet    OVERNIGHT EVENTS: none    Patient denies chest pain, abd pain, N/V, fever, chills, dysuria or any other complaints.      MEDICATIONS  (STANDING):  ascorbic acid 500 milliGRAM(s) Oral daily  aspirin enteric coated 81 milliGRAM(s) Oral daily  atorvastatin 80 milliGRAM(s) Oral at bedtime  carvedilol 12.5 milliGRAM(s) Oral every 12 hours  clopidogrel Tablet 75 milliGRAM(s) Oral daily  folic acid 1 milliGRAM(s) Oral daily  lidocaine   Patch 1 Patch Transdermal daily  methotrexate 15 milliGRAM(s) Oral every week  pantoprazole    Tablet 40 milliGRAM(s) Oral before breakfast    MEDICATIONS  (PRN):  acetaminophen   Tablet .. 650 milliGRAM(s) Oral every 6 hours PRN Temp greater or equal to 38C (100.4F), Mild Pain (1 - 3)  ALBUTerol    90 MICROgram(s) HFA Inhaler 2 Puff(s) Inhalation every 6 hours PRN Shortness of Breath and/or Wheezing  albuterol/ipratropium for Nebulization. 3 milliLiter(s) Nebulizer every 4 hours PRN Shortness of Breath and/or Wheezing        I&O's Summary      PHYSICAL EXAM:  Vital Signs Last 24 Hrs  T(C): 36.6 (10 Nov 2020 10:00), Max: 36.8 (09 Nov 2020 13:18)  T(F): 97.9 (10 Nov 2020 10:00), Max: 98.2 (09 Nov 2020 13:18)  HR: 65 (10 Nov 2020 10:00) (65 - 76)  BP: 111/70 (10 Nov 2020 10:00) (105/57 - 172/78)  BP(mean): --  RR: 18 (10 Nov 2020 10:00) (18 - 18)  SpO2: 100% (10 Nov 2020 10:00) (99% - 100%)        CONSTITUTIONAL: NAD, well-developed, well-groomed  ENMT: Moist oral mucosa, no pharyngeal injection or exudates; normal dentition; No JVD  RESPIRATORY: Normal respiratory effort; lungs are clear to auscultation bilaterally  CARDIOVASCULAR: Regular rate and rhythm, normal S1 and S2, no murmur/rub/gallop; No lower extremity edema; Peripheral pulses are 2+ bilaterally, L side chest  pacemaker   ABDOMEN: Nontender to palpation, normoactive bowel sounds, no rebound/guarding; No hepatosplenomegaly  MUSCLOSKELETAL:  Normal gait; no clubbing or cyanosis of digits; no joint swelling or tenderness to palpation  PSYCH: A+O to person, place, and time; affect appropriate  NEUROLOGY: CN 2-12 are intact and symmetric; no gross sensory deficits; was observed moving all 4 ext against gravity cooperating with exam.   SKIN: No rashes; no palpable lesion      LABS:                        9.7    7.63  )-----------( 304      ( 11 Nov 2020 06:54 )             30.2                     CAPILLARY BLOOD GLUCOSE            RADIOLOGY & ADDITIONAL TESTS:  Results Reviewed:   Imaging Personally Reviewed:  Electrocardiogram Personally Reviewed:

## 2020-11-12 ENCOUNTER — APPOINTMENT (OUTPATIENT)
Dept: FAMILY MEDICINE | Facility: CLINIC | Age: 70
End: 2020-11-12

## 2020-11-12 LAB
HCT VFR BLD CALC: 30.2 % — LOW (ref 34.5–45)
HGB BLD-MCNC: 9.6 G/DL — LOW (ref 11.5–15.5)
MCHC RBC-ENTMCNC: 28.9 PG — SIGNIFICANT CHANGE UP (ref 27–34)
MCHC RBC-ENTMCNC: 31.8 GM/DL — LOW (ref 32–36)
MCV RBC AUTO: 91 FL — SIGNIFICANT CHANGE UP (ref 80–100)
PLATELET # BLD AUTO: 325 K/UL — SIGNIFICANT CHANGE UP (ref 150–400)
RBC # BLD: 3.32 M/UL — LOW (ref 3.8–5.2)
RBC # FLD: 14 % — SIGNIFICANT CHANGE UP (ref 10.3–14.5)
WBC # BLD: 6.23 K/UL — SIGNIFICANT CHANGE UP (ref 3.8–10.5)
WBC # FLD AUTO: 6.23 K/UL — SIGNIFICANT CHANGE UP (ref 3.8–10.5)

## 2020-11-12 PROCEDURE — 99233 SBSQ HOSP IP/OBS HIGH 50: CPT

## 2020-11-12 RX ADMIN — Medication 1 MILLIGRAM(S): at 12:19

## 2020-11-12 RX ADMIN — CLOPIDOGREL BISULFATE 75 MILLIGRAM(S): 75 TABLET, FILM COATED ORAL at 12:19

## 2020-11-12 RX ADMIN — ATORVASTATIN CALCIUM 80 MILLIGRAM(S): 80 TABLET, FILM COATED ORAL at 20:54

## 2020-11-12 RX ADMIN — LIDOCAINE 1 PATCH: 4 CREAM TOPICAL at 19:22

## 2020-11-12 RX ADMIN — Medication 81 MILLIGRAM(S): at 12:19

## 2020-11-12 RX ADMIN — Medication 500 MILLIGRAM(S): at 12:19

## 2020-11-12 RX ADMIN — CARVEDILOL PHOSPHATE 12.5 MILLIGRAM(S): 80 CAPSULE, EXTENDED RELEASE ORAL at 17:12

## 2020-11-12 RX ADMIN — Medication 650 MILLIGRAM(S): at 17:12

## 2020-11-12 RX ADMIN — PANTOPRAZOLE SODIUM 40 MILLIGRAM(S): 20 TABLET, DELAYED RELEASE ORAL at 05:35

## 2020-11-12 RX ADMIN — CARVEDILOL PHOSPHATE 12.5 MILLIGRAM(S): 80 CAPSULE, EXTENDED RELEASE ORAL at 05:35

## 2020-11-12 RX ADMIN — LIDOCAINE 1 PATCH: 4 CREAM TOPICAL at 12:19

## 2020-11-12 NOTE — PROGRESS NOTE ADULT - SUBJECTIVE AND OBJECTIVE BOX
[facility] Division of Hospital Medicine    Chief Complaint:  LGIB    SUBJECTIVE: reports no blood in stool since colonoscopy, didn't move bowel today yet    OVERNIGHT EVENTS: none    Patient denies chest pain, abd pain, N/V, fever, chills, dysuria or any other complaints.      MEDICATIONS  (STANDING):  ascorbic acid 500 milliGRAM(s) Oral daily  aspirin enteric coated 81 milliGRAM(s) Oral daily  atorvastatin 80 milliGRAM(s) Oral at bedtime  carvedilol 12.5 milliGRAM(s) Oral every 12 hours  clopidogrel Tablet 75 milliGRAM(s) Oral daily  folic acid 1 milliGRAM(s) Oral daily  lidocaine   Patch 1 Patch Transdermal daily  methotrexate 15 milliGRAM(s) Oral every week  pantoprazole    Tablet 40 milliGRAM(s) Oral before breakfast    MEDICATIONS  (PRN):  acetaminophen   Tablet .. 650 milliGRAM(s) Oral every 6 hours PRN Temp greater or equal to 38C (100.4F), Mild Pain (1 - 3)  ALBUTerol    90 MICROgram(s) HFA Inhaler 2 Puff(s) Inhalation every 6 hours PRN Shortness of Breath and/or Wheezing  albuterol/ipratropium for Nebulization. 3 milliLiter(s) Nebulizer every 4 hours PRN Shortness of Breath and/or Wheezing      I&O's Summary      PHYSICAL EXAM:  Vital Signs Last 24 Hrs  T(C): 36.7 (12 Nov 2020 08:00), Max: 36.9 (11 Nov 2020 21:09)  T(F): 98 (12 Nov 2020 08:00), Max: 98.4 (11 Nov 2020 21:09)  HR: 61 (12 Nov 2020 08:00) (61 - 75)  BP: 131/78 (12 Nov 2020 08:00) (125/75 - 136/78)  BP(mean): --  RR: 16 (12 Nov 2020 08:00) (16 - 20)  SpO2: 98% (12 Nov 2020 08:00) (97% - 99%)        CONSTITUTIONAL: NAD, well-developed, well-groomed  ENMT: Moist oral mucosa, no pharyngeal injection or exudates; normal dentition; No JVD  RESPIRATORY: Normal respiratory effort; lungs are clear to auscultation bilaterally  CARDIOVASCULAR: Regular rate and rhythm, normal S1 and S2, no murmur/rub/gallop; No lower extremity edema; Peripheral pulses are 2+ bilaterally, L side chest  pacemaker   ABDOMEN: Nontender to palpation, normoactive bowel sounds, no rebound/guarding; No hepatosplenomegaly  MUSCLOSKELETAL:  Normal gait; no clubbing or cyanosis of digits; no joint swelling or tenderness to palpation  PSYCH: A+O to person, place, and time; affect appropriate  NEUROLOGY: CN 2-12 are intact and symmetric; no gross sensory deficits; was observed moving all 4 ext against gravity cooperating with exam.   SKIN: No rashes; no palpable lesion            LABS:                        9.6    6.23  )-----------( 325      ( 12 Nov 2020 07:30 )             30.2                     CAPILLARY BLOOD GLUCOSE            RADIOLOGY & ADDITIONAL TESTS:  Results Reviewed:   Imaging Personally Reviewed:  Electrocardiogram Personally Reviewed:

## 2020-11-12 NOTE — PROGRESS NOTE ADULT - ASSESSMENT
71 yo with PMH significant for HTN, Renal artery stenosis, prior TIA's with Placement of a left cerebral artery stent in 2014, COPD with emphysema  using 2L home O2 at baseline, RA, Osteopenia, HLD and S/p Cardiac arrest s/p angiography with placement of PPM, recent admission 2 weeks ago for GI bleeding, presenting today and found to have recurrent lower GI wtih cecal bleeding on CTA abdomen/pelvis this time in ED. Colonoscopy on 11/09 was unrevealing.     #Lower GI Bleed  in setting of diverticulosis,  complicated by Acute Blood Loss Anemia, s/p 4 RBC units total   - hgb  is stable  - Colonoscopy on 11/09 today is unrevealing  - c/w Aspirin adn Plavix 75 daily, she is on 150 mg of plavix at home for stent in cerbral artery placed in 2015, called [facility] office again ( [phone])>> no response from primary [email or web address] >> consulted Dr. Cardenas and ordered CTA head/neck to assess stent potency  - c/w Protonix 40 po bid  - if rebleeds again will do bleeding scan and involve IR  - cont'e to observe today    #COPD with emphysema on 2 l NC at home  -not in acute exacerbation  -c/w albuterol and duonebs prn  - supplemental o2 via nasal cannula, titrating down to home base line with SpO2 goal > 89    #Intracranial stenosis and stent placement in 2015   # CAD w/o angina  # HLD  # SSS s/p Pacemaker placement  -patient takes 150mg plavix ( holding for now)  with asa daily, see above  -Lipitor 80mg qhs, c/w Coreg 12.5 bid    #LLE edema, has improved  - US was negative for DVT   - c/w monitor    #HTN  c/w home medications  coreg       DVT PPx: holding chemical AC in setting of active GI bleeding  GOC: Full Code  LOS - anticipating d/c tomorrow

## 2020-11-13 ENCOUNTER — TRANSCRIPTION ENCOUNTER (OUTPATIENT)
Age: 70
End: 2020-11-13

## 2020-11-13 LAB
ANION GAP SERPL CALC-SCNC: 11 MMOL/L — SIGNIFICANT CHANGE UP (ref 5–17)
BUN SERPL-MCNC: 17 MG/DL — SIGNIFICANT CHANGE UP (ref 8–20)
CALCIUM SERPL-MCNC: 8.7 MG/DL — SIGNIFICANT CHANGE UP (ref 8.6–10.2)
CHLORIDE SERPL-SCNC: 107 MMOL/L — SIGNIFICANT CHANGE UP (ref 98–107)
CO2 SERPL-SCNC: 19 MMOL/L — LOW (ref 22–29)
CREAT SERPL-MCNC: 0.83 MG/DL — SIGNIFICANT CHANGE UP (ref 0.5–1.3)
GLUCOSE SERPL-MCNC: 78 MG/DL — SIGNIFICANT CHANGE UP (ref 70–99)
HCT VFR BLD CALC: 32.9 % — LOW (ref 34.5–45)
HGB BLD-MCNC: 10.3 G/DL — LOW (ref 11.5–15.5)
MCHC RBC-ENTMCNC: 29.2 PG — SIGNIFICANT CHANGE UP (ref 27–34)
MCHC RBC-ENTMCNC: 31.3 GM/DL — LOW (ref 32–36)
MCV RBC AUTO: 93.2 FL — SIGNIFICANT CHANGE UP (ref 80–100)
PLATELET # BLD AUTO: 247 K/UL — SIGNIFICANT CHANGE UP (ref 150–400)
POTASSIUM SERPL-MCNC: 4.9 MMOL/L — SIGNIFICANT CHANGE UP (ref 3.5–5.3)
POTASSIUM SERPL-SCNC: 4.9 MMOL/L — SIGNIFICANT CHANGE UP (ref 3.5–5.3)
RBC # BLD: 3.53 M/UL — LOW (ref 3.8–5.2)
RBC # FLD: 14 % — SIGNIFICANT CHANGE UP (ref 10.3–14.5)
SODIUM SERPL-SCNC: 137 MMOL/L — SIGNIFICANT CHANGE UP (ref 135–145)
WBC # BLD: 6.44 K/UL — SIGNIFICANT CHANGE UP (ref 3.8–10.5)
WBC # FLD AUTO: 6.44 K/UL — SIGNIFICANT CHANGE UP (ref 3.8–10.5)

## 2020-11-13 PROCEDURE — 99233 SBSQ HOSP IP/OBS HIGH 50: CPT

## 2020-11-13 PROCEDURE — 70498 CT ANGIOGRAPHY NECK: CPT | Mod: 26

## 2020-11-13 PROCEDURE — 99223 1ST HOSP IP/OBS HIGH 75: CPT

## 2020-11-13 PROCEDURE — 70496 CT ANGIOGRAPHY HEAD: CPT | Mod: 26

## 2020-11-13 RX ORDER — CLOPIDOGREL BISULFATE 75 MG/1
75 TABLET, FILM COATED ORAL
Qty: 0 | Refills: 0 | DISCHARGE

## 2020-11-13 RX ORDER — IPRATROPIUM/ALBUTEROL SULFATE 18-103MCG
3 AEROSOL WITH ADAPTER (GRAM) INHALATION
Qty: 0 | Refills: 0 | DISCHARGE

## 2020-11-13 RX ORDER — SODIUM CHLORIDE 9 MG/ML
1000 INJECTION, SOLUTION INTRAVENOUS
Refills: 0 | Status: DISCONTINUED | OUTPATIENT
Start: 2020-11-13 | End: 2020-11-14

## 2020-11-13 RX ORDER — FERROUS SULFATE 325(65) MG
1 TABLET ORAL
Qty: 0 | Refills: 0 | DISCHARGE

## 2020-11-13 RX ADMIN — SODIUM CHLORIDE 100 MILLILITER(S): 9 INJECTION, SOLUTION INTRAVENOUS at 14:15

## 2020-11-13 RX ADMIN — LIDOCAINE 1 PATCH: 4 CREAM TOPICAL at 10:01

## 2020-11-13 RX ADMIN — PANTOPRAZOLE SODIUM 40 MILLIGRAM(S): 20 TABLET, DELAYED RELEASE ORAL at 05:06

## 2020-11-13 RX ADMIN — Medication 500 MILLIGRAM(S): at 10:01

## 2020-11-13 RX ADMIN — SODIUM CHLORIDE 100 MILLILITER(S): 9 INJECTION, SOLUTION INTRAVENOUS at 18:15

## 2020-11-13 RX ADMIN — LIDOCAINE 1 PATCH: 4 CREAM TOPICAL at 19:25

## 2020-11-13 RX ADMIN — LIDOCAINE 1 PATCH: 4 CREAM TOPICAL at 00:15

## 2020-11-13 RX ADMIN — CARVEDILOL PHOSPHATE 12.5 MILLIGRAM(S): 80 CAPSULE, EXTENDED RELEASE ORAL at 05:06

## 2020-11-13 RX ADMIN — Medication 81 MILLIGRAM(S): at 10:01

## 2020-11-13 RX ADMIN — CLOPIDOGREL BISULFATE 75 MILLIGRAM(S): 75 TABLET, FILM COATED ORAL at 10:01

## 2020-11-13 RX ADMIN — CARVEDILOL PHOSPHATE 12.5 MILLIGRAM(S): 80 CAPSULE, EXTENDED RELEASE ORAL at 18:14

## 2020-11-13 RX ADMIN — ATORVASTATIN CALCIUM 80 MILLIGRAM(S): 80 TABLET, FILM COATED ORAL at 21:56

## 2020-11-13 RX ADMIN — Medication 1 MILLIGRAM(S): at 10:01

## 2020-11-13 NOTE — DIETITIAN INITIAL EVALUATION ADULT. - ETIOLOGY
related to inability to consume sufficient protein energy intake with poor appetite in setting of multiple hospitalizations for GI bleed

## 2020-11-13 NOTE — DISCHARGE NOTE PROVIDER - PROVIDER TOKENS
PROVIDER:[[id number]],FOLLOWUP:[1 month],ESTABLISHEDPATIENT:[T]],PROVIDER:[TOKEN:[[id number]],FOLLOWUP:[Routine],ESTABLISHEDPATIENT:[T]] PROVIDER:[TOKEN:[[id number]],FOLLOWUP:[1 month],ESTABLISHEDPATIENT:[T]],PROVIDER:[TOKEN:[[id number]],FOLLOWUP:[1 month],ESTABLISHEDPATIENT:[T]]

## 2020-11-13 NOTE — DISCHARGE NOTE PROVIDER - NSDCMRMEDTOKEN_GEN_ALL_CORE_FT
albuterol:   Aspirin Enteric Coated 81 mg oral delayed release tablet: 1 tab(s) orally once a day restart on sunday in two days   atorvastatin 80 mg oral tablet: 1 tab(s) orally once a day (at bedtime)  Breo Ellipta 100 mcg-25 mcg/inh inhalation powder: 1 puff(s) inhaled once a day  Coreg 25 mg oral tablet: 1 tab(s) orally 2 times a day  ferrous sulfate 324 mg (65 mg elemental iron) oral delayed release tablet: 1 tab(s) orally every 48 hours  folic acid:   methotrexate:   pantoprazole 40 mg oral delayed release tablet: 1 tab(s) orally 2 times a day  Vitamin C 100 mg oral tablet, chewable: 1 tab(s) orally once a day

## 2020-11-13 NOTE — DIETITIAN INITIAL EVALUATION ADULT. - PERTINENT LABORATORY DATA
11-13 Na137 mmol/L Glu 78 mg/dL K+ 4.9 mmol/L Cr  0.83 mg/dL BUN 17.0 mg/dL Phos n/a   Alb n/a   PAB n/a

## 2020-11-13 NOTE — DISCHARGE NOTE PROVIDER - CARE PROVIDERS DIRECT ADDRESSES
[email or web address]

## 2020-11-13 NOTE — DIETITIAN INITIAL EVALUATION ADULT. - OTHER INFO
Pt with h/o HTN, Renal artery stenosis, prior TIA's with Placement of a left cerebral artery stent in 2014, COPD with emphysema  using 2L home O2 at baseline, RA, Osteopenia, HLD and S/p Cardiac arrest s/p angiography with placement of PPM, recent admission 2 weeks ago for GI bleeding, presenting today and found to have recurrent lower GI with cecal bleeding on CTA abdomen/pelvis.  Pt presents with lower GI Bleed in setting of diverticulosis,  complicated by Acute Blood Loss Anemia, s/p 4 RBC units total.

## 2020-11-13 NOTE — CONSULT NOTE ADULT - ASSESSMENT
ASSESSMENT:  71 yo with PMH significant for HTN, Renal artery stenosis, prior TIA's with Placement of a right vertebral artery stent in 2014 (on ASA 81, plavix 150mg daily), COPD with emphysema  using 2L home O2 at baseline, RA, Osteopenia, HLD and S/p Cardiac arrest s/p angiography with placement of PPM, recent admission 2 weeks ago for GI bleeding, presenting today and found to have recurrent lower GI with cecal bleeding on CTA abdomen/pelvis. Colonoscopy on 11/09 revealed Multiple medium caliber diverticula all over the colon. Old blood. No active bleeding noted. Aspirin has been continued but plavix discontinued at the time of the bleed. CTA head and neck were performed today to assess the patency of the right vertebral artery stent which reveals patent stent but +Diminished flow in the left sigmoid sinus and left internal jugular vein suggest thrombosis. Pt denies HA, dizziness, visual disturbances, hearing loss, weakness on one side of the body.     PLAN:  -no need for acute neuro IR procedure  -right vertebral artery stent patent on CT imaging  -continue with aspirin, hold off on plavix for GI bleeding. d/W Dr. Cardenas about discontinuing plavix. Pt s/p stent placement in 2014  -recommend hydrating patient tonight given dye load today and recommend CTV for tomorrow to evaluate flow in the left IJ and sigmoid sinus

## 2020-11-13 NOTE — PROGRESS NOTE ADULT - SUBJECTIVE AND OBJECTIVE BOX
[facility] Division of Hospital Medicine    Chief Complaint:  LGIB    SUBJECTIVE: reports no blood in stool since colonoscopy, didn't move bowel today yet    OVERNIGHT EVENTS: none    Patient denies chest pain, abd pain, N/V, fever, chills, dysuria or any other complaints.      MEDICATIONS  (STANDING):  ascorbic acid 500 milliGRAM(s) Oral daily  aspirin enteric coated 81 milliGRAM(s) Oral daily  atorvastatin 80 milliGRAM(s) Oral at bedtime  carvedilol 12.5 milliGRAM(s) Oral every 12 hours  folic acid 1 milliGRAM(s) Oral daily  lactated ringers. 1000 milliLiter(s) (100 mL/Hr) IV Continuous <Continuous>  lidocaine   Patch 1 Patch Transdermal daily  methotrexate 15 milliGRAM(s) Oral every week  pantoprazole    Tablet 40 milliGRAM(s) Oral before breakfast    MEDICATIONS  (PRN):  acetaminophen   Tablet .. 650 milliGRAM(s) Oral every 6 hours PRN Temp greater or equal to 38C (100.4F), Mild Pain (1 - 3)  ALBUTerol    90 MICROgram(s) HFA Inhaler 2 Puff(s) Inhalation every 6 hours PRN Shortness of Breath and/or Wheezing  albuterol/ipratropium for Nebulization. 3 milliLiter(s) Nebulizer every 4 hours PRN Shortness of Breath and/or Wheezing      I&O's Summary      PHYSICAL EXAM:  Vital Signs Last 24 Hrs  T(C): 36.9 (13 Nov 2020 16:05), Max: 36.9 (13 Nov 2020 16:05)  T(F): 98.5 (13 Nov 2020 16:05), Max: 98.5 (13 Nov 2020 16:05)  HR: 78 (13 Nov 2020 16:44) (69 - 82)  BP: 138/72 (13 Nov 2020 16:44) (126/65 - 159/84)  BP(mean): --  RR: 18 (13 Nov 2020 16:05) (18 - 18)  SpO2: 98% (13 Nov 2020 16:05) (98% - 99%)        CONSTITUTIONAL: NAD, well-developed, well-groomed  ENMT: Moist oral mucosa, no pharyngeal injection or exudates; normal dentition; No JVD  RESPIRATORY: Normal respiratory effort; lungs are clear to auscultation bilaterally  CARDIOVASCULAR: Regular rate and rhythm, normal S1 and S2, no murmur/rub/gallop; No lower extremity edema; Peripheral pulses are 2+ bilaterally, L side chest  pacemaker   ABDOMEN: Nontender to palpation, normoactive bowel sounds, no rebound/guarding; No hepatosplenomegaly  MUSCLOSKELETAL:  Normal gait; no clubbing or cyanosis of digits; no joint swelling or tenderness to palpation  PSYCH: A+O to person, place, and time; affect appropriate  NEUROLOGY: CN 2-12 are intact and symmetric; no gross sensory deficits; was observed moving all 4 ext against gravity cooperating with exam.   SKIN: No rashes; no palpable lesion        LABS:                        10.3   6.44  )-----------( 247      ( 13 Nov 2020 07:30 )             32.9     11-13    137  |  107  |  17.0  ----------------------------<  78  4.9   |  19.0<L>  |  0.83    Ca    8.7      13 Nov 2020 07:30                CAPILLARY BLOOD GLUCOSE            RADIOLOGY & ADDITIONAL TESTS:  Results Reviewed:   Imaging Personally Reviewed:  Electrocardiogram Personally Reviewed:

## 2020-11-13 NOTE — DISCHARGE NOTE PROVIDER - NSDCCPCAREPLAN_GEN_ALL_CORE_FT
PRINCIPAL DISCHARGE DIAGNOSIS  Diagnosis: Gastrointestinal hemorrhage with melena  Assessment and Plan of Treatment: - you bled from diverticular, and bleedign stopped on its own  - we believe you had bleeding due to high dose of Plavix, which we stopped for you.   - please continue to take Asprin 81 mg daily   - follow up with gastroenterologist  - please follow up with Dr. Cardenas

## 2020-11-13 NOTE — PROGRESS NOTE ADULT - ASSESSMENT
69 yo with PMH significant for HTN, Renal artery stenosis, prior TIA's with Placement of a left cerebral artery stent in 2014, COPD with emphysema  using 2L home O2 at baseline, RA, Osteopenia, HLD and S/p Cardiac arrest s/p angiography with placement of PPM, recent admission 2 weeks ago for GI bleeding, presenting today and found to have recurrent lower GI wtih cecal bleeding on CTA abdomen/pelvis this time in ED. Colonoscopy on 11/09 was unrevealing.     #Lower GI Bleed  in setting of diverticulosis,  complicated by Acute Blood Loss Anemia, s/p 4 RBC units total   - hgb  is stable  - Colonoscopy on 11/09 today is unrevealing  - She is on 150 mg of plavix at home for stent in cerbral artery placed in 2015, called [facility] office again ( [phone])>> no response from primary [facility].surgen/interventionalist >> consulted Dr. Cardenas and ordered CTA head/neck to assess stent potency>> CTA H/N showed potent stent in L vertebral artery, but showed possible Diminished flow in the left sigmoid sinus and left internal jugular vein suggest thrombosis.   - plan to d/c Plavix and cont'e with ASA 81 mg  - on discussion owith Dr. Cardenas finding above it was recommended to obtain CTV head tomorrow after IV hydration to prevent contrast induced nephropathy.   - c/w Protonix 40 po bid  - if rebleeds again will do bleeding scan and involve IR  - cont'e to observe today    #COPD with emphysema on 2 l NC at home  -not in acute exacerbation  -c/w albuterol and duonebs prn  - supplemental o2 via nasal cannula, titrating down to home base line with SpO2 goal > 89    #Intracranial stenosis and stent placement in 2015   # CAD w/o angina  # HLD  # SSS s/p Pacemaker placement  -patient takes 150mg plavix ( holding for now)  with asa daily, see above  -Lipitor 80mg qhs, c/w Coreg 12.5 bid    #LLE edema, has improved  - US was negative for DVT   - c/w monitor    #HTN  c/w home medications  coreg       DVT PPx: holding chemical AC in setting of active GI bleeding  GOC: Full Code  LOS - anticipating d/c tomorrow if CTV is negative

## 2020-11-13 NOTE — PROGRESS NOTE ADULT - PROBLEM SELECTOR PROBLEM 1
Gastrointestinal hemorrhage with melena
Gastrointestinal hemorrhage with melena
Lower GI bleed
Lower GI bleed
Acute blood loss anemia

## 2020-11-13 NOTE — CONSULT NOTE ADULT - SUBJECTIVE AND OBJECTIVE BOX
HPI:  71 yo female, who has a PMH of HTN, Renal artery stenosis, prior TIA's with Placement of a left cerebral artery stent in 2014 in [facility], and on chronic Aspirin / Plavix since. Also with COPD using 2L home O2 at baseline. Hx of RA and Osteopenia, HLD and S/p Cardiac arrest s/p angiography with placement of PPM about 2016.  No hx of CAD or cardiac stents.  She presents to ED this evening, transported by car, for episode of loose blood bowel movements today. 2 weeks ago, she was admitted for loose BMs with blood streaks noted on EGD to have a hiatal hernia and with colonoscopy to have polyp x1 in descending colon and x1 in sigmoid colon with diverticuli in left side and old blood noted throughout the colon as per notes. No active signs of bleeding noted at that time. Since discharge, says that she had been experiencing small amounts of bleeding with her bowel movements and was advised that could happen following the polyp resections. Was restarted on her aspirin/plavix regimen 1 week after discharge. Says that today specifically, she had a raisin bagel which often causes her to have loose BMs at which point patient says that she started to experience loose BMs with bright red blood per rectum noted over bowel and upon wiping. This became alarming to her and she opted to come to ER after discussing with her son. Transported self to hospital.  Endorses mild dyspnea requiring increase in her home O2 use to 3L up from 2, especially upon exertion. Denied any fevers, chills, headaches, visual disturbances, sore throat, neck pain, chest pain, palpitations, abdominal pain, nausea, vomiting, dysuria, hematuria, numbness, weakness, lower extremity swelling, skin rashes.     In ED: Noted to have Hb 7.1, occult positive, 2 units prbcs and plts ordered. CTA abd/pelvis noted active bleed in cecum. GI consulted, advised holding plavix. IR consulted by ED, no acute intervention ---> medical management. Admission called to medicine.    (06 Nov 2020 23:14)      INTERVAL HPI/OVERNIGHT EVENTS:  70y Female seen sitting OOB in recliner chair. No events overnight    Vital Signs Last 24 Hrs  T(C): 36.9 (13 Nov 2020 16:05), Max: 36.9 (13 Nov 2020 16:05)  T(F): 98.5 (13 Nov 2020 16:05), Max: 98.5 (13 Nov 2020 16:05)  HR: 78 (13 Nov 2020 16:44) (69 - 82)  BP: 138/72 (13 Nov 2020 16:44) (126/65 - 159/84)  BP(mean): --  RR: 18 (13 Nov 2020 16:05) (18 - 18)  SpO2: 98% (13 Nov 2020 16:05) (98% - 99%)    PHYSICAL EXAM:  GENERAL: NAD, well-groomed, well-developed  HEAD:  Atraumatic, normocephalic  CALEB COMA SCORE: E- V- M- =15       E: 4= opens eyes spontaneously        V: 5= oriented        M: 6= follows commands   MENTAL STATUS: AAO x3; Awake; Opens eyes spontaneously; Appropriately conversant without aphasia; following simple commands  CRANIAL NERVES: Visual acuity normal for age, visual fields full to confrontation, PERRL. EOMI without nystagmus. Facial sensation intact V1-3 distribution b/l. Face symmetric w/ normal eye closure and smile, tongue midline. Hearing grossly intact. Speech clear. Head turning and shoulder shrug intact.   REFLEXES: PERRL.  MOTOR: strength 5/5 b/l upper and lower extremities  SENSATION: grossly intact to light touch all extremities  COORDINATION: Gait not assessed; rapid alternating movements intact; heel to shin intact; no upper extremity dysmetria  CHEST/LUNG: Clear to auscultation bilaterally; no rales, rhonchi, wheezing, or rubs  HEART: +S1/+S2; Regular rate and rhythm; no murmurs, rubs, or gallops  ABDOMEN: Soft, nontender, nondistended; bowel sounds present all four quadrants  EXTREMITIES:  2+ peripheral pulses, no clubbing, cyanosis, or edema  SKIN: Warm, dry; no rashes or lesions    LABS:                        10.3   6.44  )-----------( 247      ( 13 Nov 2020 07:30 )             32.9     11-13    137  |  107  |  17.0  ----------------------------<  78  4.9   |  19.0<L>  |  0.83    Ca    8.7      13 Nov 2020 07:30            11-13 @ 07:01  -  11-13 @ 17:00  --------------------------------------------------------  IN: 100 mL / OUT: 0 mL / NET: 100 mL        RADIOLOGY & ADDITIONAL TESTS:  < from: CT Angio Neck w/ IV Cont (11.13.20 @ 11:11) >     EXAM:  CT ANGIO NECK (W)AW IC                         EXAM:  CT ANGIO BRAIN (W)AW IC                          PROCEDURE DATE:  11/13/2020          INTERPRETATION:  CLINICAL INDICATIONS:to assess potency of L vertebral artery stent    TECHNIQUE: CTA brain and neck. 5 cc Omnipaque 350 Intravenous contrast was administered. 2-D MIP and 3-D volume rendering images.    COMPARISON: None    FINDINGS:    NONCONTRAST CT HEAD:  There is no compelling evidence for an acute transcortical infarction. Thereis no evidence of mass, mass effect, midline shift or extra-axial fluid collection. The lateral ventricles and cortical sulci are age-appropriate in size and configuration.  2.3 cm left maxillary sinus mucous retention cyst. The orbits, mastoid air cells and remaining visualized paranasal sinuses are unremarkable. The calvarium is intact. MR is a more sensitive imaging modality for the evaluation of an acute infarction.      CTA BRAIN: Severe calcifications of bilateral cavernous carotid arteriescausing mild left-sided stenosis.  The [place] and vertebrobasilar system are normal without evidence of stenosis, occlusion or saccular aneurysm dilation. No evidence for arterial venous malformation. The vertebral arteries are codominant.      CTA NECK: The vertebral arteries dominant. 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 The stent is demonstrated in the right vertebral artery before segment on images 323 - 329.    Absence of contrast opacification in the left sigmoid sinus and left internal jugular vein thrombosis. Ultrasound is recommended for further evaluation.    Moderate calcific plaque in bilateral carotid bulbs without significant luminal narrowing. Of series 9. There appears to be flow throughout this stent.    Severe atherosclerotic disease in the left vertebral artery V4 segment on image 34 of series 9 with occlusion. A left-sided aortic arch is demonstrated. There is normal relationship to the great vessels. The common carotid arteries, internal carotid arteries and vertebral arteries show no other evidence of significant stenosis, additional occlusion or saccular aneurysm dilation. The vertebral arteries are codominant.    Left-sided thyroid goiter with retrosternal extension and deviation of trachea to the right is demonstrated on image 26 of series 9 and measures 2.9 x 5.5 cm.    IMPRESSION:    Right vertebral artery V4 segment stent appears patent.  Occluded left vertebral artery V4 segment.  Diminished flow in the left sigmoid sinus andleft internal jugular vein suggest thrombosis.  Consider catheter angiogram as clinically warranted.    < end of copied text >          CAPRINI SCORE [CLOT]:  Patient has an estimated Caprini score of greater than 5.  However, the patient's unique clinical situation will be addressed in an individual manner to determine appropriate anticoagulation treatment, if any.   HPI:  71 yo female, who has a PMH of HTN, Renal artery stenosis, prior TIA's with Placement of a left cerebral artery stent in 2014 in [facility], and on chronic Aspirin / Plavix since. Also with COPD using 2L home O2 at baseline. Hx of RA and Osteopenia, HLD and S/p Cardiac arrest s/p angiography with placement of PPM about 2016.  No hx of CAD or cardiac stents.  She presents to ED this evening, transported by car, for episode of loose blood bowel movements today. 2 weeks ago, she was admitted for loose BMs with blood streaks noted on EGD to have a hiatal hernia and with colonoscopy to have polyp x1 in descending colon and x1 in sigmoid colon with diverticuli in left side and old blood noted throughout the colon as per notes. No active signs of bleeding noted at that time. Since discharge, says that she had been experiencing small amounts of bleeding with her bowel movements and was advised that could happen following the polyp resections. Was restarted on her aspirin/plavix regimen 1 week after discharge. Says that today specifically, she had a raisin bagel which often causes her to have loose BMs at which point patient says that she started to experience loose BMs with bright red blood per rectum noted over bowel and upon wiping. This became alarming to her and she opted to come to ER after discussing with her son. Transported self to hospital.  Endorses mild dyspnea requiring increase in her home O2 use to 3L up from 2, especially upon exertion. Denied any fevers, chills, headaches, visual disturbances, sore throat, neck pain, chest pain, palpitations, abdominal pain, nausea, vomiting, dysuria, hematuria, numbness, weakness, lower extremity swelling, skin rashes.     In ED: Noted to have Hb 7.1, occult positive, 2 units prbcs and plts ordered. CTA abd/pelvis noted active bleed in cecum. GI consulted, advised holding plavix. IR consulted by ED, no acute intervention ---> medical management. Admission called to medicine.    (06 Nov 2020 23:14)      INTERVAL HPI/OVERNIGHT EVENTS:  70y Female seen sitting OOB in recliner chair. No events overnight    Vital Signs Last 24 Hrs  T(C): 36.9 (13 Nov 2020 16:05), Max: 36.9 (13 Nov 2020 16:05)  T(F): 98.5 (13 Nov 2020 16:05), Max: 98.5 (13 Nov 2020 16:05)  HR: 78 (13 Nov 2020 16:44) (69 - 82)  BP: 138/72 (13 Nov 2020 16:44) (126/65 - 159/84)  BP(mean): --  RR: 18 (13 Nov 2020 16:05) (18 - 18)  SpO2: 98% (13 Nov 2020 16:05) (98% - 99%)    PHYSICAL EXAM:  GENERAL: NAD, well-groomed, well-developed  HEAD:  Atraumatic, normocephalic  CALEB COMA SCORE: E- V- M- =15       E: 4= opens eyes spontaneously        V: 5= oriented        M: 6= follows commands   MENTAL STATUS: AAO x3; Awake; Opens eyes spontaneously; Appropriately conversant without aphasia; following simple commands  CRANIAL NERVES: Visual acuity normal for age, visual fields full to confrontation, PERRL. EOMI without nystagmus. Facial sensation intact V1-3 distribution b/l. Face symmetric w/ normal eye closure and smile, tongue midline. Hearing grossly intact. Speech clear. Head turning and shoulder shrug intact.   REFLEXES: PERRL.  MOTOR: strength 5/5 b/l upper and lower extremities  SENSATION: grossly intact to light touch all extremities  COORDINATION: Gait not assessed; rapid alternating movements intact; heel to shin intact; no upper extremity dysmetria  CHEST/LUNG: Clear to auscultation bilaterally; no rales, rhonchi, wheezing, or rubs  HEART: +S1/+S2; Regular rate and rhythm; no murmurs, rubs, or gallops  ABDOMEN: Soft, nontender, nondistended; bowel sounds present all four quadrants  EXTREMITIES:  2+ peripheral pulses, no clubbing, cyanosis, or edema  SKIN: Warm, dry; no rashes or lesions    LABS:                        10.3   6.44  )-----------( 247      ( 13 Nov 2020 07:30 )             32.9     11-13    137  |  107  |  17.0  ----------------------------<  78  4.9   |  19.0<L>  |  0.83    Ca    8.7      13 Nov 2020 07:30            11-13 @ 07:01  -  11-13 @ 17:00  --------------------------------------------------------  IN: 100 mL / OUT: 0 mL / NET: 100 mL        RADIOLOGY & ADDITIONAL TESTS:  < from: CT Angio Neck w/ IV Cont (11.13.20 @ 11:11) >     EXAM:  CT ANGIO NECK (W)AW IC                         EXAM:  CT ANGIO BRAIN (W)AW IC                          PROCEDURE DATE:  11/13/2020          INTERPRETATION:  CLINICAL INDICATIONS:to assess potency of L vertebral artery stent    TECHNIQUE: CTA brain and neck. 5 cc Omnipaque 350 Intravenous contrast was administered. 2-D MIP and 3-D volume rendering images.    COMPARISON: None    FINDINGS:    NONCONTRAST CT HEAD:  There is no compelling evidence for an acute transcortical infarction. Thereis no evidence of mass, mass effect, midline shift or extra-axial fluid collection. The lateral ventricles and cortical sulci are age-appropriate in size and configuration.  2.3 cm left maxillary sinus mucous retention cyst. The orbits, mastoid air cells and remaining visualized paranasal sinuses are unremarkable. The calvarium is intact. MR is a more sensitive imaging modality for the evaluation of an acute infarction.      CTA BRAIN: Severe calcifications of bilateral cavernous carotid arteriescausing mild left-sided stenosis.  The [place] and vertebrobasilar system are normal without evidence of stenosis, occlusion or saccular aneurysm dilation. No evidence for arterial venous malformation. The vertebral arteries are codominant.      CTA NECK: The vertebral arteries dominant. 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 The stent is demonstrated in the right vertebral artery before segment on images 323 - 329.    Absence of contrast opacification in the left sigmoid sinus and left internal jugular vein thrombosis. Ultrasound is recommended for further evaluation.    Moderate calcific plaque in bilateral carotid bulbs without significant luminal narrowing. Of series 9. There appears to be flow throughout this stent.    Severe atherosclerotic disease in the left vertebral artery V4 segment on image 34 of series 9 with occlusion. A left-sided aortic arch is demonstrated. There is normal relationship to the great vessels. The common carotid arteries, internal carotid arteries and vertebral arteries show no other evidence of significant stenosis, additional occlusion or saccular aneurysm dilation. The vertebral arteries are codominant.    Left-sided thyroid goiter with retrosternal extension and deviation of trachea to the right is demonstrated on image 26 of series 9 and measures 2.9 x 5.5 cm.    IMPRESSION:    Right vertebral artery V4 segment stent appears patent.  Occluded left vertebral artery V4 segment.  Diminished flow in the left sigmoid sinus andleft internal jugular vein suggest thrombosis.  Consider catheter angiogram as clinically warranted.    < end of copied text >

## 2020-11-13 NOTE — DISCHARGE NOTE PROVIDER - HOSPITAL COURSE
71 yo with PMH significant for HTN, Renal artery stenosis, prior TIA's with Placement of a left cerebral artery stent in 2014, COPD with emphysema  using 2L home O2 at baseline, RA, Osteopenia, HLD and S/p Cardiac arrest s/p angiography with placement of PPM, recent admission 2 weeks ago for GI bleeding, presenting today and found to have recurrent lower GI with cecal bleeding on CTA abdomen/pelvis this time in ED. Colonoscopy on 11/09 was unrevealing. It appears she is on 150 mg of plavix at home for stent in vertebral artery placed in 2015, called [facility] office again ( [phone]) with no response from primary Nuero.surgen/interventionalist, therefore consulted Dr. Cardenas and ordered CTA head/neck to assess stent potency. It came back with  potent stent in L vertebral artery, but showed possible Diminished flow in the left sigmoid sinus and left internal jugular vein suggest thrombosiss. Finding was discussed with Dr. Cardenas and it was advised to obtain CT venogram of the head to rule out sigmoid sinus thrombosis.   71 yo with PMH significant for HTN, Renal artery stenosis, prior TIA's with Placement of a left cerebral artery stent in 2014, COPD with emphysema  using 2L home O2 at baseline, RA, Osteopenia, HLD and S/p Cardiac arrest s/p angiography with placement of PPM, recent admission 2 weeks ago for GI bleeding, presenting today and found to have recurrent lower GI with cecal bleeding on CTA abdomen/pelvis this time in ED. Colonoscopy on 11/09 was unrevealing. It appears she is on 150 mg of plavix at home for stent in vertebral artery placed in 2015, called [facility] office again ( [phone]) with no response from primary Nuero.surgen/interventionalist, therefore consulted Dr. Cardenas and ordered CTA head/neck to assess stent potency. It came back with  potent stent in L vertebral artery, but showed possible Diminished flow in the left sigmoid sinus and left internal jugular vein suggest thrombosiss. Finding was discussed with Dr. Cardenas and it was advised to obtain CT venogram of the head to rule out sigmoid sinus thrombosis. CTV head performed with results as follows: 1.   Intracranial circulation:  Moderate vascular calcification involves the proximal intradural vertebral arteries and the cavernous internal carotid arteries. The dural sinuses are patent and show normal enhancement. The internal jugular veins are patent, RIGHT greater in size than LEFT. 2.   Brain:  Old infarction in the LEFT cerebellum. Otherwise unremarkable. The patient is hemodynamically stable and medically optimized for discharge with appropriate follow up.     Discharge Physical Examination:  Vital Signs Last 24 Hrs  T(F): 98.2 (14 Nov 2020 07:19), Max: 98.5 (13 Nov 2020 16:05)  HR: 62 (14 Nov 2020 07:19) (62 - 78)  BP: 123/72 (14 Nov 2020 07:19) (123/72 - 159/84)  RR: 18 (14 Nov 2020 07:19) (18 - 18)  SpO2: 98% (14 Nov 2020 07:19) (98% - 98%)    Physical Exam:  Constitutional: alert and oriented, in no acute distress   Neck: Soft and supple, No LAD, No JVD  Respiratory: Clear to auscultation bilaterally, no wheezes or crackles  Cardiovascular: Regular rate and rhythm, no murmurs, gallops, rubs  Gastrointestinal: Soft, non-tender to palpation, +bs  Vascular: 2+ peripheral pulses  Neurological: A/O x 3, no focal neurological deficits  Musculoskeletal: no lower extremity edema bilaterally    Time spent on discharge: 40 minutes

## 2020-11-13 NOTE — DISCHARGE NOTE PROVIDER - CARE PROVIDER_API CALL
Shanon Tucker  GASTROENTEROLOGY  [address] [facility] [address], [address]  [place] [address]  Phone: [phone]  Fax: [phone]  Established Patient  Follow Up Time: 1 month    Leanna Lester)  Neurology; Vascular Neurology  [address] [place] [address]  Phone: [phone]  Fax: [phone]  Established Patient  Follow Up Time: Routine   Shanon Tucker  GASTROENTEROLOGY  [address] [facility], [address]  [place] [address]  Phone: [phone]  Fax: [phone]  Established Patient  Follow Up Time: 1 month    Leanna Lester)  Neurology; Vascular Neurology  [address] [place] [address]  Phone: [phone]  Fax: [phone]  Established Patient  Follow Up Time: 1 month

## 2020-11-13 NOTE — DIETITIAN INITIAL EVALUATION ADULT. - ORAL INTAKE PTA/DIET HISTORY
Pt reports good po intake this time.  Pt states decreased po intake x several months prior to admission due to multiple hospital admissions.  Pt also reports ~30 lb unintentional wt loss during that time.  Discussed importance of consuming adequate protein intake at meals to optimize nutrition status. Pt very receptive and agreeable.

## 2020-11-14 ENCOUNTER — TRANSCRIPTION ENCOUNTER (OUTPATIENT)
Age: 70
End: 2020-11-14

## 2020-11-14 VITALS
HEART RATE: 71 BPM | DIASTOLIC BLOOD PRESSURE: 77 MMHG | SYSTOLIC BLOOD PRESSURE: 138 MMHG | OXYGEN SATURATION: 98 % | TEMPERATURE: 98 F | RESPIRATION RATE: 18 BRPM

## 2020-11-14 LAB
ANION GAP SERPL CALC-SCNC: 10 MMOL/L — SIGNIFICANT CHANGE UP (ref 5–17)
BUN SERPL-MCNC: 12 MG/DL — SIGNIFICANT CHANGE UP (ref 8–20)
CALCIUM SERPL-MCNC: 8.7 MG/DL — SIGNIFICANT CHANGE UP (ref 8.6–10.2)
CHLORIDE SERPL-SCNC: 106 MMOL/L — SIGNIFICANT CHANGE UP (ref 98–107)
CO2 SERPL-SCNC: 25 MMOL/L — SIGNIFICANT CHANGE UP (ref 22–29)
CREAT SERPL-MCNC: 0.84 MG/DL — SIGNIFICANT CHANGE UP (ref 0.5–1.3)
GLUCOSE SERPL-MCNC: 85 MG/DL — SIGNIFICANT CHANGE UP (ref 70–99)
HCT VFR BLD CALC: 29.3 % — LOW (ref 34.5–45)
HGB BLD-MCNC: 9.4 G/DL — LOW (ref 11.5–15.5)
MCHC RBC-ENTMCNC: 28.7 PG — SIGNIFICANT CHANGE UP (ref 27–34)
MCHC RBC-ENTMCNC: 32.1 GM/DL — SIGNIFICANT CHANGE UP (ref 32–36)
MCV RBC AUTO: 89.6 FL — SIGNIFICANT CHANGE UP (ref 80–100)
PLATELET # BLD AUTO: 340 K/UL — SIGNIFICANT CHANGE UP (ref 150–400)
POTASSIUM SERPL-MCNC: 4 MMOL/L — SIGNIFICANT CHANGE UP (ref 3.5–5.3)
POTASSIUM SERPL-SCNC: 4 MMOL/L — SIGNIFICANT CHANGE UP (ref 3.5–5.3)
RBC # BLD: 3.27 M/UL — LOW (ref 3.8–5.2)
RBC # FLD: 13.9 % — SIGNIFICANT CHANGE UP (ref 10.3–14.5)
SODIUM SERPL-SCNC: 141 MMOL/L — SIGNIFICANT CHANGE UP (ref 135–145)
WBC # BLD: 5.32 K/UL — SIGNIFICANT CHANGE UP (ref 3.8–10.5)
WBC # FLD AUTO: 5.32 K/UL — SIGNIFICANT CHANGE UP (ref 3.8–10.5)

## 2020-11-14 PROCEDURE — 36415 COLL VENOUS BLD VENIPUNCTURE: CPT

## 2020-11-14 PROCEDURE — P9037: CPT

## 2020-11-14 PROCEDURE — 71045 X-RAY EXAM CHEST 1 VIEW: CPT

## 2020-11-14 PROCEDURE — 93971 EXTREMITY STUDY: CPT

## 2020-11-14 PROCEDURE — 87086 URINE CULTURE/COLONY COUNT: CPT

## 2020-11-14 PROCEDURE — 70498 CT ANGIOGRAPHY NECK: CPT

## 2020-11-14 PROCEDURE — 85014 HEMATOCRIT: CPT

## 2020-11-14 PROCEDURE — 70496 CT ANGIOGRAPHY HEAD: CPT | Mod: 26

## 2020-11-14 PROCEDURE — 86769 SARS-COV-2 COVID-19 ANTIBODY: CPT

## 2020-11-14 PROCEDURE — 99232 SBSQ HOSP IP/OBS MODERATE 35: CPT

## 2020-11-14 PROCEDURE — 81001 URINALYSIS AUTO W/SCOPE: CPT

## 2020-11-14 PROCEDURE — 85025 COMPLETE CBC W/AUTO DIFF WBC: CPT

## 2020-11-14 PROCEDURE — 99239 HOSP IP/OBS DSCHRG MGMT >30: CPT

## 2020-11-14 PROCEDURE — 86923 COMPATIBILITY TEST ELECTRIC: CPT

## 2020-11-14 PROCEDURE — 86900 BLOOD TYPING SEROLOGIC ABO: CPT

## 2020-11-14 PROCEDURE — 36430 TRANSFUSION BLD/BLD COMPNT: CPT

## 2020-11-14 PROCEDURE — 80048 BASIC METABOLIC PNL TOTAL CA: CPT

## 2020-11-14 PROCEDURE — 74174 CTA ABD&PLVS W/CONTRAST: CPT

## 2020-11-14 PROCEDURE — 85018 HEMOGLOBIN: CPT

## 2020-11-14 PROCEDURE — 86850 RBC ANTIBODY SCREEN: CPT

## 2020-11-14 PROCEDURE — 85027 COMPLETE CBC AUTOMATED: CPT

## 2020-11-14 PROCEDURE — 82272 OCCULT BLD FECES 1-3 TESTS: CPT

## 2020-11-14 PROCEDURE — 86901 BLOOD TYPING SEROLOGIC RH(D): CPT

## 2020-11-14 PROCEDURE — 80053 COMPREHEN METABOLIC PANEL: CPT

## 2020-11-14 PROCEDURE — 70496 CT ANGIOGRAPHY HEAD: CPT

## 2020-11-14 PROCEDURE — 99285 EMERGENCY DEPT VISIT HI MDM: CPT | Mod: 25

## 2020-11-14 PROCEDURE — P9016: CPT

## 2020-11-14 PROCEDURE — 85610 PROTHROMBIN TIME: CPT

## 2020-11-14 PROCEDURE — 93005 ELECTROCARDIOGRAM TRACING: CPT

## 2020-11-14 PROCEDURE — U0003: CPT

## 2020-11-14 PROCEDURE — 84484 ASSAY OF TROPONIN QUANT: CPT

## 2020-11-14 PROCEDURE — 83690 ASSAY OF LIPASE: CPT

## 2020-11-14 RX ADMIN — Medication 1 MILLIGRAM(S): at 11:29

## 2020-11-14 RX ADMIN — Medication 500 MILLIGRAM(S): at 11:29

## 2020-11-14 RX ADMIN — LIDOCAINE 1 PATCH: 4 CREAM TOPICAL at 00:45

## 2020-11-14 RX ADMIN — LIDOCAINE 1 PATCH: 4 CREAM TOPICAL at 11:29

## 2020-11-14 RX ADMIN — PANTOPRAZOLE SODIUM 40 MILLIGRAM(S): 20 TABLET, DELAYED RELEASE ORAL at 06:03

## 2020-11-14 RX ADMIN — CARVEDILOL PHOSPHATE 12.5 MILLIGRAM(S): 80 CAPSULE, EXTENDED RELEASE ORAL at 06:03

## 2020-11-14 RX ADMIN — Medication 81 MILLIGRAM(S): at 11:29

## 2020-11-14 NOTE — PROGRESS NOTE ADULT - ASSESSMENT
69 yo with PMH significant for HTN, Renal artery stenosis, prior TIA's with Placement of a right vertebral artery stent in 2014 (on ASA 81, plavix 150mg daily), COPD with emphysema  using 2L home O2 at baseline, RA, Osteopenia, HLD and S/p Cardiac arrest s/p angiography with placement of PPM, recent admission 2 weeks ago for GI bleeding, presenting today and found to have recurrent lower GI with cecal bleeding on CTA abdomen/pelvis. Colonoscopy on 11/09 revealed Multiple medium caliber diverticula all over the colon. Old blood. No active bleeding noted. Aspirin has been continued but plavix discontinued at the time of the bleed. CTA head and neck were performed today to assess the patency of the right vertebral artery stent which reveals patent stent but +Diminished flow in the left sigmoid sinus and left internal jugular vein suggest thrombosis. Pt denies HA, dizziness, visual disturbances, hearing loss, weakness on one side of the body.     Plan  - No acute neuro IR intervention at this time  - Right vertebral artery stent patent on CT imaging  - CTV imaging results reviewed and discussed with patient.  - Continue with aspirin, hold off on Plavix for GI bleeding. Pt s/p stent placement in 2014  - Patient is cleared from a neuro IR perspective for discharge, pending primary teams medical clearance  - Follow up with Dr. Cardenas in 1 month outpatient  - D/w Dr. Cardenas

## 2020-11-14 NOTE — PROGRESS NOTE ADULT - NUTRITIONAL ASSESSMENT
This patient has been assessed with a concern for Malnutrition and has been determined to have a diagnosis/diagnoses of Moderate protein-calorie malnutrition.    This patient is being managed with:   Diet Regular-  High Fiber (HIFIBER)  Entered: Nov 11 2020 10:00AM    
This patient has been assessed with a concern for Malnutrition and has been determined to have a diagnosis/diagnoses of Moderate protein-calorie malnutrition.    This patient is being managed with:   Diet Regular-  High Fiber (HIFIBER)  Entered: Nov 11 2020 10:00AM      This patient has been assessed with a concern for Malnutrition and has been determined to have a diagnosis/diagnoses of Moderate protein-calorie malnutrition.    This patient is being managed with:   Diet Regular-  High Fiber (HIFIBER)  Entered: Nov 11 2020 10:00AM    
This patient has been assessed with a concern for Malnutrition and has been determined to have a diagnosis/diagnoses of Moderate protein-calorie malnutrition.    This patient is being managed with:   Diet Regular-  High Fiber (HIFIBER)  Entered: Nov 11 2020 10:00AM

## 2020-11-14 NOTE — PROGRESS NOTE ADULT - REASON FOR ADMISSION
gi bleeding

## 2020-11-14 NOTE — DISCHARGE NOTE NURSING/CASE MANAGEMENT/SOCIAL WORK - PATIENT PORTAL LINK FT
You can access the FollowMyHealth Patient Portal offered by [facility] by registering at the following website: [email or web address]. By joining [employer]’s FollowMyHealth portal, you will also be able to view your health information using other applications (apps) compatible with our system.

## 2020-11-14 NOTE — PROGRESS NOTE ADULT - SUBJECTIVE AND OBJECTIVE BOX
Chief complaint: GI bleed    Patient seen and examined at bedside. No acute overnight events reported. Patient states she is doing well and has no complaints. Denies headache, fever, chills, cough, chest pain, shortness of breath, nausea, vomiting, abdominal pain, diarrhea or constipation.     Vital Signs Last 24 Hrs  T(F): 98.2 (14 Nov 2020 07:19), Max: 98.5 (13 Nov 2020 16:05)  HR: 62 (14 Nov 2020 07:19) (62 - 78)  BP: 123/72 (14 Nov 2020 07:19) (123/72 - 159/84)  RR: 18 (14 Nov 2020 07:19) (18 - 18)  SpO2: 98% (14 Nov 2020 07:19) (98% - 98%)    Physical Exam:  Constitutional: alert and oriented, in no acute distress   Neck: Soft and supple, No LAD, No JVD  Respiratory: Clear to auscultation bilaterally, no wheezes or crackles  Cardiovascular: Regular rate and rhythm no murmurs, gallops, rubs  Gastrointestinal: Soft, non-tender to palpation, +bs  Vascular: 2+ peripheral pulses  Neurological: A/O x 3, no focal neurological deficits  Musculoskeletal:  no lower extremity edema bilaterally    Labs:                        9.4    5.32  )-----------( 340      ( 14 Nov 2020 08:04 )             29.3   11-14    141  |  106  |  12.0  ----------------------------<  85  4.0   |  25.0  |  0.84    Ca    8.7      14 Nov 2020 08:04

## 2020-11-14 NOTE — PROGRESS NOTE ADULT - ATTENDING COMMENTS
Agree with plan as stated above
GI will sign off.  Please reconsult as needed and call with any questions or concerns.
Patient with likely diverticular bleed that appears to have stopped bleeding without intervention.  Colonoscopy showed old blood without any active bleeding.  If patient rebleeds, recommend IR consultation for angiography and embolization.  Repeat colonoscopy would be of little utility and would only delay treatment.

## 2020-11-14 NOTE — PROGRESS NOTE ADULT - ASSESSMENT
71 yo with PMH significant for HTN, Renal artery stenosis, prior TIA's with Placement of a left cerebral artery stent in 2014, COPD with emphysema  using 2L home O2 at baseline, RA, Osteopenia, HLD and S/p Cardiac arrest s/p angiography with placement of PPM, recent admission 2 weeks ago for GI bleeding, presenting today and found to have recurrent lower GI with cecal bleeding on CTA abdomen/pelvis this time in ED. Colonoscopy on 11/09 was unrevealing.     Lower GI Bleed  in setting of diverticulosis,  complicated by Acute Blood Loss Anemia, s/p 4 RBC units total   - stable  - Colonoscopy on 11/09 today is unrevealing  - She is on 150 mg of plavix at home for stent in cerbral artery placed in 2015, called [facility] office again ( [phone])>> no response from primary Nuero.surgen/interventionalist >> consulted Dr. Cardenas and ordered CTA head/neck to assess stent potency>> CTA H/N showed potent stent in L vertebral artery, but showed possible Diminished flow in the left sigmoid sinus and left internal jugular vein suggest thrombosis.   - plan to d/c Plavix and c/wwith ASA 81 mg  - CTV head noted  - c/w Protonix 40 po bid  - if rebleeds again will do bleeding scan and involve IR    COPD with emphysema on 2 l NC at home  -not in acute exacerbation  -c/w albuterol and duonebs prn  - supplemental o2 via nasal cannula, titrating down to home base line with SpO2 goal > 89    Intracranial stenosis and stent placement in 2015/CAD w/o angina/ HLD/SSS s/p Pacemaker placement  -patient takes 150mg plavix ( holding for now)  with asa daily, see above  -Lipitor 80mg qhs, c/w Coreg 12.5 bid    LLE edema  - improved  - US was negative for DVT     HTN  - c/w Coreg    DVT ppx  - holding chemical AC in setting of active GI bleeding

## 2020-11-14 NOTE — PROGRESS NOTE ADULT - SUBJECTIVE AND OBJECTIVE BOX
INTERVAL HPI/OVERNIGHT EVENTS:  69 yo with PMH significant for HTN, Renal artery stenosis, prior TIA's with Placement of a right vertebral artery stent in 2014 (on ASA 81, plavix 150mg daily), COPD with emphysema  using 2L home O2 at baseline, RA, Osteopenia, HLD and S/p Cardiac arrest s/p angiography with placement of PPM, recent admission 2 weeks ago for GI bleeding, presenting today and found to have recurrent lower GI with cecal bleeding on CTA abdomen/pelvis. Colonoscopy on 11/09 revealed Multiple medium caliber diverticula all over the colon. Old blood. No active bleeding noted. Aspirin has been continued but plavix discontinued at the time of the bleed. CTA head and neck were performed today to assess the patency of the right vertebral artery stent which reveals patent stent but +Diminished flow in the left sigmoid sinus and left internal jugular vein suggest thrombosis. Pt denies HA, dizziness, visual disturbances, hearing loss, weakness on one side of the body.     Patient seen and examined this afternoon with neuroIR team. Patient is eager for discharge, only complaining of right shoulder pain from hx of shoulder tear. Denies any blood in stool. Explained the CTV imaging results and discussed follow up with Dr. Cardenas in 1 month.     Vital Signs Last 24 Hrs  T(C): 36.8 (14 Nov 2020 07:19), Max: 36.9 (13 Nov 2020 16:05)  T(F): 98.2 (14 Nov 2020 07:19), Max: 98.5 (13 Nov 2020 16:05)  HR: 62 (14 Nov 2020 07:19) (62 - 78)  BP: 123/72 (14 Nov 2020 07:19) (123/72 - 159/84)  BP(mean): --  RR: 18 (14 Nov 2020 07:19) (18 - 18)  SpO2: 98% (14 Nov 2020 07:19) (98% - 98%)    PHYSICAL EXAM:  GENERAL: NAD, well-groomed, well-developed. Pleasant  HEAD: Atraumatic, normocephalic  CALEB COMA SCORE: E-4 V-5 M-6 =15  MENTAL STATUS: AAO x3; Awake; Opens eyes spontaneously; Appropriately conversant without aphasia; following simple commands  CRANIAL NERVES: Visual acuity normal for age, PERRL. EOMI without nystagmus. Facial sensation intact V1-3 distribution b/l. Face symmetric w/ normal eye closure and smile, tongue midline. Hearing grossly intact. Speech clear.   MOTOR: strength 5/5 b/l upper and lower extremities. RUE discomfort from hx of shoulder tear  SENSATION: grossly intact to light touch all extremities  CHEST/LUNG: Nonlabored breaths    LABS:                        9.4    5.32  )-----------( 340      ( 14 Nov 2020 08:04 )             29.3     11-14    141  |  106  |  12.0  ----------------------------<  85  4.0   |  25.0  |  0.84    Ca    8.7      14 Nov 2020 08:04            11-13 @ 07:01  -  11-14 @ 07:00  --------------------------------------------------------  IN: 100 mL / OUT: 0 mL / NET: 100 mL        RADIOLOGY & ADDITIONAL TESTS:  CT Angio Head w/ IV Cont (11.14.20 @ 09:12)   FINDINGS:   No previous examinations are available for review.  The intracranial circulation is intact without aneurysm, vascular malformation or abnormal vessel termination.  Moderate vascular calcification involves the proximal intradural vertebral arteries and the cavernous internal carotid arteries. The dural sinuses are patent and show normal enhancement. The internal jugular veins are patent, RIGHT greater in size than LEFT.  The brain demonstrates old infarction in the LEFT cerebellum.  No acute cerebral cortical infarct is seen.  No intracranial hemorrhage is found.  The ventricles, sulci and basal cisterns appear unremarkable.  The orbits are unremarkable.  The paranasal sinuses are significant for a 3.3 cm retention cyst in the LEFT maxillary sinus.  The nasal cavity remains intact.  The nasopharynx is symmetric.  The central skull base, petrous temporal bones and calvarium remain intact.  IMPRESSION:   1.   Intracranial circulation:  Moderate vascular calcification involves the proximal intradural vertebral arteries and the cavernous internal carotid arteries. The dural sinuses are patent and show normal enhancement. The internal jugular veins are patent, RIGHT greater in size than LEFT.   2.   Brain:  Old infarction in the LEFT cerebellum. Otherwise unremarkable.          CAPRINI SCORE [CLOT]:  Patient has an estimated Caprini score of greater than 5.  However, the patient's unique clinical situation will be addressed in an individual manner to determine appropriate anticoagulation treatment, if any.

## 2020-11-16 ENCOUNTER — NON-APPOINTMENT (OUTPATIENT)
Age: 70
End: 2020-11-16

## 2020-11-16 ENCOUNTER — APPOINTMENT (OUTPATIENT)
Dept: GASTROENTEROLOGY | Facility: CLINIC | Age: 70
End: 2020-11-16
Payer: MEDICARE

## 2020-11-16 VITALS
HEART RATE: 70 BPM | BODY MASS INDEX: 26.07 KG/M2 | HEIGHT: 68 IN | DIASTOLIC BLOOD PRESSURE: 82 MMHG | RESPIRATION RATE: 15 BRPM | TEMPERATURE: 95.1 F | WEIGHT: 172 LBS | OXYGEN SATURATION: 97 % | SYSTOLIC BLOOD PRESSURE: 120 MMHG

## 2020-11-16 PROCEDURE — 99072 ADDL SUPL MATRL&STAF TM PHE: CPT

## 2020-11-16 PROCEDURE — 99214 OFFICE O/P EST MOD 30 MIN: CPT

## 2020-11-16 NOTE — HISTORY OF PRESENT ILLNESS
[de-identified] :  Patient seen at [facility] for rectal bleeding  anemia.  Patient with hx of CVA, high cholesterol, HTN, renal artery stenosis, RA, PPM,COPD on O2 cerebral stent ( was on high dose plavix 150 - now off) . \par     Patient admitted to [facility] in july 2020 with SBO . Required SB resection and SIA.  NOted to have anemia in august with hgb 8.6. MCV and FE normal. NO family hx of colon cancer or polyps. Seen by hematology- thought to have Fe deficiency anemia with Fe def aas well. \par     oN 10/20- PT  Admitted to [facility] with rectal bleeding and diarrhea. Colonscopy shpwed left sided diverticulosis, polyps, old blood throughout.   EGD showed 2 cm hiatal hernia, antral gastritis. SH e had hgb of 10 on discharge 10/24.\par      ON 11/6 pt admitted with bloody diarrhea/ CT showed bleeding from cecum.  Repeat colon on 11/9 showed diverticulosis, old blood through out the colon. No AVM's seen. On admission hgb was 7.1, on discharge 11/14,hgb was 9.3. \par    neuro IR saw pt  to assess cerebral stent. The Right vent stent was patent.  DIminished flow in the left sig sinus and left IJ  ( maybe thrombus) . AS per IR , ok to stop the plavix. Pt remains on ASA\par     No rectal bleeding since D/C on 11/14. No abdominal pain\par

## 2020-11-16 NOTE — PHYSICAL EXAM
[General Appearance - Alert] : alert [General Appearance - In No Acute Distress] : in no acute distress [General Appearance - Well Nourished] : well nourished [General Appearance - Well Developed] : well developed [Sclera] : the sclera and conjunctiva were normal [Outer Ear] : the ears and nose were normal in appearance [Neck Appearance] : the appearance of the neck was normal [Neck Cervical Mass (___cm)] : no neck mass was observed [] : no respiratory distress [Respiration, Rhythm And Depth] : normal respiratory rhythm and effort [Exaggerated Use Of Accessory Muscles For Inspiration] : no accessory muscle use [Apical Impulse] : the apical impulse was normal [Heart Rate And Rhythm] : heart rate was normal and rhythm regular [Heart Sounds] : normal S1 and S2 [Bowel Sounds] : normal bowel sounds [Abdomen Soft] : soft [Abdomen Tenderness] : non-tender [Skin Color & Pigmentation] : normal skin color and pigmentation [Oriented To Time, Place, And Person] : oriented to person, place, and time [Impaired Insight] : insight and judgment were intact [Affect] : the affect was normal

## 2020-11-16 NOTE — ASSESSMENT
[FreeTextEntry1] : A/P\par Anemia- now off high dose  plavix . Remains on ASA. \par capsule endospcy ordered\par protonix qd\par F/u in 2 months. Call for capsule endoscpy results

## 2020-11-18 ENCOUNTER — NON-APPOINTMENT (OUTPATIENT)
Age: 70
End: 2020-11-18

## 2020-11-24 ENCOUNTER — APPOINTMENT (OUTPATIENT)
Dept: FAMILY MEDICINE | Facility: CLINIC | Age: 70
End: 2020-11-24
Payer: MEDICARE

## 2020-11-24 VITALS
OXYGEN SATURATION: 96 % | HEART RATE: 75 BPM | HEIGHT: 68 IN | DIASTOLIC BLOOD PRESSURE: 78 MMHG | WEIGHT: 173 LBS | BODY MASS INDEX: 26.22 KG/M2 | TEMPERATURE: 98.6 F | SYSTOLIC BLOOD PRESSURE: 120 MMHG

## 2020-11-24 DIAGNOSIS — Z87.19 PERSONAL HISTORY OF OTHER DISEASES OF THE DIGESTIVE SYSTEM: ICD-10-CM

## 2020-11-24 PROCEDURE — 99495 TRANSJ CARE MGMT MOD F2F 14D: CPT

## 2020-11-24 RX ORDER — CLOPIDOGREL BISULFATE 75 MG/1
75 TABLET, FILM COATED ORAL
Qty: 90 | Refills: 0 | Status: COMPLETED | COMMUNITY
Start: 2016-08-26 | End: 2020-11-24

## 2020-11-24 NOTE — ADDENDUM
[FreeTextEntry1] : I, Carol Rajan acting as a scribe for Dr. Sarah Gardner on Nov 24, 2020  at 8:45 AM\par

## 2020-11-24 NOTE — HISTORY OF PRESENT ILLNESS
[Post-hospitalization from ___ Hospital] : Post-hospitalization from [unfilled] Hospital [Admitted on: ___] : The patient was admitted on [unfilled] [Discharged on ___] : discharged on [unfilled] [Discharge Summary] : discharge summary [FreeTextEntry2] : JAVED is a 70 year female here for hospital follow up. See note below. States she had four transfusions while in the hospital. At discharge patient's HgA1c was 9.3. Her Plavix was d/c and currently taking  ASA 81 mg. Reports stools are now normal in appearance with no blood. She is now taking Iron 375 mg every day other day. Overall feeling much better since being discharged. She has appt with her neurologist Dr. Limon today. \par \par Per Hospital Note: \par 71 yo with PMH significant for HTN, Renal artery stenosis, prior TIA's with Placement of a left cerebral artery stent in 2014, COPD with emphysema  using 2L home O2 at baseline, RA, Osteopenia, HLD and S/p Cardiac arrest s/p \par angiography with placement of PPM, recent admission 2 weeks ago for GI bleeding, presenting today and found to have recurrent lower GI with cecal bleeding on CTA abdomen/pelvis this time in ED. Colonoscopy on 11/09 was \par unrevealing. It appears she is on 150 mg of Plavix at home for stent in vertebral artery placed in 2015, called [facility] office again ( [phone]) with no response from primary Neuro.surgeon/interventionalist, therefore consulted Dr. Cardenas and ordered CTA head/neck to assess stent potency. It came back with  potent stent in L vertebral artery, but showed possible Diminished flow in the left sigmoid sinus and left internal jugular vein suggest thrombosis. Finding was discussed with Dr. Cardenas and it was advised to obtain CT venogram of the head to rule out sigmoid sinus thrombosis. CTV head performed with results as follows: 1. Intracranial circulation: Moderate vascular calcification involves the proximal intradural vertebral arteries and the cavernous internal carotid arteries. The dural sinuses are \par patent and show normal enhancement. The internal jugular veins are patent, RIGHT greater in size than LEFT. 2. Brain:  Old infarction in the LEFT cerebellum. Otherwise unremarkable. The patient is hemodynamically stable and medically optimized for discharge with appropriate follow up. \par \par

## 2020-11-24 NOTE — END OF VISIT
[FreeTextEntry3] : Medical record entries made by the scribe today today, were at my direction and personally dictated to them by me, Dr. Sarah Gardner on Nov 24, 2020. I have reviewed the chart and agree that the record accurately reflects my personal performance of the history, physical exam, assessment, and plan.\par

## 2020-11-24 NOTE — PHYSICAL EXAM
[No Acute Distress] : no acute distress [Well Nourished] : well nourished [Well Developed] : well developed [Well-Appearing] : well-appearing [Normal Sclera/Conjunctiva] : normal sclera/conjunctiva [PERRL] : pupils equal round and reactive to light [EOMI] : extraocular movements intact [Normal Outer Ear/Nose] : the outer ears and nose were normal in appearance [Normal Oropharynx] : the oropharynx was normal [No JVD] : no jugular venous distention [No Lymphadenopathy] : no lymphadenopathy [Supple] : supple [Thyroid Normal, No Nodules] : the thyroid was normal and there were no nodules present [No Respiratory Distress] : no respiratory distress  [No Accessory Muscle Use] : no accessory muscle use [Clear to Auscultation] : lungs were clear to auscultation bilaterally [Normal Rate] : normal rate  [Regular Rhythm] : with a regular rhythm [Normal S1, S2] : normal S1 and S2 [No Murmur] : no murmur heard [No Carotid Bruits] : no carotid bruits [No Abdominal Bruit] : a ~M bruit was not heard ~T in the abdomen [No Varicosities] : no varicosities [Pedal Pulses Present] : the pedal pulses are present [No Edema] : there was no peripheral edema [No Palpable Aorta] : no palpable aorta [No Extremity Clubbing/Cyanosis] : no extremity clubbing/cyanosis [Soft] : abdomen soft [Non Tender] : non-tender [Non-distended] : non-distended [No Masses] : no abdominal mass palpated [No HSM] : no HSM [Normal Bowel Sounds] : normal bowel sounds [Normal Posterior Cervical Nodes] : no posterior cervical lymphadenopathy [Normal Anterior Cervical Nodes] : no anterior cervical lymphadenopathy [No CVA Tenderness] : no CVA  tenderness [No Spinal Tenderness] : no spinal tenderness [No Joint Swelling] : no joint swelling [Grossly Normal Strength/Tone] : grossly normal strength/tone [No Rash] : no rash [Coordination Grossly Intact] : coordination grossly intact [No Focal Deficits] : no focal deficits [Normal Gait] : normal gait [Normal Affect] : the affect was normal [Normal Insight/Judgement] : insight and judgment were intact [02288 - Moderate Complexity requires multiple possible diagnoses and/or the management options, moderate complexity of the medical data (tests, etc.) to be reviewed, and moderate risk of significant complications, morbidity, and/or mortality as well as co] : Moderate Complexity

## 2020-11-25 LAB
ALBUMIN SERPL ELPH-MCNC: 4.2 G/DL
ALP BLD-CCNC: 145 U/L
ALT SERPL-CCNC: 17 U/L
ANION GAP SERPL CALC-SCNC: 13 MMOL/L
AST SERPL-CCNC: 26 U/L
BASOPHILS # BLD AUTO: 0.03 K/UL
BASOPHILS NFR BLD AUTO: 0.6 %
BILIRUB SERPL-MCNC: 0.2 MG/DL
BUN SERPL-MCNC: 15 MG/DL
CALCIUM SERPL-MCNC: 9.5 MG/DL
CHLORIDE SERPL-SCNC: 107 MMOL/L
CO2 SERPL-SCNC: 22 MMOL/L
CREAT SERPL-MCNC: 1 MG/DL
EOSINOPHIL # BLD AUTO: 0.18 K/UL
EOSINOPHIL NFR BLD AUTO: 3.3 %
FERRITIN SERPL-MCNC: 85 NG/ML
GLUCOSE SERPL-MCNC: 96 MG/DL
HCT VFR BLD CALC: 37.8 %
HGB BLD-MCNC: 11.6 G/DL
IMM GRANULOCYTES NFR BLD AUTO: 0.4 %
IRON SATN MFR SERPL: 28 %
IRON SERPL-MCNC: 105 UG/DL
LYMPHOCYTES # BLD AUTO: 0.79 K/UL
LYMPHOCYTES NFR BLD AUTO: 14.6 %
MAN DIFF?: NORMAL
MCHC RBC-ENTMCNC: 28.9 PG
MCHC RBC-ENTMCNC: 30.7 GM/DL
MCV RBC AUTO: 94 FL
MONOCYTES # BLD AUTO: 0.49 K/UL
MONOCYTES NFR BLD AUTO: 9.1 %
NEUTROPHILS # BLD AUTO: 3.89 K/UL
NEUTROPHILS NFR BLD AUTO: 72 %
PLATELET # BLD AUTO: 407 K/UL
POTASSIUM SERPL-SCNC: 4.6 MMOL/L
PROT SERPL-MCNC: 7.1 G/DL
RBC # BLD: 4.02 M/UL
RBC # FLD: 15.2 %
SODIUM SERPL-SCNC: 142 MMOL/L
TIBC SERPL-MCNC: 372 UG/DL
UIBC SERPL-MCNC: 267 UG/DL
WBC # FLD AUTO: 5.4 K/UL

## 2020-12-02 ENCOUNTER — NON-APPOINTMENT (OUTPATIENT)
Age: 70
End: 2020-12-02

## 2020-12-02 ENCOUNTER — APPOINTMENT (OUTPATIENT)
Dept: GASTROENTEROLOGY | Facility: CLINIC | Age: 70
End: 2020-12-02
Payer: MEDICARE

## 2020-12-02 PROCEDURE — 99072 ADDL SUPL MATRL&STAF TM PHE: CPT

## 2020-12-02 PROCEDURE — 91110 GI TRC IMG INTRAL ESOPH-ILE: CPT

## 2020-12-04 NOTE — PHYSICAL EXAM
[General Appearance - Alert] : alert [General Appearance - In No Acute Distress] : in no acute distress [General Appearance - Well Nourished] : well nourished [General Appearance - Well Developed] : well developed [Sclera] : the sclera and conjunctiva were normal [] : no respiratory distress [Respiration, Rhythm And Depth] : normal respiratory rhythm and effort [Exaggerated Use Of Accessory Muscles For Inspiration] : no accessory muscle use [Auscultation Breath Sounds / Voice Sounds] : lungs were clear to auscultation bilaterally [Apical Impulse] : the apical impulse was normal [Heart Rate And Rhythm] : heart rate was normal and rhythm regular [Heart Sounds] : normal S1 and S2 [Bowel Sounds] : normal bowel sounds [Abdomen Soft] : soft [Abdomen Tenderness] : non-tender [Oriented To Time, Place, And Person] : oriented to person, place, and time [Impaired Insight] : insight and judgment were intact [Affect] : the affect was normal

## 2020-12-07 ENCOUNTER — RX RENEWAL (OUTPATIENT)
Age: 70
End: 2020-12-07

## 2020-12-14 ENCOUNTER — NON-APPOINTMENT (OUTPATIENT)
Age: 70
End: 2020-12-14

## 2020-12-15 ENCOUNTER — APPOINTMENT (OUTPATIENT)
Dept: NEUROSURGERY | Facility: CLINIC | Age: 70
End: 2020-12-15
Payer: MEDICARE

## 2020-12-15 VITALS
BODY MASS INDEX: 26.22 KG/M2 | HEIGHT: 68 IN | OXYGEN SATURATION: 97 % | SYSTOLIC BLOOD PRESSURE: 130 MMHG | DIASTOLIC BLOOD PRESSURE: 72 MMHG | HEART RATE: 66 BPM | WEIGHT: 173 LBS | TEMPERATURE: 97.3 F

## 2020-12-15 DIAGNOSIS — I66.9 OCCLUSION AND STENOSIS OF UNSPECIFIED CEREBRAL ARTERY: ICD-10-CM

## 2020-12-15 DIAGNOSIS — I65.09 OCCLUSION AND STENOSIS OF UNSPECIFIED VERTEBRAL ARTERY: ICD-10-CM

## 2020-12-15 DIAGNOSIS — I10 ESSENTIAL (PRIMARY) HYPERTENSION: ICD-10-CM

## 2020-12-15 DIAGNOSIS — Z82.0 FAMILY HISTORY OF EPILEPSY AND OTHER DISEASES OF THE NERVOUS SYSTEM: ICD-10-CM

## 2020-12-15 DIAGNOSIS — Z86.73 PERSONAL HISTORY OF TRANSIENT ISCHEMIC ATTACK (TIA), AND CEREBRAL INFARCTION W/OUT RESIDUAL DEFICITS: ICD-10-CM

## 2020-12-15 DIAGNOSIS — Z80.42 FAMILY HISTORY OF MALIGNANT NEOPLASM OF PROSTATE: ICD-10-CM

## 2020-12-15 DIAGNOSIS — Z87.891 PERSONAL HISTORY OF NICOTINE DEPENDENCE: ICD-10-CM

## 2020-12-15 DIAGNOSIS — E78.5 HYPERLIPIDEMIA, UNSPECIFIED: ICD-10-CM

## 2020-12-15 DIAGNOSIS — Z78.9 OTHER SPECIFIED HEALTH STATUS: ICD-10-CM

## 2020-12-15 PROCEDURE — 99215 OFFICE O/P EST HI 40 MIN: CPT

## 2020-12-15 PROCEDURE — 99072 ADDL SUPL MATRL&STAF TM PHE: CPT

## 2020-12-15 NOTE — PHYSICAL EXAM
[General Appearance - Alert] : alert [General Appearance - In No Acute Distress] : in no acute distress [Oriented To Time, Place, And Person] : oriented to person, place, and time [Impaired Insight] : insight and judgment were intact [Affect] : the affect was normal [Cranial Nerves Optic (II)] : visual acuity intact bilaterally,  visual fields full to confrontation, pupils equal round and reactive to light [Cranial Nerves Oculomotor (III)] : extraocular motion intact [Cranial Nerves Trigeminal (V)] : facial sensation intact symmetrically [Cranial Nerves Facial (VII)] : face symmetrical [Cranial Nerves Glossopharyngeal (IX)] : tongue and palate midline [Cranial Nerves Accessory (XI - Cranial And Spinal)] : head turning and shoulder shrug symmetric [Cranial Nerves Hypoglossal (XII)] : there was no tongue deviation with protrusion [Motor Tone] : muscle tone was normal in all four extremities [Motor Strength] : muscle strength was normal in all four extremities [Sensation Tactile Decrease] : light touch was intact [Sensation Pain / Temperature Decrease] : pain and temperature was intact [Abnormal Walk] : normal gait [Balance] : balance was intact

## 2020-12-16 PROBLEM — Z87.09 HISTORY OF UPPER RESPIRATORY INFECTION: Status: RESOLVED | Noted: 2018-06-26 | Resolved: 2020-12-16

## 2020-12-17 PROBLEM — E78.5 HYPERLIPIDEMIA: Status: ACTIVE | Noted: 2018-03-20

## 2020-12-17 PROBLEM — Z80.42 FAMILY HISTORY OF MALIGNANT NEOPLASM OF PROSTATE: Status: ACTIVE | Noted: 2018-03-20

## 2020-12-17 PROBLEM — I66.9 STENOSIS OF INTRACRANIAL VESSEL: Status: ACTIVE | Noted: 2020-12-17

## 2020-12-17 PROBLEM — Z82.0 FAMILY HISTORY OF ALZHEIMER'S DISEASE: Status: ACTIVE | Noted: 2018-03-20

## 2020-12-17 NOTE — HISTORY OF PRESENT ILLNESS
[FreeTextEntry1] : Ms. Betancourt presents for follow up visit/post hospitalization [facility] 11/6- 11/14/2020. 69 yo with PMH significant for HTN, Renal artery stenosis, prior TIA's with Placement of a left cerebral artery stent in 2014, COPD with emphysema  using 2L home O2 at baseline, RA, Osteopenia, HLD and S/p Cardiac arrest s/p angiography with placement of PPM, recent admission 2 weeks ago for GI bleeding, presenting today and found to have recurrent lower GI with cecal bleeding on CTA abdomen/pelvis this time in ED. Colonoscopy on 11/09 was unrevealing. It appears she is on 150 mg of plavix at home for stent in \par vertebral artery placed in 2015, called Sammy Stover.\par \par At today's visit, she presents in no acute distress on 2L via nasal cannula.  She denies any headaches, n/v or visual disturbances. She has not had any seizures.   No new bleeding.  Patient has remained off her Plavix since discharge.  Denies any chest pain or SOB. \par \par As per our discussion in the hospital she has never had repeated formal cerebral angiography to asses stent patency. I discussed with her in the hospital that the CT angiogram showed complete patency of the stent and at this point if stent was put in 5 years ago she could d/c her Plavix in the setting of GI bleed. \par ,

## 2020-12-17 NOTE — REVIEW OF SYSTEMS
[Feeling Tired] : feeling tired [As Noted in HPI] : as noted in HPI [Negative] : Heme/Lymph [Numbness] : no numbness [Tingling] : no tingling [Seizures] : no convulsions [Dizziness] : no dizziness [Tension Headache] : no tension-type headache [Difficulty Walking] : no difficulty walking [Inability to Walk] : able to walk [FreeTextEntry9] : lower back pain

## 2020-12-17 NOTE — DISCUSSION/SUMMARY
[FreeTextEntry1] : Impression: Status post left vertebral artery stent 2015 with dual antiplatelet therapy since on 150mg of Plavix daily and ASA. The Plavix was discontinued at the hospital stay given life threatening GI bleed with no pathological findings in any of the investigations. I explained to her the risk of the stent patency being compromised at this point is very low and she can continue ASA 81mg as a single antiplatelet agent. \par \par \par Plan:\par -MR NOVA to asses intracranial flow and if any risk for further strokes\par -Has to be planned with medtr[employer] in the setting of pacemaker and anesthesia since patient has significant claustrophobia\par -Secondary stroke prevention measures as \par Follow up lipid panel goal LDL <70\par BP goal <140/90 today 130/72\par Exercise and Mediterranean style diet \par -Will follow 3-4 months \par -Patient with essential tremor no medication needed at the moment

## 2020-12-17 NOTE — DATA REVIEWED
[de-identified] : EXAM: CT ANGIO NECK (W)AW IC\par  EXAM: CT ANGIO BRAIN (W)AW IC\par \par PROCEDURE DATE: 11/13/2020\par \par \par \par INTERPRETATION: CLINICAL INDICATIONS:to assess potency of L vertebral artery stent\par \par TECHNIQUE: CTA brain and neck. 5 cc Omnipaque 350 Intravenous contrast was administered. 2-D MIP and 3-D volume rendering images.\par \par COMPARISON: None\par \par FINDINGS:\par \par NONCONTRAST CT HEAD:\par There is no compelling evidence for an acute transcortical infarction. There is no evidence of mass, mass effect, midline shift or extra-axial fluid collection. The lateral ventricles and cortical sulci are age-appropriate in size and configuration. 2.3 cm left maxillary sinus mucous retention cyst. The orbits, mastoid air cells and remaining visualized paranasal sinuses are unremarkable. The calvarium is intact. MR is a more sensitive imaging modality for the evaluation of an acute infarction.\par \par \par CTA BRAIN: Severe calcifications of bilateral cavernous carotid arteries causing mild left-sided stenosis.\par The [place] and vertebrobasilar system are normal without evidence of stenosis, occlusion or saccular aneurysm dilation. No evidence for arterial venous malformation. The vertebral arteries are codominant.\par \par \par CTA NECK: The vertebral arteries dominant. 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 The stent is demonstrated in the right vertebral artery before segment on images 323 - 329.\par \par Absence of contrast opacification in the left sigmoid sinus and left internal jugular vein thrombosis. Ultrasound is recommended for further evaluation.\par \par Moderate calcific plaque in bilateral carotid bulbs without significant luminal narrowing. Of series 9. There appears to be flow throughout this stent.\par \par Severe atherosclerotic disease in the left vertebral artery V4 segment on image 34 of series 9 with occlusion. A left-sided aortic arch is demonstrated. There is normal relationship to the great vessels. The common carotid arteries, internal carotid arteries and vertebral arteries show no other evidence of significant stenosis, additional occlusion or saccular aneurysm dilation. The vertebral arteries are codominant.\par \par Left-sided thyroid goiter with retrosternal extension and deviation of trachea to the right is demonstrated on image 26 of series 9 and measures 2.9 x 5.5 cm.\par \par IMPRESSION:\par \par Right vertebral artery V4 segment stent appears patent.\par Occluded left vertebral artery V4 segment.\par Diminished flow in the left sigmoid sinus and left internal jugular vein suggest thrombosis.\par Consider catheter angiogram as clinically warranted.\par \par CTV head performed with results as follows: 1. Intracranial circulation: \par Moderate vascular calcification involves the proximal intradural vertebral \par arteries and the cavernous internal carotid arteries. The dural sinuses are \par patent and show normal enhancement. The internal jugular veins are patent, \par RIGHT greater in size than LEFT. 2. Brain:  Old infarction in the LEFT \par cerebellum. Otherwise unremarkable. The patient is hemodynamically stable and \par medically optimized for discharge with appropriate follow up.

## 2020-12-21 PROBLEM — Z86.69 HISTORY OF CONJUNCTIVITIS: Status: RESOLVED | Noted: 2019-11-08 | Resolved: 2020-12-21

## 2021-01-15 ENCOUNTER — NON-APPOINTMENT (OUTPATIENT)
Age: 71
End: 2021-01-15

## 2021-03-05 ENCOUNTER — NON-APPOINTMENT (OUTPATIENT)
Age: 71
End: 2021-03-05

## 2021-03-10 ENCOUNTER — APPOINTMENT (OUTPATIENT)
Dept: RHEUMATOLOGY | Facility: CLINIC | Age: 71
End: 2021-03-10

## 2021-04-09 ENCOUNTER — APPOINTMENT (OUTPATIENT)
Dept: RHEUMATOLOGY | Facility: CLINIC | Age: 71
End: 2021-04-09

## 2021-04-20 ENCOUNTER — APPOINTMENT (OUTPATIENT)
Dept: RHEUMATOLOGY | Facility: CLINIC | Age: 71
End: 2021-04-20
Payer: MEDICARE

## 2021-04-20 VITALS
OXYGEN SATURATION: 96 % | HEART RATE: 82 BPM | RESPIRATION RATE: 17 BRPM | HEIGHT: 68 IN | SYSTOLIC BLOOD PRESSURE: 116 MMHG | TEMPERATURE: 97.3 F | DIASTOLIC BLOOD PRESSURE: 78 MMHG

## 2021-04-20 PROCEDURE — 99215 OFFICE O/P EST HI 40 MIN: CPT

## 2021-04-20 RX ORDER — METHOTREXATE 2.5 MG/1
2.5 TABLET ORAL
Qty: 72 | Refills: 0 | Status: COMPLETED | COMMUNITY
Start: 2020-12-07 | End: 2021-04-20

## 2021-04-20 RX ORDER — TIOTROPIUM BROMIDE AND OLODATEROL 3.124; 2.736 UG/1; UG/1
2.5-2.5 SPRAY, METERED RESPIRATORY (INHALATION)
Qty: 4 | Refills: 0 | Status: ACTIVE | COMMUNITY
Start: 2021-04-07

## 2021-04-20 NOTE — ASSESSMENT
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 \par \par rheumatoid arthritis-\par \par She was on combination of humira and MTX but was taken off the humira after her stroke. Concern was raised at that time that her neurological symptoms may have been exacerbated by humira. She did have a couple of TIAs several years ago but was APL negative.  She is currently well controlled on methotrexate 15 mg weekly monotherapy. \par - Cont MTX 15mg weekly, folic acid 1mg daily.\par - Flu vaccine (fall 2020), Prevnar UTD.  Pt received the COVID vaccine.\par - Check labs.\par \par COPD-\par -She no longer smokes for many years.\par - under care of pulm\par \par Methotrexate use-\par -she is aware to hold medication while on antibiotics.\par \par Osteopenia- Pharmacologic therapy not indicated based on FRAX\par   - calcium / vit D\par   - weight bearing exercise.\par \par She is aware to call if her sx worsen. followup 3 months.

## 2021-04-20 NOTE — PHYSICAL EXAM
[General Appearance - Well Nourished] : well nourished [General Appearance - Well Developed] : well developed [Sclera] : the sclera and conjunctiva were normal [Oropharynx] : the oropharynx was normal [Neck Appearance] : the appearance of the neck was normal [Respiration, Rhythm And Depth] : normal respiratory rhythm and effort [Auscultation Breath Sounds / Voice Sounds] : lungs were clear to auscultation bilaterally [Heart Sounds] : normal S1 and S2 [Edema] : there was no peripheral edema [Abdomen Tenderness] : non-tender [Cervical Lymph Nodes Enlarged Anterior Bilaterally] : anterior cervical [Supraclavicular Lymph Nodes Enlarged Bilaterally] : supraclavicular [No Spinal Tenderness] : no spinal tenderness [Abnormal Walk] : normal gait [Musculoskeletal - Swelling] : no joint swelling seen [Motor Tone] : muscle strength and tone were normal [Deep Tendon Reflexes (DTR)] : deep tendon reflexes were 2+ and symmetric [Motor Exam] : the motor exam was normal [Oriented To Time, Place, And Person] : oriented to person, place, and time [General Appearance - Alert] : alert [General Appearance - In No Acute Distress] : in no acute distress [Outer Ear] : the ears and nose were normal in appearance [Neck Cervical Mass (___cm)] : no neck mass was observed [Jugular Venous Distention Increased] : there was no jugular-venous distention [Thyroid Diffuse Enlargement] : the thyroid was not enlarged [Thyroid Nodule] : there were no palpable thyroid nodules [] : no respiratory distress [Heart Rate And Rhythm] : heart rate was normal and rhythm regular [Heart Sounds Gallop] : no gallops [Murmurs] : no murmurs [Heart Sounds Pericardial Friction Rub] : no pericardial rub [Bowel Sounds] : normal bowel sounds [Abdomen Soft] : soft [Abdomen Mass (___ Cm)] : no abdominal mass palpated [Cervical Lymph Nodes Enlarged Posterior Bilaterally] : posterior cervical [Skin Color & Pigmentation] : normal skin color and pigmentation [Skin Turgor] : normal skin turgor [No Focal Deficits] : no focal deficits [Impaired Insight] : insight and judgment were intact [Affect] : the affect was normal [FreeTextEntry1] : FROM neck, shoulders, elbows, wrists, hands, hips, knees, ankles and feet, including the small joints of the hands and feet without any evidence of inflammatory arthritis , No inflammatory arthritis or synovitis noted. No tender points noted. tightness hamstrings b/l

## 2021-04-20 NOTE — REVIEW OF SYSTEMS
[Arthralgias] : arthralgias [Joint Pain] : joint pain [Negative] : Heme/Lymph [Fever] : no fever [Chills] : no chills [Feeling Poorly] : not feeling poorly [Feeling Tired] : not feeling tired [Dry Eyes] : no dryness of the eyes [Palpitations] : no palpitations [Chest Pain] : no chest pain [Shortness Of Breath] : no shortness of breath [Cough] : no cough [SOB on Exertion] : no shortness of breath during exertion [Abdominal Pain] : no abdominal pain [Joint Swelling] : no joint swelling [Joint Stiffness] : no joint stiffness [Skin Lesions] : no skin lesions [Difficulty Walking] : no difficulty walking [Anxiety] : no anxiety [Depression] : no depression

## 2021-04-20 NOTE — HISTORY OF PRESENT ILLNESS
[FreeTextEntry1] : Pt reports that she experienced an RA flare about a week ago, but she took prednisone and it quickly resolved.  Otherwise has been feeling fine since last visit.  No joint pain/swelling.  (+)AM stiffness x 10 minutes (chronic "for years").  Breathing is stable on home O2.  No new complaints.

## 2021-04-21 ENCOUNTER — APPOINTMENT (OUTPATIENT)
Dept: FAMILY MEDICINE | Facility: CLINIC | Age: 71
End: 2021-04-21
Payer: MEDICARE

## 2021-04-21 VITALS
WEIGHT: 173 LBS | BODY MASS INDEX: 26.22 KG/M2 | TEMPERATURE: 97.3 F | HEART RATE: 71 BPM | DIASTOLIC BLOOD PRESSURE: 88 MMHG | HEIGHT: 68 IN | OXYGEN SATURATION: 98 % | SYSTOLIC BLOOD PRESSURE: 122 MMHG

## 2021-04-21 DIAGNOSIS — Z92.29 PERSONAL HISTORY OF OTHER DRUG THERAPY: ICD-10-CM

## 2021-04-21 PROCEDURE — 99214 OFFICE O/P EST MOD 30 MIN: CPT

## 2021-04-21 NOTE — PHYSICAL EXAM
[No Acute Distress] : no acute distress [Well Nourished] : well nourished [Well Developed] : well developed [Well-Appearing] : well-appearing [EOMI] : extraocular movements intact [Normal Outer Ear/Nose] : the outer ears and nose were normal in appearance [No JVD] : no jugular venous distention [Normal Rate] : normal rate  [Regular Rhythm] : with a regular rhythm [Normal S1, S2] : normal S1 and S2 [No CVA Tenderness] : no CVA  tenderness [No Edema] : there was no peripheral edema [Grossly Normal Strength/Tone] : grossly normal strength/tone [No Rash] : no rash [Coordination Grossly Intact] : coordination grossly intact [No Focal Deficits] : no focal deficits [Normal Gait] : normal gait [Normal Affect] : the affect was normal [Normal Mood] : the mood was normal [Normal Insight/Judgement] : insight and judgment were intact [de-identified] : clear BS B/L c aid of supplemental O2

## 2021-04-21 NOTE — HISTORY OF PRESENT ILLNESS
[FreeTextEntry8] : Pt. c/o cold sore x 1 day \par \par 69 yo female presents to office c/o cold sore L upper lip over the last 24hrs.  Denies associated f/c/s  Denies known increased physical/emotional and/or mental stress\par \par NO relief c OTC topical antivirals

## 2021-07-20 ENCOUNTER — APPOINTMENT (OUTPATIENT)
Dept: RHEUMATOLOGY | Facility: CLINIC | Age: 71
End: 2021-07-20

## 2021-07-22 ENCOUNTER — APPOINTMENT (OUTPATIENT)
Dept: FAMILY MEDICINE | Facility: CLINIC | Age: 71
End: 2021-07-22

## 2021-08-19 ENCOUNTER — TRANSCRIPTION ENCOUNTER (OUTPATIENT)
Age: 71
End: 2021-08-19

## 2021-08-24 ENCOUNTER — APPOINTMENT (OUTPATIENT)
Dept: FAMILY MEDICINE | Facility: CLINIC | Age: 71
End: 2021-08-24
Payer: MEDICARE

## 2021-08-24 DIAGNOSIS — R29.898 OTHER SYMPTOMS AND SIGNS INVOLVING THE MUSCULOSKELETAL SYSTEM: ICD-10-CM

## 2021-08-24 DIAGNOSIS — R26.9 UNSPECIFIED ABNORMALITIES OF GAIT AND MOBILITY: ICD-10-CM

## 2021-08-24 DIAGNOSIS — K92.2 GASTROINTESTINAL HEMORRHAGE, UNSPECIFIED: ICD-10-CM

## 2021-08-24 PROCEDURE — 99496 TRANSJ CARE MGMT HIGH F2F 7D: CPT

## 2021-08-24 NOTE — END OF VISIT
[Time Spent: ___ minutes] : I have spent [unfilled] minutes of time on the encounter. [FreeTextEntry3] : Medical record entries made by the scribe today today, were at my direction and personally dictated to them by me, Dr. Sarah Gardner on Aug 24, 2021. I have reviewed the chart and agree that the record accurately reflects my personal performance of the history, physical exam, assessment, and plan.\par

## 2021-08-24 NOTE — ADDENDUM
[FreeTextEntry1] : I, Carol Rajan acting as a scribe for Dr. Sarah Gardner on Aug 24, 2021  at 11:35 AM\par

## 2021-08-24 NOTE — REVIEW OF SYSTEMS
[Muscle Weakness] : muscle weakness [Unsteady Walking] : ataxia [Negative] : Heme/Lymph [FreeTextEntry2] : weakness

## 2021-08-24 NOTE — HISTORY OF PRESENT ILLNESS
[Home] : at home, [unfilled] , at the time of the visit. [Medical Office: ([facility])___] : at the medical office located in  [Family Member] : family member [Other:____] : [unfilled] [Verbal consent obtained from patient] : the patient, [unfilled] [Post-hospitalization from ___ Hospital] : Post-hospitalization from [unfilled] Hospital [Admitted on: ___] : The patient was admitted on [unfilled] [Discharged on ___] : discharged on [unfilled] [FreeTextEntry2] : JAVED is a 70 year female here for hospital follow up. Patient explains while in [place] she had a MI. Had to have stent placed. During catheterization, pt reports she had a stroke. She was discharged to rehab. Within 24 hours of being discharged to rehab patient developed rectal bleeding. 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 Had to undergo surgery again. Discharged to rehab. Reports she left rehab 8/22/21. \par \par Since being discharged, patient reports she has no sensation in her three middle fingers of right hand due to stroke. Also reports difficulty ambulating, needs assistance. In addition, patient states she still feels weak. \par

## 2021-08-24 NOTE — PLAN
[FreeTextEntry1] : \par - F/u with cardiology\par - Home care services ordered\par - Can use Trazodone as needed\par - C/w spirolactone

## 2021-10-21 ENCOUNTER — INPATIENT (INPATIENT)
Facility: HOSPITAL | Age: 71
LOS: 3 days | Discharge: HOME HEALTH | DRG: 378 | End: 2021-10-25
Attending: HOSPITALIST | Admitting: HOSPITALIST
Payer: MEDICARE

## 2021-10-21 VITALS
WEIGHT: 179.9 LBS | RESPIRATION RATE: 18 BRPM | DIASTOLIC BLOOD PRESSURE: 71 MMHG | HEART RATE: 98 BPM | TEMPERATURE: 100 F | HEIGHT: 68 IN | SYSTOLIC BLOOD PRESSURE: 104 MMHG | OXYGEN SATURATION: 100 %

## 2021-10-21 DIAGNOSIS — Z90.49 ACQUIRED ABSENCE OF OTHER SPECIFIED PARTS OF DIGESTIVE TRACT: Chronic | ICD-10-CM

## 2021-10-21 DIAGNOSIS — Z90.710 ACQUIRED ABSENCE OF BOTH CERVIX AND UTERUS: Chronic | ICD-10-CM

## 2021-10-21 DIAGNOSIS — Z98.890 OTHER SPECIFIED POSTPROCEDURAL STATES: Chronic | ICD-10-CM

## 2021-10-21 DIAGNOSIS — K92.2 GASTROINTESTINAL HEMORRHAGE, UNSPECIFIED: ICD-10-CM

## 2021-10-21 LAB
ACANTHOCYTES BLD QL SMEAR: SLIGHT — SIGNIFICANT CHANGE UP
ALBUMIN SERPL ELPH-MCNC: 3.6 G/DL — SIGNIFICANT CHANGE UP (ref 3.3–5.2)
ALP SERPL-CCNC: 106 U/L — SIGNIFICANT CHANGE UP (ref 40–120)
ALT FLD-CCNC: 12 U/L — SIGNIFICANT CHANGE UP
ANION GAP SERPL CALC-SCNC: 13 MMOL/L — SIGNIFICANT CHANGE UP (ref 5–17)
ANISOCYTOSIS BLD QL: SLIGHT — SIGNIFICANT CHANGE UP
APTT BLD: 26.9 SEC — LOW (ref 27.5–35.5)
AST SERPL-CCNC: 21 U/L — SIGNIFICANT CHANGE UP
BASOPHILS # BLD AUTO: 0.02 K/UL — SIGNIFICANT CHANGE UP (ref 0–0.2)
BASOPHILS # BLD AUTO: 0.03 K/UL — SIGNIFICANT CHANGE UP (ref 0–0.2)
BASOPHILS NFR BLD AUTO: 0.3 % — SIGNIFICANT CHANGE UP (ref 0–2)
BASOPHILS NFR BLD AUTO: 0.4 % — SIGNIFICANT CHANGE UP (ref 0–2)
BILIRUB SERPL-MCNC: 0.3 MG/DL — LOW (ref 0.4–2)
BLD GP AB SCN SERPL QL: SIGNIFICANT CHANGE UP
BUN SERPL-MCNC: 26.9 MG/DL — HIGH (ref 8–20)
BURR CELLS BLD QL SMEAR: PRESENT — SIGNIFICANT CHANGE UP
CALCIUM SERPL-MCNC: 8.7 MG/DL — SIGNIFICANT CHANGE UP (ref 8.6–10.2)
CHLORIDE SERPL-SCNC: 102 MMOL/L — SIGNIFICANT CHANGE UP (ref 98–107)
CO2 SERPL-SCNC: 25 MMOL/L — SIGNIFICANT CHANGE UP (ref 22–29)
CREAT SERPL-MCNC: 1.12 MG/DL — SIGNIFICANT CHANGE UP (ref 0.5–1.3)
ELLIPTOCYTES BLD QL SMEAR: SLIGHT — SIGNIFICANT CHANGE UP
EOSINOPHIL # BLD AUTO: 0.05 K/UL — SIGNIFICANT CHANGE UP (ref 0–0.5)
EOSINOPHIL # BLD AUTO: 0.09 K/UL — SIGNIFICANT CHANGE UP (ref 0–0.5)
EOSINOPHIL NFR BLD AUTO: 0.7 % — SIGNIFICANT CHANGE UP (ref 0–6)
EOSINOPHIL NFR BLD AUTO: 1.1 % — SIGNIFICANT CHANGE UP (ref 0–6)
GLUCOSE SERPL-MCNC: 108 MG/DL — HIGH (ref 70–99)
HCT VFR BLD CALC: 22.6 % — LOW (ref 34.5–45)
HCT VFR BLD CALC: 26 % — LOW (ref 34.5–45)
HGB BLD-MCNC: 7 G/DL — CRITICAL LOW (ref 11.5–15.5)
HGB BLD-MCNC: 8 G/DL — LOW (ref 11.5–15.5)
HYPOCHROMIA BLD QL: SLIGHT — SIGNIFICANT CHANGE UP
IMM GRANULOCYTES NFR BLD AUTO: 0.3 % — SIGNIFICANT CHANGE UP (ref 0–1.5)
IMM GRANULOCYTES NFR BLD AUTO: 0.5 % — SIGNIFICANT CHANGE UP (ref 0–1.5)
INR BLD: 1.17 RATIO — HIGH (ref 0.88–1.16)
LYMPHOCYTES # BLD AUTO: 0.77 K/UL — LOW (ref 1–3.3)
LYMPHOCYTES # BLD AUTO: 0.97 K/UL — LOW (ref 1–3.3)
LYMPHOCYTES # BLD AUTO: 13.5 % — SIGNIFICANT CHANGE UP (ref 13–44)
LYMPHOCYTES # BLD AUTO: 9.2 % — LOW (ref 13–44)
MACROCYTES BLD QL: SLIGHT — SIGNIFICANT CHANGE UP
MANUAL SMEAR VERIFICATION: SIGNIFICANT CHANGE UP
MCHC RBC-ENTMCNC: 25.5 PG — LOW (ref 27–34)
MCHC RBC-ENTMCNC: 25.8 PG — LOW (ref 27–34)
MCHC RBC-ENTMCNC: 30.8 GM/DL — LOW (ref 32–36)
MCHC RBC-ENTMCNC: 31 GM/DL — LOW (ref 32–36)
MCV RBC AUTO: 82.8 FL — SIGNIFICANT CHANGE UP (ref 80–100)
MCV RBC AUTO: 83.4 FL — SIGNIFICANT CHANGE UP (ref 80–100)
MICROCYTES BLD QL: SLIGHT — SIGNIFICANT CHANGE UP
MONOCYTES # BLD AUTO: 0.54 K/UL — SIGNIFICANT CHANGE UP (ref 0–0.9)
MONOCYTES # BLD AUTO: 0.62 K/UL — SIGNIFICANT CHANGE UP (ref 0–0.9)
MONOCYTES NFR BLD AUTO: 6.5 % — SIGNIFICANT CHANGE UP (ref 2–14)
MONOCYTES NFR BLD AUTO: 8.6 % — SIGNIFICANT CHANGE UP (ref 2–14)
NEUTROPHILS # BLD AUTO: 5.53 K/UL — SIGNIFICANT CHANGE UP (ref 1.8–7.4)
NEUTROPHILS # BLD AUTO: 6.87 K/UL — SIGNIFICANT CHANGE UP (ref 1.8–7.4)
NEUTROPHILS NFR BLD AUTO: 76.6 % — SIGNIFICANT CHANGE UP (ref 43–77)
NEUTROPHILS NFR BLD AUTO: 82.3 % — HIGH (ref 43–77)
OB PNL STL: POSITIVE
OVALOCYTES BLD QL SMEAR: SLIGHT — SIGNIFICANT CHANGE UP
PLAT MORPH BLD: NORMAL — SIGNIFICANT CHANGE UP
PLATELET # BLD AUTO: 403 K/UL — HIGH (ref 150–400)
PLATELET # BLD AUTO: 467 K/UL — HIGH (ref 150–400)
POIKILOCYTOSIS BLD QL AUTO: SIGNIFICANT CHANGE UP
POLYCHROMASIA BLD QL SMEAR: SIGNIFICANT CHANGE UP
POTASSIUM SERPL-MCNC: 3.8 MMOL/L — SIGNIFICANT CHANGE UP (ref 3.5–5.3)
POTASSIUM SERPL-SCNC: 3.8 MMOL/L — SIGNIFICANT CHANGE UP (ref 3.5–5.3)
PROT SERPL-MCNC: 7.6 G/DL — SIGNIFICANT CHANGE UP (ref 6.6–8.7)
PROTHROM AB SERPL-ACNC: 13.5 SEC — SIGNIFICANT CHANGE UP (ref 10.6–13.6)
RBC # BLD: 2.71 M/UL — LOW (ref 3.8–5.2)
RBC # BLD: 3.14 M/UL — LOW (ref 3.8–5.2)
RBC # FLD: 18.4 % — HIGH (ref 10.3–14.5)
RBC # FLD: 18.5 % — HIGH (ref 10.3–14.5)
RBC BLD AUTO: ABNORMAL
SCHISTOCYTES BLD QL AUTO: SLIGHT — SIGNIFICANT CHANGE UP
SODIUM SERPL-SCNC: 140 MMOL/L — SIGNIFICANT CHANGE UP (ref 135–145)
WBC # BLD: 7.21 K/UL — SIGNIFICANT CHANGE UP (ref 3.8–10.5)
WBC # BLD: 8.34 K/UL — SIGNIFICANT CHANGE UP (ref 3.8–10.5)
WBC # FLD AUTO: 7.21 K/UL — SIGNIFICANT CHANGE UP (ref 3.8–10.5)
WBC # FLD AUTO: 8.34 K/UL — SIGNIFICANT CHANGE UP (ref 3.8–10.5)

## 2021-10-21 PROCEDURE — 99223 1ST HOSP IP/OBS HIGH 75: CPT

## 2021-10-21 PROCEDURE — 99222 1ST HOSP IP/OBS MODERATE 55: CPT

## 2021-10-21 PROCEDURE — 93010 ELECTROCARDIOGRAM REPORT: CPT

## 2021-10-21 PROCEDURE — 99291 CRITICAL CARE FIRST HOUR: CPT

## 2021-10-21 PROCEDURE — 71045 X-RAY EXAM CHEST 1 VIEW: CPT | Mod: 26

## 2021-10-21 PROCEDURE — 74177 CT ABD & PELVIS W/CONTRAST: CPT | Mod: 26,MA

## 2021-10-21 RX ORDER — FOLIC ACID 0.8 MG
1 TABLET ORAL DAILY
Refills: 0 | Status: DISCONTINUED | OUTPATIENT
Start: 2021-10-21 | End: 2021-10-25

## 2021-10-21 RX ORDER — FERROUS SULFATE 325(65) MG
325 TABLET ORAL DAILY
Refills: 0 | Status: DISCONTINUED | OUTPATIENT
Start: 2021-10-21 | End: 2021-10-25

## 2021-10-21 RX ORDER — PANTOPRAZOLE SODIUM 20 MG/1
80 TABLET, DELAYED RELEASE ORAL ONCE
Refills: 0 | Status: COMPLETED | OUTPATIENT
Start: 2021-10-21 | End: 2021-10-21

## 2021-10-21 RX ORDER — CARVEDILOL PHOSPHATE 80 MG/1
1 CAPSULE, EXTENDED RELEASE ORAL
Qty: 0 | Refills: 0 | DISCHARGE

## 2021-10-21 RX ORDER — LANOLIN ALCOHOL/MO/W.PET/CERES
3 CREAM (GRAM) TOPICAL AT BEDTIME
Refills: 0 | Status: DISCONTINUED | OUTPATIENT
Start: 2021-10-21 | End: 2021-10-25

## 2021-10-21 RX ORDER — PANTOPRAZOLE SODIUM 20 MG/1
8 TABLET, DELAYED RELEASE ORAL
Qty: 80 | Refills: 0 | Status: DISCONTINUED | OUTPATIENT
Start: 2021-10-21 | End: 2021-10-22

## 2021-10-21 RX ORDER — AMIODARONE HYDROCHLORIDE 400 MG/1
200 TABLET ORAL DAILY
Refills: 0 | Status: DISCONTINUED | OUTPATIENT
Start: 2021-10-21 | End: 2021-10-25

## 2021-10-21 RX ORDER — SODIUM CHLORIDE 9 MG/ML
250 INJECTION INTRAMUSCULAR; INTRAVENOUS; SUBCUTANEOUS ONCE
Refills: 0 | Status: COMPLETED | OUTPATIENT
Start: 2021-10-21 | End: 2021-10-21

## 2021-10-21 RX ORDER — METHOTREXATE 2.5 MG/1
0 TABLET ORAL
Qty: 0 | Refills: 0 | DISCHARGE

## 2021-10-21 RX ORDER — ATORVASTATIN CALCIUM 80 MG/1
80 TABLET, FILM COATED ORAL AT BEDTIME
Refills: 0 | Status: DISCONTINUED | OUTPATIENT
Start: 2021-10-21 | End: 2021-10-25

## 2021-10-21 RX ORDER — SODIUM CHLORIDE 9 MG/ML
1000 INJECTION INTRAMUSCULAR; INTRAVENOUS; SUBCUTANEOUS
Refills: 0 | Status: DISCONTINUED | OUTPATIENT
Start: 2021-10-21 | End: 2021-10-25

## 2021-10-21 RX ORDER — ACETAMINOPHEN 500 MG
650 TABLET ORAL EVERY 6 HOURS
Refills: 0 | Status: DISCONTINUED | OUTPATIENT
Start: 2021-10-21 | End: 2021-10-25

## 2021-10-21 RX ORDER — FLUTICASONE FUROATE AND VILANTEROL TRIFENATATE 100; 25 UG/1; UG/1
1 POWDER RESPIRATORY (INHALATION)
Qty: 0 | Refills: 0 | DISCHARGE

## 2021-10-21 RX ORDER — ONDANSETRON 8 MG/1
4 TABLET, FILM COATED ORAL EVERY 8 HOURS
Refills: 0 | Status: DISCONTINUED | OUTPATIENT
Start: 2021-10-21 | End: 2021-10-25

## 2021-10-21 RX ORDER — TIOTROPIUM BROMIDE AND OLODATEROL 3.124; 2.736 UG/1; UG/1
2 SPRAY, METERED RESPIRATORY (INHALATION) DAILY
Refills: 0 | Status: DISCONTINUED | OUTPATIENT
Start: 2021-10-21 | End: 2021-10-25

## 2021-10-21 RX ORDER — CARVEDILOL PHOSPHATE 80 MG/1
3.12 CAPSULE, EXTENDED RELEASE ORAL EVERY 12 HOURS
Refills: 0 | Status: DISCONTINUED | OUTPATIENT
Start: 2021-10-21 | End: 2021-10-25

## 2021-10-21 RX ORDER — ASCORBIC ACID 60 MG
1 TABLET,CHEWABLE ORAL
Qty: 0 | Refills: 0 | DISCHARGE

## 2021-10-21 RX ORDER — ALBUTEROL 90 UG/1
2.5 AEROSOL, METERED ORAL
Refills: 0 | Status: DISCONTINUED | OUTPATIENT
Start: 2021-10-21 | End: 2021-10-25

## 2021-10-21 RX ADMIN — Medication 325 MILLIGRAM(S): at 21:36

## 2021-10-21 RX ADMIN — SODIUM CHLORIDE 250 MILLILITER(S): 9 INJECTION INTRAMUSCULAR; INTRAVENOUS; SUBCUTANEOUS at 11:11

## 2021-10-21 RX ADMIN — PANTOPRAZOLE SODIUM 10 MG/HR: 20 TABLET, DELAYED RELEASE ORAL at 12:51

## 2021-10-21 RX ADMIN — ATORVASTATIN CALCIUM 80 MILLIGRAM(S): 80 TABLET, FILM COATED ORAL at 21:37

## 2021-10-21 RX ADMIN — ALBUTEROL 2.5 MILLIGRAM(S): 90 AEROSOL, METERED ORAL at 21:48

## 2021-10-21 RX ADMIN — Medication 1 MILLIGRAM(S): at 21:37

## 2021-10-21 RX ADMIN — PANTOPRAZOLE SODIUM 80 MILLIGRAM(S): 20 TABLET, DELAYED RELEASE ORAL at 11:11

## 2021-10-21 NOTE — ED PROVIDER NOTE - PROGRESS NOTE DETAILS
Spoke with pt about CT findings of left ventricular enlargement with wall calcifications, indicting possible left ventricle aneurysm. States she had cardiac surgery to repair the aneurysm this past June 2021.

## 2021-10-21 NOTE — H&P ADULT - NSHPPHYSICALEXAM_GEN_ALL_CORE
PHYSICAL EXAM:  Constitutional: No acute distress, alert and oriented by 3  HEENT: AT/NC, EOMI, PERRLA, Normal conjunctiva, no pharyngreal erythema, moist oral mucosa  Respiratory: CTA BL, equal breath sounds, no crackles or wheezing, 3L NC  Cardiovascular: tachycardic, regular rhythm ,  no edema  Gastrointestinal: soft, Non-tender, Non-distended + Bowel sounds, no rebound or guarding  Musculoskeletal: No joint edema  Neurological: CN 2-12 grossly intact, no focal deficits  Skin: warm, dry and intact  Psychiatric: normal mood and affect

## 2021-10-21 NOTE — ED PROVIDER NOTE - OBJECTIVE STATEMENT
72 yo with PMH significant for HTN, Renal artery stenosis, prior TIA's with Placement of a left cerebral artery stent in 2014, COPD with emphysema  using 2L home O2 at baseline, RA, Osteopenia, HLD and S/p Cardiac arrest s/p angiography with placement of PPM, multiple admissions for GI bleeding, 72 yo with PMH significant for HTN, Renal artery stenosis, prior TIA's with Placement of a left cerebral artery stent in 2014, COPD with emphysema  using 2L home O2 at baseline, RA, Osteopenia, HLD and S/p Cardiac arrest s/p angiography with placement of PPM, multiple admissions for GI bleeding. Pt is currently on low dose Plavix and states last night had malodorous dark diarrhea with red tinged blood x1 episode. Diarrhea continued this morning, which made pt deside to come in. Pt states she feels fatigued with low energy, similar to past episodes of GI bleeds that have required blood transfusions. 70 yo with PMH significant for HTN, Renal artery stenosis, prior TIA's with Placement of a left cerebral artery stent in 2014, COPD with emphysema  using eL home O2 at baseline, RA, Osteopenia, HLD and S/p Cardiac arrest s/p angiography with placement of PPM, multiple admissions for GI bleeding, Pt is currently on low dose Plavix and states last night had malodorous dark diarrhea with red tinged blood x1 episode. Diarrhea continued this morning, which made pt deside to come in. Pt states she feels fatigued with low energy, similar to past episodes of GI bleeds that have required blood transfusions.

## 2021-10-21 NOTE — ED PROVIDER NOTE - ATTENDING CONTRIBUTION TO CARE
Dr. Monge : I have personally seen and examined this patient at the bedside. I have fully participated in the care of this patient. I have reviewed all pertinent clinical information, including history, physical exam, plan and the Resident's note and agree except as noted.       70 yo with PMH significant for HTN, Renal artery stenosis, prior TIA's with Placement of a left cerebral artery stent in 2014, COPD with emphysema  using home O2 at baseline, RA, Osteopenia, HLD and S/p Cardiac arrest s/p angiography with placement of PPM, multiple admissions for GI bleeding and transfussions. pw dark stool mixed with blood. not on recent abx. , Pt is currently on low dose Plavix only not onAC . + fatigue + soft dark stool mixed with blood today as well.  Pt states she feels fatigued with low energy. notes that it feels the same as her prior GI bleed episodes. notes that has had multiple colonoscopies that never found where she is bleeding from, but was taken off all the ac bc of bleeds. also notes recent cardiac procedure in June - found to have cardiac aneurysm at that time.     Denies f/c/n/v/cp/sob/palpitations/cough/abd.pain/c/dysuria/hematuria. no sick contacts/recent travel.    PE:  head; atraumatic normocephalic  eyes: perrla  Heart: rrr s1s2  lungs: ctab  abd: soft, nt nd + bs no rebound/guarding no cva ttp  le: no swelling no calf ttp  back: no midline cervical/thoracic/lumbar ttp      -->gi bleed will start protonix; check h/h transfuse as needed; ct angio;  gi consult-admit

## 2021-10-21 NOTE — CONSULT NOTE ADULT - SUBJECTIVE AND OBJECTIVE BOX
Patient is a 71y old  Female who presents with a chief complaint of     HPI:      REVIEW OF SYSTEMS:  Constitutional: No fever, weight loss or fatigue  ENMT:  No difficulty hearing, tinnitus, vertigo; No sinus or throat pain  Respiratory: No cough, wheezing, chills or hemoptysis  Cardiovascular: No chest pain, palpitations, dizziness or leg swelling  Gastrointestinal: No abdominal or epigastric pain. No nausea, vomiting or hematemesis; No diarrhea or constipation. No melena or hematochezia.  Skin: No itching, burning, rashes or lesions   Musculoskeletal: No joint pain or swelling; No muscle, back or extremity pain  Patient has no cardiopulmonary, peripheral vascular, musculoskeletal, dermatological, neurological, gynecological or psychological symptoms or complaints at this time  PAST MEDICAL & SURGICAL HISTORY:  Rheumatoid arthritis    COPD without exacerbation    HTN (hypertension)    Pacemaker    H/O cerebral artery stenosis  left cerebral artery stent per history in 2014.    S/P hysterectomy    S/P cholecystectomy    H/O exploratory laparotomy        FAMILY HISTORY:  FH: dementia  mother    FH: prostate cancer  father        SOCIAL HISTORY:  Smoking Status: [ ] Current, [ ] Former, [ ] Never  Pack Years:    MEDICATIONS:  MEDICATIONS  (STANDING):  pantoprazole Infusion 8 mG/Hr (10 mL/Hr) IV Continuous <Continuous>    MEDICATIONS  (PRN):      Allergies    No Known Allergies    Intolerances        Vital Signs Last 24 Hrs  T(C): 37.6 (21 Oct 2021 09:29), Max: 37.6 (21 Oct 2021 09:29)  T(F): 99.6 (21 Oct 2021 09:29), Max: 99.6 (21 Oct 2021 09:29)  HR: 98 (21 Oct 2021 09:29) (98 - 98)  BP: 104/71 (21 Oct 2021 09:29) (104/71 - 104/71)  BP(mean): --  RR: 18 (21 Oct 2021 09:29) (18 - 18)  SpO2: 100% (21 Oct 2021 09:29) (100% - 100%)        PHYSICAL EXAM:    General: Well developed; well nourished; in no acute distress  HEENT: MMM, conjunctiva and sclera clear  Gastrointestinal: Soft, non-tender non-distended; Normal bowel sounds; No rebound or guarding  Extremities: Normal range of motion, No clubbing, cyanosis or edema  Neurological: Alert and oriented x3  Skin: Warm and dry. No obvious rash      LABS:                        8.0    8.34  )-----------( 467      ( 21 Oct 2021 10:57 )             26.0     10-21    140  |  102  |  26.9<H>  ----------------------------<  108<H>  3.8   |  25.0  |  1.12    Ca    8.7      21 Oct 2021 10:57    TPro  7.6  /  Alb  3.6  /  TBili  0.3<L>  /  DBili  x   /  AST  21  /  ALT  12  /  AlkPhos  106  10-21          RADIOLOGY & ADDITIONAL STUDIES:    Patient is a 71y old  Female who presents with a chief complaint of bloody stools.    HPI: 70 y/o female presents with dark to bloody diarrhea which started last night and went into this AM. She had similar presentation in October 2020 and was admitted at that time and had an EGD which showed mild antral gastritis and a colonoscopy which showed two adenomatous polyps and left-sided diverticulosis followed by a negative VCE in 12/2020. Now presents with painless hematochezia w/o associated abdominal or rectal pain. She is on chronic Plavix therapy for left cerebral artery stent in 2014 s/p CVA. She also has a h/o oxygen dependent COPD on 2 liters N/C at home and has had TIA's and has a h/o renal artery stenosis and is s/p cardiac arrest. Her chief complaint is diarrhea with one episode of BRB mixed with stool this AM.      REVIEW OF SYSTEMS:  Constitutional: No fever, weight loss or fatigue  ENMT:  No difficulty hearing, tinnitus, vertigo; No sinus or throat pain  Respiratory: No cough, wheezing, chills or hemoptysis  Cardiovascular: No chest pain, palpitations, dizziness or leg swelling  Gastrointestinal: As per HPI.   Skin: No itching, burning, rashes or lesions   Musculoskeletal: No joint pain or swelling; No muscle, back or extremity pain  Patient has no cardiopulmonary, peripheral vascular, musculoskeletal, dermatological, neurological, gynecological or psychological symptoms or complaints at this time.    PAST MEDICAL & SURGICAL HISTORY:  Rheumatoid arthritis    COPD without exacerbation on home oxygen    HTN (hypertension)    Pacemaker    H/O cerebral artery stenosis  left cerebral artery stent per history in 2014.    S/P hysterectomy    S/P cholecystectomy    H/O exploratory laparotomy    H/O cardiac arrest    H/O GERD        FAMILY HISTORY:  FH: dementia  mother    FH: prostate cancer  father        SOCIAL HISTORY:  Smoking Status: [ ] Current, [x ] Former, [ ] Never  Pack Years: > 20. No ETOH or drug abuse history.    MEDICATIONS:  MEDICATIONS  (STANDING):  pantoprazole Infusion 8 mG/Hr (10 mL/Hr) IV Continuous <Continuous>    MEDICATIONS  (PRN):      Allergies    No Known Allergies    Intolerances        Vital Signs Last 24 Hrs  T(C): 37.6 (21 Oct 2021 09:29), Max: 37.6 (21 Oct 2021 09:29)  T(F): 99.6 (21 Oct 2021 09:29), Max: 99.6 (21 Oct 2021 09:29)  HR: 98 (21 Oct 2021 09:29) (98 - 98)  BP: 104/71 (21 Oct 2021 09:29) (104/71 - 104/71)  BP(mean): --  RR: 18 (21 Oct 2021 09:29) (18 - 18)  SpO2: 100% (21 Oct 2021 09:29) (100% - 100%)        PHYSICAL EXAM:    General: Well developed; elderly appearing; in no acute distress  HEENT: MMM, conjunctiva pink and sclera anicteric  Lungs: Clear bilaterally  Cor: RRR S1, S2 only  Gastrointestinal: Abdomen: Soft, non-tender non-distended; Normal bowel sounds; No rebound or guarding or HSM.  ADRIAN: Dark brown stool in the rectal vault.  Extremities: Normal range of motion, No clubbing, cyanosis or edema  Neurological: Alert and oriented x3  Skin: Warm and dry. No obvious rash      LABS:                        8.0    8.34  )-----------( 467      ( 21 Oct 2021 10:57 )             26.0     10-21    140  |  102  |  26.9<H>  ----------------------------<  108<H>  3.8   |  25.0  |  1.12    Ca    8.7      21 Oct 2021 10:57    TPro  7.6  /  Alb  3.6  /  TBili  0.3<L>  /  DBili  x   /  AST  21  /  ALT  12  /  AlkPhos  106  10-21          RADIOLOGY & ADDITIONAL STUDIES:   CTA + for active bleeding in the ileum.

## 2021-10-21 NOTE — H&P ADULT - ASSESSMENT
72 yo F with hx of chronic resp failure from COPD on 3L NC,  HTN, Renal artery stenosis, prior TIA's with Placement of a left cerebral artery stent in 2014, RA, Osteopenia, HLD and S/p Cardiac arrest s/p angiography with placement of PPM and cardiac surg in June of 2021 for valve replacement and pseudoaneurysm repair with stent placement presented to the ED with bloody stools.    Lower GI bleed  CTA positve for bleed in ileum  - NPO  - hold asa and plavix  - GI consulted   - check stool PCR  - IVFs  - active type and screen  - 2 units rbc ordered.   - IR consulted    Chronic resp failure from COPD on 3L NC  - continue nasal canula  - resume home inhalers    Recent Valve repair and pseudoaneurysm repair in June  s/p PPM  was on asa and plavix, will hold for now due to GI bleed, cardiology consulted to see if ok to hold    HTN and renal artery stenosis  - hold anti-hypertensive meds due to soft bp in the setting of GI bleed    hx of TIAs with Placement of a left cerebral artery stent in 2014 70 yo F with hx of chronic resp failure from COPD on 3L NC,  HTN, Renal artery stenosis, prior TIA's with Placement of a left cerebral artery stent in 2014, RA, Osteopenia, HLD and S/p Cardiac arrest s/p angiography with placement of PPM and cardiac surg in June of 2021 for valve replacement and pseudoaneurysm repair with stent placement presented to the ED with bloody stools.    Acute blood loss anemia due to Lower GI bleed  CTA positve for bleed in ileum  - NPO  - hold asa and plavix  - GI consulted   - check stool PCR  - IVFs  - active type and screen  - 2 units rbc ordered.   - IR consulted, Discussed with Dr. Head no acute intervention at this time. If become hypotensive or start bleeding more briskly please call back,   - check hgb post 2 units.     Chronic resp failure from COPD on 3L NC  - continue nasal canula  - resume home inhalers    Recent Valve repair and pseudoaneurysm repair in June  s/p PPM  - was on asa and plavix, will hold for now due to GI bleed, cardiology consulted to see if ok to hold  - resume amiodarone    HTN and renal artery stenosis  - hold anti-hypertensive meds due to soft bp in the setting of GI bleed    hx of TIAs with Placement of a left cerebral artery stent in 2014  - hold asa and plavix  - continue statin    dvt ppx on hold due to gi bleed  Dispo: PT ordered, admit to tele bed with q 4 hour vital signs 70 yo F with hx of chronic resp failure from COPD on 3L NC,  HTN, Renal artery stenosis, prior TIA's with Placement of a left cerebral artery stent in 2014, RA, Osteopenia, HLD and S/p Cardiac arrest s/p angiography with placement of PPM and cardiac surg in June of 2021 for valve replacement and pseudoaneurysm repair with stent placement presented to the ED with bloody stools.    Acute blood loss anemia due to Lower GI bleed  CTA positve for bleed in ileum  - NPO  - hold asa and plavix  - GI consulted   - check stool PCR  - IVFs  - active type and screen  - 2 units rbc ordered.   - IR consulted, Discussed with Dr. Head no acute intervention at this time. If become hypotensive or start bleeding more briskly please call back,   - check hgb post 2 units.   - continue iron    Chronic resp failure from COPD on 3L NC  - continue nasal canula  - resume home inhalers    Recent Valve repair and pseudoaneurysm repair in June  s/p PPM  - was on asa and plavix, will hold for now due to GI bleed, cardiology consulted to see if ok to hold  - resume amiodarone  - coreg 3.125mg BID  - hold lasix in the setting of gi bleed    HTN and renal artery stenosis  - hold anti-hypertensive meds due to soft bp in the setting of GI bleed    hx of TIAs with Placement of a left cerebral artery stent in 2014  - hold asa and Plavix  - continue statin    dvt ppx on hold due to gi bleed  Dispo: PT ordered, admit to tele bed with q 4 hour vital signs
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
## 2021-10-21 NOTE — H&P ADULT - HISTORY OF PRESENT ILLNESS
Ms. Betancourt is a 70 yo F with hx of chronic resp failure from COPD on 3L NC,  HTN, Renal artery stenosis, prior TIA's with Placement of a left cerebral artery stent in 2014, RA, Osteopenia, HLD and S/p Cardiac arrest s/p angiography with placement of PPM and cardiac surg in June of 2021 for valve replacement and pseudoaneurysm repair with stent placement presented to the ED with bloody stools. Patient states she believes it started last night but really noticed it this morning. Reports brown stools with red blood and diarrhea. She denies chest pain, shortness of breath, and feeling lightheaded or dizzy. She reports she had a similar episode last year. She took all her meds this morning including asa and Plavix.    IN the ED she underwent CTA which showed Active GI bleed in a right-sided small bowel loop, likely ileum. She was seen by GI who rec NPO status. Her hgb went from 8 to 7 in 3 hours so she is being transfused 2 units RBC.

## 2021-10-21 NOTE — CONSULT NOTE ADULT - SUBJECTIVE AND OBJECTIVE BOX
[facility]                                                               Office:  [address] [address]                                                              Telephone: [phone]. Fax:[phone]                                                                        CARDIOLOGY CONSULTATION NOTE                                                                                             Consult requested by:  Dr. Baeza  Reason for Consultation: GI Bleed on DAPT  Primary Cardiologist: 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 Patient had a prolonged hospitalization in [place], and was recently discharged form rehab about a week ago. Patient states that suddenly starting yesterday she began to have dark black stools with some bright red blood as well. Patient states that the symptoms persisted, so she came to the ER to be evaluated. Patient denies any fevers, chills, CP, worsening SOB, abdominal pain, N/V/D, headache, or dizziness.     REVIEW OF SYMPTOMS:     CONSTITUTIONAL: No fever, weight loss, or fatigue  ENMT:  No difficulty hearing, tinnitus, vertigo; No sinus or throat pain  NECK: No pain or stiffness  CARDIOVASCULAR: AS PER HPI  RESPIRATORY: Dyspnea on exertion, Shortness of breath, cough, wheezing  : No dysuria, no hematuria   GI: AS PER HPI  NEURO: No headache, no dizziness, no slurred speech   MUSCULOSKELETAL: No joint pain or swelling; No muscle, back, or extremity pain  PSYCH: No agitation, no anxiety.    ALL OTHER REVIEW OF SYSTEMS ARE NEGATIVE.      PREVIOUS DIAGNOSTIC TESTING  ECHO FINDINGS:      STRESS FINDINGS:      CATHETERIZATION FINDINGS:         ALLERGIES: Allergies    No Known Allergies    Intolerances          PAST MEDICAL HISTORY  No pertinent past medical history    Rheumatoid arthritis    COPD without exacerbation    HTN (hypertension)    Pacemaker    H/O cerebral artery stenosis        PAST SURGICAL HISTORY  No significant past surgical history    S/P hysterectomy    S/P cholecystectomy    H/O exploratory laparotomy        FAMILY HISTORY:  FH: dementia  mother    FH: prostate cancer  father        SOCIAL HISTORY:  Denies smoking/alcohol/drugs  CIGARETTES:     ALCOHOL:  DRUGS:      CURRENT MEDICATIONS:  aMIOdarone    Tablet 200 milliGRAM(s) Oral daily  carvedilol 3.125 milliGRAM(s) Oral every 12 hours    ALBUTerol    0.083%  tiotropium 2.5 MICROgram(s)/olodaterol 2.5 MICROgram(s) (STIOLTO) Inhaler   pantoprazole Infusion  atorvastatin  ferrous    sulfate  folic acid  sodium chloride 0.9%.        HOME MEDICATIONS:      Vital Signs Last 24 Hrs  T(C): 37.7 (21 Oct 2021 15:23), Max: 37.7 (21 Oct 2021 15:23)  T(F): 99.9 (21 Oct 2021 15:23), Max: 99.9 (21 Oct 2021 15:23)  HR: 103 (21 Oct 2021 15:23) (98 - 103)  BP: 111/57 (21 Oct 2021 15:23) (104/71 - 111/57)  BP(mean): 75 (21 Oct 2021 15:23) (75 - 75)  RR: 16 (21 Oct 2021 15:23) (16 - 18)  SpO2: 98% (21 Oct 2021 15:23) (98% - 100%)      PHYSICAL EXAM:  Constitutional: Comfortable . No acute distress.   HEENT: Atraumatic and normocephalic , neck is supple . no JVD. No carotid bruit. PEERL   CNS: A&Ox3. No focal deficits. EOMI. Cranial nerves II-IX are intact.   Lymph Nodes: Cervical : Not palpable.  Respiratory: CTAB  Cardiovascular: S1S2 RRR. No murmur/rubs or gallop.  Gastrointestinal: Soft non-tender and non distended . +Bowel sounds. negative Joiner's sign.  Extremities: No edema.   Psychiatric: Calm . no agitation.  Skin: No skin rash/ulcers visualized to face, hands or feet.    Intake and output:     LABS:                        7.0    7.21  )-----------( 403      ( 21 Oct 2021 14:21 )             22.6     10-21    140  |  102  |  26.9<H>  ----------------------------<  108<H>  3.8   |  25.0  |  1.12    Ca    8.7      21 Oct 2021 10:57    TPro  7.6  /  Alb  3.6  /  TBili  0.3<L>  /  DBili  x   /  AST  21  /  ALT  12  /  AlkPhos  106  10-21      ;p-BNP=  PT/INR - ( 21 Oct 2021 10:57 )   PT: 13.5 sec;   INR: 1.17 ratio         PTT - ( 21 Oct 2021 10:57 )  PTT:26.9 sec      INTERPRETATION OF TELEMETRY: Reviewed by me.   ECG: Reviewed by me.     RADIOLOGY & ADDITIONAL STUDIES:    X-ray:  reviewed by me.   CT scan:   MRI:                                                                          [facility]                                                               Office:  [address] [address]                                                              Telephone: [phone]. Fax:[phone]                                                                        CARDIOLOGY CONSULTATION NOTE                                                                                             Consult requested by:  Dr. Baeza  Reason for Consultation: GI Bleed on DAPT  Primary Cardiologist: [facility]  History obtained by: Patient and medical record   obtained: No    Covid Status: Pending    Chief complaint:    Patient is a 71y old  Female who presents with a chief complaint of GI bleed (21 Oct 2021 15:57)      HPI: 70 y/o F with a PMHx of bradyarrhythmia with cardiac arrest and LHC 2016 (no PCI or intervention at that time) s/p MDT PPM, STEMI 05/21 with diffuse RCA and LAD disease c/b acute CVA s/p tPA with planned PCI to dLM into ostial LAD 06/2021 c/b LV apical pseudoaneurysm and VSD s/p open repair 06/2021 with MV replacement?, HFrEF (EF 35-40%), recurrent TIA/CVA s/p bilateral vertebral stenosis (s/p stent 2014), renal artery stenosis, COPD (on 3 L NC), Atrial fibrillation c/b recurrent GI bleeds (s/p 18 blood transfusions) who presented to the ER with complaints of bloody stools. Patient had a prolonged hospitalization in [place], and was recently discharged form rehab about a week ago. Patient states that suddenly starting yesterday she began to have dark black stools with some bright red blood as well. Patient states that the symptoms persisted, so she came to the ER to be evaluated. Patient denies any fevers, chills, CP, worsening SOB, abdominal pain, N/V/D, headache, or dizziness.     REVIEW OF SYMPTOMS:     CONSTITUTIONAL: No fever, weight loss, or fatigue  ENMT:  No difficulty hearing, tinnitus, vertigo; No sinus or throat pain  NECK: No pain or stiffness  CARDIOVASCULAR: AS PER HPI  RESPIRATORY: Dyspnea on exertion, Shortness of breath, cough, wheezing  : No dysuria, no hematuria   GI: AS PER HPI  NEURO: No headache, no dizziness, no slurred speech   MUSCULOSKELETAL: No joint pain or swelling; No muscle, back, or extremity pain  PSYCH: No agitation, no anxiety.    ALL OTHER REVIEW OF SYSTEMS ARE NEGATIVE.      PREVIOUS DIAGNOSTIC TESTING  ECHO FINDINGS:  Pending    CATHETERIZATION FINDINGS:   [facility] 05/21/21 ostial lad 80%, m 4050% distal RCA   [facility] 05/23/21 had PCI of LM and LAD.       ALLERGIES: Allergies    No Known Allergies    Intolerances      PAST MEDICAL HISTORY  No pertinent past medical history    Rheumatoid arthritis    COPD without exacerbation    HTN (hypertension)    Pacemaker    H/O cerebral artery stenosis        PAST SURGICAL HISTORY  No significant past surgical history    S/P hysterectomy    S/P cholecystectomy    H/O exploratory laparotomy        FAMILY HISTORY:  FH: dementia  mother    FH: prostate cancer  father      SOCIAL HISTORY:    CIGARETTES:   Former smoker  ALCOHOL: Rare EtOH  DRUGS: Denies      CURRENT MEDICATIONS:  aMIOdarone    Tablet 200 milliGRAM(s) Oral daily  carvedilol 3.125 milliGRAM(s) Oral every 12 hours    ALBUTerol    0.083%  tiotropium 2.5 MICROgram(s)/olodaterol 2.5 MICROgram(s) (STIOLTO) Inhaler   pantoprazole Infusion  atorvastatin  ferrous    sulfate  folic acid  sodium chloride 0.9%.      HOME MEDICATIONS:  Home Medications:  albuterol:  (06 Nov 2020 23:24)  amiodarone 200 mg oral tablet: 1 tab(s) orally once a day (21 Oct 2021 16:22)  Aspirin Enteric Coated 81 mg oral delayed release tablet: 1 tab(s) orally once a day restart on sunday in two days  (06 Nov 2020 23:24)  Coreg 3.125 mg oral tablet: 1 tab(s) orally 2 times a day (21 Oct 2021 16:22)  ferrous sulfate 324 mg (65 mg elemental iron) oral delayed release tablet: 1 tab(s) orally every 48 hours (13 Nov 2020 17:31)  folic acid:  (06 Nov 2020 23:24)  furosemide 40 mg oral tablet: 1 tab(s) orally once a day (21 Oct 2021 16:22)  Plavix 75 mg oral tablet: 1 tab(s) orally once a day (21 Oct 2021 16:22)  potassium chloride 20 mEq oral powder for reconstitution: 1 each orally once a day (21 Oct 2021 16:22)  Stiolto Respimat 28 ACT 2.5 mcg-2.5 mcg/inh inhalation aerosol: 2 puff(s) inhaled every 24 hours (21 Oct 2021 16:22)      Vital Signs Last 24 Hrs  T(C): 37.7 (21 Oct 2021 15:23), Max: 37.7 (21 Oct 2021 15:23)  T(F): 99.9 (21 Oct 2021 15:23), Max: 99.9 (21 Oct 2021 15:23)  HR: 103 (21 Oct 2021 15:23) (98 - 103)  BP: 111/57 (21 Oct 2021 15:23) (104/71 - 111/57)  BP(mean): 75 (21 Oct 2021 15:23) (75 - 75)  RR: 16 (21 Oct 2021 15:23) (16 - 18)  SpO2: 98% (21 Oct 2021 15:23) (98% - 100%)      PHYSICAL EXAM:  Constitutional: Comfortable . No acute distress.   HEENT: Atraumatic and normocephalic , neck is supple . no JVD. No carotid bruit. PEERL   CNS: A&Ox3. No focal deficits. EOMI. Cranial nerves II-IX are intact.   Lymph Nodes: Cervical : Not palpable.  Respiratory: CTAB  Cardiovascular: S1S2 RRR. No murmur/rubs or gallop.  Gastrointestinal: Soft non-tender and non distended . +Bowel sounds. negative Joiner's sign.  Extremities: No edema.   Psychiatric: Calm . no agitation.  Skin: No skin rash/ulcers visualized to face, hands or feet.    Intake and output:     LABS:                        7.0    7.21  )-----------( 403      ( 21 Oct 2021 14:21 )             22.6     10-21    140  |  102  |  26.9<H>  ----------------------------<  108<H>  3.8   |  25.0  |  1.12    Ca    8.7      21 Oct 2021 10:57    TPro  7.6  /  Alb  3.6  /  TBili  0.3<L>  /  DBili  x   /  AST  21  /  ALT  12  /  AlkPhos  106  10-21      ;p-BNP=  PT/INR - ( 21 Oct 2021 10:57 )   PT: 13.5 sec;   INR: 1.17 ratio         PTT - ( 21 Oct 2021 10:57 )  PTT:26.9 sec      INTERPRETATION OF TELEMETRY: Reviewed by me.   ECG: Reviewed by me.     RADIOLOGY & ADDITIONAL STUDIES:    X-ray:  reviewed by me.   CT scan:   MRI:                                                                          [facility]                                                               Office:  [address] [address]                                                              Telephone: [phone]. Fax:[phone]                                                                        CARDIOLOGY CONSULTATION NOTE                                                                                             Consult requested by:  Dr. Baeza  Reason for Consultation: GI Bleed on DAPT  Primary Cardiologist: [facility]  History obtained by: Patient and medical record   obtained: No    Covid Status: Pending    Chief complaint:    Patient is a 71y old  Female who presents with a chief complaint of GI bleed (21 Oct 2021 15:57)      HPI: 70 y/o F with a PMHx of bradyarrhythmia with cardiac arrest and LHC 2016 (no PCI or intervention at that time) s/p MDT PPM, STEMI 05/21 with diffuse RCA and LAD disease c/b acute CVA s/p tPA with planned PCI to dLM into ostial LAD 06/2021 c/b LV apical pseudoaneurysm and VSD s/p open repair 06/2021 with MV replacement?, HFrEF (EF 35-40%), recurrent TIA/CVA s/p bilateral vertebral stenosis (s/p stent 2014), renal artery stenosis, COPD (on 3 L NC), Atrial fibrillation c/b recurrent GI bleeds (s/p 18 blood transfusions) who presented to the ER with complaints of bloody stools. Patient had a prolonged hospitalization in [place], and was recently discharged form rehab about a week ago. Patient states that suddenly starting yesterday she began to have dark black stools with some bright red blood as well. Patient states that the symptoms persisted, so she came to the ER to be evaluated. Patient denies any fevers, chills, CP, worsening SOB, abdominal pain, N/V/D, headache, or dizziness.     REVIEW OF SYMPTOMS:     CONSTITUTIONAL: No fever, weight loss, or fatigue  ENMT:  No difficulty hearing, tinnitus, vertigo; No sinus or throat pain  NECK: No pain or stiffness  CARDIOVASCULAR: AS PER HPI  RESPIRATORY: Dyspnea on exertion, Shortness of breath, cough, wheezing  : No dysuria, no hematuria   GI: AS PER HPI  NEURO: No headache, no dizziness, no slurred speech   MUSCULOSKELETAL: No joint pain or swelling; No muscle, back, or extremity pain  PSYCH: No agitation, no anxiety.    ALL OTHER REVIEW OF SYSTEMS ARE NEGATIVE.      PREVIOUS DIAGNOSTIC TESTING  ECHO FINDINGS:  Pending    CATHETERIZATION FINDINGS:   [facility] 05/21/21 ostial lad 80%, m 4050% distal RCA   [facility] 05/23/21 had PCI of LM and LAD.       ALLERGIES: Allergies    No Known Allergies    Intolerances      PAST MEDICAL HISTORY  No pertinent past medical history    Rheumatoid arthritis    COPD without exacerbation    HTN (hypertension)    Pacemaker    H/O cerebral artery stenosis        PAST SURGICAL HISTORY  No significant past surgical history    S/P hysterectomy    S/P cholecystectomy    H/O exploratory laparotomy        FAMILY HISTORY:  FH: dementia  mother    FH: prostate cancer  father      SOCIAL HISTORY:    CIGARETTES:   Former smoker  ALCOHOL: Rare EtOH  DRUGS: Denies      CURRENT MEDICATIONS:  aMIOdarone    Tablet 200 milliGRAM(s) Oral daily  carvedilol 3.125 milliGRAM(s) Oral every 12 hours    ALBUTerol    0.083%  tiotropium 2.5 MICROgram(s)/olodaterol 2.5 MICROgram(s) (STIOLTO) Inhaler   pantoprazole Infusion  atorvastatin  ferrous    sulfate  folic acid  sodium chloride 0.9%.      HOME MEDICATIONS:  Home Medications:  albuterol:  (06 Nov 2020 23:24)  amiodarone 200 mg oral tablet: 1 tab(s) orally once a day (21 Oct 2021 16:22)  Aspirin Enteric Coated 81 mg oral delayed release tablet: 1 tab(s) orally once a day restart on sunday in two days  (06 Nov 2020 23:24)  Coreg 3.125 mg oral tablet: 1 tab(s) orally 2 times a day (21 Oct 2021 16:22)  ferrous sulfate 324 mg (65 mg elemental iron) oral delayed release tablet: 1 tab(s) orally every 48 hours (13 Nov 2020 17:31)  folic acid:  (06 Nov 2020 23:24)  furosemide 40 mg oral tablet: 1 tab(s) orally once a day (21 Oct 2021 16:22)  Plavix 75 mg oral tablet: 1 tab(s) orally once a day (21 Oct 2021 16:22)  potassium chloride 20 mEq oral powder for reconstitution: 1 each orally once a day (21 Oct 2021 16:22)  Stiolto Respimat 28 ACT 2.5 mcg-2.5 mcg/inh inhalation aerosol: 2 puff(s) inhaled every 24 hours (21 Oct 2021 16:22)      Vital Signs Last 24 Hrs  T(C): 37.7 (21 Oct 2021 15:23), Max: 37.7 (21 Oct 2021 15:23)  T(F): 99.9 (21 Oct 2021 15:23), Max: 99.9 (21 Oct 2021 15:23)  HR: 103 (21 Oct 2021 15:23) (98 - 103)  BP: 111/57 (21 Oct 2021 15:23) (104/71 - 111/57)  BP(mean): 75 (21 Oct 2021 15:23) (75 - 75)  RR: 16 (21 Oct 2021 15:23) (16 - 18)  SpO2: 98% (21 Oct 2021 15:23) (98% - 100%)      PHYSICAL EXAM:  Constitutional: Comfortable . No acute distress.   HEENT: Atraumatic and normocephalic , neck is supple . no JVD. No carotid bruit. PEERL   CNS: A&Ox3. No focal deficits. EOMI. Cranial nerves II-IX are intact.   Lymph Nodes: Cervical : Not palpable.  Respiratory: Decreased BS at bases  Cardiovascular: S1S2 RRR. No murmur/rubs or gallop.  Gastrointestinal: Soft non-tender and non distended . +Bowel sounds. negative Joiner's sign.  Extremities: No edema.   Psychiatric: Calm . no agitation.  Skin: No skin rash/ulcers visualized to face, hands or feet.    Intake and output:     LABS:                        7.0    7.21  )-----------( 403      ( 21 Oct 2021 14:21 )             22.6     10-21    140  |  102  |  26.9<H>  ----------------------------<  108<H>  3.8   |  25.0  |  1.12    Ca    8.7      21 Oct 2021 10:57    TPro  7.6  /  Alb  3.6  /  TBili  0.3<L>  /  DBili  x   /  AST  21  /  ALT  12  /  AlkPhos  106  10-21      ;p-BNP=  PT/INR - ( 21 Oct 2021 10:57 )   PT: 13.5 sec;   INR: 1.17 ratio         PTT - ( 21 Oct 2021 10:57 )  PTT:26.9 sec      INTERPRETATION OF TELEMETRY: Reviewed by me.  ECG: Reviewed by me. Pending    RADIOLOGY & ADDITIONAL STUDIES:       CT scan:   < from: CT Abdomen and Pelvis w/ IV Cont (10.21.21 @ 12:25) >  FINDINGS:  LOWER CHEST: Bilateral lower lobe dependent atelectasis and mild bilateral lower lobe bronchiectasis unchanged. Mild to moderate cardiomegaly. Mitral valve repair. Pacemaker leads in the right atrium and rightventricle. Interval enlargement of the left ventricle with ventricular wall calcifications, possibly ventricular aneurysm.    LIVER: Within normal limits.  BILE DUCTS: Dilated common duct 1.6 cm, stable. Post cholecystectomy.  GALLBLADDER: Cholecystectomy.  SPLEEN: Within normal limits.  PANCREAS: Within normal limits.  ADRENALS: Stable mildly thickened adrenal glands.  KIDNEYS/URETERS: Within normal limits.    BLADDER: Within normal limits.  REPRODUCTIVE ORGANS: Hysterectomy.    BOWEL: No bowel obstruction. Focal contrast extravasation in a right-sided small bowel loop, likely ileum (8:74 and 10:34). Appendix is normal. Residual enteric contrast in a segment of mid small bowel (series 5:63 and in a segment of right pelvic small bowel (5:111). No evidence of intraluminal contrast extravasation.  PERITONEUM: No ascites.  VESSELS: Atherosclerotic changes.  RETROPERITONEUM/LYMPH NODES: No lymphadenopathy.  ABDOMINAL WALL: Within normal limits.  BONES: Within normal limits.    IMPRESSION:  Active GI bleed in a right-sided small bowel loop, likely ileum.    Interval enlargement of the left ventricle with wall calcifications. Possible left ventricular aneurysm. Recommend cardiology evaluation. Pacemaker. Mitral valve replacement.    Findings were discussed with Dr. Monge 10/21/2021 1:18 PM by Dr. Su Crooks with read back confirmation.    < end of copied text >    MRI:

## 2021-10-21 NOTE — ED ADULT TRIAGE NOTE - CHIEF COMPLAINT QUOTE
pt a+ox3, brought to ED by son c/o bright red, foul smelling GI bleed since this morning. reports hx of GI bleed, rec'd 18units PRBC's after open heart surgery.

## 2021-10-21 NOTE — CONSULT NOTE ADULT - ASSESSMENT
Pt with bloody diarrhea with active bleeding noted in the ileum. r/o possible infectious ileitis +/or bleed from AVM although she did have a negative VCE in 12/2020. Admit to monitored bed and keep Hb at 8 grams or higher. If she becomes hemodynamically unstable would need to contact IR for possible embolization. Keep NPO for now and continue current IV Pantoprazole Rx.
A/P: 70 y/o F with a PMHx of bradyarrhythmia with cardiac arrest and [facility] 2016 (no PCI or intervention at that time) s/p MDT PPM, STEMI 05/21 with diffuse RCA and LAD disease c/b acute CVA s/p tPA with planned PCI to dLM into ostial LAD 06/2021 c/b LV apical pseudoaneurysm and VSD s/p open repair 06/2021 with MV replacement?, HFrEF (EF 35-40%), recurrent TIA/CVA s/p bilateral vertebral stenosis (s/p stent 2014), renal artery stenosis, COPD (on 3 L NC), Atrial fibrillation c/b recurrent GI bleeds (s/p 18 blood transfusions) who presented to the ER with complaints of bloody stools. Patient had a prolonged hospitalization in [place], and was recently discharged form rehab about a week ago. Patient states that suddenly starting yesterday she began to have dark black stools with some bright red blood as well. Patient states that the symptoms persisted, so she came to the ER to be evaluated. Patient denies any fevers, chills, CP, worsening SOB, abdominal pain, N/V/D, headache, or dizziness.     Coronary Artery Disease  - S/p TATA to LM and LAD 06/21.   - Now with active GI bleed in ileum.   - Hold aspirin and plavix at this time.   - GI consulted.   - Patient is over 3 months out from intervention, ok to start aspirin only when cleared by GI as risk of DAPT outweigh the benefits at this time.   - Obtain TTE.   - Continue statin, coreg, norvasc.     HFrEF  - EF from records 35-40%.   - Continue Coreg, aldactone, and PO lasix.   - May require IV lasix after PRBC transfusions.   - Repeat echo.   - Monitor on telemetry.   - Strict i/o and daily weights.   - Keep K > 4, Mg > 2.   - Monitor renal function with ongoing diuresis.    Atrial Fibrillation   - EKG pending.   - Patient not on telemetry, please place on telemetry.   - Continue Coreg and Amiodarone.   - Unable to tolerate AC due to recurrent GI bleeds, being evaluated for Watchman as an outpatient.     Aishwarya-operative Cardiac Risk Stratification   - RCRI 11% risk of major cardiac event.   - EKG pending, Echo pending.   - Transfuse to Hgb > 8.0.   - Aspirin and plavix on hold.   - Patient is at elevated risk due to multiple comorbidities but at this time benefits of IR embolization outweigh risks. No absolute cardiac contraindications if no acute findings on EKG and echocardiogram.     Assessment and recommendations are final when note is signed by the attending.

## 2021-10-21 NOTE — ED PROVIDER NOTE - CLINICAL SUMMARY MEDICAL DECISION MAKING FREE TEXT BOX
72 yo with PMH significant for HTN, Renal artery stenosis, prior TIA's with Placement of a left cerebral artery stent in 2014, COPD with emphysema  using 3L home O2 at baseline, RA, Osteopenia, HLD and S/p Cardiac arrest s/p angiography with placement of PPM, multiple admissions for GI bleeding, presents with GI bleeding from terminal ileum per CT.  HGb 9 and vitals stable.  GI consulted and patient to be admitted to medicine

## 2021-10-21 NOTE — CHART NOTE - NSCHARTNOTEFT_GEN_A_CORE
GI Addendum: CTA + for bleeding site in ileum. Pt. is refusing colonoscopy. IR contacted will consider embolization if pt. becomes unstable which she presently isn't.

## 2021-10-21 NOTE — ED PROVIDER NOTE - NS ED MD TWO NIGHTS YN
Date of Service: 09/19/2021    The patient is seen today, chart reviewed and case discussed with staff.  Reportedly, patient has been quite drowsy this morning.  She got Hydroxyzine before I saw her.  She is lying in bed, drowsy.  Spoke with the nurses.  The patient reports having passive thoughts of suicide with no plan.  Reports feeling anxious.  Denies any hallucinations.  Taking medication as prescribed.    VITAL SIGNS AND CURRENT MEDICATIONS:  Reviewed in Epic.    ASSESSMENT:  Alcohol abuse.  Anxiety and depression.    RECOMMENDATIONS:  1.  Continue current medication.  2.  Discharge planning.  3.  Groups.      Dictated By: Danielle Schofield MD  Signing Provider: MD JING Paige/kalyani ([id number])  DD: 09/19/2021 14:01:31 TD: 09/19/2021 17:20:36    Copy Sent To:    Yes

## 2021-10-21 NOTE — CONSULT NOTE ADULT - ATTENDING COMMENTS
Problem list     CAD s/p LM and proximal LAD ATTA in 6/21  Active Gi bleeding in ileum   HFrEF EF 35-40%   Atrial fibrillation     Plan     can discontinue ASA 81 and clopidogrel at this point due to increased risk of bleeding ( stent > 3 months ago)  continue coreg , aldactone, po lasix   on amiodarone and BB for AFib , no anticoagulation because of high risk of bleeding ( prior multiple blood transfusion )   11% risk of perioperative cardiac events

## 2021-10-22 LAB
ALBUMIN SERPL ELPH-MCNC: 2.9 G/DL — LOW (ref 3.3–5.2)
ALP SERPL-CCNC: 81 U/L — SIGNIFICANT CHANGE UP (ref 40–120)
ALT FLD-CCNC: 10 U/L — SIGNIFICANT CHANGE UP
ANION GAP SERPL CALC-SCNC: 11 MMOL/L — SIGNIFICANT CHANGE UP (ref 5–17)
AST SERPL-CCNC: 19 U/L — SIGNIFICANT CHANGE UP
BASOPHILS # BLD AUTO: 0.02 K/UL — SIGNIFICANT CHANGE UP (ref 0–0.2)
BASOPHILS NFR BLD AUTO: 0.3 % — SIGNIFICANT CHANGE UP (ref 0–2)
BILIRUB SERPL-MCNC: 0.3 MG/DL — LOW (ref 0.4–2)
BUN SERPL-MCNC: 30 MG/DL — HIGH (ref 8–20)
CALCIUM SERPL-MCNC: 8.5 MG/DL — LOW (ref 8.6–10.2)
CHLORIDE SERPL-SCNC: 108 MMOL/L — HIGH (ref 98–107)
CO2 SERPL-SCNC: 22 MMOL/L — SIGNIFICANT CHANGE UP (ref 22–29)
COVID-19 SPIKE DOMAIN AB INTERP: POSITIVE
COVID-19 SPIKE DOMAIN ANTIBODY RESULT: 86 U/ML — HIGH
CREAT SERPL-MCNC: 0.96 MG/DL — SIGNIFICANT CHANGE UP (ref 0.5–1.3)
EOSINOPHIL # BLD AUTO: 0.14 K/UL — SIGNIFICANT CHANGE UP (ref 0–0.5)
EOSINOPHIL NFR BLD AUTO: 2.2 % — SIGNIFICANT CHANGE UP (ref 0–6)
FLUAV AG NPH QL: SIGNIFICANT CHANGE UP
FLUBV AG NPH QL: SIGNIFICANT CHANGE UP
GLUCOSE BLDC GLUCOMTR-MCNC: 87 MG/DL — SIGNIFICANT CHANGE UP (ref 70–99)
GLUCOSE BLDC GLUCOMTR-MCNC: 88 MG/DL — SIGNIFICANT CHANGE UP (ref 70–99)
GLUCOSE BLDC GLUCOMTR-MCNC: 93 MG/DL — SIGNIFICANT CHANGE UP (ref 70–99)
GLUCOSE SERPL-MCNC: 86 MG/DL — SIGNIFICANT CHANGE UP (ref 70–99)
HCT VFR BLD CALC: 24 % — LOW (ref 34.5–45)
HCT VFR BLD CALC: 27.7 % — LOW (ref 34.5–45)
HGB BLD-MCNC: 7.4 G/DL — LOW (ref 11.5–15.5)
HGB BLD-MCNC: 8.6 G/DL — LOW (ref 11.5–15.5)
IMM GRANULOCYTES NFR BLD AUTO: 0.5 % — SIGNIFICANT CHANGE UP (ref 0–1.5)
LYMPHOCYTES # BLD AUTO: 1.16 K/UL — SIGNIFICANT CHANGE UP (ref 1–3.3)
LYMPHOCYTES # BLD AUTO: 18 % — SIGNIFICANT CHANGE UP (ref 13–44)
MCHC RBC-ENTMCNC: 26.2 PG — LOW (ref 27–34)
MCHC RBC-ENTMCNC: 30.8 GM/DL — LOW (ref 32–36)
MCV RBC AUTO: 85.1 FL — SIGNIFICANT CHANGE UP (ref 80–100)
MONOCYTES # BLD AUTO: 0.71 K/UL — SIGNIFICANT CHANGE UP (ref 0–0.9)
MONOCYTES NFR BLD AUTO: 11 % — SIGNIFICANT CHANGE UP (ref 2–14)
NEUTROPHILS # BLD AUTO: 4.39 K/UL — SIGNIFICANT CHANGE UP (ref 1.8–7.4)
NEUTROPHILS NFR BLD AUTO: 68 % — SIGNIFICANT CHANGE UP (ref 43–77)
PLATELET # BLD AUTO: 335 K/UL — SIGNIFICANT CHANGE UP (ref 150–400)
POTASSIUM SERPL-MCNC: 3.1 MMOL/L — LOW (ref 3.5–5.3)
POTASSIUM SERPL-SCNC: 3.1 MMOL/L — LOW (ref 3.5–5.3)
PROT SERPL-MCNC: 5.9 G/DL — LOW (ref 6.6–8.7)
RBC # BLD: 2.82 M/UL — LOW (ref 3.8–5.2)
RBC # FLD: 17.6 % — HIGH (ref 10.3–14.5)
RSV RNA NPH QL NAA+NON-PROBE: SIGNIFICANT CHANGE UP
SARS-COV-2 IGG+IGM SERPL QL IA: 86 U/ML — HIGH
SARS-COV-2 IGG+IGM SERPL QL IA: POSITIVE
SARS-COV-2 RNA SPEC QL NAA+PROBE: SIGNIFICANT CHANGE UP
SODIUM SERPL-SCNC: 141 MMOL/L — SIGNIFICANT CHANGE UP (ref 135–145)
WBC # BLD: 6.45 K/UL — SIGNIFICANT CHANGE UP (ref 3.8–10.5)
WBC # FLD AUTO: 6.45 K/UL — SIGNIFICANT CHANGE UP (ref 3.8–10.5)

## 2021-10-22 PROCEDURE — 99233 SBSQ HOSP IP/OBS HIGH 50: CPT

## 2021-10-22 PROCEDURE — 93306 TTE W/DOPPLER COMPLETE: CPT | Mod: 26

## 2021-10-22 PROCEDURE — 99232 SBSQ HOSP IP/OBS MODERATE 35: CPT

## 2021-10-22 RX ORDER — PANTOPRAZOLE SODIUM 20 MG/1
40 TABLET, DELAYED RELEASE ORAL EVERY 12 HOURS
Refills: 0 | Status: DISCONTINUED | OUTPATIENT
Start: 2021-10-22 | End: 2021-10-25

## 2021-10-22 RX ORDER — SODIUM CHLORIDE 0.65 %
1 AEROSOL, SPRAY (ML) NASAL EVERY 4 HOURS
Refills: 0 | Status: DISCONTINUED | OUTPATIENT
Start: 2021-10-22 | End: 2021-10-25

## 2021-10-22 RX ADMIN — Medication 325 MILLIGRAM(S): at 12:01

## 2021-10-22 RX ADMIN — Medication 1 MILLIGRAM(S): at 12:01

## 2021-10-22 RX ADMIN — CARVEDILOL PHOSPHATE 3.12 MILLIGRAM(S): 80 CAPSULE, EXTENDED RELEASE ORAL at 17:15

## 2021-10-22 RX ADMIN — AMIODARONE HYDROCHLORIDE 200 MILLIGRAM(S): 400 TABLET ORAL at 05:03

## 2021-10-22 RX ADMIN — ALBUTEROL 2.5 MILLIGRAM(S): 90 AEROSOL, METERED ORAL at 21:52

## 2021-10-22 RX ADMIN — PANTOPRAZOLE SODIUM 10 MG/HR: 20 TABLET, DELAYED RELEASE ORAL at 01:23

## 2021-10-22 RX ADMIN — TIOTROPIUM BROMIDE AND OLODATEROL 2 PUFF(S): 3.124; 2.736 SPRAY, METERED RESPIRATORY (INHALATION) at 09:01

## 2021-10-22 RX ADMIN — PANTOPRAZOLE SODIUM 40 MILLIGRAM(S): 20 TABLET, DELAYED RELEASE ORAL at 17:15

## 2021-10-22 RX ADMIN — ALBUTEROL 2.5 MILLIGRAM(S): 90 AEROSOL, METERED ORAL at 08:59

## 2021-10-22 RX ADMIN — ATORVASTATIN CALCIUM 80 MILLIGRAM(S): 80 TABLET, FILM COATED ORAL at 21:10

## 2021-10-22 NOTE — CHART NOTE - NSCHARTNOTEFT_GEN_A_CORE
Transfusion 1u PRBC complete, follow up H/H pending  No increased dyspnea, cough.  Lungs w few crackles bilat bases cleared partially w cough.  C/O mild nose bleeds w oxygen, tissues with slight blood noted at bedside  Will add saline nasal spray and bubble humidifier.

## 2021-10-22 NOTE — PROGRESS NOTE ADULT - SUBJECTIVE AND OBJECTIVE BOX
JAVED CHIN  71y  Female    Interval/overnight events/pt complaints:  Patient without any complaints at this time. Is frustrated by seeming confusion about treatment plan (whether getting IR procedure or not, when she can eat)  No overnight events reported, no melena reported overnight      ROS:  GEN: Denies fever, chills, sweats,  HEENT: denies URI sx  RESPIRATORY: Denies, dyspnea, orthopnea, wheeze, cough, purulent sputum or hemoptysis  CARDIAC: Denies CP, palpitations, dizziness, pedal edema  GI: Appetite good, Denies N/V, diarrhea, constipation.   : Denies difficulty urinating, dysuria, hematuria  NEURO: Denies headache, dizziness, numbness/tingling, weakness  MS: Denies aches/pains, loss of FROM from baseline  SKIN: Denies rash, sores/wounds, itching    Vital Signs Last 24 Hrs  T(C): 36.3 (22 Oct 2021 08:10), Max: 37.7 (21 Oct 2021 15:23)  T(F): 97.3 (22 Oct 2021 08:10), Max: 99.9 (21 Oct 2021 15:23)  HR: 88 (22 Oct 2021 08:10) (78 - 103)  BP: 107/56 (22 Oct 2021 08:10) (100/58 - 115/71)  BP(mean): 3 (22 Oct 2021 05:00) (3 - 75)  RR: 18 (22 Oct 2021 08:10) (16 - 18)  SpO2: 97% (22 Oct 2021 08:10) (97% - 100%)  I&O's Summary      PHYSICAL EXAM:  GENERAL: NAD, nontoxic appearing  HEENT - EOMI, pupils equal and reactive to light;  Moist mucous membranes  NECK: Supple, No JVD, no adenopathy  CHEST/LUNG: Good aeration bilaterally, few faint end insp crackles L base posteriorly, no wheeze/rhonchi  HEART: Regular rate and rhythm; No murmur appreciated  ABDOMEN: Soft, Nontender, Nondistended; Bowel sounds present  EXTREMITIES:  2+ Peripheral Pulses, No clubbing, cyanosis, edema, calf tenderness  NEURO:  No obvious Focal deficits, sensory and motor grossly intact  SKIN: warm, dry    CAPILLARY BLOOD GLUCOSE      POCT Blood Glucose.: 88 mg/dL (22 Oct 2021 07:58)  POCT Blood Glucose.: 93 mg/dL (22 Oct 2021 06:38)  POCT Blood Glucose.: 87 mg/dL (22 Oct 2021 00:49)      LABS    (10-22 @ 03:53)                      7.4  6.45 )-----------( 335                 24.0    Neutrophils = 4.39 (68.0%)  Lymphocytes = 1.16 (18.0%)  Eosinophils = 0.14 (2.2%)  Basophils = 0.02 (0.3%)  Monocytes = 0.71 (11.0%)  Bands = --%    10-22    141  |  108<H>  |  30.0<H>  ----------------------------<  86  3.1<L>   |  22.0  |  0.96    Ca    8.5<L>      22 Oct 2021 03:53    TPro  5.9<L>  /  Alb  2.9<L>  /  TBili  0.3<L>  /  DBili  x   /  AST  19  /  ALT  10  /  AlkPhos  81  10-22        IMAGING    < from: CT Abdomen and Pelvis w/ IV Cont (10.21.21 @ 12:25) >  IMPRESSION:  Active GI bleed in a right-sided small bowel loop, likely ileum.    Interval enlargement of the left ventricle with wall calcifications. Possible left ventricular aneurysm. Recommend cardiology evaluation. Pacemaker. Mitral valve replacement.    < end of copied text >      Imaging Personally Reviewed:  [ ] YES  [x ] NO    MEDICATIONS  (STANDING):  ALBUTerol    0.083% 2.5 milliGRAM(s) Nebulizer two times a day  aMIOdarone    Tablet 200 milliGRAM(s) Oral daily  atorvastatin 80 milliGRAM(s) Oral at bedtime  carvedilol 3.125 milliGRAM(s) Oral every 12 hours  ferrous    sulfate 325 milliGRAM(s) Oral daily  folic acid 1 milliGRAM(s) Oral daily  pantoprazole  Injectable 40 milliGRAM(s) IV Push every 12 hours  sodium chloride 0.9%. 1000 milliLiter(s) (75 mL/Hr) IV Continuous <Continuous>  tiotropium 2.5 MICROgram(s)/olodaterol 2.5 MICROgram(s) (STIOLTO) Inhaler 2 Puff(s) Inhalation daily    MEDICATIONS  (PRN):  acetaminophen     Tablet .. 650 milliGRAM(s) Oral every 6 hours PRN Temp greater or equal to 38C (100.4F), Mild Pain (1 - 3)  aluminum hydroxide/magnesium hydroxide/simethicone Suspension 30 milliLiter(s) Oral every 4 hours PRN Dyspepsia  melatonin 3 milliGRAM(s) Oral at bedtime PRN Insomnia  ondansetron Injectable 4 milliGRAM(s) IV Push every 8 hours PRN Nausea and/or Vomiting    AllergiesNo Known Allergies      Consultant(s) Notes Reviewed:  [x ] YES  [ ] NO  Care Discussed with Consultants/Other Providers [ x] YES  [ ] NO

## 2021-10-22 NOTE — PROGRESS NOTE ADULT - SUBJECTIVE AND OBJECTIVE BOX
Pt seen and examined. She has had no further bloody BM's here in the ED. CTA of the abdomen and pelvis done yesterday revealed active bleeding in the region of the ileum. Her Hb this AM remains stable @ 7.4 grams. No GI complaints presently offered. She continues to refuse colonoscopy because of the bowel prep. It is unclear or uncertain that this area of the ileum could be reached or evaluated with colonoscopy but it is too far down for enteroscopy.    REVIEW OF SYSTEMS:  Constitutional: No fever, weight loss or fatigue  Cardiovascular: No chest pain, palpitations, dizziness or leg swelling  Gastrointestinal: As noted above. No abdominal or epigastric pain. No nausea, vomiting or hematemesis; No diarrhea or constipation. No melena or hematochezia.  Skin: No itching, burning, rashes or lesions       MEDICATIONS:  MEDICATIONS  (STANDING):  ALBUTerol    0.083% 2.5 milliGRAM(s) Nebulizer two times a day  aMIOdarone    Tablet 200 milliGRAM(s) Oral daily  atorvastatin 80 milliGRAM(s) Oral at bedtime  carvedilol 3.125 milliGRAM(s) Oral every 12 hours  ferrous    sulfate 325 milliGRAM(s) Oral daily  folic acid 1 milliGRAM(s) Oral daily  pantoprazole Infusion 8 mG/Hr (10 mL/Hr) IV Continuous <Continuous>  sodium chloride 0.9%. 1000 milliLiter(s) (75 mL/Hr) IV Continuous <Continuous>  tiotropium 2.5 MICROgram(s)/olodaterol 2.5 MICROgram(s) (STIOLTO) Inhaler 2 Puff(s) Inhalation daily    MEDICATIONS  (PRN):  acetaminophen     Tablet .. 650 milliGRAM(s) Oral every 6 hours PRN Temp greater or equal to 38C (100.4F), Mild Pain (1 - 3)  aluminum hydroxide/magnesium hydroxide/simethicone Suspension 30 milliLiter(s) Oral every 4 hours PRN Dyspepsia  melatonin 3 milliGRAM(s) Oral at bedtime PRN Insomnia  ondansetron Injectable 4 milliGRAM(s) IV Push every 8 hours PRN Nausea and/or Vomiting      Allergies    No Known Allergies    Intolerances        Vital Signs Last 24 Hrs  T(C): 36.8 (22 Oct 2021 05:00), Max: 37.7 (21 Oct 2021 15:23)  T(F): 98.3 (22 Oct 2021 05:00), Max: 99.9 (21 Oct 2021 15:23)  HR: 85 (22 Oct 2021 05:00) (78 - 103)  BP: 100/58 (22 Oct 2021 05:00) (100/58 - 115/71)  BP(mean): 3 (22 Oct 2021 05:00) (3 - 75)  RR: 18 (22 Oct 2021 05:00) (16 - 18)  SpO2: 97% (22 Oct 2021 05:00) (97% - 100%)      PHYSICAL EXAM:    General: Well developed; in no acute distress  HEENT: MMM, conjunctiva pink and sclera anicteric.  Lungs: clear to auscultation bilaterally.  Cor: RRR S1, S2 only.  Gastrointestinal: Abdomen: Soft non-tender non-distended; Normal bowel sounds; No hepatosplenomegaly.  Extremities: no cyanosis, clubbing or edema.  Skin: Warm and dry. No obvious rash  Neuro: Pt. a + o x 3.    LABS:  CBC Full  -  ( 22 Oct 2021 03:53 )  WBC Count : 6.45 K/uL  RBC Count : 2.82 M/uL  Hemoglobin : 7.4 g/dL  Hematocrit : 24.0 %  Platelet Count - Automated : 335 K/uL  Mean Cell Volume : 85.1 fl  Mean Cell Hemoglobin : 26.2 pg  Mean Cell Hemoglobin Concentration : 30.8 gm/dL  Auto Neutrophil # : 4.39 K/uL  Auto Lymphocyte # : 1.16 K/uL  Auto Monocyte # : 0.71 K/uL  Auto Eosinophil # : 0.14 K/uL  Auto Basophil # : 0.02 K/uL  Auto Neutrophil % : 68.0 %  Auto Lymphocyte % : 18.0 %  Auto Monocyte % : 11.0 %  Auto Eosinophil % : 2.2 %  Auto Basophil % : 0.3 %    10-22    141  |  108<H>  |  30.0<H>  ----------------------------<  86  3.1<L>   |  22.0  |  0.96    Ca    8.5<L>      22 Oct 2021 03:53    TPro  5.9<L>  /  Alb  2.9<L>  /  TBili  0.3<L>  /  DBili  x   /  AST  19  /  ALT  10  /  AlkPhos  81  10-22    PT/INR - ( 21 Oct 2021 10:57 )   PT: 13.5 sec;   INR: 1.17 ratio         PTT - ( 21 Oct 2021 10:57 )  PTT:26.9 sec                  RADIOLOGY & ADDITIONAL STUDIES (The following images were personally reviewed):

## 2021-10-22 NOTE — PHYSICAL THERAPY INITIAL EVALUATION ADULT - ADDITIONAL COMMENTS
per patient she had spent time in a rehab facility, and was strong enough to ambulate on her own in the home. Pt has ~4 HOA with a handrail and does it with supervision, after that she is on the ground level. Pt has assistance in the home and would like to reinstate physical therapy.

## 2021-10-22 NOTE — PROGRESS NOTE ADULT - SUBJECTIVE AND OBJECTIVE BOX
[place] [facility]                                                               Office: [address] [address]                                                              Telephone: [phone]. Fax:[phone]                                                                             PROGRESS NOTE  Reason for follow up: GI bleed on DAPT  Update: Patient   Covid Status:     Review of symptoms:   Cardiac:  No chest pain. No dyspnea. No palpitations.  Respiratory: No cough. No dyspnea  Gastrointestinal: No diarrhea. No abdominal pain. No bleeding.     Past medical history: No updates.   	  Vitals:  T(C): 36.8 (10-22-21 @ 05:00), Max: 37.7 (10-21-21 @ 15:23)  HR: 85 (10-22-21 @ 05:00) (78 - 103)  BP: 100/58 (10-22-21 @ 05:00) (100/58 - 115/71)  RR: 18 (10-22-21 @ 05:00) (16 - 18)  SpO2: 97% (10-22-21 @ 05:00) (97% - 100%)  Wt(kg): --  I&O's Summary    Weight (kg): 81.6 (10-21 @ 09:29)      PHYSICAL EXAM:  Appearance: Comfortable. No acute distress  HEENT:  Head and neck: Atraumatic. Normocephalic.  Normal oral mucosa, PERRL, Neck is supple. No JVD, No carotid bruit.   Neurologic: A&Ox 3, no focal deficits. EOMI, Cranial nerves are intact.  Lymphatic: No cervical lymphadenopathy  Cardiovascular: Normal S1 S2, No murmur, rubs/gallops. No JVD, No edema  Respiratory: Lungs clear to auscultation  Gastrointestinal:  Soft, Non-tender, + BS  Lower Extremities: No edema  Psychiatry: Patient is calm. No agitation. Mood & affect appropriate  Skin: No rashes/ecchymoses/cyanosis/ulcers visualized on the face, hands or feet.      CURRENT MEDICATIONS:  aMIOdarone    Tablet 200 milliGRAM(s) Oral daily  carvedilol 3.125 milliGRAM(s) Oral every 12 hours    ALBUTerol    0.083%  tiotropium 2.5 MICROgram(s)/olodaterol 2.5 MICROgram(s) (STIOLTO) Inhaler  pantoprazole  Injectable  atorvastatin  ferrous    sulfate  folic acid  sodium chloride 0.9%.      DIAGNOSTIC TESTING:  [ ] Echocardiogram:   [ ]  Catheterization:  [ ] Stress Test:    OTHER: 	      LABS:	 	                            7.4    6.45  )-----------( 335      ( 22 Oct 2021 03:53 )             24.0     10-22    141  |  108<H>  |  30.0<H>  ----------------------------<  86  3.1<L>   |  22.0  |  0.96    Ca    8.5<L>      22 Oct 2021 03:53    TPro  5.9<L>  /  Alb  2.9<L>  /  TBili  0.3<L>  /  DBili  x   /  AST  19  /  ALT  10  /  AlkPhos  81  10-22    proBNP:   Lipid Profile:   HgA1c:   TSH:       TELEMETRY: Reviewed    ECG:  Reviewed by me. 	                                                                      [place] [facility]                                                               Office: [address] [address]                                                              Telephone: [phone]. Fax:[phone]                                                                             PROGRESS NOTE  Reason for follow up: GI bleed on DAPT  Update: Patient being evaluated by GI, who is now not recommending IR embolization unless there is another active bleed. Patient's hemoglobin is only 7.4 at this time, will need to be transfused to Hgb > 8.0. Patient not a candidate for Endoscopy or colonoscopy at this time. No active chest pain or dyspnea.   Covid Status: Pending    Review of symptoms:   Cardiac:  No chest pain. No dyspnea. No palpitations.  Respiratory: No cough. No dyspnea  Gastrointestinal: No diarrhea. No abdominal pain. No bleeding.     Past medical history: No updates.   	  Vitals:  T(C): 36.8 (10-22-21 @ 05:00), Max: 37.7 (10-21-21 @ 15:23)  HR: 85 (10-22-21 @ 05:00) (78 - 103)  BP: 100/58 (10-22-21 @ 05:00) (100/58 - 115/71)  RR: 18 (10-22-21 @ 05:00) (16 - 18)  SpO2: 97% (10-22-21 @ 05:00) (97% - 100%)  Wt(kg): --  I&O's Summary    Weight (kg): 81.6 (10-21 @ 09:29)      PHYSICAL EXAM:  Constitutional: Comfortable . No acute distress.   HEENT: Atraumatic and normocephalic , neck is supple . no JVD. No carotid bruit. PEERL   CNS: A&Ox3. No focal deficits. EOMI. Cranial nerves II-IX are intact.   Lymph Nodes: Cervical : Not palpable.  Respiratory: Decreased BS at bases  Cardiovascular: S1S2 RRR. No murmur/rubs or gallop.  Gastrointestinal: Soft non-tender and non distended . +Bowel sounds. negative Joiner's sign.  Extremities: No edema.   Psychiatric: Calm . no agitation.  Skin: No skin rash/ulcers visualized to face, hands or feet.      CURRENT MEDICATIONS:  aMIOdarone    Tablet 200 milliGRAM(s) Oral daily  carvedilol 3.125 milliGRAM(s) Oral every 12 hours    ALBUTerol    0.083%  tiotropium 2.5 MICROgram(s)/olodaterol 2.5 MICROgram(s) (STIOLTO) Inhaler  pantoprazole  Injectable  atorvastatin  ferrous    sulfate  folic acid  sodium chloride 0.9%.      DIAGNOSTIC TESTING:  PREVIOUS DIAGNOSTIC TESTING  ECHO FINDINGS:  Pending    CATHETERIZATION FINDINGS:   [facility] 05/21/21 ostial lad 80%, m 4050% distal RCA   [facility] 05/23/21 had PCI of LM and LAD.     OTHER: 	      LABS:	 	                            7.4    6.45  )-----------( 335      ( 22 Oct 2021 03:53 )             24.0     10-22    141  |  108<H>  |  30.0<H>  ----------------------------<  86  3.1<L>   |  22.0  |  0.96    Ca    8.5<L>      22 Oct 2021 03:53    TPro  5.9<L>  /  Alb  2.9<L>  /  TBili  0.3<L>  /  DBili  x   /  AST  19  /  ALT  10  /  AlkPhos  81  10-22    proBNP:   Lipid Profile:   HgA1c:   TSH:       TELEMETRY: Reviewed A-paced

## 2021-10-23 LAB
ANION GAP SERPL CALC-SCNC: 14 MMOL/L — SIGNIFICANT CHANGE UP (ref 5–17)
BASOPHILS # BLD AUTO: 0.02 K/UL — SIGNIFICANT CHANGE UP (ref 0–0.2)
BASOPHILS NFR BLD AUTO: 0.3 % — SIGNIFICANT CHANGE UP (ref 0–2)
BUN SERPL-MCNC: 32.9 MG/DL — HIGH (ref 8–20)
CALCIUM SERPL-MCNC: 8 MG/DL — LOW (ref 8.6–10.2)
CHLORIDE SERPL-SCNC: 104 MMOL/L — SIGNIFICANT CHANGE UP (ref 98–107)
CO2 SERPL-SCNC: 20 MMOL/L — LOW (ref 22–29)
CREAT SERPL-MCNC: 0.86 MG/DL — SIGNIFICANT CHANGE UP (ref 0.5–1.3)
EOSINOPHIL # BLD AUTO: 0.29 K/UL — SIGNIFICANT CHANGE UP (ref 0–0.5)
EOSINOPHIL NFR BLD AUTO: 3.8 % — SIGNIFICANT CHANGE UP (ref 0–6)
GLUCOSE SERPL-MCNC: 80 MG/DL — SIGNIFICANT CHANGE UP (ref 70–99)
HCT VFR BLD CALC: 24 % — LOW (ref 34.5–45)
HGB BLD-MCNC: 7.7 G/DL — LOW (ref 11.5–15.5)
IMM GRANULOCYTES NFR BLD AUTO: 0.3 % — SIGNIFICANT CHANGE UP (ref 0–1.5)
LYMPHOCYTES # BLD AUTO: 0.93 K/UL — LOW (ref 1–3.3)
LYMPHOCYTES # BLD AUTO: 12.3 % — LOW (ref 13–44)
MCHC RBC-ENTMCNC: 26.8 PG — LOW (ref 27–34)
MCHC RBC-ENTMCNC: 32.1 GM/DL — SIGNIFICANT CHANGE UP (ref 32–36)
MCV RBC AUTO: 83.6 FL — SIGNIFICANT CHANGE UP (ref 80–100)
MONOCYTES # BLD AUTO: 0.96 K/UL — HIGH (ref 0–0.9)
MONOCYTES NFR BLD AUTO: 12.6 % — SIGNIFICANT CHANGE UP (ref 2–14)
NEUTROPHILS # BLD AUTO: 5.37 K/UL — SIGNIFICANT CHANGE UP (ref 1.8–7.4)
NEUTROPHILS NFR BLD AUTO: 70.7 % — SIGNIFICANT CHANGE UP (ref 43–77)
PLATELET # BLD AUTO: 322 K/UL — SIGNIFICANT CHANGE UP (ref 150–400)
POTASSIUM SERPL-MCNC: 2.7 MMOL/L — CRITICAL LOW (ref 3.5–5.3)
POTASSIUM SERPL-SCNC: 2.7 MMOL/L — CRITICAL LOW (ref 3.5–5.3)
RBC # BLD: 2.87 M/UL — LOW (ref 3.8–5.2)
RBC # FLD: 17.9 % — HIGH (ref 10.3–14.5)
SODIUM SERPL-SCNC: 138 MMOL/L — SIGNIFICANT CHANGE UP (ref 135–145)
WBC # BLD: 7.59 K/UL — SIGNIFICANT CHANGE UP (ref 3.8–10.5)
WBC # FLD AUTO: 7.59 K/UL — SIGNIFICANT CHANGE UP (ref 3.8–10.5)

## 2021-10-23 PROCEDURE — 99233 SBSQ HOSP IP/OBS HIGH 50: CPT

## 2021-10-23 RX ORDER — ASPIRIN/CALCIUM CARB/MAGNESIUM 324 MG
81 TABLET ORAL DAILY
Refills: 0 | Status: DISCONTINUED | OUTPATIENT
Start: 2021-10-23 | End: 2021-10-25

## 2021-10-23 RX ORDER — CLOPIDOGREL BISULFATE 75 MG/1
75 TABLET, FILM COATED ORAL DAILY
Refills: 0 | Status: DISCONTINUED | OUTPATIENT
Start: 2021-10-23 | End: 2021-10-25

## 2021-10-23 RX ORDER — POTASSIUM CHLORIDE 20 MEQ
10 PACKET (EA) ORAL
Refills: 0 | Status: COMPLETED | OUTPATIENT
Start: 2021-10-23 | End: 2021-10-23

## 2021-10-23 RX ORDER — POTASSIUM CHLORIDE 20 MEQ
40 PACKET (EA) ORAL ONCE
Refills: 0 | Status: COMPLETED | OUTPATIENT
Start: 2021-10-23 | End: 2021-10-23

## 2021-10-23 RX ADMIN — Medication 100 MILLIEQUIVALENT(S): at 09:31

## 2021-10-23 RX ADMIN — CLOPIDOGREL BISULFATE 75 MILLIGRAM(S): 75 TABLET, FILM COATED ORAL at 14:01

## 2021-10-23 RX ADMIN — Medication 81 MILLIGRAM(S): at 14:01

## 2021-10-23 RX ADMIN — AMIODARONE HYDROCHLORIDE 200 MILLIGRAM(S): 400 TABLET ORAL at 05:13

## 2021-10-23 RX ADMIN — ALBUTEROL 2.5 MILLIGRAM(S): 90 AEROSOL, METERED ORAL at 20:10

## 2021-10-23 RX ADMIN — Medication 100 MILLIEQUIVALENT(S): at 06:00

## 2021-10-23 RX ADMIN — ATORVASTATIN CALCIUM 80 MILLIGRAM(S): 80 TABLET, FILM COATED ORAL at 21:15

## 2021-10-23 RX ADMIN — PANTOPRAZOLE SODIUM 40 MILLIGRAM(S): 20 TABLET, DELAYED RELEASE ORAL at 05:12

## 2021-10-23 RX ADMIN — CARVEDILOL PHOSPHATE 3.12 MILLIGRAM(S): 80 CAPSULE, EXTENDED RELEASE ORAL at 05:13

## 2021-10-23 RX ADMIN — Medication 100 MILLIEQUIVALENT(S): at 08:00

## 2021-10-23 RX ADMIN — Medication 325 MILLIGRAM(S): at 09:30

## 2021-10-23 RX ADMIN — ALBUTEROL 2.5 MILLIGRAM(S): 90 AEROSOL, METERED ORAL at 07:54

## 2021-10-23 RX ADMIN — TIOTROPIUM BROMIDE AND OLODATEROL 2 PUFF(S): 3.124; 2.736 SPRAY, METERED RESPIRATORY (INHALATION) at 07:54

## 2021-10-23 RX ADMIN — Medication 1 MILLIGRAM(S): at 09:30

## 2021-10-23 RX ADMIN — PANTOPRAZOLE SODIUM 40 MILLIGRAM(S): 20 TABLET, DELAYED RELEASE ORAL at 17:01

## 2021-10-23 RX ADMIN — Medication 40 MILLIEQUIVALENT(S): at 05:56

## 2021-10-23 NOTE — PROGRESS NOTE ADULT - SUBJECTIVE AND OBJECTIVE BOX
[facility]  [address] [place] [id number]          SUBJECTIVE / OVERNIGHT EVENTS:    ROS:  CARDIAC: No cp sob or palp  RESP: No mucus production no uri symptoms  GI:  No melana no abd pain  EXTREMITIES:  no calf tenderness no edema    TELEMETRY:      MEDICATIONS  (STANDING):  ALBUTerol    0.083% 2.5 milliGRAM(s) Nebulizer two times a day  aMIOdarone    Tablet 200 milliGRAM(s) Oral daily  atorvastatin 80 milliGRAM(s) Oral at bedtime  carvedilol 3.125 milliGRAM(s) Oral every 12 hours  ferrous    sulfate 325 milliGRAM(s) Oral daily  folic acid 1 milliGRAM(s) Oral daily  pantoprazole  Injectable 40 milliGRAM(s) IV Push every 12 hours  sodium chloride 0.9%. 1000 milliLiter(s) (75 mL/Hr) IV Continuous <Continuous>  tiotropium 2.5 MICROgram(s)/olodaterol 2.5 MICROgram(s) (STIOLTO) Inhaler 2 Puff(s) Inhalation daily    MEDICATIONS  (PRN):  acetaminophen     Tablet .. 650 milliGRAM(s) Oral every 6 hours PRN Temp greater or equal to 38C (100.4F), Mild Pain (1 - 3)  aluminum hydroxide/magnesium hydroxide/simethicone Suspension 30 milliLiter(s) Oral every 4 hours PRN Dyspepsia  melatonin 3 milliGRAM(s) Oral at bedtime PRN Insomnia  ondansetron Injectable 4 milliGRAM(s) IV Push every 8 hours PRN Nausea and/or Vomiting  sodium chloride 0.65% Nasal 1 Spray(s) Both Nostrils every 4 hours PRN Nasal Congestion    Vital Signs Last 24 Hrs  T(C): 36.4 (23 Oct 2021 11:01), Max: 36.8 (23 Oct 2021 05:00)  T(F): 97.6 (23 Oct 2021 11:01), Max: 98.2 (23 Oct 2021 05:00)  HR: 83 (23 Oct 2021 11:01) (83 - 87)  BP: 95/63 (23 Oct 2021 11:01) (93/64 - 122/81)  BP(mean): 88 (23 Oct 2021 05:00) (79 - 88)  RR: 18 (23 Oct 2021 11:01) (18 - 18)  SpO2: 99% (23 Oct 2021 11:01) (95% - 100%)  I&O's Summary    22 Oct 2021 07:01  -  23 Oct 2021 07:00  --------------------------------------------------------  IN: 0 mL / OUT: 600 mL / NET: -600 mL        PHYSICAL EXAM:  GENERAL: NAD, well-developed  EYES: EOMI, PERRLA, conjunctiva and sclera clear  NECK:  No JVD  CHEST/LUNG: CTABL ; No wheeze  HEART: RRR; No murmurs  ABDOMEN: Soft, Nontender, Nondistended; Bowel sounds present  EXTREMITIES:  2+ Peripheral Pulses, No edema  PSYCH: AAOx3  NEUROLOGY: non-focal  SKIN: No rashes or lesions. No sacral ulcer    LABS:                        7.7    7.59  )-----------( 322      ( 23 Oct 2021 03:27 )             24.0     10-23    138  |  104  |  32.9<H>  ----------------------------<  80  2.7<LL>   |  20.0<L>  |  0.86    Ca    8.0<L>      23 Oct 2021 03:27    TPro  5.9<L>  /  Alb  2.9<L>  /  TBili  0.3<L>  /  DBili  x   /  AST  19  /  ALT  10  /  AlkPhos  81  10-22              Microbiology;        RADIOLOGY & ADDITIONAL TESTS:                     [facility]  [address] [place] [id number]    SUBJECTIVE / OVERNIGHT EVENTS:  Refusing colonoscopy  diet to be advanced  s/p prbc      ROS:  CARDIAC: No cp sob or palp  RESP: No mucus production no uri symptoms  GI:  No melana no abd pain  EXTREMITIES:  no calf tenderness no edema    TELEMETRY:      MEDICATIONS  (STANDING):  ALBUTerol    0.083% 2.5 milliGRAM(s) Nebulizer two times a day  aMIOdarone    Tablet 200 milliGRAM(s) Oral daily  atorvastatin 80 milliGRAM(s) Oral at bedtime  carvedilol 3.125 milliGRAM(s) Oral every 12 hours  ferrous    sulfate 325 milliGRAM(s) Oral daily  folic acid 1 milliGRAM(s) Oral daily  pantoprazole  Injectable 40 milliGRAM(s) IV Push every 12 hours  sodium chloride 0.9%. 1000 milliLiter(s) (75 mL/Hr) IV Continuous <Continuous>  tiotropium 2.5 MICROgram(s)/olodaterol 2.5 MICROgram(s) (STIOLTO) Inhaler 2 Puff(s) Inhalation daily    MEDICATIONS  (PRN):  acetaminophen     Tablet .. 650 milliGRAM(s) Oral every 6 hours PRN Temp greater or equal to 38C (100.4F), Mild Pain (1 - 3)  aluminum hydroxide/magnesium hydroxide/simethicone Suspension 30 milliLiter(s) Oral every 4 hours PRN Dyspepsia  melatonin 3 milliGRAM(s) Oral at bedtime PRN Insomnia  ondansetron Injectable 4 milliGRAM(s) IV Push every 8 hours PRN Nausea and/or Vomiting  sodium chloride 0.65% Nasal 1 Spray(s) Both Nostrils every 4 hours PRN Nasal Congestion    Vital Signs Last 24 Hrs  T(C): 36.4 (23 Oct 2021 11:01), Max: 36.8 (23 Oct 2021 05:00)  T(F): 97.6 (23 Oct 2021 11:01), Max: 98.2 (23 Oct 2021 05:00)  HR: 83 (23 Oct 2021 11:01) (83 - 87)  BP: 95/63 (23 Oct 2021 11:01) (93/64 - 122/81)  BP(mean): 88 (23 Oct 2021 05:00) (79 - 88)  RR: 18 (23 Oct 2021 11:01) (18 - 18)  SpO2: 99% (23 Oct 2021 11:01) (95% - 100%)  I&O's Summary    22 Oct 2021 07:01  -  23 Oct 2021 07:00  --------------------------------------------------------  IN: 0 mL / OUT: 600 mL / NET: -600 mL        PHYSICAL EXAM:  GENERAL: NAD, well-developed  EYES: EOMI, PERRLA, conjunctiva and sclera clear  NECK:  No JVD  CHEST/LUNG: CTABL ; No wheeze  HEART: RRR; No murmurs  ABDOMEN: Soft, Nontender, Nondistended; Bowel sounds present  EXTREMITIES:  2+ Peripheral Pulses, No edema  PSYCH: AAOx3  NEUROLOGY: non-focal  SKIN: No rashes or lesions. No sacral ulcer    LABS:                        7.7    7.59  )-----------( 322      ( 23 Oct 2021 03:27 )             24.0     10-23    138  |  104  |  32.9<H>  ----------------------------<  80  2.7<LL>   |  20.0<L>  |  0.86    Ca    8.0<L>      23 Oct 2021 03:27    TPro  5.9<L>  /  Alb  2.9<L>  /  TBili  0.3<L>  /  DBili  x   /  AST  19  /  ALT  10  /  AlkPhos  81  10-22              Microbiology;        RADIOLOGY & ADDITIONAL TESTS:

## 2021-10-23 NOTE — PROGRESS NOTE ADULT - SUBJECTIVE AND OBJECTIVE BOX
JAVED CHIN  ----------------------------------------  The patient was seen at bedside. Patient with gastrointestinal hemorrhage and anemia. Reported having a bowel movement with some dark blood visible. Denied abdominal pain, chest pain, or dyspnea.    Vital Signs Last 24 Hrs  T(C): 36.4 (23 Oct 2021 11:01), Max: 36.8 (23 Oct 2021 05:00)  T(F): 97.6 (23 Oct 2021 11:01), Max: 98.2 (23 Oct 2021 05:00)  HR: 83 (23 Oct 2021 11:01) (83 - 87)  BP: 95/63 (23 Oct 2021 11:01) (93/64 - 122/81)  BP(mean): 88 (23 Oct 2021 05:00) (79 - 88)  RR: 18 (23 Oct 2021 11:01) (18 - 18)  SpO2: 99% (23 Oct 2021 11:01) (95% - 100%)    PHYSICAL EXAMINATION:  ----------------------------------------  General appearance: No acute distress, Awake, Alert  HEENT: Normocephalic, Atraumatic, Conjunctiva clear, EOMI  Neck: Supple, No JVD, No tenderness  Lungs: Breath sound equal bilaterally, No wheezes, No rales  Cardiovascular: S1S2, Regular rhythm  Abdomen: Soft, Nontender, Nondistended, No guarding/rebound, Positive bowel sounds  Extremities: No clubbing, No cyanosis, No edema, No calf tenderness  Neuro: Strength equal bilaterally, No tremors  Psychiatric: Appropriate mood, Normal affect    LABORATORY STUDIES:  ----------------------------------------             7.7    7.59  )-----------( 322      ( 23 Oct 2021 03:27 )             24.0     10-23    138  |  104  |  32.9<H>  ----------------------------<  80  2.7<LL>   |  20.0<L>  |  0.86    Ca    8.0<L>      23 Oct 2021 03:27    TPro  5.9<L>  /  Alb  2.9<L>  /  TBili  0.3<L>  /  DBili  x   /  AST  19  /  ALT  10  /  AlkPhos  81  10-22    LIVER FUNCTIONS - ( 22 Oct 2021 03:53 )  Alb: 2.9 g/dL / Pro: 5.9 g/dL / ALK PHOS: 81 U/L / ALT: 10 U/L / AST: 19 U/L / GGT: x           MEDICATIONS  (STANDING):  ALBUTerol    0.083% 2.5 milliGRAM(s) Nebulizer two times a day  aMIOdarone    Tablet 200 milliGRAM(s) Oral daily  aspirin  chewable 81 milliGRAM(s) Oral daily  atorvastatin 80 milliGRAM(s) Oral at bedtime  carvedilol 3.125 milliGRAM(s) Oral every 12 hours  clopidogrel Tablet 75 milliGRAM(s) Oral daily  ferrous    sulfate 325 milliGRAM(s) Oral daily  folic acid 1 milliGRAM(s) Oral daily  pantoprazole  Injectable 40 milliGRAM(s) IV Push every 12 hours  sodium chloride 0.9%. 1000 milliLiter(s) (75 mL/Hr) IV Continuous <Continuous>  tiotropium 2.5 MICROgram(s)/olodaterol 2.5 MICROgram(s) (STIOLTO) Inhaler 2 Puff(s) Inhalation daily    MEDICATIONS  (PRN):  acetaminophen     Tablet .. 650 milliGRAM(s) Oral every 6 hours PRN Temp greater or equal to 38C (100.4F), Mild Pain (1 - 3)  aluminum hydroxide/magnesium hydroxide/simethicone Suspension 30 milliLiter(s) Oral every 4 hours PRN Dyspepsia  melatonin 3 milliGRAM(s) Oral at bedtime PRN Insomnia  ondansetron Injectable 4 milliGRAM(s) IV Push every 8 hours PRN Nausea and/or Vomiting  sodium chloride 0.65% Nasal 1 Spray(s) Both Nostrils every 4 hours PRN Nasal Congestion      ASSESSMENT / PLAN:  ----------------------------------------  72 yo F with hx of chronic resp failure from COPD on 3L NC,  HTN, Renal artery stenosis, prior TIA's with Placement of a left cerebral artery stent in 2014, RA, Osteopenia, HLD and S/p Cardiac arrest s/p angiography with placement of PPM and cardiac surg in June of 2021 for valve replacement and pseudoaneurysm repair with stent placement presented to the ED with bloody stools.    Gastrointestinal hemorrhage / Acute blood loss anemia - Monitoring hemoglobin levels. CT noted bleeding at the ileum. Previous transfusion noted, for repeat transfusion of packed red blood cells. On pantoprazole. Gastroenterology consultation noted. The patient did not wish to pursue repeat colonoscopy.    COPD - On supplemental oxygen. Inhaled bronchodilator therapy.    Atrial fibrillation - On amiodarone and carvedilol. No anticoagulation due to gastrointestinal hemorrhage.    Hypertension - Close blood pressure monitoring.    CVA - Noted history of cerebral artery stent. On aspirin, clopidogrel, and atorvastatin. JAVED CHIN  ----------------------------------------  The patient was seen at bedside. Patient with gastrointestinal hemorrhage and anemia. Reported having a bowel movement with some dark blood visible. Denied abdominal pain, chest pain, or dyspnea.    Vital Signs Last 24 Hrs  T(C): 36.4 (23 Oct 2021 11:01), Max: 36.8 (23 Oct 2021 05:00)  T(F): 97.6 (23 Oct 2021 11:01), Max: 98.2 (23 Oct 2021 05:00)  HR: 83 (23 Oct 2021 11:01) (83 - 87)  BP: 95/63 (23 Oct 2021 11:01) (93/64 - 122/81)  BP(mean): 88 (23 Oct 2021 05:00) (79 - 88)  RR: 18 (23 Oct 2021 11:01) (18 - 18)  SpO2: 99% (23 Oct 2021 11:01) (95% - 100%)    PHYSICAL EXAMINATION:  ----------------------------------------  General appearance: No acute distress, Awake, Alert  HEENT: Normocephalic, Atraumatic, Conjunctiva clear, EOMI  Neck: Supple, No JVD, No tenderness  Lungs: Breath sound equal bilaterally, No wheezes, No rales  Cardiovascular: S1S2, Regular rhythm  Abdomen: Soft, Nontender, Nondistended, No guarding/rebound, Positive bowel sounds  Extremities: No clubbing, No cyanosis, No edema, No calf tenderness  Neuro: Strength equal bilaterally, No tremors  Psychiatric: Appropriate mood, Normal affect    LABORATORY STUDIES:  ----------------------------------------             7.7    7.59  )-----------( 322      ( 23 Oct 2021 03:27 )             24.0     10-23    138  |  104  |  32.9<H>  ----------------------------<  80  2.7<LL>   |  20.0<L>  |  0.86    Ca    8.0<L>      23 Oct 2021 03:27    TPro  5.9<L>  /  Alb  2.9<L>  /  TBili  0.3<L>  /  DBili  x   /  AST  19  /  ALT  10  /  AlkPhos  81  10-22    LIVER FUNCTIONS - ( 22 Oct 2021 03:53 )  Alb: 2.9 g/dL / Pro: 5.9 g/dL / ALK PHOS: 81 U/L / ALT: 10 U/L / AST: 19 U/L / GGT: x           MEDICATIONS  (STANDING):  ALBUTerol    0.083% 2.5 milliGRAM(s) Nebulizer two times a day  aMIOdarone    Tablet 200 milliGRAM(s) Oral daily  aspirin  chewable 81 milliGRAM(s) Oral daily  atorvastatin 80 milliGRAM(s) Oral at bedtime  carvedilol 3.125 milliGRAM(s) Oral every 12 hours  clopidogrel Tablet 75 milliGRAM(s) Oral daily  ferrous    sulfate 325 milliGRAM(s) Oral daily  folic acid 1 milliGRAM(s) Oral daily  pantoprazole  Injectable 40 milliGRAM(s) IV Push every 12 hours  sodium chloride 0.9%. 1000 milliLiter(s) (75 mL/Hr) IV Continuous <Continuous>  tiotropium 2.5 MICROgram(s)/olodaterol 2.5 MICROgram(s) (STIOLTO) Inhaler 2 Puff(s) Inhalation daily    MEDICATIONS  (PRN):  acetaminophen     Tablet .. 650 milliGRAM(s) Oral every 6 hours PRN Temp greater or equal to 38C (100.4F), Mild Pain (1 - 3)  aluminum hydroxide/magnesium hydroxide/simethicone Suspension 30 milliLiter(s) Oral every 4 hours PRN Dyspepsia  melatonin 3 milliGRAM(s) Oral at bedtime PRN Insomnia  ondansetron Injectable 4 milliGRAM(s) IV Push every 8 hours PRN Nausea and/or Vomiting  sodium chloride 0.65% Nasal 1 Spray(s) Both Nostrils every 4 hours PRN Nasal Congestion      ASSESSMENT / PLAN:  ----------------------------------------  72 yo F with hx of chronic resp failure from COPD on 3L NC,  HTN, Renal artery stenosis, prior TIA's with Placement of a left cerebral artery stent in 2014, RA, Osteopenia, HLD and S/p Cardiac arrest s/p angiography with placement of PPM and cardiac surg in June of 2021 for valve replacement and pseudoaneurysm repair with stent placement presented to the ED with bloody stools.    Gastrointestinal hemorrhage / Acute blood loss anemia - Monitoring hemoglobin levels. CT noted bleeding at the ileum. Previous transfusion noted, for repeat transfusion of packed red blood cells. On pantoprazole. Gastroenterology consultation noted. The patient did not wish to pursue repeat colonoscopy.    Hypokalemia - Potassium supplementation.    COPD - On supplemental oxygen. Inhaled bronchodilator therapy.    Atrial fibrillation - On amiodarone and carvedilol. No anticoagulation due to gastrointestinal hemorrhage.    Hypertension - Close blood pressure monitoring.    CVA - Noted history of cerebral artery stent. On aspirin, clopidogrel, and atorvastatin.

## 2021-10-23 NOTE — CHART NOTE - NSCHARTNOTEFT_GEN_A_CORE
On call nocturnist    Called by CDU RN about hypokalemia.  Per RN, pt has IV access and can swallow but not large pills.  Will order IV K+ x 3 and PO K+ solution.  Pt is already on telemetry.

## 2021-10-23 NOTE — PROGRESS NOTE ADULT - SUBJECTIVE AND OBJECTIVE BOX
Pt seen and examined. No BM's in the ED. This AM's Hb is down from 8.6 g. to 7.7 grams. Pt. continues to refuse colonoscopy and wishes diet advancement from her current CLD.    REVIEW OF SYSTEMS:  Constitutional: No fever, weight loss or fatigue  Cardiovascular: No chest pain, palpitations, dizziness or leg swelling  Gastrointestinal: As noted above. No abdominal or epigastric pain. No nausea, vomiting or hematemesis; No diarrhea or constipation. No melena or hematochezia.  Skin: No itching, burning, rashes or lesions       MEDICATIONS:  MEDICATIONS  (STANDING):  ALBUTerol    0.083% 2.5 milliGRAM(s) Nebulizer two times a day  aMIOdarone    Tablet 200 milliGRAM(s) Oral daily  atorvastatin 80 milliGRAM(s) Oral at bedtime  carvedilol 3.125 milliGRAM(s) Oral every 12 hours  ferrous    sulfate 325 milliGRAM(s) Oral daily  folic acid 1 milliGRAM(s) Oral daily  pantoprazole  Injectable 40 milliGRAM(s) IV Push every 12 hours  potassium chloride  10 mEq/100 mL IVPB 10 milliEquivalent(s) IV Intermittent every 1 hour  sodium chloride 0.9%. 1000 milliLiter(s) (75 mL/Hr) IV Continuous <Continuous>  tiotropium 2.5 MICROgram(s)/olodaterol 2.5 MICROgram(s) (STIOLTO) Inhaler 2 Puff(s) Inhalation daily    MEDICATIONS  (PRN):  acetaminophen     Tablet .. 650 milliGRAM(s) Oral every 6 hours PRN Temp greater or equal to 38C (100.4F), Mild Pain (1 - 3)  aluminum hydroxide/magnesium hydroxide/simethicone Suspension 30 milliLiter(s) Oral every 4 hours PRN Dyspepsia  melatonin 3 milliGRAM(s) Oral at bedtime PRN Insomnia  ondansetron Injectable 4 milliGRAM(s) IV Push every 8 hours PRN Nausea and/or Vomiting  sodium chloride 0.65% Nasal 1 Spray(s) Both Nostrils every 4 hours PRN Nasal Congestion      Allergies    No Known Allergies    Intolerances        Vital Signs Last 24 Hrs  T(C): 36.8 (23 Oct 2021 05:00), Max: 36.8 (23 Oct 2021 05:00)  T(F): 98.2 (23 Oct 2021 05:00), Max: 98.2 (23 Oct 2021 05:00)  HR: 84 (23 Oct 2021 05:00) (84 - 97)  BP: 111/77 (23 Oct 2021 05:00) (101/63 - 122/81)  BP(mean): 88 (23 Oct 2021 05:00) (79 - 88)  RR: 18 (23 Oct 2021 05:00) (18 - 18)  SpO2: 97% (23 Oct 2021 05:00) (95% - 100%)    10-22 @ 07:01  -  10-23 @ 07:00  --------------------------------------------------------  IN: 0 mL / OUT: 600 mL / NET: -600 mL        PHYSICAL EXAM:    General: Well developed; in no acute distress  HEENT: MMM, conjunctiva pink and sclera anicteric.  Lungs: clear to auscultation bilaterally.  Cor: RRR S1, S2 only.  Gastrointestinal: Abdomen: Soft non-tender non-distended; Normal bowel sounds; No hepatosplenomegaly.  Extremities: no cyanosis, clubbing or edema.  Skin: Warm and dry. No obvious rash  Neuro: Pt. a + o x 3.    LABS:  CBC Full  -  ( 23 Oct 2021 03:27 )  WBC Count : 7.59 K/uL  RBC Count : 2.87 M/uL  Hemoglobin : 7.7 g/dL  Hematocrit : 24.0 %  Platelet Count - Automated : 322 K/uL  Mean Cell Volume : 83.6 fl  Mean Cell Hemoglobin : 26.8 pg  Mean Cell Hemoglobin Concentration : 32.1 gm/dL  Auto Neutrophil # : 5.37 K/uL  Auto Lymphocyte # : 0.93 K/uL  Auto Monocyte # : 0.96 K/uL  Auto Eosinophil # : 0.29 K/uL  Auto Basophil # : 0.02 K/uL  Auto Neutrophil % : 70.7 %  Auto Lymphocyte % : 12.3 %  Auto Monocyte % : 12.6 %  Auto Eosinophil % : 3.8 %  Auto Basophil % : 0.3 %    10-23    138  |  104  |  32.9<H>  ----------------------------<  80  2.7<LL>   |  20.0<L>  |  0.86    Ca    8.0<L>      23 Oct 2021 03:27    TPro  5.9<L>  /  Alb  2.9<L>  /  TBili  0.3<L>  /  DBili  x   /  AST  19  /  ALT  10  /  AlkPhos  81  10-22    PT/INR - ( 21 Oct 2021 10:57 )   PT: 13.5 sec;   INR: 1.17 ratio         PTT - ( 21 Oct 2021 10:57 )  PTT:26.9 sec                  RADIOLOGY & ADDITIONAL STUDIES (The following images were personally reviewed):

## 2021-10-24 LAB
ANION GAP SERPL CALC-SCNC: 12 MMOL/L — SIGNIFICANT CHANGE UP (ref 5–17)
BASOPHILS # BLD AUTO: 0.01 K/UL — SIGNIFICANT CHANGE UP (ref 0–0.2)
BASOPHILS NFR BLD AUTO: 0.1 % — SIGNIFICANT CHANGE UP (ref 0–2)
BUN SERPL-MCNC: 25.8 MG/DL — HIGH (ref 8–20)
CALCIUM SERPL-MCNC: 8.3 MG/DL — LOW (ref 8.6–10.2)
CHLORIDE SERPL-SCNC: 113 MMOL/L — HIGH (ref 98–107)
CO2 SERPL-SCNC: 20 MMOL/L — LOW (ref 22–29)
CREAT SERPL-MCNC: 0.98 MG/DL — SIGNIFICANT CHANGE UP (ref 0.5–1.3)
EOSINOPHIL # BLD AUTO: 0.28 K/UL — SIGNIFICANT CHANGE UP (ref 0–0.5)
EOSINOPHIL NFR BLD AUTO: 3.1 % — SIGNIFICANT CHANGE UP (ref 0–6)
GLUCOSE SERPL-MCNC: 92 MG/DL — SIGNIFICANT CHANGE UP (ref 70–99)
HCT VFR BLD CALC: 27.8 % — LOW (ref 34.5–45)
HGB BLD-MCNC: 8.9 G/DL — LOW (ref 11.5–15.5)
IMM GRANULOCYTES NFR BLD AUTO: 0.2 % — SIGNIFICANT CHANGE UP (ref 0–1.5)
LYMPHOCYTES # BLD AUTO: 0.91 K/UL — LOW (ref 1–3.3)
LYMPHOCYTES # BLD AUTO: 9.9 % — LOW (ref 13–44)
MAGNESIUM SERPL-MCNC: 1.8 MG/DL — SIGNIFICANT CHANGE UP (ref 1.6–2.6)
MCHC RBC-ENTMCNC: 27.6 PG — SIGNIFICANT CHANGE UP (ref 27–34)
MCHC RBC-ENTMCNC: 32 GM/DL — SIGNIFICANT CHANGE UP (ref 32–36)
MCV RBC AUTO: 86.3 FL — SIGNIFICANT CHANGE UP (ref 80–100)
MONOCYTES # BLD AUTO: 0.86 K/UL — SIGNIFICANT CHANGE UP (ref 0–0.9)
MONOCYTES NFR BLD AUTO: 9.4 % — SIGNIFICANT CHANGE UP (ref 2–14)
NEUTROPHILS # BLD AUTO: 7.08 K/UL — SIGNIFICANT CHANGE UP (ref 1.8–7.4)
NEUTROPHILS NFR BLD AUTO: 77.3 % — HIGH (ref 43–77)
PLATELET # BLD AUTO: 304 K/UL — SIGNIFICANT CHANGE UP (ref 150–400)
POTASSIUM SERPL-MCNC: 3.2 MMOL/L — LOW (ref 3.5–5.3)
POTASSIUM SERPL-SCNC: 3.2 MMOL/L — LOW (ref 3.5–5.3)
RBC # BLD: 3.22 M/UL — LOW (ref 3.8–5.2)
RBC # FLD: 17.6 % — HIGH (ref 10.3–14.5)
SODIUM SERPL-SCNC: 145 MMOL/L — SIGNIFICANT CHANGE UP (ref 135–145)
WBC # BLD: 9.16 K/UL — SIGNIFICANT CHANGE UP (ref 3.8–10.5)
WBC # FLD AUTO: 9.16 K/UL — SIGNIFICANT CHANGE UP (ref 3.8–10.5)

## 2021-10-24 PROCEDURE — 99233 SBSQ HOSP IP/OBS HIGH 50: CPT

## 2021-10-24 RX ORDER — INFLUENZA VIRUS VACCINE 15; 15; 15; 15 UG/.5ML; UG/.5ML; UG/.5ML; UG/.5ML
0.5 SUSPENSION INTRAMUSCULAR ONCE
Refills: 0 | Status: COMPLETED | OUTPATIENT
Start: 2021-10-24 | End: 2021-10-24

## 2021-10-24 RX ORDER — OXYCODONE AND ACETAMINOPHEN 5; 325 MG/1; MG/1
1 TABLET ORAL ONCE
Refills: 0 | Status: DISCONTINUED | OUTPATIENT
Start: 2021-10-24 | End: 2021-10-24

## 2021-10-24 RX ORDER — POTASSIUM CHLORIDE 20 MEQ
40 PACKET (EA) ORAL ONCE
Refills: 0 | Status: COMPLETED | OUTPATIENT
Start: 2021-10-24 | End: 2021-10-24

## 2021-10-24 RX ORDER — FUROSEMIDE 40 MG
40 TABLET ORAL DAILY
Refills: 0 | Status: DISCONTINUED | OUTPATIENT
Start: 2021-10-24 | End: 2021-10-25

## 2021-10-24 RX ADMIN — CARVEDILOL PHOSPHATE 3.12 MILLIGRAM(S): 80 CAPSULE, EXTENDED RELEASE ORAL at 06:00

## 2021-10-24 RX ADMIN — Medication 650 MILLIGRAM(S): at 09:42

## 2021-10-24 RX ADMIN — PANTOPRAZOLE SODIUM 40 MILLIGRAM(S): 20 TABLET, DELAYED RELEASE ORAL at 17:59

## 2021-10-24 RX ADMIN — CLOPIDOGREL BISULFATE 75 MILLIGRAM(S): 75 TABLET, FILM COATED ORAL at 11:54

## 2021-10-24 RX ADMIN — Medication 40 MILLIEQUIVALENT(S): at 11:54

## 2021-10-24 RX ADMIN — Medication 325 MILLIGRAM(S): at 11:54

## 2021-10-24 RX ADMIN — PANTOPRAZOLE SODIUM 40 MILLIGRAM(S): 20 TABLET, DELAYED RELEASE ORAL at 05:59

## 2021-10-24 RX ADMIN — Medication 40 MILLIGRAM(S): at 17:59

## 2021-10-24 RX ADMIN — TIOTROPIUM BROMIDE AND OLODATEROL 2 PUFF(S): 3.124; 2.736 SPRAY, METERED RESPIRATORY (INHALATION) at 08:02

## 2021-10-24 RX ADMIN — Medication 1 MILLIGRAM(S): at 11:54

## 2021-10-24 RX ADMIN — CARVEDILOL PHOSPHATE 3.12 MILLIGRAM(S): 80 CAPSULE, EXTENDED RELEASE ORAL at 17:59

## 2021-10-24 RX ADMIN — OXYCODONE AND ACETAMINOPHEN 1 TABLET(S): 5; 325 TABLET ORAL at 22:06

## 2021-10-24 RX ADMIN — Medication 650 MILLIGRAM(S): at 17:58

## 2021-10-24 RX ADMIN — Medication 3 MILLIGRAM(S): at 00:16

## 2021-10-24 RX ADMIN — ATORVASTATIN CALCIUM 80 MILLIGRAM(S): 80 TABLET, FILM COATED ORAL at 22:06

## 2021-10-24 RX ADMIN — AMIODARONE HYDROCHLORIDE 200 MILLIGRAM(S): 400 TABLET ORAL at 06:00

## 2021-10-24 RX ADMIN — OXYCODONE AND ACETAMINOPHEN 1 TABLET(S): 5; 325 TABLET ORAL at 22:21

## 2021-10-24 RX ADMIN — SODIUM CHLORIDE 75 MILLILITER(S): 9 INJECTION INTRAMUSCULAR; INTRAVENOUS; SUBCUTANEOUS at 00:16

## 2021-10-24 RX ADMIN — ALBUTEROL 2.5 MILLIGRAM(S): 90 AEROSOL, METERED ORAL at 08:02

## 2021-10-24 RX ADMIN — ALBUTEROL 2.5 MILLIGRAM(S): 90 AEROSOL, METERED ORAL at 19:58

## 2021-10-24 RX ADMIN — Medication 81 MILLIGRAM(S): at 11:54

## 2021-10-24 NOTE — PROGRESS NOTE ADULT - SUBJECTIVE AND OBJECTIVE BOX
JAVED CHIN  ----------------------------------------  The patient was seen at bedside. Patient with anemia. Offered no complaints. No bowel movement today.    Vital Signs Last 24 Hrs  T(C): 36.8 (24 Oct 2021 09:02), Max: 36.8 (23 Oct 2021 18:15)  T(F): 98.3 (24 Oct 2021 09:02), Max: 98.3 (24 Oct 2021 09:02)  HR: 87 (24 Oct 2021 09:02) (81 - 111)  BP: 105/67 (24 Oct 2021 09:02) (97/63 - 109/62)  BP(mean): --  RR: 16 (24 Oct 2021 09:02) (16 - 18)  SpO2: 93% (24 Oct 2021 09:02) (93% - 100%)    PHYSICAL EXAMINATION:  ----------------------------------------  General appearance: No acute distress, Awake, Alert  HEENT: Normocephalic, Atraumatic, Conjunctiva clear, EOMI  Neck: Supple, No JVD, No tenderness  Lungs: Breath sound equal bilaterally, No wheezes, No rales  Cardiovascular: S1S2, Regular rhythm  Abdomen: Soft, Nontender, Nondistended, No guarding/rebound, Positive bowel sounds  Extremities: No clubbing, No cyanosis, No edema, No calf tenderness  Neuro: Strength equal bilaterally, No tremors  Psychiatric: Appropriate mood, Normal affect    LABORATORY STUDIES:  ----------------------------------------             8.9    9.16  )-----------( 304      ( 24 Oct 2021 06:20 )             27.8     10-24    145  |  113<H>  |  25.8<H>  ----------------------------<  92  3.2<L>   |  20.0<L>  |  0.98    Ca    8.3<L>      24 Oct 2021 06:20  Mg     1.8     10-24    MEDICATIONS  (STANDING):  ALBUTerol    0.083% 2.5 milliGRAM(s) Nebulizer two times a day  aMIOdarone    Tablet 200 milliGRAM(s) Oral daily  aspirin  chewable 81 milliGRAM(s) Oral daily  atorvastatin 80 milliGRAM(s) Oral at bedtime  carvedilol 3.125 milliGRAM(s) Oral every 12 hours  clopidogrel Tablet 75 milliGRAM(s) Oral daily  ferrous    sulfate 325 milliGRAM(s) Oral daily  folic acid 1 milliGRAM(s) Oral daily  influenza   Vaccine 0.5 milliLiter(s) IntraMuscular once  pantoprazole  Injectable 40 milliGRAM(s) IV Push every 12 hours  sodium chloride 0.9%. 1000 milliLiter(s) (75 mL/Hr) IV Continuous <Continuous>  tiotropium 2.5 MICROgram(s)/olodaterol 2.5 MICROgram(s) (STIOLTO) Inhaler 2 Puff(s) Inhalation daily    MEDICATIONS  (PRN):  acetaminophen     Tablet .. 650 milliGRAM(s) Oral every 6 hours PRN Temp greater or equal to 38C (100.4F), Mild Pain (1 - 3)  aluminum hydroxide/magnesium hydroxide/simethicone Suspension 30 milliLiter(s) Oral every 4 hours PRN Dyspepsia  melatonin 3 milliGRAM(s) Oral at bedtime PRN Insomnia  ondansetron Injectable 4 milliGRAM(s) IV Push every 8 hours PRN Nausea and/or Vomiting  sodium chloride 0.65% Nasal 1 Spray(s) Both Nostrils every 4 hours PRN Nasal Congestion      ASSESSMENT / PLAN:  ----------------------------------------  70 yo F with hx of chronic resp failure from COPD on 3L NC,  HTN, Renal artery stenosis, prior TIA's with Placement of a left cerebral artery stent in 2014, RA, Osteopenia, HLD and S/p Cardiac arrest s/p angiography with placement of PPM and cardiac surg in June of 2021 for valve replacement and pseudoaneurysm repair with stent placement presented to the ED with bloody stools.    Gastrointestinal hemorrhage / Acute blood loss anemia - Monitoring hemoglobin levels. Status post previous transfusions. CT noted bleeding at the ileum. On pantoprazole. The patient did not wish to pursue repeat colonoscopy. Continue on diet as tolerated.    Hypokalemia - For further potassium supplementation.    COPD - On supplemental oxygen at 3 liters/minute. Inhaled bronchodilator therapy.    Atrial fibrillation - On amiodarone and carvedilol. No anticoagulation due to gastrointestinal hemorrhage.    Hypertension - Close blood pressure monitoring.    CVA - Noted history of cerebral artery stent. On aspirin, clopidogrel, and atorvastatin.

## 2021-10-24 NOTE — PROGRESS NOTE ADULT - SUBJECTIVE AND OBJECTIVE BOX
Pt seen and examined. No recurrent LGI bleeding. Hb this AM has increased to 8.9 grams     REVIEW OF SYSTEMS:  Constitutional: No fever, weight loss or fatigue  Cardiovascular: No chest pain, palpitations, dizziness or leg swelling  Gastrointestinal: As noted above. No abdominal or epigastric pain. No nausea, vomiting or hematemesis; No diarrhea or constipation. No melena or hematochezia.  Skin: No itching, burning, rashes or lesions       MEDICATIONS:  MEDICATIONS  (STANDING):  ALBUTerol    0.083% 2.5 milliGRAM(s) Nebulizer two times a day  aMIOdarone    Tablet 200 milliGRAM(s) Oral daily  aspirin  chewable 81 milliGRAM(s) Oral daily  atorvastatin 80 milliGRAM(s) Oral at bedtime  carvedilol 3.125 milliGRAM(s) Oral every 12 hours  clopidogrel Tablet 75 milliGRAM(s) Oral daily  ferrous    sulfate 325 milliGRAM(s) Oral daily  folic acid 1 milliGRAM(s) Oral daily  influenza   Vaccine 0.5 milliLiter(s) IntraMuscular once  pantoprazole  Injectable 40 milliGRAM(s) IV Push every 12 hours  potassium chloride    Tablet ER 40 milliEquivalent(s) Oral once  sodium chloride 0.9%. 1000 milliLiter(s) (75 mL/Hr) IV Continuous <Continuous>  tiotropium 2.5 MICROgram(s)/olodaterol 2.5 MICROgram(s) (STIOLTO) Inhaler 2 Puff(s) Inhalation daily    MEDICATIONS  (PRN):  acetaminophen     Tablet .. 650 milliGRAM(s) Oral every 6 hours PRN Temp greater or equal to 38C (100.4F), Mild Pain (1 - 3)  aluminum hydroxide/magnesium hydroxide/simethicone Suspension 30 milliLiter(s) Oral every 4 hours PRN Dyspepsia  melatonin 3 milliGRAM(s) Oral at bedtime PRN Insomnia  ondansetron Injectable 4 milliGRAM(s) IV Push every 8 hours PRN Nausea and/or Vomiting  sodium chloride 0.65% Nasal 1 Spray(s) Both Nostrils every 4 hours PRN Nasal Congestion      Allergies    No Known Allergies    Intolerances        Vital Signs Last 24 Hrs  T(C): 36.8 (24 Oct 2021 09:02), Max: 36.8 (23 Oct 2021 18:15)  T(F): 98.3 (24 Oct 2021 09:02), Max: 98.3 (24 Oct 2021 09:02)  HR: 87 (24 Oct 2021 09:02) (81 - 111)  BP: 105/67 (24 Oct 2021 09:02) (97/63 - 109/62)  BP(mean): --  RR: 16 (24 Oct 2021 09:02) (16 - 18)  SpO2: 93% (24 Oct 2021 09:02) (93% - 100%)      PHYSICAL EXAM:    General: Well developed; in no acute distress  HEENT: MMM, conjunctiva pink and sclera anicteric.  Lungs: clear to auscultation bilaterally.  Cor: RRR S1, S2 only.  Gastrointestinal: Abdomen: Soft non-tender non-distended; Normal bowel sounds; No hepatosplenomegaly.  Extremities: no cyanosis, clubbing or edema.  Skin: Warm and dry. No obvious rash  Neuro: Pt. a + o x 3    LABS:  CBC Full  -  ( 24 Oct 2021 06:20 )  WBC Count : 9.16 K/uL  RBC Count : 3.22 M/uL  Hemoglobin : 8.9 g/dL  Hematocrit : 27.8 %  Platelet Count - Automated : 304 K/uL  Mean Cell Volume : 86.3 fl  Mean Cell Hemoglobin : 27.6 pg  Mean Cell Hemoglobin Concentration : 32.0 gm/dL  Auto Neutrophil # : 7.08 K/uL  Auto Lymphocyte # : 0.91 K/uL  Auto Monocyte # : 0.86 K/uL  Auto Eosinophil # : 0.28 K/uL  Auto Basophil # : 0.01 K/uL  Auto Neutrophil % : 77.3 %  Auto Lymphocyte % : 9.9 %  Auto Monocyte % : 9.4 %  Auto Eosinophil % : 3.1 %  Auto Basophil % : 0.1 %    10-24    145  |  113<H>  |  25.8<H>  ----------------------------<  92  3.2<L>   |  20.0<L>  |  0.98    Ca    8.3<L>      24 Oct 2021 06:20  Mg     1.8     10-24                        RADIOLOGY & ADDITIONAL STUDIES (The following images were personally reviewed):

## 2021-10-24 NOTE — PROGRESS NOTE ADULT - TIME BILLING
Severe Anemia secondary to GI bleed, Coronary Artery Disease S/p TATA to LM and LAD 06/21, Atrial fibrillation ( on Amiodarone), HFrEF ( 30-35%) WITH VOLUME OVERLOAD
Severe Anemia secondary to GI bleed, Coronary Artery Disease S/p TATA to LM and LAD 06/21, Atrial fibrillation ( on Amiodarone)

## 2021-10-24 NOTE — PROGRESS NOTE ADULT - SUBJECTIVE AND OBJECTIVE BOX
[facility]                                                        Office: [address] [address]                                                       Telephone: [phone]. Fax:[phone]                                                                             PROGRESS NOTE   Reason for follow up:    GI bleed                            Overnight: No new events.   Update:   -600mg/24hr.    Subjective: "No one told me how am I doing"   Complains of:   Review of symptoms: Cardiac:  No chest pain. No dyspnea. No palpitations.  Respiratory: no cough. No dyspnea  Gastrointestinal: No diarrhea. No abdominal pain. No bleeding.   Past medical history: No updates.   Chronic conditions: PAST MEDICAL & SURGICAL HISTORY:  Rheumatoid arthritis  COPD without exacerbation  HTN (hypertension)  Pacemaker  H/O cerebral artery stenosis  left cerebral artery stent per history in 2014.  S/P hysterectomy  S/P cholecystectomy  H/O exploratory laparotomy  	  Vitals:  T(C): 36.8 (10-24-21 @ 09:02), Max: 36.8 (10-23-21 @ 18:15)  HR: 87 (10-24-21 @ 09:02) (81 - 111)  BP: 105/67 (10-24-21 @ 09:02) (97/63 - 109/62)  RR: 16 (10-24-21 @ 09:02) (16 - 18)  SpO2: 93% (10-24-21 @ 09:02) (93% - 100%)    I&O's Summary  Weight (kg): 81.6 (10-21 @ 09:29)    PHYSICAL EXAM:  Appearance: Comfortable. No acute distress  HEENT:  Head and neck: Atraumatic. Normocephalic.  Normal oral mucosa, PERRL, Neck is supple. No JVD, No carotid bruit.   Neurologic: A & O x 3, no focal deficits. EOMI , Cranial nerves are intact.  Lymphatic: No cervical lymphadenopathy  Cardiovascular: Normal S1 S2, No murmur, rubs/gallops. No JVD, No edema  Respiratory: Lungs clear to auscultation  Gastrointestinal:  Soft, Non-tender, + BS  Lower Extremities: No edema  Psychiatry: Patient is calm. No agitation. Mood & affect appropriate  Skin: No rashes/ ecchymoses/cyanosis/ulcers visualized on the face, hands or feet.    CURRENT MEDICATIONS:  aMIOdarone    Tablet 200 milliGRAM(s) Oral daily  carvedilol 3.125 milliGRAM(s) Oral every 12 hours  furosemide    Tablet 40 milliGRAM(s) Oral daily  ALBUTerol    0.083%  tiotropium 2.5 MICROgram(s)/olodaterol 2.5 MICROgram(s) (STIOLTO) Inhaler  pantoprazole  Injectable  atorvastatin  aspirin  chewable  clopidogrel Tablet  ferrous    sulfate  folic acid  influenza   Vaccine  sodium chloride 0.9%.    LABS:	 	                       8.9    9.16  )-----------( 304      ( 24 Oct 2021 06:20 )             27.8   10-24  145  |  113<H>  |  25.8<H>  ----------------------------<  92  3.2<L>   |  20.0<L>  |  0.98  Ca    8.3<L>      24 Oct 2021 06:20  Mg     1.8     10-24    TELEMETRY: Reviewed

## 2021-10-25 ENCOUNTER — TRANSCRIPTION ENCOUNTER (OUTPATIENT)
Age: 71
End: 2021-10-25

## 2021-10-25 VITALS
DIASTOLIC BLOOD PRESSURE: 79 MMHG | SYSTOLIC BLOOD PRESSURE: 118 MMHG | TEMPERATURE: 98 F | OXYGEN SATURATION: 97 % | RESPIRATION RATE: 18 BRPM | HEART RATE: 89 BPM

## 2021-10-25 DIAGNOSIS — K92.2 GASTROINTESTINAL HEMORRHAGE, UNSPECIFIED: ICD-10-CM

## 2021-10-25 LAB
ANION GAP SERPL CALC-SCNC: 14 MMOL/L — SIGNIFICANT CHANGE UP (ref 5–17)
BASOPHILS # BLD AUTO: 0.02 K/UL — SIGNIFICANT CHANGE UP (ref 0–0.2)
BASOPHILS NFR BLD AUTO: 0.2 % — SIGNIFICANT CHANGE UP (ref 0–2)
BUN SERPL-MCNC: 16.9 MG/DL — SIGNIFICANT CHANGE UP (ref 8–20)
CALCIUM SERPL-MCNC: 8.5 MG/DL — LOW (ref 8.6–10.2)
CHLORIDE SERPL-SCNC: 112 MMOL/L — HIGH (ref 98–107)
CO2 SERPL-SCNC: 20 MMOL/L — LOW (ref 22–29)
CREAT SERPL-MCNC: 0.94 MG/DL — SIGNIFICANT CHANGE UP (ref 0.5–1.3)
EOSINOPHIL # BLD AUTO: 0.36 K/UL — SIGNIFICANT CHANGE UP (ref 0–0.5)
EOSINOPHIL NFR BLD AUTO: 3.7 % — SIGNIFICANT CHANGE UP (ref 0–6)
GLUCOSE SERPL-MCNC: 89 MG/DL — SIGNIFICANT CHANGE UP (ref 70–99)
HCT VFR BLD CALC: 33.1 % — LOW (ref 34.5–45)
HGB BLD-MCNC: 10.3 G/DL — LOW (ref 11.5–15.5)
IMM GRANULOCYTES NFR BLD AUTO: 0.5 % — SIGNIFICANT CHANGE UP (ref 0–1.5)
LYMPHOCYTES # BLD AUTO: 1 K/UL — SIGNIFICANT CHANGE UP (ref 1–3.3)
LYMPHOCYTES # BLD AUTO: 10.4 % — LOW (ref 13–44)
MAGNESIUM SERPL-MCNC: 1.8 MG/DL — SIGNIFICANT CHANGE UP (ref 1.6–2.6)
MCHC RBC-ENTMCNC: 27.2 PG — SIGNIFICANT CHANGE UP (ref 27–34)
MCHC RBC-ENTMCNC: 31.1 GM/DL — LOW (ref 32–36)
MCV RBC AUTO: 87.6 FL — SIGNIFICANT CHANGE UP (ref 80–100)
MONOCYTES # BLD AUTO: 0.81 K/UL — SIGNIFICANT CHANGE UP (ref 0–0.9)
MONOCYTES NFR BLD AUTO: 8.4 % — SIGNIFICANT CHANGE UP (ref 2–14)
NEUTROPHILS # BLD AUTO: 7.39 K/UL — SIGNIFICANT CHANGE UP (ref 1.8–7.4)
NEUTROPHILS NFR BLD AUTO: 76.8 % — SIGNIFICANT CHANGE UP (ref 43–77)
PLATELET # BLD AUTO: 323 K/UL — SIGNIFICANT CHANGE UP (ref 150–400)
POTASSIUM SERPL-MCNC: 3.5 MMOL/L — SIGNIFICANT CHANGE UP (ref 3.5–5.3)
POTASSIUM SERPL-SCNC: 3.5 MMOL/L — SIGNIFICANT CHANGE UP (ref 3.5–5.3)
RBC # BLD: 3.78 M/UL — LOW (ref 3.8–5.2)
RBC # FLD: 18.4 % — HIGH (ref 10.3–14.5)
SODIUM SERPL-SCNC: 146 MMOL/L — HIGH (ref 135–145)
WBC # BLD: 9.63 K/UL — SIGNIFICANT CHANGE UP (ref 3.8–10.5)
WBC # FLD AUTO: 9.63 K/UL — SIGNIFICANT CHANGE UP (ref 3.8–10.5)

## 2021-10-25 PROCEDURE — 99233 SBSQ HOSP IP/OBS HIGH 50: CPT

## 2021-10-25 PROCEDURE — 71045 X-RAY EXAM CHEST 1 VIEW: CPT | Mod: 26

## 2021-10-25 PROCEDURE — 99239 HOSP IP/OBS DSCHRG MGMT >30: CPT

## 2021-10-25 RX ORDER — FUROSEMIDE 40 MG
20 TABLET ORAL ONCE
Refills: 0 | Status: COMPLETED | OUTPATIENT
Start: 2021-10-25 | End: 2021-10-25

## 2021-10-25 RX ORDER — FUROSEMIDE 40 MG
60 TABLET ORAL DAILY
Refills: 0 | Status: DISCONTINUED | OUTPATIENT
Start: 2021-10-26 | End: 2021-10-25

## 2021-10-25 RX ADMIN — TIOTROPIUM BROMIDE AND OLODATEROL 2 PUFF(S): 3.124; 2.736 SPRAY, METERED RESPIRATORY (INHALATION) at 08:14

## 2021-10-25 RX ADMIN — SODIUM CHLORIDE 75 MILLILITER(S): 9 INJECTION INTRAMUSCULAR; INTRAVENOUS; SUBCUTANEOUS at 02:40

## 2021-10-25 RX ADMIN — CARVEDILOL PHOSPHATE 3.12 MILLIGRAM(S): 80 CAPSULE, EXTENDED RELEASE ORAL at 17:20

## 2021-10-25 RX ADMIN — Medication 325 MILLIGRAM(S): at 11:27

## 2021-10-25 RX ADMIN — CLOPIDOGREL BISULFATE 75 MILLIGRAM(S): 75 TABLET, FILM COATED ORAL at 11:27

## 2021-10-25 RX ADMIN — AMIODARONE HYDROCHLORIDE 200 MILLIGRAM(S): 400 TABLET ORAL at 05:46

## 2021-10-25 RX ADMIN — PANTOPRAZOLE SODIUM 40 MILLIGRAM(S): 20 TABLET, DELAYED RELEASE ORAL at 05:46

## 2021-10-25 RX ADMIN — Medication 81 MILLIGRAM(S): at 11:27

## 2021-10-25 RX ADMIN — Medication 40 MILLIGRAM(S): at 05:45

## 2021-10-25 RX ADMIN — ALBUTEROL 2.5 MILLIGRAM(S): 90 AEROSOL, METERED ORAL at 08:13

## 2021-10-25 RX ADMIN — Medication 20 MILLIGRAM(S): at 11:42

## 2021-10-25 RX ADMIN — PANTOPRAZOLE SODIUM 40 MILLIGRAM(S): 20 TABLET, DELAYED RELEASE ORAL at 17:21

## 2021-10-25 RX ADMIN — Medication 1 MILLIGRAM(S): at 11:27

## 2021-10-25 NOTE — DISCHARGE NOTE NURSING/CASE MANAGEMENT/SOCIAL WORK - NSDCPEPTCAREGIVEDUMATLIST _GEN_ALL_CORE
How Many Skin Cancers Have You Had?: one
What Is The Reason For Today's Visit?: History of Non-Melanoma Skin Cancer
When Was Your Last Cancer Diagnosed?: 2016
Stroke

## 2021-10-25 NOTE — PROGRESS NOTE ADULT - SUBJECTIVE AND OBJECTIVE BOX
JAVED CHIN  ----------------------------------------  The patient was seen at bedside. Patient with gastrointestinal hemorrhage. Offered no complaints. Had a bowel movement without evidence of bleeding.    Vital Signs Last 24 Hrs  T(C): 36.8 (25 Oct 2021 10:47), Max: 36.8 (25 Oct 2021 10:47)  T(F): 98.3 (25 Oct 2021 10:47), Max: 98.3 (25 Oct 2021 10:47)  HR: 93 (25 Oct 2021 10:47) (65 - 93)  BP: 103/67 (25 Oct 2021 10:47) (103/67 - 140/84)  BP(mean): 18 (25 Oct 2021 10:47) (18 - 18)  RR: 18 (25 Oct 2021 10:47) (16 - 18)  SpO2: 97% (25 Oct 2021 10:47) (94% - 99%)    PHYSICAL EXAMINATION:  ----------------------------------------  General appearance: No acute distress, Awake, Alert  HEENT: Normocephalic, Atraumatic, Conjunctiva clear, EOMI  Neck: Supple, No JVD, No tenderness  Lungs: Breath sound equal bilaterally, No wheezes, No rales  Cardiovascular: S1S2, Regular rhythm  Abdomen: Soft, Nontender, Nondistended, No guarding/rebound, Positive bowel sounds  Extremities: No clubbing, No cyanosis, No edema, No calf tenderness  Neuro: Strength equal bilaterally, No tremors  Psychiatric: Appropriate mood, Normal affect    LABORATORY STUDIES:  ----------------------------------------             10.3   9.63  )-----------( 323      ( 25 Oct 2021 07:01 )             33.1     10-25    146<H>  |  112<H>  |  16.9  ----------------------------<  89  3.5   |  20.0<L>  |  0.94    Ca    8.5<L>      25 Oct 2021 07:01  Mg     1.8     10-25    MEDICATIONS  (STANDING):  ALBUTerol    0.083% 2.5 milliGRAM(s) Nebulizer two times a day  aMIOdarone    Tablet 200 milliGRAM(s) Oral daily  aspirin  chewable 81 milliGRAM(s) Oral daily  atorvastatin 80 milliGRAM(s) Oral at bedtime  carvedilol 3.125 milliGRAM(s) Oral every 12 hours  clopidogrel Tablet 75 milliGRAM(s) Oral daily  ferrous    sulfate 325 milliGRAM(s) Oral daily  folic acid 1 milliGRAM(s) Oral daily  influenza   Vaccine 0.5 milliLiter(s) IntraMuscular once  pantoprazole  Injectable 40 milliGRAM(s) IV Push every 12 hours  tiotropium 2.5 MICROgram(s)/olodaterol 2.5 MICROgram(s) (STIOLTO) Inhaler 2 Puff(s) Inhalation daily    MEDICATIONS  (PRN):  acetaminophen     Tablet .. 650 milliGRAM(s) Oral every 6 hours PRN Temp greater or equal to 38C (100.4F), Mild Pain (1 - 3)  aluminum hydroxide/magnesium hydroxide/simethicone Suspension 30 milliLiter(s) Oral every 4 hours PRN Dyspepsia  melatonin 3 milliGRAM(s) Oral at bedtime PRN Insomnia  ondansetron Injectable 4 milliGRAM(s) IV Push every 8 hours PRN Nausea and/or Vomiting  sodium chloride 0.65% Nasal 1 Spray(s) Both Nostrils every 4 hours PRN Nasal Congestion      ASSESSMENT / PLAN:  ----------------------------------------  70 yo F with hx of chronic resp failure from COPD on 3L NC,  HTN, Renal artery stenosis, prior TIA's with Placement of a left cerebral artery stent in 2014, RA, Osteopenia, HLD and S/p Cardiac arrest s/p angiography with placement of PPM and cardiac surg in June of 2021 for valve replacement and pseudoaneurysm repair with stent placement presented to the ED with bloody stools.    Gastrointestinal hemorrhage / Acute blood loss anemia - Hemoglobin level stable/improved. No further bleeding noted. On pantoprazole. Status post previous transfusions. The patient did not wish to pursue repeat colonoscopy. Continue on diet as tolerated.    Hypokalemia - Improved with further potassium supplementation.    COPD - On supplemental oxygen at 3 liters/minute. Inhaled bronchodilator therapy.    Atrial fibrillation - On amiodarone and carvedilol. No anticoagulation due to gastrointestinal hemorrhage.    Hypertension - Close blood pressure monitoring.    CVA - Noted history of cerebral artery stent. On aspirin, clopidogrel, and atorvastatin.

## 2021-10-25 NOTE — DISCHARGE NOTE PROVIDER - NSDCMRMEDTOKEN_GEN_ALL_CORE_FT
albuterol:   amiodarone 200 mg oral tablet: 1 tab(s) orally once a day  Aspirin Enteric Coated 81 mg oral delayed release tablet: 1 tab(s) orally once a day restart on sunday in two days   atorvastatin 80 mg oral tablet: 1 tab(s) orally once a day (at bedtime)  Coreg 3.125 mg oral tablet: 1 tab(s) orally 2 times a day  ferrous sulfate 324 mg (65 mg elemental iron) oral delayed release tablet: 1 tab(s) orally every 48 hours  folic acid:   furosemide 40 mg oral tablet: 1 tab(s) orally once a day  pantoprazole 40 mg oral delayed release tablet: 1 tab(s) orally 2 times a day  Plavix 75 mg oral tablet: 1 tab(s) orally once a day  potassium chloride 20 mEq oral powder for reconstitution: 1 each orally once a day  Stiolto Respimat 28 ACT 2.5 mcg-2.5 mcg/inh inhalation aerosol: 2 puff(s) inhaled every 24 hours

## 2021-10-25 NOTE — PROGRESS NOTE ADULT - PROVIDER SPECIALTY LIST ADULT
Hospitalist
Cardiology
Gastroenterology
Hospitalist
Hospitalist
Cardiology
Gastroenterology
Gastroenterology
Hospitalist
Gastroenterology

## 2021-10-25 NOTE — DISCHARGE NOTE PROVIDER - CARE PROVIDER_API CALL
Shanon Tucker (DO)  Gastroenterology  [address], [address]  [place] [address]  Phone: [phone]  Fax: [phone]  Follow Up Time:

## 2021-10-25 NOTE — PROGRESS NOTE ADULT - ASSESSMENT
A/P: 72 y/o F with a PMHx of bradyarrhythmia with cardiac arrest and [facility] 2016 (no PCI or intervention at that time) s/p MDT PPM, STEMI 05/21 with diffuse RCA and LAD disease c/b acute CVA s/p tPA with planned PCI to dLM into ostial LAD 06/2021 c/b LV apical pseudoaneurysm and VSD s/p open repair 06/2021 with MV replacement?, HFrEF (EF 35-40%), recurrent TIA/CVA s/p bilateral vertebral stenosis (s/p stent 2014), renal artery stenosis, COPD (on 3 L NC), Atrial fibrillation c/b recurrent GI bleeds (s/p 18 blood transfusions) who presented to the ER with complaints of bloody stools. Patient had a prolonged hospitalization in [place], and was recently discharged form rehab about a week ago. Patient states that suddenly starting yesterday she began to have dark black stools with some bright red blood as well. Patient states that the symptoms persisted, so she came to the ER to be evaluated. Patient denies any fevers, chills, CP, worsening SOB, abdominal pain, N/V/D, headache, or dizziness.     Coronary Artery Disease  - S/p TATA to LM and LAD 06/21.   - Restarted on Aspirin and Plavix.   - H/H stable at this time.   - Continue statin, coreg, Norvasc     HFrEF  - EF 35%.    - Continue Coreg, aldactone.   - Increased PO lasix to 60mg PO qday, patient states she was on 60mg PO BID, but records showed 40mg PO qday.   - Some rales on exam.   - D/C IV fluids.   - Monitor on telemetry.    - Strict i/o and daily weights.   - Keep K > 4, Mg > 2.   - Monitor renal function with ongoing diuresis.    Atrial Fibrillation   - Atrial paced on montior  - Continue Coreg and Amiodarone.   - Unable to tolerate AC due to recurrent GI bleeds, being evaluated for Watchman as an outpatient.     Assessment and recommendations are final when note is signed by the attending. 
Clinically resolved GI bleeding from what appears to have been the ileum. Pt. continues to refuse repeat colonoscopy having had one down roughly one year ago. She did have a negative Video Capsule Endoscopy in 12/2020. Her Hb is stable @ 7.4 grams w/o evidence of active bleeding. Would transfuse to Hb of 8 grams. CLD ordered with repeat labs ordered. If she re-bleeds would contact IR for possible embolization of this area. Her multiple medical co-morbidities make her a poor candidate for any type of endoscopic procedures requiring IV sedation especially, but not limited to, colonoscopy. In addition it is uncertain that her ICV could be cannulated to enter into her terminal ileum 
Pt. with resolved GI bleeding from apparent ileal source. No further active bleeding and pt. is presently refusing repeat colonoscopy with possible ileoscopy. Diet to be advanced to DASH / TLC. Same IV Pantoprazole for GI mucosal cytoprotection with repeat labs ordered for the AM.
Resolved ileal bleeding. She continues to refuse colonoscopy with possible ileal intubation. No absolute GI contraindications presently exist to restarting her ASA and Plavix therapies. If cbc tomorrow stable can send home on Pantoprazole 40 mg./d, for GI mucosal cytoprotection and oral iron TID.
70 y/o F with a PMHx of bradyarrhythmia with cardiac arrest and [facility] 2016 (no PCI or intervention at that time) s/p MDT PPM, STEMI 05/21 with diffuse RCA and LAD disease c/b acute CVA s/p tPA with planned PCI to dLM into ostial LAD 06/2021 c/b LV apical pseudoaneurysm and VSD s/p open repair 06/2021 with MV replacement?, HFrEF (EF 35-40%), recurrent TIA/CVA s/p bilateral vertebral stenosis (s/p stent 2014), renal artery stenosis, COPD (on 3 L NC), Atrial fibrillation c/b recurrent GI bleeds (s/p 18 blood transfusions) who presented to the ER with complaints of bloody stools. Patient had a prolonged hospitalization in [place], and was recently discharged form rehab about a week ago. Patient states that suddenly starting yesterday she began to have dark black stools with some bright red blood as well. Patient states that the symptoms persisted, so she came to the ER to be evaluated. Patient denies any fevers, chills, CP, worsening SOB, abdominal pain, N/V/D, headache, or dizziness.     Coronary Artery Disease  S/p TATA to LM and LAD 06/21.   Dapt:   Asa and Plavix  continue statin, coreg, Norvasc     HFrEF  Ef 35%.    Continue Coreg, aldactone, and PO lasix.   s/p transfusion   euvolemic on exam  Start Lasix 40mg po daily  Keep potassium > 4 and magnesium > 2    Atrial Fibrillation   Atrial paced on montior  Continue Coreg and Amiodarone.   Unable to tolerate AC due to recurrent GI bleeds, being evaluated for Watchman as an outpatient.   due to severe anemia and active GI bleed AC on hold, continue with Amiodarone and Coreg, would recommend Watchman eval in the outpatient setting, as patient would have to tolerate AC with DAPT for approx. 45 days, will need to see EP in the outpatient setting     Hypokalemia  k rplaced  
70 yo F with hx of chronic resp failure from COPD on 3L NC,  HTN, Renal artery stenosis, prior TIA's with Placement of a left cerebral artery stent in 2014, RA, Osteopenia, HLD and S/p Cardiac arrest s/p angiography with placement of PPM and cardiac surg in June of 2021 for valve replacement and pseudoaneurysm repair with stent placement presented to the ED with bloody stools.    Acute blood loss anemia due to Lower GI bleed  CTA positve for bleed in ileum  - hold asa and plavix  - check stool PCR, pending collection  - IVF NS 75ml/hr  - active type and screen  - 1 U PRBC received yesterday, Hgb 7.4 this AM, will give 2nd unit this AM - no evidence of fluid overload on exam at this time - monitor for dyspnea/fluid overload/need for diuretic  - GI following - patient refusing colonoscopy at this time. Additionally poor candidate for any type of endoscopic procedures requiring IV sedation especially, but not limited to, colonoscopy. GI suggests IR embolization if bleeding reoccurs  - IR consulted 10/21 Discussed with Dr. Head no acute intervention at this time. If become hypotensive or start bleeding more briskly please call IR back  - continue iron  - Per GI start clear liquid diet    Chronic resp failure from COPD on 3L NC  - continue nasal canula  - resume home inhalers    Recent Valve repair and pseudoaneurysm repair in June  s/p PPM  - was on asa and plavix, cardiology agrees w holding ASA and Plavix    Atrial fibrillation, rate controlled - no events on telemetry overnight  - Per cardiology as Pt is over 3 months out from intervention, ok to start aspirin only when cleared by GI as risk of DAPT outweigh the benefits at this time.   - being evaluated for Watchman as an outpatient.  - continue amiodarone  - coreg 3.125mg BID    HTN and renal artery stenosis  - hold anti-hypertensive meds due to soft bp in the setting of GI bleed- bp remains soft today    hx of TIAs with Placement of a left cerebral artery stent in 2014  - hold asa and Plavix  - continue statin    dvt ppx on hold due to gi bleed  Dispo: PT eval pending, admit to tele bed with q 4 hour vital signs  Plan reviewed with Dr Aparicio    
72 y/o F with a PMHx of bradyarrhythmia with cardiac arrest and [facility] 2016 (no PCI or intervention at that time) s/p MDT PPM, STEMI 05/21 with diffuse RCA and LAD disease c/b acute CVA s/p tPA with planned PCI to dLM into ostial LAD 06/2021 c/b LV apical pseudoaneurysm and VSD s/p open repair 06/2021 with MV replacement?, HFrEF (EF 35-40%), recurrent TIA/CVA s/p bilateral vertebral stenosis (s/p stent 2014), renal artery stenosis, COPD (on 3 L NC), Atrial fibrillation c/b recurrent GI bleeds (s/p 18 blood transfusions) who presented to the ER with complaints of bloody stools. Patient had a prolonged hospitalization in [place], and was recently discharged form rehab about a week ago. Patient states that suddenly starting yesterday she began to have dark black stools with some bright red blood as well. Patient states that the symptoms persisted, so she came to the ER to be evaluated. Patient denies any fevers, chills, CP, worsening SOB, abdominal pain, N/V/D, headache, or dizziness.     Coronary Artery Disease  S/p TATA to LM and LAD 06/21.   Discussed with gi ok to restart asa and plavix  diet being advanced today   Echo done results are pending  continue statin, coreg, Norvasc     HFrEF  Echo performed and is pending  Continue Coreg, aldactone, and PO lasix.   s/p transfusion   euvolemic on exam  Echo results are pending    Atrial Fibrillation   Atrial paced on montior  Continue Coreg and Amiodarone.   Unable to tolerate AC due to recurrent GI bleeds, being evaluated for Watchman as an outpatient.     Hypokalemia  k rplaced      CARDIAC MEDS  aMIOdarone    Tablet 200 milliGRAM(s) Oral daily  atorvastatin 80 milliGRAM(s) Oral at bedtime  carvedilol 3.125 milliGRAM(s) Oral every 12 hours    
A/P: 70 y/o F with a PMHx of bradyarrhythmia with cardiac arrest and [facility] 2016 (no PCI or intervention at that time) s/p MDT PPM, STEMI 05/21 with diffuse RCA and LAD disease c/b acute CVA s/p tPA with planned PCI to dLM into ostial LAD 06/2021 c/b LV apical pseudoaneurysm and VSD s/p open repair 06/2021 with MV replacement?, HFrEF (EF 35-40%), recurrent TIA/CVA s/p bilateral vertebral stenosis (s/p stent 2014), renal artery stenosis, COPD (on 3 L NC), Atrial fibrillation c/b recurrent GI bleeds (s/p 18 blood transfusions) who presented to the ER with complaints of bloody stools. Patient had a prolonged hospitalization in [place], and was recently discharged form rehab about a week ago. Patient states that suddenly starting yesterday she began to have dark black stools with some bright red blood as well. Patient states that the symptoms persisted, so she came to the ER to be evaluated. Patient denies any fevers, chills, CP, worsening SOB, abdominal pain, N/V/D, headache, or dizziness.     Coronary Artery Disease  - S/p TATA to LM and LAD 06/21.   - Now with active GI bleed in ileum.   - Hold aspirin and plavix at this time.   - GI following.   - Patient is over 3 months out from intervention, ok to start aspirin only when cleared by GI as risk of DAPT outweigh the benefits at this time.   - Obtain TTE.   - Continue statin, coreg, norvasc.     HFrEF  - EF from records 35-40%.   - Continue Coreg, aldactone, and PO lasix.   - May require IV lasix after PRBC transfusions.   - Repeat echo.   - Monitor on telemetry.   - Strict i/o and daily weights.   - Keep K > 4, Mg > 2.   - Monitor renal function with ongoing diuresis.    Atrial Fibrillation   - Currently A-paced  - Patient not on telemetry, please place on telemetry.   - Continue Coreg and Amiodarone.   - Unable to tolerate AC due to recurrent GI bleeds, being evaluated for Watchman as an outpatient.     Aishwarya-operative Cardiac Risk Stratification   - RCRI 11% risk of major cardiac event.   -  Echo pending.   - Transfuse to Hgb > 8.0.   - Aspirin and plavix on hold.   - Patient is at elevated risk due to multiple comorbidities but at this time benefits of IR embolization outweigh risks. No absolute cardiac contraindications if no acute findings on EKG and echocardiogram.     Assessment and recommendations are final when note is signed by the attending.

## 2021-10-25 NOTE — DISCHARGE NOTE PROVIDER - HOSPITAL COURSE
71F with prior hospitalization for gastrointestinal hemorrhage presented with bloody stools. CT of the abdomen noted unchanged bilateral lower lobe bronchiectasis, mild to moderate cardiomegaly, mitral valve repair, focal contrast extravasation in a right-sided small bowel loop, likely ileum. The patient was seen by Gastroenterology in consultation for gastrointestinal hemorrhage and noted to have had prior endoscopic evaluations in the past. Packed red blood cells were transfused for anemia. The patient was seen by Gastroenterology and the diet advanced. Interventional Radiology was contacted and embolization was not thought to be indicated at the time unless the patient unless the patient developed instability. The patient was seen by Cardiology in consultation and thought to be suitable for resumption of aspirin without clopidogrel when feasible. Echocardiogram noted moderately to severely reduced global left ventricular systolic function, ejection fraction of 30 to 35%, akinetic apex, mid and apical inferior septum, and mid and apical inferior wall, hypokinetic mid anterolateral segment and mid anterior segment, no pericardial effusion. The patient was seen by Gastroenterology and thought to be a poor candidate for endoscopy and the patient die no wish to have a repeat colonoscopy. A clear liquid diet was initiated. Repeat laboratory studies noted recurrent anemia and the packed red blood cells were further transfused. Potassium was supplemented for hypokalemia and the diet was advanced. Repeat studies noted a stable hemoglobin level. The patient had no further bleeding and was discharged home with instructions to follow up with her primary care physician and gastroenterologist for further management.      36 minutes total time

## 2021-10-25 NOTE — PROGRESS NOTE ADULT - SUBJECTIVE AND OBJECTIVE BOX
House GI Progress Note    Chief Complaint:  Patient is a 71y old  Female who presents with a chief complaint of GI bleed (25 Oct 2021 10:20)      Interval Events / Subjective: Patient seen and evaluated at bedside, reporting no complaints, no overnight events. Patient Denies nausea, vomiting, abdominal pain, chest pain, shortness of breath, hematemesis, hematochezia, melena. Hb increased from 8.9 to 10.3 this AM.                                                        .      REVIEW OF SYSTEMS:   General: Negative  HEENT: Negative  CV: Negative  Respiratory: Negative  GI: See HPI  : Negative  MSK: Negative  Hematologic: Negative  Skin: Negative    MEDICATIONS:   MEDICATIONS  (STANDING):  ALBUTerol    0.083% 2.5 milliGRAM(s) Nebulizer two times a day  aMIOdarone    Tablet 200 milliGRAM(s) Oral daily  aspirin  chewable 81 milliGRAM(s) Oral daily  atorvastatin 80 milliGRAM(s) Oral at bedtime  carvedilol 3.125 milliGRAM(s) Oral every 12 hours  clopidogrel Tablet 75 milliGRAM(s) Oral daily  ferrous    sulfate 325 milliGRAM(s) Oral daily  folic acid 1 milliGRAM(s) Oral daily  influenza   Vaccine 0.5 milliLiter(s) IntraMuscular once  pantoprazole  Injectable 40 milliGRAM(s) IV Push every 12 hours  tiotropium 2.5 MICROgram(s)/olodaterol 2.5 MICROgram(s) (STIOLTO) Inhaler 2 Puff(s) Inhalation daily    MEDICATIONS  (PRN):  acetaminophen     Tablet .. 650 milliGRAM(s) Oral every 6 hours PRN Temp greater or equal to 38C (100.4F), Mild Pain (1 - 3)  aluminum hydroxide/magnesium hydroxide/simethicone Suspension 30 milliLiter(s) Oral every 4 hours PRN Dyspepsia  melatonin 3 milliGRAM(s) Oral at bedtime PRN Insomnia  ondansetron Injectable 4 milliGRAM(s) IV Push every 8 hours PRN Nausea and/or Vomiting  sodium chloride 0.65% Nasal 1 Spray(s) Both Nostrils every 4 hours PRN Nasal Congestion      ALLERGIES:   Allergies    No Known Allergies    Intolerances        VITAL SIGNS:   Vital Signs Last 24 Hrs  T(C): 36.8 (25 Oct 2021 10:47), Max: 36.8 (25 Oct 2021 10:47)  T(F): 98.3 (25 Oct 2021 10:47), Max: 98.3 (25 Oct 2021 10:47)  HR: 93 (25 Oct 2021 10:47) (65 - 93)  BP: 103/67 (25 Oct 2021 10:47) (103/67 - 140/84)  BP(mean): 18 (25 Oct 2021 10:47) (18 - 18)  RR: 18 (25 Oct 2021 10:47) (16 - 18)  SpO2: 97% (25 Oct 2021 10:47) (94% - 99%)  I&O's Summary    24 Oct 2021 07:01  -  25 Oct 2021 07:00  --------------------------------------------------------  IN: 0 mL / OUT: 300 mL / NET: -300 mL        PHYSICAL EXAM:   GENERAL:  Appears stated age, well-groomed, well-nourished, no distress  HEENT:  NC/AT,  conjunctivae clear, sclera -anicteric  CHEST:  Full & symmetric excursion, no increased effort, breath sounds clear  HEART:  Regular rhythm, S1, S2, no murmur/rub/S3/S4,  no edema  ABDOMEN:  Soft, non-tender, non-distended, normoactive bowel sounds,  no masses, no hepatosplenomegaly   EXTREMITIES: No cyanosis, clubbing or edema  SKIN:  No rash/erythema/ecchymoses/petechiae/wounds/abscess/warm/dry  NEURO:  Alert, oriented    LABS:  CBC Full  -  ( 25 Oct 2021 07:01 )  WBC Count : 9.63 K/uL  RBC Count : 3.78 M/uL  Hemoglobin : 10.3 g/dL  Hematocrit : 33.1 %  Platelet Count - Automated : 323 K/uL  Mean Cell Volume : 87.6 fl  Mean Cell Hemoglobin : 27.2 pg  Mean Cell Hemoglobin Concentration : 31.1 gm/dL  Auto Neutrophil # : 7.39 K/uL  Auto Lymphocyte # : 1.00 K/uL  Auto Monocyte # : 0.81 K/uL  Auto Eosinophil # : 0.36 K/uL  Auto Basophil # : 0.02 K/uL  Auto Neutrophil % : 76.8 %  Auto Lymphocyte % : 10.4 %  Auto Monocyte % : 8.4 %  Auto Eosinophil % : 3.7 %  Auto Basophil % : 0.2 %    10-25    146<H>  |  112<H>  |  16.9  ----------------------------<  89  3.5   |  20.0<L>  |  0.94    Ca    8.5<L>      25 Oct 2021 07:01  Mg     1.8     10-25                IMAGING:

## 2021-10-25 NOTE — DISCHARGE NOTE PROVIDER - NSDCCPCAREPLAN_GEN_ALL_CORE_FT
PRINCIPAL DISCHARGE DIAGNOSIS  Diagnosis: GI bleeding  Assessment and Plan of Treatment: Continue on pantoprazole. Follow up with your gastroenterologist for further management.      SECONDARY DISCHARGE DIAGNOSES  Diagnosis: History of TIAs  Assessment and Plan of Treatment: Continue on aspirin and clopidogrel.    Diagnosis: Chronic obstructive pulmonary disease, unspecified COPD type  Assessment and Plan of Treatment: Continue on your home medications.

## 2021-10-25 NOTE — PROGRESS NOTE ADULT - SUBJECTIVE AND OBJECTIVE BOX
[facility]                                                               Office: [address] [address]                                                              Telephone: [phone]. Fax:[phone]                                                                             PROGRESS NOTE  Reason for follow up: GI Bleed   Update: Patient's H/H is stable, but she is continuing to have worsening dyspnea. Patient restarted on PO lasix yesterday, but still receiving continuous IV fluids?     Review of symptoms:   Cardiac:  No chest pain. + dyspnea. No palpitations.  Respiratory: No cough. + dyspnea  Gastrointestinal: No diarrhea. No abdominal pain. No bleeding.     Past medical history: No updates.   	  Vitals:  T(C): 36.4 (10-25-21 @ 04:50), Max: 36.4 (10-24-21 @ 17:49)  HR: 70 (10-25-21 @ 08:20) (65 - 84)  BP: 107/72 (10-25-21 @ 04:50) (107/72 - 140/84)  RR: 16 (10-25-21 @ 04:50) (16 - 18)  SpO2: 96% (10-25-21 @ 08:20) (94% - 99%)  Wt(kg): --  I&O's Summary    24 Oct 2021 07:01  -  25 Oct 2021 07:00  --------------------------------------------------------  IN: 0 mL / OUT: 300 mL / NET: -300 mL      Weight (kg): 81.6 (10-21 @ 09:29)      PHYSICAL EXAM:  Appearance: Comfortable. No acute distress  HEENT:  Head and neck: Atraumatic. Normocephalic.  Normal oral mucosa, PERRL, Neck is supple. No JVD, No carotid bruit.   Neurologic: A&Ox 3, no focal deficits. EOMI, Cranial nerves are intact.  Lymphatic: No cervical lymphadenopathy  Cardiovascular: Normal S1 S2, No murmur, rubs/gallops. No JVD, No edema  Respiratory: Bibasilar rales  Gastrointestinal:  Soft, Non-tender, + BS  Lower Extremities: No edema  Psychiatry: Patient is calm. No agitation. Mood & affect appropriate  Skin: No rashes/ecchymoses/cyanosis/ulcers visualized on the face, hands or feet.      CURRENT MEDICATIONS:  aMIOdarone    Tablet 200 milliGRAM(s) Oral daily  carvedilol 3.125 milliGRAM(s) Oral every 12 hours  furosemide    Tablet 20 milliGRAM(s) Oral once    ALBUTerol    0.083%  tiotropium 2.5 MICROgram(s)/olodaterol 2.5 MICROgram(s) (STIOLTO) Inhaler  pantoprazole  Injectable  atorvastatin  aspirin  chewable  clopidogrel Tablet  ferrous    sulfate  folic acid  influenza   Vaccine      DIAGNOSTIC TESTING:  [ ] Echocardiogram:   < from: TTE Echo Complete w/ Contrast w/ Doppler (10.22.21 @ 19:34) >  PHYSICIAN INTERPRETATION:  Left Ventricle: Endocardial visualization was enhanced with intravenous echo contrast. The left ventricular internal cavity size is normal.  Global LV systolic function was moderately to severely decreased. Left ventricular ejection fraction, by visual estimation, is 30 to 35%. Spectral Doppler shows impaired relaxation pattern of left ventricular myocardial filling (Grade I diastolic dysfunction).      LV Wall Scoring:  The entire apex, mid and apical inferior septum, and mid and apical inferior  wall are akinetic. The mid anterolateral segment and mid anterior segment are  hypokinetic.    Right Ventricle: The right ventricular size is normal. RV systolic function is low normal.  Left Atrium: The left atrium is normal in size.  Right Atrium: The right atrium is normalin size.  Pericardium: There is no evidence of pericardial effusion.  Mitral Valve: A bioprosthetic valve is present in the mitral position. Trace mitral valve regurgitation is seen.  Tricuspid Valve: Mild tricuspid regurgitation is visualized.  Aortic Valve: Sclerotic aortic valve with normal opening. Mild aortic valve regurgitation is seen.  Pulmonic Valve: The pulmonic valve was not well visualized.  Aorta: The aortic root is normal in size and structure.  Pulmonary Artery: The pulmonary artery is not well seen.  Additional Comments: A pacer wire is visualized in the right atrium and right ventricle.  In comparison to the previous echocardiogram(s): There are no prior studies on this patient for comparison purposes.      Summary:   1. Leftventricular ejection fraction, by visual estimation, is 30 to 35%.   2. Moderately to severely decreased global left ventricular systolic function.   3. Multiple left ventricular regional wall motion abnormalities exist. See wall motion findings.   4. Spectral Doppler shows impaired relaxation pattern of left ventricular myocardial filling (Grade I diastolic dysfunction).   5. A bioprosthetic valve is present in the mitral position.   6. Trace mitral valve regurgitation.   7. Mild tricuspid regurgitation.   8. Mild aortic regurgitation.   9. Sclerotic aortic valve with normal opening.  10. There is no evidence of pericardial effusion.  11. Endocardial visualization was enhanced with intravenous echo contrast.  12. There are no prior studies on this patient for comparison purposes.    Felicia Wang D.O. Electronically signed on 10/24/2021 at 7:53:17 AM    < end of copied text >      OTHER: 	  < from: Xray Chest 1 View AP/PA. (10.21.21 @ 11:42) >   EXAM:  XR CHEST AP OR PA 1V                          PROCEDURE DATE:  10/21/2021          INTERPRETATION:  TECHNIQUE: Single portable view of the chest.    COMPARISON:  11/6/2020    CLINICAL HISTORY: fatigue    FINDINGS:    Single frontal view of the chest demonstrates the lungs to be clear. The cardiomediastinal silhouette is enlarged. Mediastinal sternotomy wires. Left-sided dual-chamber pacemaker. No acute osseous abnormalities.    IMPRESSION: No acute cardiopulmonary disease process. Cardiomegaly.    < end of copied text >      LABS:	 	                            10.3   9.63  )-----------( 323      ( 25 Oct 2021 07:01 )             33.1     10-25    146<H>  |  112<H>  |  16.9  ----------------------------<  89  3.5   |  20.0<L>  |  0.94    Ca    8.5<L>      25 Oct 2021 07:01  Mg     1.8     10-25      proBNP:   Lipid Profile:   HgA1c:   TSH:       TELEMETRY: Reviewed  A-paced, PVCs

## 2021-10-25 NOTE — PROGRESS NOTE ADULT - PROBLEM SELECTOR PLAN 1
Patient refused colonoscopy and now Ileal bleeding Resolved.  Hb increased to 10.3 this AM.   There are no contraindications to restart patient on her ASA and Plavix.   Please send home on Pantoprazole 40mg/d for cytoprotection.  Please follow up with Dr. Tucker and possible repeat of capsule study.   As per GI, Patient cleared to go home. Patient refused colonoscopy and now Ileal bleeding Resolved.  Hb increased to 10.3 this AM.   There are no contraindications to restart patient on her ASA and Plavix.   Please send home on Pantoprazole 40mg/d for cytoprotection.  Please follow up with Dr. Tucker for possible repeat Video Capsule study to further evlauate the ileal bleeding seen on admission CTA of abdomen and pelvis.  As per GI, Patient cleared to go home.

## 2021-10-25 NOTE — PROGRESS NOTE ADULT - ATTENDING COMMENTS
71 year old female with complex PMH including CAD s/p PCI, COPD on 3LNC, Left GUS s/p Stent, TIA and GIB requiring multiple transfusions presented with rectal bleeding with active bleeding noted on CTA. Received PRBC x2, no BM since 2 days prior to admission.  Symptomatically improved in terms of dyspnea, weakness. No focal signs or symptoms of ongoing bleeding.  Hemodynamically stable on examination without clear lung, soft abdomen, no edema.  Continue to watch and wait for now, hold DAPT, monitor Hgb.  If recurrence may need to reconsider intervention
Patient was seen and examined today and on physical examination crackles at the bases bilaterally   Patient still refusing colonoscopy and last BM yesterday she had melena and no BM since then   Otherwise no chest pain or shortness of breath     Labs reviewed   No events on telemetry     Dx: Severe Anemia secondary to GI bleed, Coronary Artery Disease S/p TATA to LM and LAD 06/21, Atrial fibrillation ( on Amiodarone), HFrEF ( 30-35%)   - patient s/p 1 unit over the past 24 hrs and Hgb 8.9  - patient is on ASA and Plavix and ok to be restarted from GI standpoint but to the recent stent, continue with statin   - for Afib due to severe anemia and active GI bleed AC on hold, continue with Amiodarone and Coreg, would recommend Watchman eval in the outpatient setting, as patient would have to tolerate AC with DAPT for approx. 45 days, will need to see EP in the outpatient setting   - follow up with GI   - transfuse to Hgb > 8   - due to rales on physical examination, restarted Lasix 40mg poq daily, notes that she is short of breath likely due to anemia/ volume overload vs underlying COPD, CXR ordered   - will follow up    Felicia Wang D.O. [facility]  Cardiology/Vascular Cardiology -[facility] Cardiology   Telephone # [phone] .
CAD s/p TATA to LM and LAD.   on ASA 81 mg , suggest d/c plavix since patient had recurrent bleeding on DApT    HFrEF   EF 35%   rales on physical exam .   increase lasix to 60 mg daily     Atrial fibrillation   continue coreg and amiodarone
I evaluated this pt. with my NP and agree with the above assessment and management plan. No further bleeding since admission with a CTA of the abdomen and pelvis that was + for active bleeding somewhere in the ileum which spontaneously stopped. During this admission she refused repeat colonoscopy with possible ileal intubation because she did not want to go through with the bowel prep again. Should have OPT VCE. OK to for ASA and Plavix at this time.
hold ASA 81 mg and plavix for now since no Gi intervention   will resume ASA when hb stable  case discussed with Dr. Aparicio
Patient seen and examined at bedside and notes to be doing well with no complaints  Patient is refusing colonoscopy     Hgb 7.7    Dx: Severe Anemia secondary to GI bleed, Coronary Artery Disease S/p TATA to LM and LAD 06/21, Atrial fibrillation ( on Amiodarone)   - patient is ok but still having blood in stool and decline in Hgb   - patient is on ASA and Plavix and ok to be restarted from GI standpoint but to the recent stent, continue with statin   - for Afib due to severe anemia and active GI bleed AC on hold, continue with Amiodarone and Coreg, would recommend Watchman eval in the outpatient setting, as patient would have to tolerate AC with DAPT for approx. 45 days, will need to see EP in the outpatient setting   - follow up with GI   - transfuse to Hgb > 8     Felicia Wang D.O. [facility]  Cardiology/Vascular Cardiology -[facility] Cardiology   Telephone # [phone]

## 2021-10-25 NOTE — DISCHARGE NOTE NURSING/CASE MANAGEMENT/SOCIAL WORK - PATIENT PORTAL LINK FT
You can access the FollowMyHealth Patient Portal offered by [facility] by registering at the following website: [email or web address]. By joining [facility]’s FollowMyHealth portal, you will also be able to view your health information using other applications (apps) compatible with our system.

## 2021-11-02 ENCOUNTER — NON-APPOINTMENT (OUTPATIENT)
Age: 71
End: 2021-11-02

## 2021-11-09 ENCOUNTER — NON-APPOINTMENT (OUTPATIENT)
Age: 71
End: 2021-11-09

## 2021-11-10 ENCOUNTER — APPOINTMENT (OUTPATIENT)
Dept: FAMILY MEDICINE | Facility: CLINIC | Age: 71
End: 2021-11-10
Payer: MEDICARE

## 2021-11-10 VITALS
OXYGEN SATURATION: 100 % | TEMPERATURE: 96.5 F | HEIGHT: 68 IN | SYSTOLIC BLOOD PRESSURE: 104 MMHG | HEART RATE: 75 BPM | DIASTOLIC BLOOD PRESSURE: 72 MMHG

## 2021-11-10 DIAGNOSIS — D50.9 IRON DEFICIENCY ANEMIA, UNSPECIFIED: ICD-10-CM

## 2021-11-10 DIAGNOSIS — Z87.19 PERSONAL HISTORY OF OTHER DISEASES OF THE DIGESTIVE SYSTEM: ICD-10-CM

## 2021-11-10 PROCEDURE — 99496 TRANSJ CARE MGMT HIGH F2F 7D: CPT

## 2021-11-14 PROBLEM — D50.9 ANEMIA, IRON DEFICIENCY: Status: ACTIVE | Noted: 2020-10-30

## 2021-11-14 PROBLEM — Z87.19 HISTORY OF SMALL BOWEL OBSTRUCTION: Status: RESOLVED | Noted: 2020-07-20 | Resolved: 2021-11-14

## 2021-11-14 NOTE — END OF VISIT
[FreeTextEntry3] : Medical record entries made by the scribe today today, were at my direction and personally dictated to them by me, Dr. Sarah Gardner on Nov 10, 2021. I have reviewed the chart and agree that the record accurately reflects my personal performance of the history, physical exam, assessment, and plan.\par

## 2021-11-14 NOTE — HISTORY OF PRESENT ILLNESS
[Post-hospitalization from ___ Hospital] : Post-hospitalization from [unfilled] Hospital [Admitted on: ___] : The patient was admitted on [unfilled] [Discharged on ___] : discharged on [unfilled] [Patient Contacted By: ____] : and contacted by [unfilled] [FreeTextEntry2] : JAVED is a 71 year female here for hospital follow up. Patient admitted for GI bleed at [facility]  on 11/3/21 and discharged 11/7/21. Not cauterized. She was switched to Ferrous gluconate. Patient was told she would benefit from an anti-anxiety medication. Her son confirms that she tends to overwork herself. Patient also confirms to feeling anxious especially with everything she has gone through. \par

## 2021-11-14 NOTE — PLAN
[FreeTextEntry1] : \par - Hospital discharge summary reviewed with patient and patient's son \par - Will start patient with 20 xanax 0.25 mg tabs \par - Repeat blood work 1 week\par - F/u 11/22/21

## 2021-11-14 NOTE — ADDENDUM
[FreeTextEntry1] : I, Carol Rajan acting as a scribe for Dr. Sarah Gardner on Nov 10, 2021  at 3:46 PM\par

## 2021-11-19 ENCOUNTER — NON-APPOINTMENT (OUTPATIENT)
Age: 71
End: 2021-11-19

## 2021-11-22 ENCOUNTER — APPOINTMENT (OUTPATIENT)
Dept: FAMILY MEDICINE | Facility: CLINIC | Age: 71
End: 2021-11-22
Payer: COMMERCIAL

## 2021-11-22 PROCEDURE — 36415 COLL VENOUS BLD VENIPUNCTURE: CPT

## 2021-11-23 LAB
25(OH)D3 SERPL-MCNC: 30.2 NG/ML
ALBUMIN SERPL ELPH-MCNC: 3.6 G/DL
ALP BLD-CCNC: 121 U/L
ALT SERPL-CCNC: 16 U/L
ANION GAP SERPL CALC-SCNC: 18 MMOL/L
AST SERPL-CCNC: 23 U/L
BILIRUB SERPL-MCNC: 0.3 MG/DL
BUN SERPL-MCNC: 15 MG/DL
CALCIUM SERPL-MCNC: 9.1 MG/DL
CHLORIDE SERPL-SCNC: 102 MMOL/L
CHOLEST SERPL-MCNC: 133 MG/DL
CO2 SERPL-SCNC: 22 MMOL/L
CREAT SERPL-MCNC: 1.32 MG/DL
FERRITIN SERPL-MCNC: 47 NG/ML
GLUCOSE SERPL-MCNC: 90 MG/DL
HDLC SERPL-MCNC: 56 MG/DL
IRON SATN MFR SERPL: 6 %
IRON SERPL-MCNC: 21 UG/DL
LDLC SERPL CALC-MCNC: 66 MG/DL
NONHDLC SERPL-MCNC: 77 MG/DL
POTASSIUM SERPL-SCNC: 3.3 MMOL/L
PROT SERPL-MCNC: 7.1 G/DL
SODIUM SERPL-SCNC: 142 MMOL/L
TIBC SERPL-MCNC: 360 UG/DL
TRIGL SERPL-MCNC: 55 MG/DL
TSH SERPL-ACNC: 0.4 UIU/ML
UIBC SERPL-MCNC: 340 UG/DL
URATE SERPL-MCNC: 6.6 MG/DL

## 2021-11-29 RX ORDER — ALBUTEROL SULFATE 2.5 MG/3ML
(2.5 MG/3ML) SOLUTION RESPIRATORY (INHALATION) TWICE DAILY
Qty: 1 | Refills: 3 | Status: ACTIVE | COMMUNITY
Start: 2021-11-29 | End: 1900-01-01

## 2021-12-02 ENCOUNTER — TRANSCRIPTION ENCOUNTER (OUTPATIENT)
Age: 71
End: 2021-12-02

## 2021-12-15 ENCOUNTER — TRANSCRIPTION ENCOUNTER (OUTPATIENT)
Age: 71
End: 2021-12-15

## 2021-12-27 ENCOUNTER — OUTPATIENT (OUTPATIENT)
Dept: OUTPATIENT SERVICES | Facility: HOSPITAL | Age: 71
LOS: 1 days | Discharge: HOME | End: 2021-12-27

## 2021-12-27 DIAGNOSIS — D64.9 ANEMIA, UNSPECIFIED: ICD-10-CM

## 2021-12-27 DIAGNOSIS — Z90.49 ACQUIRED ABSENCE OF OTHER SPECIFIED PARTS OF DIGESTIVE TRACT: Chronic | ICD-10-CM

## 2021-12-27 DIAGNOSIS — Z98.890 OTHER SPECIFIED POSTPROCEDURAL STATES: Chronic | ICD-10-CM

## 2021-12-27 DIAGNOSIS — Z90.710 ACQUIRED ABSENCE OF BOTH CERVIX AND UTERUS: Chronic | ICD-10-CM

## 2021-12-29 ENCOUNTER — APPOINTMENT (OUTPATIENT)
Dept: HEMATOLOGY ONCOLOGY | Facility: CLINIC | Age: 71
End: 2021-12-29

## 2021-12-30 PROCEDURE — 85025 COMPLETE CBC W/AUTO DIFF WBC: CPT

## 2021-12-30 PROCEDURE — 94760 N-INVAS EAR/PLS OXIMETRY 1: CPT

## 2021-12-30 PROCEDURE — 96375 TX/PRO/DX INJ NEW DRUG ADDON: CPT

## 2021-12-30 PROCEDURE — 86769 SARS-COV-2 COVID-19 ANTIBODY: CPT

## 2021-12-30 PROCEDURE — 80053 COMPREHEN METABOLIC PANEL: CPT

## 2021-12-30 PROCEDURE — 85014 HEMATOCRIT: CPT

## 2021-12-30 PROCEDURE — 87637 SARSCOV2&INF A&B&RSV AMP PRB: CPT

## 2021-12-30 PROCEDURE — 82962 GLUCOSE BLOOD TEST: CPT

## 2021-12-30 PROCEDURE — 86923 COMPATIBILITY TEST ELECTRIC: CPT

## 2021-12-30 PROCEDURE — 85018 HEMOGLOBIN: CPT

## 2021-12-30 PROCEDURE — 86901 BLOOD TYPING SEROLOGIC RH(D): CPT

## 2021-12-30 PROCEDURE — 97110 THERAPEUTIC EXERCISES: CPT

## 2021-12-30 PROCEDURE — 86900 BLOOD TYPING SEROLOGIC ABO: CPT

## 2021-12-30 PROCEDURE — P9016: CPT

## 2021-12-30 PROCEDURE — 85610 PROTHROMBIN TIME: CPT

## 2021-12-30 PROCEDURE — 97116 GAIT TRAINING THERAPY: CPT

## 2021-12-30 PROCEDURE — 85730 THROMBOPLASTIN TIME PARTIAL: CPT

## 2021-12-30 PROCEDURE — 93005 ELECTROCARDIOGRAM TRACING: CPT

## 2021-12-30 PROCEDURE — 83735 ASSAY OF MAGNESIUM: CPT

## 2021-12-30 PROCEDURE — 36415 COLL VENOUS BLD VENIPUNCTURE: CPT

## 2021-12-30 PROCEDURE — C8929: CPT

## 2021-12-30 PROCEDURE — 36430 TRANSFUSION BLD/BLD COMPNT: CPT

## 2021-12-30 PROCEDURE — 82272 OCCULT BLD FECES 1-3 TESTS: CPT

## 2021-12-30 PROCEDURE — 74177 CT ABD & PELVIS W/CONTRAST: CPT | Mod: MA

## 2021-12-30 PROCEDURE — 86850 RBC ANTIBODY SCREEN: CPT

## 2021-12-30 PROCEDURE — 96374 THER/PROPH/DIAG INJ IV PUSH: CPT

## 2021-12-30 PROCEDURE — 99285 EMERGENCY DEPT VISIT HI MDM: CPT

## 2021-12-30 PROCEDURE — 94640 AIRWAY INHALATION TREATMENT: CPT

## 2021-12-30 PROCEDURE — 80048 BASIC METABOLIC PNL TOTAL CA: CPT

## 2021-12-30 PROCEDURE — 71045 X-RAY EXAM CHEST 1 VIEW: CPT

## 2022-01-24 ENCOUNTER — NON-APPOINTMENT (OUTPATIENT)
Age: 72
End: 2022-01-24

## 2022-02-07 ENCOUNTER — NON-APPOINTMENT (OUTPATIENT)
Age: 72
End: 2022-02-07

## 2022-02-23 ENCOUNTER — APPOINTMENT (OUTPATIENT)
Dept: FAMILY MEDICINE | Facility: CLINIC | Age: 72
End: 2022-02-23
Payer: MEDICARE

## 2022-02-23 VITALS — OXYGEN SATURATION: 98 % | HEART RATE: 88 BPM

## 2022-02-23 VITALS
HEIGHT: 68 IN | TEMPERATURE: 96.4 F | WEIGHT: 159 LBS | DIASTOLIC BLOOD PRESSURE: 66 MMHG | SYSTOLIC BLOOD PRESSURE: 98 MMHG | BODY MASS INDEX: 24.1 KG/M2

## 2022-02-23 VITALS — SYSTOLIC BLOOD PRESSURE: 72 MMHG | DIASTOLIC BLOOD PRESSURE: 50 MMHG

## 2022-02-23 VITALS — SYSTOLIC BLOOD PRESSURE: 80 MMHG | DIASTOLIC BLOOD PRESSURE: 50 MMHG

## 2022-02-23 DIAGNOSIS — R09.89 OTHER SPECIFIED SYMPTOMS AND SIGNS INVOLVING THE CIRCULATORY AND RESPIRATORY SYSTEMS: ICD-10-CM

## 2022-02-23 DIAGNOSIS — I63.9 CEREBRAL INFARCTION, UNSPECIFIED: ICD-10-CM

## 2022-02-23 DIAGNOSIS — H69.82 OTHER SPECIFIED DISORDERS OF EUSTACHIAN TUBE, LEFT EAR: ICD-10-CM

## 2022-02-23 DIAGNOSIS — I95.9 HYPOTENSION, UNSPECIFIED: ICD-10-CM

## 2022-02-23 PROCEDURE — 99215 OFFICE O/P EST HI 40 MIN: CPT

## 2022-02-23 RX ORDER — MONTELUKAST 10 MG/1
10 TABLET, FILM COATED ORAL
Qty: 30 | Refills: 0 | Status: COMPLETED | COMMUNITY
Start: 2018-06-26 | End: 2022-02-23

## 2022-02-23 RX ORDER — CARVEDILOL 25 MG/1
25 TABLET, FILM COATED ORAL TWICE DAILY
Qty: 180 | Refills: 3 | Status: COMPLETED | COMMUNITY
Start: 2018-06-08 | End: 2022-02-23

## 2022-02-23 RX ORDER — POLYETHYLENE GLYCOL 3350, SODIUM SULFATE, SODIUM CHLORIDE, POTASSIUM CHLORIDE, ASCORBIC ACID, SODIUM ASCORBATE 7.5-2.691G
100 KIT ORAL
Qty: 1 | Refills: 0 | Status: COMPLETED | COMMUNITY
Start: 2020-08-17 | End: 2022-02-23

## 2022-02-23 RX ORDER — FERROUS SULFATE 325(65) MG
325 (65 FE) TABLET ORAL
Qty: 180 | Refills: 0 | Status: ACTIVE | COMMUNITY
Start: 2020-08-10

## 2022-02-23 RX ORDER — TORSEMIDE 20 MG/1
20 TABLET ORAL
Qty: 30 | Refills: 0 | Status: ACTIVE | COMMUNITY
Start: 2022-02-23

## 2022-02-23 RX ORDER — BUTALBITAL, ACETAMINOPHEN AND CAFFEINE 300; 50; 40 MG/1; MG/1; MG/1
50-300-40 CAPSULE ORAL
Qty: 60 | Refills: 0 | Status: COMPLETED | COMMUNITY
Start: 2018-02-28 | End: 2022-02-23

## 2022-02-23 RX ORDER — ISOSORBIDE MONONITRATE 10 MG/1
10 TABLET ORAL TWICE DAILY
Refills: 0 | Status: COMPLETED | COMMUNITY
Start: 2020-09-22 | End: 2022-02-23

## 2022-02-23 RX ORDER — ROSUVASTATIN CALCIUM 40 MG/1
40 TABLET, FILM COATED ORAL
Qty: 90 | Refills: 3 | Status: ACTIVE | COMMUNITY
Start: 2022-02-23

## 2022-02-23 RX ORDER — METOPROLOL SUCCINATE 25 MG/1
25 TABLET, EXTENDED RELEASE ORAL DAILY
Qty: 90 | Refills: 3 | Status: ACTIVE | COMMUNITY
Start: 2022-02-23

## 2022-02-23 RX ORDER — CLOPIDOGREL BISULFATE 75 MG/1
75 TABLET, FILM COATED ORAL
Qty: 90 | Refills: 1 | Status: ACTIVE | COMMUNITY
Start: 2022-02-23

## 2022-02-23 RX ORDER — ALBUTEROL SULFATE 90 UG/1
108 (90 BASE) AEROSOL, METERED RESPIRATORY (INHALATION)
Qty: 1 | Refills: 0 | Status: ACTIVE | COMMUNITY

## 2022-02-23 RX ORDER — HYDRALAZINE HYDROCHLORIDE 10 MG/1
10 TABLET ORAL
Qty: 90 | Refills: 0 | Status: COMPLETED | COMMUNITY
Start: 2018-06-08 | End: 2022-02-23

## 2022-02-23 RX ORDER — ATORVASTATIN CALCIUM 80 MG/1
80 TABLET, FILM COATED ORAL
Qty: 30 | Refills: 0 | Status: COMPLETED | COMMUNITY
Start: 2020-10-23 | End: 2022-02-23

## 2022-02-23 RX ORDER — UMECLIDINIUM BROMIDE AND VILANTEROL TRIFENATATE 62.5; 25 UG/1; UG/1
62.5-25 POWDER RESPIRATORY (INHALATION)
Refills: 0 | Status: COMPLETED | COMMUNITY
End: 2022-02-23

## 2022-02-23 NOTE — HISTORY OF PRESENT ILLNESS
[Family Member] : family member [FreeTextEntry8] : Pt c/o left ear pain and headache x 1-2 wee\par \par \par \par \par 70 yo female  s/p Dx'ed MI  5/13/2021 while on vacation in [place].   Initially treated with TATA and pharmacologic therapy and Cardiac Rehab.  24 hrs post  Cardiac Rehab d/c  developed rectal bleeding  6/16/21  MV replaced,  pseudoaneurysm repair in LV,  ASD repair.  \par \par 70 yo female c > 1 week hx of chronic unchanged intermittent L ear pain.   \par no f/c/s\par no N/V/D no abd pain \par no known tinnitus  \par no sore throat \par chronic PND c yellow mucous   \par no cough \par pain intermittently radiates into L temporal region \par \par \par \par pt states home am weights have been stable x 4 days at a weight of 153.4   (this am wt = 153.4)

## 2022-02-23 NOTE — PLAN
[FreeTextEntry1] : OTC Flonase 2 sp ea nostril once daily \par \par \par cont'd f/u c CArdiology Dr. Zarate ([facility] \par see radiology orders \par \par pt's BP 80/50 c clinical fatigue/dizziness/ lethargy.  Recommend decreasing Entresto from 1 tab bid to 1/2 tab bid.  Cardiology to be consulted

## 2022-02-23 NOTE — PHYSICAL EXAM
[No Acute Distress] : no acute distress [Well Nourished] : well nourished [Well Developed] : well developed [Well-Appearing] : well-appearing [EOMI] : extraocular movements intact [de-identified] : +ve LLL rales o/w CTA B/L

## 2022-03-05 ENCOUNTER — INPATIENT (INPATIENT)
Facility: HOSPITAL | Age: 72
LOS: 2 days | Discharge: HOME | DRG: 287 | End: 2022-03-08
Attending: GENERAL ACUTE CARE HOSPITAL | Admitting: INTERNAL MEDICINE
Payer: MEDICARE

## 2022-03-05 VITALS
DIASTOLIC BLOOD PRESSURE: 75 MMHG | HEART RATE: 111 BPM | SYSTOLIC BLOOD PRESSURE: 145 MMHG | RESPIRATION RATE: 20 BRPM | HEIGHT: 68 IN

## 2022-03-05 DIAGNOSIS — I25.10 ATHEROSCLEROTIC HEART DISEASE OF NATIVE CORONARY ARTERY WITHOUT ANGINA PECTORIS: ICD-10-CM

## 2022-03-05 DIAGNOSIS — I21.3 ST ELEVATION (STEMI) MYOCARDIAL INFARCTION OF UNSPECIFIED SITE: ICD-10-CM

## 2022-03-05 DIAGNOSIS — I50.22 CHRONIC SYSTOLIC (CONGESTIVE) HEART FAILURE: ICD-10-CM

## 2022-03-05 DIAGNOSIS — N17.9 ACUTE KIDNEY FAILURE, UNSPECIFIED: ICD-10-CM

## 2022-03-05 DIAGNOSIS — D64.9 ANEMIA, UNSPECIFIED: ICD-10-CM

## 2022-03-05 DIAGNOSIS — Z98.890 OTHER SPECIFIED POSTPROCEDURAL STATES: Chronic | ICD-10-CM

## 2022-03-05 DIAGNOSIS — Z90.49 ACQUIRED ABSENCE OF OTHER SPECIFIED PARTS OF DIGESTIVE TRACT: Chronic | ICD-10-CM

## 2022-03-05 DIAGNOSIS — Z90.710 ACQUIRED ABSENCE OF BOTH CERVIX AND UTERUS: Chronic | ICD-10-CM

## 2022-03-05 LAB
ALBUMIN SERPL ELPH-MCNC: 3.8 G/DL — SIGNIFICANT CHANGE UP (ref 3.3–5.2)
ALP SERPL-CCNC: 137 U/L — HIGH (ref 40–120)
ALT FLD-CCNC: 13 U/L — SIGNIFICANT CHANGE UP
ANION GAP SERPL CALC-SCNC: 16 MMOL/L — SIGNIFICANT CHANGE UP (ref 5–17)
APTT BLD: 28.1 SEC — SIGNIFICANT CHANGE UP (ref 27.5–35.5)
AST SERPL-CCNC: 23 U/L — SIGNIFICANT CHANGE UP
BASOPHILS # BLD AUTO: 0.03 K/UL — SIGNIFICANT CHANGE UP (ref 0–0.2)
BASOPHILS NFR BLD AUTO: 0.4 % — SIGNIFICANT CHANGE UP (ref 0–2)
BILIRUB SERPL-MCNC: <0.2 MG/DL — LOW (ref 0.4–2)
BLD GP AB SCN SERPL QL: SIGNIFICANT CHANGE UP
BUN SERPL-MCNC: 24.2 MG/DL — HIGH (ref 8–20)
CALCIUM SERPL-MCNC: 9.1 MG/DL — SIGNIFICANT CHANGE UP (ref 8.6–10.2)
CHLORIDE SERPL-SCNC: 100 MMOL/L — SIGNIFICANT CHANGE UP (ref 98–107)
CO2 SERPL-SCNC: 22 MMOL/L — SIGNIFICANT CHANGE UP (ref 22–29)
CREAT SERPL-MCNC: 1.67 MG/DL — HIGH (ref 0.5–1.3)
EGFR: 32 ML/MIN/1.73M2 — LOW
EOSINOPHIL # BLD AUTO: 0.1 K/UL — SIGNIFICANT CHANGE UP (ref 0–0.5)
EOSINOPHIL NFR BLD AUTO: 1.5 % — SIGNIFICANT CHANGE UP (ref 0–6)
FLUAV AG NPH QL: SIGNIFICANT CHANGE UP
FLUBV AG NPH QL: SIGNIFICANT CHANGE UP
GLUCOSE SERPL-MCNC: 119 MG/DL — HIGH (ref 70–99)
HCT VFR BLD CALC: 23.6 % — LOW (ref 34.5–45)
HGB BLD-MCNC: 7.1 G/DL — LOW (ref 11.5–15.5)
IMM GRANULOCYTES NFR BLD AUTO: 0.3 % — SIGNIFICANT CHANGE UP (ref 0–1.5)
INR BLD: 1.08 RATIO — SIGNIFICANT CHANGE UP (ref 0.88–1.16)
LYMPHOCYTES # BLD AUTO: 1.38 K/UL — SIGNIFICANT CHANGE UP (ref 1–3.3)
LYMPHOCYTES # BLD AUTO: 20.5 % — SIGNIFICANT CHANGE UP (ref 13–44)
MCHC RBC-ENTMCNC: 26.8 PG — LOW (ref 27–34)
MCHC RBC-ENTMCNC: 30.1 GM/DL — LOW (ref 32–36)
MCV RBC AUTO: 89.1 FL — SIGNIFICANT CHANGE UP (ref 80–100)
MONOCYTES # BLD AUTO: 0.94 K/UL — HIGH (ref 0–0.9)
MONOCYTES NFR BLD AUTO: 14 % — SIGNIFICANT CHANGE UP (ref 2–14)
NEUTROPHILS # BLD AUTO: 4.25 K/UL — SIGNIFICANT CHANGE UP (ref 1.8–7.4)
NEUTROPHILS NFR BLD AUTO: 63.3 % — SIGNIFICANT CHANGE UP (ref 43–77)
NT-PROBNP SERPL-SCNC: 1391 PG/ML — HIGH (ref 0–300)
PLATELET # BLD AUTO: 552 K/UL — HIGH (ref 150–400)
POTASSIUM SERPL-MCNC: 4.2 MMOL/L — SIGNIFICANT CHANGE UP (ref 3.5–5.3)
POTASSIUM SERPL-SCNC: 4.2 MMOL/L — SIGNIFICANT CHANGE UP (ref 3.5–5.3)
PROT SERPL-MCNC: 7.5 G/DL — SIGNIFICANT CHANGE UP (ref 6.6–8.7)
PROTHROM AB SERPL-ACNC: 12.5 SEC — SIGNIFICANT CHANGE UP (ref 10.5–13.4)
RBC # BLD: 2.65 M/UL — LOW (ref 3.8–5.2)
RBC # FLD: 20 % — HIGH (ref 10.3–14.5)
RSV RNA NPH QL NAA+NON-PROBE: SIGNIFICANT CHANGE UP
SARS-COV-2 RNA SPEC QL NAA+PROBE: SIGNIFICANT CHANGE UP
SODIUM SERPL-SCNC: 138 MMOL/L — SIGNIFICANT CHANGE UP (ref 135–145)
TROPONIN T SERPL-MCNC: 0.02 NG/ML — SIGNIFICANT CHANGE UP (ref 0–0.06)
WBC # BLD: 6.72 K/UL — SIGNIFICANT CHANGE UP (ref 3.8–10.5)
WBC # FLD AUTO: 6.72 K/UL — SIGNIFICANT CHANGE UP (ref 3.8–10.5)

## 2022-03-05 PROCEDURE — 93010 ELECTROCARDIOGRAM REPORT: CPT | Mod: 76

## 2022-03-05 PROCEDURE — 93458 L HRT ARTERY/VENTRICLE ANGIO: CPT | Mod: 26

## 2022-03-05 PROCEDURE — 71045 X-RAY EXAM CHEST 1 VIEW: CPT | Mod: 26

## 2022-03-05 PROCEDURE — 99223 1ST HOSP IP/OBS HIGH 75: CPT | Mod: 25

## 2022-03-05 PROCEDURE — 99152 MOD SED SAME PHYS/QHP 5/>YRS: CPT

## 2022-03-05 PROCEDURE — 99291 CRITICAL CARE FIRST HOUR: CPT

## 2022-03-05 RX ORDER — ALPRAZOLAM 0.25 MG
0.25 TABLET ORAL ONCE
Refills: 0 | Status: DISCONTINUED | OUTPATIENT
Start: 2022-03-05 | End: 2022-03-05

## 2022-03-05 RX ORDER — ATORVASTATIN CALCIUM 80 MG/1
80 TABLET, FILM COATED ORAL AT BEDTIME
Refills: 0 | Status: DISCONTINUED | OUTPATIENT
Start: 2022-03-05 | End: 2022-03-08

## 2022-03-05 RX ORDER — HEPARIN SODIUM 5000 [USP'U]/ML
5000 INJECTION INTRAVENOUS; SUBCUTANEOUS ONCE
Refills: 0 | Status: COMPLETED | OUTPATIENT
Start: 2022-03-05 | End: 2022-03-05

## 2022-03-05 RX ORDER — METOPROLOL TARTRATE 50 MG
12.5 TABLET ORAL EVERY 12 HOURS
Refills: 0 | Status: DISCONTINUED | OUTPATIENT
Start: 2022-03-06 | End: 2022-03-08

## 2022-03-05 RX ORDER — CLOPIDOGREL BISULFATE 75 MG/1
75 TABLET, FILM COATED ORAL DAILY
Refills: 0 | Status: DISCONTINUED | OUTPATIENT
Start: 2022-03-05 | End: 2022-03-08

## 2022-03-05 RX ORDER — AMIODARONE HYDROCHLORIDE 400 MG/1
200 TABLET ORAL DAILY
Refills: 0 | Status: DISCONTINUED | OUTPATIENT
Start: 2022-03-06 | End: 2022-03-08

## 2022-03-05 RX ORDER — FOLIC ACID 0.8 MG
1 TABLET ORAL DAILY
Refills: 0 | Status: DISCONTINUED | OUTPATIENT
Start: 2022-03-05 | End: 2022-03-08

## 2022-03-05 RX ORDER — ASPIRIN/CALCIUM CARB/MAGNESIUM 324 MG
81 TABLET ORAL DAILY
Refills: 0 | Status: DISCONTINUED | OUTPATIENT
Start: 2022-03-05 | End: 2022-03-08

## 2022-03-05 RX ORDER — AMIODARONE HYDROCHLORIDE 400 MG/1
200 TABLET ORAL DAILY
Refills: 0 | Status: DISCONTINUED | OUTPATIENT
Start: 2022-03-05 | End: 2022-03-05

## 2022-03-05 RX ORDER — CLOPIDOGREL BISULFATE 75 MG/1
300 TABLET, FILM COATED ORAL ONCE
Refills: 0 | Status: COMPLETED | OUTPATIENT
Start: 2022-03-05 | End: 2022-03-05

## 2022-03-05 RX ORDER — CHLORHEXIDINE GLUCONATE 213 G/1000ML
1 SOLUTION TOPICAL
Refills: 0 | Status: DISCONTINUED | OUTPATIENT
Start: 2022-03-05 | End: 2022-03-06

## 2022-03-05 RX ORDER — PANTOPRAZOLE SODIUM 20 MG/1
40 TABLET, DELAYED RELEASE ORAL DAILY
Refills: 0 | Status: DISCONTINUED | OUTPATIENT
Start: 2022-03-05 | End: 2022-03-08

## 2022-03-05 RX ORDER — CARVEDILOL PHOSPHATE 80 MG/1
3.12 CAPSULE, EXTENDED RELEASE ORAL EVERY 12 HOURS
Refills: 0 | Status: DISCONTINUED | OUTPATIENT
Start: 2022-03-05 | End: 2022-03-05

## 2022-03-05 RX ORDER — FERROUS SULFATE 325(65) MG
325 TABLET ORAL DAILY
Refills: 0 | Status: DISCONTINUED | OUTPATIENT
Start: 2022-03-05 | End: 2022-03-08

## 2022-03-05 RX ADMIN — HEPARIN SODIUM 5000 UNIT(S): 5000 INJECTION INTRAVENOUS; SUBCUTANEOUS at 19:49

## 2022-03-05 RX ADMIN — CLOPIDOGREL BISULFATE 300 MILLIGRAM(S): 75 TABLET, FILM COATED ORAL at 19:50

## 2022-03-05 RX ADMIN — ATORVASTATIN CALCIUM 80 MILLIGRAM(S): 80 TABLET, FILM COATED ORAL at 22:07

## 2022-03-05 RX ADMIN — Medication 0.25 MILLIGRAM(S): at 23:47

## 2022-03-05 NOTE — CONSULT NOTE ADULT - PROBLEM SELECTOR RECOMMENDATION 3
-Hgb 7.1 (from 10.3 last October)  -no obvious signs of bleeding  -transfuse 2 units of PRBC  -recurrent GI bleeds and multiple blood transfusions in the past, pt follows with Hematology at [facility]  -continue ASA/Plavix for now -Hgb 7.1 (from 10.3 last October)  -no obvious signs of bleeding  -transfuse 2 units of PRBC  -hx of recurrent GI bleeds and multiple blood transfusions in the past, pt follows with Hematology at [facility]  -continue ASA/Plavix for now

## 2022-03-05 NOTE — CONSULT NOTE ADULT - SUBJECTIVE AND OBJECTIVE BOX
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 EKG revealed ST elevations in lateral leads. Code STEMI was activated and pt was urgently taken to catheterization lab. Coronary angiogram revealed no obstructions and no intervention was performed. Right femoral artery access with Mynx closure. No LV gram was performed.      REVIEW OF SYMPTOMS:   Cardiovascular:   as per HPI  Respiratory:  c/o dyspnea,  denies cough,    Genitourinary:  No dysuria, no hematuria;   Gastrointestinal:   No dark color stool, no melena, no diarrhea, no constipation, no abdominal pain;   Neurological: No headache, no dizziness, no slurred speech;    Psychiatric: No agitation, no anxiety.  ALL OTHER REVIEW OF SYSTEMS ARE NEGATIVE.    MEDICATIONS  (home):  · 	pantoprazole 40 mg oral delayed release tablet: 1 tab(s) orally 2 times a day  · 	rosuvastatin 20 mg QHS  · 	Aspirin Enteric Coated 81 mg oral delayed release tablet: 1 tab(s) orally once a day restart on sunday in two days   · 	ferrous sulfate 324 mg (65 mg elemental iron) oral delayed release tablet: 1 tab(s) orally every 48 hours  · 	Metoprolol  .           Spironolactone  · 	Stiolto Respimat 28 ACT 2.5 mcg-2.5 mcg/inh inhalation aerosol: 2 puff(s) inhaled every 24 hours  · 	Plavix 75 mg oral tablet: 1 tab(s) orally once a day  · 	amiodarone 200 mg oral tablet: 1 tab(s) orally once a day  · 	potassium chloride 20 mEq oral powder for reconstitution: 1 each orally once a day  · 	furosemide 40 mg oral tablet: 1 tab(s) orally once a day  · 	folic acid:   · 	albuterol:   .           methotrexate 15 mg Q Sunday          PAST MEDICAL & SURGICAL HISTORY:  Rheumatoid arthritis    COPD without exacerbation    HTN (hypertension)    Pacemaker    H/O cerebral artery stenosis  left cerebral artery stent per history in 2014.    S/P hysterectomy    S/P cholecystectomy    H/O exploratory laparotomy    s/p MDT PPM  s/p cardiac stents  s/p mitral valve replacement        FAMILY HISTORY:  FH: dementia  mother    FH: prostate cancer  father        SOCIAL HISTORY:    CIGARETTES:       ALCOHOL:    DRUGS:    Vital Signs Last 24 Hrs  T(C): --  T(F): --  HR: 111 (05 Mar 2022 19:26) (111 - 111)  BP: 145/75 (05 Mar 2022 19:26) (145/75 - 145/75)  BP(mean): --  RR: 20 (05 Mar 2022 19:26) (20 - 20)  SpO2: --    PHYSICAL EXAM:  General: WN/WD NAD  Neurology: A&Ox3, nonfocal, BERTRAND x 4  Eyes: PERRL/ EOMI, Gross vision intact  ENT/Neck: Neck supple, trachea midline, No JVD, Gross hearing intact  Respiratory: CTA B/L, No wheezing, rales, rhonchi  CV: tachy, S1S2, no gross murmurs  Abdominal: Soft, NT, ND +BS,   Extremities: No edema, + peripheral pulses  Skin: No Rashes, Hematoma, Ecchymosis          INTERPRETATION OF TELEMETRY: 's    ECG: ST [id number] degree AVB, ST elevation in lateral leads        I&O's Detail      LABS:                        7.1    6.72  )-----------( 552      ( 05 Mar 2022 20:03 )             23.6               PT/INR - ( 05 Mar 2022 20:04 )   PT: 12.5 sec;   INR: 1.08 ratio         PTT - ( 05 Mar 2022 20:04 )  PTT:28.1 sec    I&O's Summary      RADIOLOGY & ADDITIONAL STUDIES:  X-ray:    PREVIOUS DIAGNOSTIC TESTING:      ECHO:  < from: TTE Echo Complete w/ Contrast w/ Doppler (10.22.21 @ 19:34) >  Summary:   1. Leftventricular ejection fraction, by visual estimation, is 30 to 35%.   2. Moderately to severely decreased global left ventricular systolic function.   3. Multiple left ventricular regional wall motion abnormalities exist. See wall motion findings.   4. Spectral Doppler shows impaired relaxation pattern of left ventricular myocardial filling (Grade I diastolic dysfunction).   5. A bioprosthetic valve is present in the mitral position.   6. Trace mitral valve regurgitation.   7. Mild tricuspid regurgitation.   8. Mild aortic regurgitation.   9. Sclerotic aortic valve with normal opening.  10. There is no evidence of pericardial effusion.  11. Endocardial visualization was enhanced with intravenous echo contrast.  12. There are no prior studies on this patient for comparison purposes.    Felicia Wang D.O. Electronically signed on 10/24/2021 at 7:53:17 AM    < end of copied text >      STRESS: n/a    CATHETERIZATION: n/a

## 2022-03-05 NOTE — CONSULT NOTE ADULT - ASSESSMENT
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 EKG revealed ST elevations in lateral leads. Code STEMI was activated and pt was urgently taken to catheterization lab. Coronary angiogram revealed no obstructions and no intervention was performed. Right femoral artery access with Mynx closure. No LV gram was performed.

## 2022-03-05 NOTE — H&P ADULT - ATTENDING COMMENTS
71F w/ PMHx CAD s/p PCI, ICM (EF 30-35%), bradyarrhythmia s/p MDT PPM, apical pseudoaneurysm, s/p VSD repair, bioMVR, MOLLY, COPD on home O2, AFib (not on AC due to recurrent lower GI bleed), chronic anemia (s/p 24 transfusions in past) presented on 03/05 w/ SOB and palpitations. Code STEMI activated after HOA seen on inferior lateral leads.  LHC w/ no acute obstructions and no intervention performed  MICU for overnight observation. Plan to reinitiate DAPT and transfuse 2U pRBC  Holding torsemide, Entresto and aldactone in setting of ZANDRA, Renal eval in AM if SCr keeps trending up

## 2022-03-05 NOTE — ED ADULT TRIAGE NOTE - CHIEF COMPLAINT QUOTE
Pt A&Ox4 states "I was SOB and had palpations."  BIBA c/o Palpations and SOB, 12 lead showed ST elevations. Patient has prior MI.

## 2022-03-05 NOTE — ED PROVIDER NOTE - PHYSICAL EXAMINATION
Gen: mild discomfort/distress. mildly sick appearing  HEENT: Mucous membranes moist, pink conjunctivae, EOMI  CV: RRR, nl s1/s2. equal pulses b/l  Resp: CTAB, normal rate and effort  GI: Abdomen soft, NT, ND. No rebound, no guarding  : No CVAT  Neuro: A&O x 3, moving all 4 extremities  MSK: No spine or joint tenderness to palpation  Skin: No rashes. intact and perfused.

## 2022-03-05 NOTE — H&P ADULT - HISTORY OF PRESENT ILLNESS
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 EKG revealed ST elevations in lateral leads. Code STEMI was activated and pt was urgently taken to catheterization lab. Coronary angiogram revealed no obstructions and no intervention was performed. MICU consulted for further management. AAOx4, HD stable.   Trop negative x1, SCr 1.67, Hgb 7.1.    PAST MEDICAL & SURGICAL HISTORY:  Rheumatoid arthritis  COPD without exacerbation  HTN (hypertension)  Pacemaker  H/O cerebral artery stenosis  left cerebral artery stent per history in 2014.  S/P hysterectomy  S/P cholecystectomy  H/O exploratory laparotomy    Review of Systems:  CONSTITUTIONAL: No fever, chills, or fatigue  EYES: No eye pain, visual disturbances, or discharge  ENMT:  No difficulty hearing, tinnitus, vertigo; No sinus or throat pain  NECK: No pain or stiffness  RESPIRATORY: No cough, wheezing, chills or hemoptysis; (+) shortness of breath  CARDIOVASCULAR: No chest pain, (+) palpitations; No dizziness, or leg swelling  GASTROINTESTINAL: No abdominal or epigastric pain. No nausea, vomiting, or hematemesis; No diarrhea or constipation. No melena or hematochezia.  GENITOURINARY: No dysuria, frequency, hematuria, or incontinence  NEUROLOGICAL: No headaches, memory loss, loss of strength, numbness, or tremors  SKIN: No itching, burning, rashes, or lesions   MUSCULOSKELETAL: No joint pain or swelling; No muscle, back, or extremity pain  PSYCHIATRIC: No depression, anxiety, mood swings, or difficulty sleeping    Medications:  aMIOdarone    Tablet 200 milliGRAM(s) Oral daily  carvedilol 3.125 milliGRAM(s) Oral every 12 hours  aspirin enteric coated 81 milliGRAM(s) Oral daily  clopidogrel Tablet 75 milliGRAM(s) Oral daily  atorvastatin 80 milliGRAM(s) Oral at bedtime  ferrous    sulfate 325 milliGRAM(s) Oral daily  folic acid 1 milliGRAM(s) Oral daily  chlorhexidine 4% Liquid 1 Application(s) Topical <User Schedule>    ICU Vital Signs Last 24 Hrs  T(C): --  T(F): --  HR: 111 (05 Mar 2022 19:26) (111 - 111)  BP: 145/75 (05 Mar 2022 19:26) (145/75 - 145/75)  BP(mean): --  ABP: --  ABP(mean): --  RR: 20 (05 Mar 2022 19:26) (20 - 20)  SpO2: --    I&O's Detail    LABS:                        7.1    6.72  )-----------( 552      ( 05 Mar 2022 20:03 )             23.6     03-05  138  |  100  |  24.2<H>  ----------------------------<  119<H>  4.2   |  22.0  |  1.67<H>  Ca    9.1      05 Mar 2022 20:03  TPro  7.5  /  Alb  3.8  /  TBili  <0.2<L>  /  DBili  x   /  AST  23  /  ALT  13  /  AlkPhos  137<H>  03-05    CARDIAC MARKERS ( 05 Mar 2022 20:03 )  x     / 0.02 ng/mL / x     / x     / x        CAPILLARY BLOOD GLUCOSE    PT/INR - ( 05 Mar 2022 20:04 )   PT: 12.5 sec;   INR: 1.08 ratio    PTT - ( 05 Mar 2022 20:04 )  PTT:28.1 sec    CULTURES:      Physical Examination:  General: Alert, oriented, interactive, nonfocal  HEENT: PERRL.  PULM: Clear to auscultation bilaterally.  CVS: s1/s2.  ABD: Soft, nondistended, nontender, normoactive bowel sounds.  EXT: No edema, nontender  SKIN: Warm.  NEURO: A&Ox3.      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 EKG revealed ST elevations in lateral leads. Code STEMI was activated and pt was urgently taken to catheterization lab. Coronary angiogram revealed no obstructions and no intervention was performed. MICU consulted for further management  Assessment:  1. STEMI  2. ZANDRA  3. Anemia    Plan:  NEURO:  -No acute issues. Analgesia PRN.    CV:  -Maintain goal MAP >65.  -ASA/Statin/Plavix. Coreg, Amio PO.  -Keep K~4 and Mg>2 for optimal arrhythmia suppression   -Serial EKG/Troponin.     RESP:  -No acute issues.    RENAL:  Renal function currently w/ ZANDRA (BL 0.7 - 1.0).   -trend lytes/Scr daily with BMP  -I's and O's, goal UOP 0.5 cc/kg/hr  -renal dose meds and avoid nephrotoxins     GI:  -DASH/TLC diet.     ENDO:  -Aggressive glycemic control to limit FS glucose to <180mg/dl. BS WNL.    ID:  -Afebrile, no leukocytosis.     HEME:  -Hgb 7.1, will transfuse 2u PRBC. Transfuse for Hgb <8. Keep active t/s. No signs of active bleeding currently. Follows up w/ outpatient Hematologist due to chronic anemia.   -Ferrous sulfate.   -DVT ppx w/ SCDs.     DISPO: D/w ICU intenvisit Dr. Montgomery and Cardiology NP. Will admit to MICU.     TIME SPENT: 40 minutes  Evaluating/treating patient, reviewing data/labs/imaging, discussing case with multidisciplinary team, discussing plan/goals of care with patient/family. Non-inclusive of procedure time.   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 EKG revealed ST elevations in lateral leads. Code STEMI was activated and pt was urgently taken to catheterization lab. Coronary angiogram revealed no obstructions and no intervention was performed. MICU consulted for further management. AAOx4, HD stable.   Trop negative x1, SCr 1.67, Hgb 7.1.    PAST MEDICAL & SURGICAL HISTORY:  Rheumatoid arthritis  COPD without exacerbation  HTN (hypertension)  Pacemaker  H/O cerebral artery stenosis  left cerebral artery stent per history in 2014.  S/P hysterectomy  S/P cholecystectomy  H/O exploratory laparotomy    Review of Systems:  CONSTITUTIONAL: No fever, chills, or fatigue  EYES: No eye pain, visual disturbances, or discharge  ENMT:  No difficulty hearing, tinnitus, vertigo; No sinus or throat pain  NECK: No pain or stiffness  RESPIRATORY: No cough, wheezing, chills or hemoptysis; (+) shortness of breath  CARDIOVASCULAR: No chest pain, (+) palpitations; No dizziness, or leg swelling  GASTROINTESTINAL: No abdominal or epigastric pain. No nausea, vomiting, or hematemesis; No diarrhea or constipation. No melena or hematochezia.  GENITOURINARY: No dysuria, frequency, hematuria, or incontinence  NEUROLOGICAL: No headaches, memory loss, loss of strength, numbness, or tremors  SKIN: No itching, burning, rashes, or lesions   MUSCULOSKELETAL: No joint pain or swelling; No muscle, back, or extremity pain  PSYCHIATRIC: No depression, anxiety, mood swings, or difficulty sleeping    Medications:  aMIOdarone    Tablet 200 milliGRAM(s) Oral daily  carvedilol 3.125 milliGRAM(s) Oral every 12 hours  aspirin enteric coated 81 milliGRAM(s) Oral daily  clopidogrel Tablet 75 milliGRAM(s) Oral daily  atorvastatin 80 milliGRAM(s) Oral at bedtime  ferrous    sulfate 325 milliGRAM(s) Oral daily  folic acid 1 milliGRAM(s) Oral daily  chlorhexidine 4% Liquid 1 Application(s) Topical <User Schedule>    ICU Vital Signs Last 24 Hrs  T(C): --  T(F): --  HR: 111 (05 Mar 2022 19:26) (111 - 111)  BP: 145/75 (05 Mar 2022 19:26) (145/75 - 145/75)  BP(mean): --  ABP: --  ABP(mean): --  RR: 20 (05 Mar 2022 19:26) (20 - 20)  SpO2: --    I&O's Detail    LABS:                        7.1    6.72  )-----------( 552      ( 05 Mar 2022 20:03 )             23.6     03-05  138  |  100  |  24.2<H>  ----------------------------<  119<H>  4.2   |  22.0  |  1.67<H>  Ca    9.1      05 Mar 2022 20:03  TPro  7.5  /  Alb  3.8  /  TBili  <0.2<L>  /  DBili  x   /  AST  23  /  ALT  13  /  AlkPhos  137<H>  03-05    CARDIAC MARKERS ( 05 Mar 2022 20:03 )  x     / 0.02 ng/mL / x     / x     / x        CAPILLARY BLOOD GLUCOSE    PT/INR - ( 05 Mar 2022 20:04 )   PT: 12.5 sec;   INR: 1.08 ratio    PTT - ( 05 Mar 2022 20:04 )  PTT:28.1 sec    CULTURES:      Physical Examination:  General: Alert, oriented, interactive, nonfocal  HEENT: PERRL.  PULM: Clear to auscultation bilaterally.  CVS: s1/s2.  ABD: Soft, nondistended, nontender, normoactive bowel sounds.  EXT: No edema, nontender  SKIN: Warm.  NEURO: A&Ox3.      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 EKG revealed ST elevations in lateral leads. Code STEMI was activated and pt was urgently taken to catheterization lab. Coronary angiogram revealed no obstructions and no intervention was performed. MICU consulted for further management  Assessment:  1. STEMI  2. ZANDRA  3. Anemia    Plan:  NEURO:  -No acute issues. Analgesia PRN.    CV:  -Maintain goal MAP >65.  -ASA/Statin/Plavix. Coreg, Amio PO.  -Keep K~4 and Mg>2 for optimal arrhythmia suppression.   -Serial EKG/Troponin.   -TTE in AM.     RESP:  -No acute issues.    RENAL:  -Renal function currently w/ ZANDRA (BL 0.7 - 1.0).   -trend lytes/Scr daily with BMP  -I's and O's, goal UOP 0.5 cc/kg/hr  -renal dose meds and avoid nephrotoxins     GI:  -DASH/TLC diet.     ENDO:  -Aggressive glycemic control to limit FS glucose to <180mg/dl. BS WNL.    ID:  -Afebrile, no leukocytosis.     HEME:  -Hgb 7.1, will transfuse 2u PRBC. Transfuse for Hgb <8. Keep active t/s. No signs of active bleeding currently. Follows up w/ outpatient Hematologist due to chronic anemia.   -Ferrous sulfate.   -DVT ppx w/ SCDs.     DISPO: D/w ICU intenvisit Dr. Montgomery and Cardiology NP. Will admit to MICU.     TIME SPENT: 40 minutes  Evaluating/treating patient, reviewing data/labs/imaging, discussing case with multidisciplinary team, discussing plan/goals of care with patient/family. Non-inclusive of procedure time.   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 EKG revealed ST elevations in lateral leads. Code STEMI was activated and pt was urgently taken to catheterization lab. Coronary angiogram revealed no obstructions and no intervention was performed. MICU consulted for further management. AAOx4, HD stable.   Trop negative x1, SCr 1.67, Hgb 7.1.    PAST MEDICAL & SURGICAL HISTORY:  Rheumatoid arthritis  COPD without exacerbation  HTN (hypertension)  Pacemaker  H/O cerebral artery stenosis  left cerebral artery stent per history in 2014.  S/P hysterectomy  S/P cholecystectomy  H/O exploratory laparotomy    Review of Systems:  CONSTITUTIONAL: No fever, chills, or fatigue  EYES: No eye pain, visual disturbances, or discharge  ENMT:  No difficulty hearing, tinnitus, vertigo; No sinus or throat pain  NECK: No pain or stiffness  RESPIRATORY: No cough, wheezing, chills or hemoptysis; (+) shortness of breath  CARDIOVASCULAR: No chest pain, (+) palpitations; No dizziness, or leg swelling  GASTROINTESTINAL: No abdominal or epigastric pain. No nausea, vomiting, or hematemesis; No diarrhea or constipation. No melena or hematochezia.  GENITOURINARY: No dysuria, frequency, hematuria, or incontinence  NEUROLOGICAL: No headaches, memory loss, loss of strength, numbness, or tremors  SKIN: No itching, burning, rashes, or lesions   MUSCULOSKELETAL: No joint pain or swelling; No muscle, back, or extremity pain  PSYCHIATRIC: No depression, anxiety, mood swings, or difficulty sleeping    Medications:  aMIOdarone    Tablet 200 milliGRAM(s) Oral daily  carvedilol 3.125 milliGRAM(s) Oral every 12 hours  aspirin enteric coated 81 milliGRAM(s) Oral daily  clopidogrel Tablet 75 milliGRAM(s) Oral daily  atorvastatin 80 milliGRAM(s) Oral at bedtime  ferrous    sulfate 325 milliGRAM(s) Oral daily  folic acid 1 milliGRAM(s) Oral daily  chlorhexidine 4% Liquid 1 Application(s) Topical <User Schedule>    ICU Vital Signs Last 24 Hrs  T(C): --  T(F): --  HR: 111 (05 Mar 2022 19:26) (111 - 111)  BP: 145/75 (05 Mar 2022 19:26) (145/75 - 145/75)  BP(mean): --  ABP: --  ABP(mean): --  RR: 20 (05 Mar 2022 19:26) (20 - 20)  SpO2: --    I&O's Detail    LABS:                        7.1    6.72  )-----------( 552      ( 05 Mar 2022 20:03 )             23.6     03-05  138  |  100  |  24.2<H>  ----------------------------<  119<H>  4.2   |  22.0  |  1.67<H>  Ca    9.1      05 Mar 2022 20:03  TPro  7.5  /  Alb  3.8  /  TBili  <0.2<L>  /  DBili  x   /  AST  23  /  ALT  13  /  AlkPhos  137<H>  03-05    CARDIAC MARKERS ( 05 Mar 2022 20:03 )  x     / 0.02 ng/mL / x     / x     / x        CAPILLARY BLOOD GLUCOSE    PT/INR - ( 05 Mar 2022 20:04 )   PT: 12.5 sec;   INR: 1.08 ratio    PTT - ( 05 Mar 2022 20:04 )  PTT:28.1 sec    CULTURES:      Physical Examination:  General: Alert, oriented, interactive, nonfocal  HEENT: PERRL.  PULM: Clear to auscultation bilaterally.  CVS: s1/s2.  ABD: Soft, nondistended, nontender, normoactive bowel sounds.  EXT: No edema, nontender  SKIN: Warm.  NEURO: A&Ox3.      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 EKG revealed ST elevations in lateral leads. Code STEMI was activated and pt was urgently taken to catheterization lab. Coronary angiogram revealed no obstructions and no intervention was performed. MICU consulted for further management  Assessment:  1. STEMI  2. ZANDRA  3. Anemia    Plan:  NEURO:  -No acute issues. Analgesia PRN.    CV:  -Maintain goal MAP >65.  -ASA/Statin/Plavix. Coreg, Amio PO.  -Keep K~4 and Mg>2 for optimal arrhythmia suppression.   -Serial EKG/Troponin.   -TTE in AM.     RESP:  -No acute issues.    RENAL:  -Renal function currently w/ ZANDRA (BL 0.7 - 1.0).   -trend lytes/Scr daily with BMP  -I's and O's, goal UOP 0.5 cc/kg/hr  -renal dose meds and avoid nephrotoxins     GI:  -DASH/TLC diet.     ENDO:  -Aggressive glycemic control to limit FS glucose to <180mg/dl. BS WNL.    ID:  -Afebrile, no leukocytosis.     HEME:  -Hgb 7.1, will transfuse 2u PRBC. Transfuse for Hgb <8. Keep active t/s. No signs of active bleeding currently. Follows up w/ outpatient Hematologist due to chronic anemia.   -Ferrous sulfate.   -DVT ppx w/ SCDs.     DISPO: D/w ICU intensivist Dr. Montgomery and Cardiology NP. Will admit to MICU.     TIME SPENT: 40 minutes  Evaluating/treating patient, reviewing data/labs/imaging, discussing case with multidisciplinary team, discussing plan/goals of care with patient/family. Non-inclusive of procedure time.   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 EKG revealed ST elevations in lateral leads. Code STEMI was activated and pt was urgently taken to catheterization lab. Coronary angiogram revealed no obstructions and no intervention was performed. MICU consulted for further management. AAOx4, HD stable.   Trop negative x1, SCr 1.67, Hgb 7.1.    PAST MEDICAL & SURGICAL HISTORY:  Rheumatoid arthritis  COPD without exacerbation  HTN (hypertension)  Pacemaker  H/O cerebral artery stenosis  left cerebral artery stent per history in 2014.  S/P hysterectomy  S/P cholecystectomy  H/O exploratory laparotomy    Review of Systems:  CONSTITUTIONAL: No fever, chills, or fatigue  EYES: No eye pain, visual disturbances, or discharge  ENMT:  No difficulty hearing, tinnitus, vertigo; No sinus or throat pain  NECK: No pain or stiffness  RESPIRATORY: No cough, wheezing, chills or hemoptysis; (+) shortness of breath  CARDIOVASCULAR: No chest pain, (+) palpitations; No dizziness, or leg swelling  GASTROINTESTINAL: No abdominal or epigastric pain. No nausea, vomiting, or hematemesis; No diarrhea or constipation. No melena or hematochezia.  GENITOURINARY: No dysuria, frequency, hematuria, or incontinence  NEUROLOGICAL: No headaches, memory loss, loss of strength, numbness, or tremors  SKIN: No itching, burning, rashes, or lesions   MUSCULOSKELETAL: No joint pain or swelling; No muscle, back, or extremity pain  PSYCHIATRIC: No depression, anxiety, mood swings, or difficulty sleeping    Medications:  aMIOdarone    Tablet 200 milliGRAM(s) Oral daily  carvedilol 3.125 milliGRAM(s) Oral every 12 hours  aspirin enteric coated 81 milliGRAM(s) Oral daily  clopidogrel Tablet 75 milliGRAM(s) Oral daily  atorvastatin 80 milliGRAM(s) Oral at bedtime  ferrous    sulfate 325 milliGRAM(s) Oral daily  folic acid 1 milliGRAM(s) Oral daily  chlorhexidine 4% Liquid 1 Application(s) Topical <User Schedule>    ICU Vital Signs Last 24 Hrs  T(C): --  T(F): --  HR: 111 (05 Mar 2022 19:26) (111 - 111)  BP: 145/75 (05 Mar 2022 19:26) (145/75 - 145/75)  BP(mean): --  ABP: --  ABP(mean): --  RR: 20 (05 Mar 2022 19:26) (20 - 20)  SpO2: --    I&O's Detail    LABS:                        7.1    6.72  )-----------( 552      ( 05 Mar 2022 20:03 )             23.6     03-05  138  |  100  |  24.2<H>  ----------------------------<  119<H>  4.2   |  22.0  |  1.67<H>  Ca    9.1      05 Mar 2022 20:03  TPro  7.5  /  Alb  3.8  /  TBili  <0.2<L>  /  DBili  x   /  AST  23  /  ALT  13  /  AlkPhos  137<H>  03-05    CARDIAC MARKERS ( 05 Mar 2022 20:03 )  x     / 0.02 ng/mL / x     / x     / x        CAPILLARY BLOOD GLUCOSE    PT/INR - ( 05 Mar 2022 20:04 )   PT: 12.5 sec;   INR: 1.08 ratio    PTT - ( 05 Mar 2022 20:04 )  PTT:28.1 sec    CULTURES:      Physical Examination:  General: Alert, oriented, interactive, nonfocal  HEENT: PERRL.  PULM: Clear to auscultation bilaterally.  CVS: s1/s2.  ABD: Soft, nondistended, nontender, normoactive bowel sounds.  EXT: No edema, nontender  SKIN: Warm.  NEURO: A&Ox3.      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 EKG revealed ST elevations in lateral leads. Code STEMI was activated and pt was urgently taken to catheterization lab. Coronary angiogram revealed no obstructions and no intervention was performed. MICU consulted for further management  Assessment:  1. STEMI  2. ZANDRA  3. Anemia    Plan:  NEURO:  -No acute issues. Analgesia PRN.    CV:  -Maintain goal MAP >65.  -ASA/Statin/Plavix/BB. Amio PO.  -Keep K~4 and Mg>2 for optimal arrhythmia suppression.   -Serial EKG/Troponin.   -TTE in AM.     RESP:  -No acute issues.    RENAL:  -Renal function currently w/ ZANDRA (BL 0.7 - 1.0).   -trend lytes/Scr daily with BMP  -I's and O's, goal UOP 0.5 cc/kg/hr  -renal dose meds and avoid nephrotoxins     GI:  -DASH/TLC diet.     ENDO:  -Aggressive glycemic control to limit FS glucose to <180mg/dl. BS WNL.    ID:  -Afebrile, no leukocytosis.     HEME:  -Hgb 7.1, will transfuse 2u PRBC. Transfuse for Hgb <8. Keep active t/s. No signs of active bleeding currently. Follows up w/ outpatient Hematologist due to chronic anemia.   -Ferrous sulfate.   -DVT ppx w/ SCDs.     DISPO: D/w ICU intensivist Dr. Montgomery and Cardiology NP. Will admit to MICU.     TIME SPENT: 40 minutes  Evaluating/treating patient, reviewing data/labs/imaging, discussing case with multidisciplinary team, discussing plan/goals of care with patient/family. Non-inclusive of procedure time.   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 EKG revealed ST elevations in lateral leads. Code STEMI was activated and pt was urgently taken to catheterization lab. Coronary angiogram revealed no obstructions and no intervention was performed. MICU consulted for further management. AAOx4, HD stable.   Trop negative x1, SCr 1.67, Hgb 7.1.    PAST MEDICAL & SURGICAL HISTORY:  Rheumatoid arthritis  COPD without exacerbation  HTN (hypertension)  Pacemaker  H/O cerebral artery stenosis  left cerebral artery stent per history in 2014.  S/P hysterectomy  S/P cholecystectomy  H/O exploratory laparotomy    Review of Systems:  CONSTITUTIONAL: No fever, chills, or fatigue  EYES: No eye pain, visual disturbances, or discharge  ENMT:  No difficulty hearing, tinnitus, vertigo; No sinus or throat pain  NECK: No pain or stiffness  RESPIRATORY: No cough, wheezing, chills or hemoptysis; (+) shortness of breath  CARDIOVASCULAR: No chest pain, (+) palpitations; No dizziness, or leg swelling  GASTROINTESTINAL: No abdominal or epigastric pain. No nausea, vomiting, or hematemesis; No diarrhea or constipation. No melena or hematochezia.  GENITOURINARY: No dysuria, frequency, hematuria, or incontinence  NEUROLOGICAL: No headaches, memory loss, loss of strength, numbness, or tremors  SKIN: No itching, burning, rashes, or lesions   MUSCULOSKELETAL: No joint pain or swelling; No muscle, back, or extremity pain  PSYCHIATRIC: No depression, anxiety, mood swings, or difficulty sleeping    Medications:  aMIOdarone    Tablet 200 milliGRAM(s) Oral daily  carvedilol 3.125 milliGRAM(s) Oral every 12 hours  aspirin enteric coated 81 milliGRAM(s) Oral daily  clopidogrel Tablet 75 milliGRAM(s) Oral daily  atorvastatin 80 milliGRAM(s) Oral at bedtime  ferrous    sulfate 325 milliGRAM(s) Oral daily  folic acid 1 milliGRAM(s) Oral daily  chlorhexidine 4% Liquid 1 Application(s) Topical <User Schedule>    ICU Vital Signs Last 24 Hrs  T(C): --  T(F): --  HR: 111 (05 Mar 2022 19:26) (111 - 111)  BP: 145/75 (05 Mar 2022 19:26) (145/75 - 145/75)  BP(mean): --  ABP: --  ABP(mean): --  RR: 20 (05 Mar 2022 19:26) (20 - 20)  SpO2: --    I&O's Detail    LABS:                        7.1    6.72  )-----------( 552      ( 05 Mar 2022 20:03 )             23.6     03-05  138  |  100  |  24.2<H>  ----------------------------<  119<H>  4.2   |  22.0  |  1.67<H>  Ca    9.1      05 Mar 2022 20:03  TPro  7.5  /  Alb  3.8  /  TBili  <0.2<L>  /  DBili  x   /  AST  23  /  ALT  13  /  AlkPhos  137<H>  03-05    CARDIAC MARKERS ( 05 Mar 2022 20:03 )  x     / 0.02 ng/mL / x     / x     / x        CAPILLARY BLOOD GLUCOSE    PT/INR - ( 05 Mar 2022 20:04 )   PT: 12.5 sec;   INR: 1.08 ratio    PTT - ( 05 Mar 2022 20:04 )  PTT:28.1 sec    CULTURES:      Physical Examination:  General: Alert, oriented, interactive, nonfocal  HEENT: PERRL.  PULM: Clear to auscultation bilaterally.  CVS: s1/s2.  ABD: Soft, nondistended, nontender, normoactive bowel sounds.  EXT: No edema, nontender  SKIN: Warm.  NEURO: A&Ox3.      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 EKG revealed ST elevations in lateral leads. Code STEMI was activated and pt was urgently taken to catheterization lab. Coronary angiogram revealed no obstructions and no intervention was performed. MICU consulted for further management  Assessment:  1. STEMI  2. ZANDRA  3. Anemia    Plan:  NEURO:  -No acute issues. Analgesia PRN.    CV:  -Maintain goal MAP >65.  -ASA/Statin/Plavix/BB. Amio PO.  -Keep K~4 and Mg>2 for optimal arrhythmia suppression.   -Serial EKG/Troponin.   -TTE in AM.     RESP:  -No acute issues.    RENAL:  -Renal function currently w/ ZANDRA (BL 0.7 - 1.0).   -trend lytes/Scr daily with BMP  -I's and O's, goal UOP 0.5 cc/kg/hr  -renal dose meds and avoid nephrotoxins   -Holding home diuretics due to ZANDRA.     GI:  -DASH/TLC diet.     ENDO:  -Aggressive glycemic control to limit FS glucose to <180mg/dl. BS WNL.    ID:  -Afebrile, no leukocytosis.     HEME:  -Hgb 7.1, will transfuse 2u PRBC. Transfuse for Hgb <8. Keep active t/s. No signs of active bleeding currently. Follows up w/ outpatient Hematologist due to chronic anemia.   -Ferrous sulfate.   -DVT ppx w/ SCDs.     DISPO: D/w ICU intensivist Dr. Montgomery and Cardiology NP. Will admit to MICU.     TIME SPENT: 40 minutes  Evaluating/treating patient, reviewing data/labs/imaging, discussing case with multidisciplinary team, discussing plan/goals of care with patient/family. Non-inclusive of procedure time.

## 2022-03-05 NOTE — ED PROVIDER NOTE - NS ED ATTENDING STATEMENT MOD
I have personally provided the amount of critical care time documented below excluding time spent on separate procedures. 40

## 2022-03-05 NOTE — CONSULT NOTE ADULT - PROBLEM SELECTOR RECOMMENDATION 2
-creatinine 1.67 (from 0.94 last October)  -avoid nephrotoxic agents  -hold Entresto, Spironolactone and Lasix  -nephrology consultation

## 2022-03-05 NOTE — ED ADULT NURSE NOTE - OBJECTIVE STATEMENT
Pt AAOX4, Pt c/o chest palpitations and increased SOB that started one hour pta, pt denies chest pain, pt on 4L NC respirations even and labored, pt diaphoretic, EKG obtained, MD West at bedside, pt transported to Cath lab in stable condition

## 2022-03-05 NOTE — ED PROVIDER NOTE - OBJECTIVE STATEMENT
71y /o female hx copd on home O2, cad, chf ef 30-35%, GI bleeds, ppm, htn biba for palpitations/sob, possible stemi. felt sob all day, palpitations over past 1 hour. Had MI in past and didn't have symptoms per pt. Reports stroke? after cath from prior stemi last year. ekg showing stemi. stemi team activated.     ROS: No fever/chills. No eye pain/changes in vision, No ear pain/sore throat/dysphagia,  No abdominal pain, N/V/D, no black/bloody bm. No dysuria/frequency/discharge, No headache. No Dizziness.    No rashes or breaks in skin. No numbness/tingling/weakness.

## 2022-03-05 NOTE — CONSULT NOTE ADULT - PROBLEM SELECTOR RECOMMENDATION 4
-pt appears volume depleted, cath showed low filling pressures   -hold Lasix, Entresto and Spirolactone as above  -LVEF 30-35% as per TTE last October

## 2022-03-05 NOTE — ED PROVIDER NOTE - CLINICAL SUMMARY MEDICAL DECISION MAKING FREE TEXT BOX
pt with ekg concerning for stemi, pulse ~110, spoke to Dr. Tabor, has ekg from october and today, recommending heparin bolus and plavix load. to cath lab.

## 2022-03-06 LAB
ANION GAP SERPL CALC-SCNC: 15 MMOL/L — SIGNIFICANT CHANGE UP (ref 5–17)
BUN SERPL-MCNC: 25 MG/DL — HIGH (ref 8–20)
CALCIUM SERPL-MCNC: 8.3 MG/DL — LOW (ref 8.6–10.2)
CHLORIDE SERPL-SCNC: 104 MMOL/L — SIGNIFICANT CHANGE UP (ref 98–107)
CO2 SERPL-SCNC: 22 MMOL/L — SIGNIFICANT CHANGE UP (ref 22–29)
CREAT SERPL-MCNC: 1.44 MG/DL — HIGH (ref 0.5–1.3)
EGFR: 38 ML/MIN/1.73M2 — LOW
GLUCOSE SERPL-MCNC: 90 MG/DL — SIGNIFICANT CHANGE UP (ref 70–99)
HCT VFR BLD CALC: 25 % — LOW (ref 34.5–45)
HGB BLD-MCNC: 7.6 G/DL — LOW (ref 11.5–15.5)
MAGNESIUM SERPL-MCNC: 2 MG/DL — SIGNIFICANT CHANGE UP (ref 1.6–2.6)
MCHC RBC-ENTMCNC: 27 PG — SIGNIFICANT CHANGE UP (ref 27–34)
MCHC RBC-ENTMCNC: 30.4 GM/DL — LOW (ref 32–36)
MCV RBC AUTO: 88.7 FL — SIGNIFICANT CHANGE UP (ref 80–100)
PHOSPHATE SERPL-MCNC: 4.1 MG/DL — SIGNIFICANT CHANGE UP (ref 2.4–4.7)
PLATELET # BLD AUTO: 416 K/UL — HIGH (ref 150–400)
POTASSIUM SERPL-MCNC: 4.1 MMOL/L — SIGNIFICANT CHANGE UP (ref 3.5–5.3)
POTASSIUM SERPL-SCNC: 4.1 MMOL/L — SIGNIFICANT CHANGE UP (ref 3.5–5.3)
RBC # BLD: 2.82 M/UL — LOW (ref 3.8–5.2)
RBC # FLD: 18.5 % — HIGH (ref 10.3–14.5)
SODIUM SERPL-SCNC: 141 MMOL/L — SIGNIFICANT CHANGE UP (ref 135–145)
TROPONIN T SERPL-MCNC: 0.02 NG/ML — SIGNIFICANT CHANGE UP (ref 0–0.06)
WBC # BLD: 5.5 K/UL — SIGNIFICANT CHANGE UP (ref 3.8–10.5)
WBC # FLD AUTO: 5.5 K/UL — SIGNIFICANT CHANGE UP (ref 3.8–10.5)

## 2022-03-06 PROCEDURE — 93306 TTE W/DOPPLER COMPLETE: CPT | Mod: 26

## 2022-03-06 PROCEDURE — 99232 SBSQ HOSP IP/OBS MODERATE 35: CPT

## 2022-03-06 PROCEDURE — 99223 1ST HOSP IP/OBS HIGH 75: CPT

## 2022-03-06 PROCEDURE — 99233 SBSQ HOSP IP/OBS HIGH 50: CPT

## 2022-03-06 RX ORDER — POLYETHYLENE GLYCOL 3350 17 G/17G
17 POWDER, FOR SOLUTION ORAL DAILY
Refills: 0 | Status: DISCONTINUED | OUTPATIENT
Start: 2022-03-06 | End: 2022-03-08

## 2022-03-06 RX ORDER — ALPRAZOLAM 0.25 MG
0.25 TABLET ORAL ONCE
Refills: 0 | Status: DISCONTINUED | OUTPATIENT
Start: 2022-03-06 | End: 2022-03-06

## 2022-03-06 RX ORDER — ALBUTEROL 90 UG/1
2.5 AEROSOL, METERED ORAL EVERY 6 HOURS
Refills: 0 | Status: DISCONTINUED | OUTPATIENT
Start: 2022-03-06 | End: 2022-03-06

## 2022-03-06 RX ORDER — METHOTREXATE 2.5 MG/1
15 TABLET ORAL
Refills: 0 | Status: DISCONTINUED | OUTPATIENT
Start: 2022-03-06 | End: 2022-03-08

## 2022-03-06 RX ORDER — MIDODRINE HYDROCHLORIDE 2.5 MG/1
5 TABLET ORAL EVERY 8 HOURS
Refills: 0 | Status: DISCONTINUED | OUTPATIENT
Start: 2022-03-06 | End: 2022-03-08

## 2022-03-06 RX ORDER — SODIUM CHLORIDE 0.65 %
1 AEROSOL, SPRAY (ML) NASAL EVERY 4 HOURS
Refills: 0 | Status: DISCONTINUED | OUTPATIENT
Start: 2022-03-06 | End: 2022-03-08

## 2022-03-06 RX ORDER — IPRATROPIUM/ALBUTEROL SULFATE 18-103MCG
3 AEROSOL WITH ADAPTER (GRAM) INHALATION EVERY 6 HOURS
Refills: 0 | Status: DISCONTINUED | OUTPATIENT
Start: 2022-03-06 | End: 2022-03-08

## 2022-03-06 RX ADMIN — Medication 325 MILLIGRAM(S): at 12:25

## 2022-03-06 RX ADMIN — Medication 1 SPRAY(S): at 14:42

## 2022-03-06 RX ADMIN — METHOTREXATE 15 MILLIGRAM(S): 2.5 TABLET ORAL at 09:55

## 2022-03-06 RX ADMIN — Medication 81 MILLIGRAM(S): at 12:23

## 2022-03-06 RX ADMIN — MIDODRINE HYDROCHLORIDE 5 MILLIGRAM(S): 2.5 TABLET ORAL at 14:27

## 2022-03-06 RX ADMIN — ATORVASTATIN CALCIUM 80 MILLIGRAM(S): 80 TABLET, FILM COATED ORAL at 22:47

## 2022-03-06 RX ADMIN — Medication 1 MILLIGRAM(S): at 12:23

## 2022-03-06 RX ADMIN — MIDODRINE HYDROCHLORIDE 5 MILLIGRAM(S): 2.5 TABLET ORAL at 22:47

## 2022-03-06 RX ADMIN — AMIODARONE HYDROCHLORIDE 200 MILLIGRAM(S): 400 TABLET ORAL at 06:36

## 2022-03-06 RX ADMIN — Medication 0.25 MILLIGRAM(S): at 23:21

## 2022-03-06 RX ADMIN — PANTOPRAZOLE SODIUM 40 MILLIGRAM(S): 20 TABLET, DELAYED RELEASE ORAL at 12:22

## 2022-03-06 RX ADMIN — CLOPIDOGREL BISULFATE 75 MILLIGRAM(S): 75 TABLET, FILM COATED ORAL at 12:23

## 2022-03-06 RX ADMIN — Medication 12.5 MILLIGRAM(S): at 17:14

## 2022-03-06 NOTE — CONSULT NOTE ADULT - SUBJECTIVE AND OBJECTIVE BOX
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 EKG revealed ST elevations in lateral leads. Code STEMI was activated and pt was urgently taken to catheterization lab. Coronary angiogram revealed no obstructions and no intervention was performed. MICU consulted for further management. AAOx4, HD stable.   Trop negative x1, SCr 1.67, Hgb 7.1.    Patient s/p 27 U PRBC in the past 4-5 months She has had a GI w/u with EGD/ colonoscopy in Oct/ Nov 2021 at [facility].   Patient s/p 2 unit PRBC plan for 3rd unit     Patient saw DR gold in sept 2020         PAST MEDICAL & SURGICAL HISTORY:  Rheumatoid arthritis  COPD without exacerbation  HTN (hypertension)  Pacemaker  H/O cerebral artery stenosis  left cerebral artery stent per history in 2014.  S/P hysterectomy  S/P cholecystectomy  H/O exploratory laparotomy    Review of Systems:  CONSTITUTIONAL: No fever, chills, or fatigue  EYES: No eye pain, visual disturbances, or discharge  ENMT:  No difficulty hearing, tinnitus, vertigo; No sinus or throat pain  NECK: No pain or stiffness  RESPIRATORY: No cough, wheezing, chills or hemoptysis; (+) shortness of breath  CARDIOVASCULAR: No chest pain, (+) palpitations; No dizziness, or leg swelling  GASTROINTESTINAL: No abdominal or epigastric pain. No nausea, vomiting, or hematemesis; No diarrhea or constipation. No melena or hematochezia.  GENITOURINARY: No dysuria, frequency, hematuria, or incontinence  NEUROLOGICAL: No headaches, memory loss, loss of strength, numbness, or tremors  SKIN: No itching, burning, rashes, or lesions   MUSCULOSKELETAL: No joint pain or swelling; No muscle, back, or extremity pain  PSYCHIATRIC: No depression, anxiety, mood swings, or difficulty sleeping    Medications:  aMIOdarone    Tablet 200 milliGRAM(s) Oral daily  carvedilol 3.125 milliGRAM(s) Oral every 12 hours  aspirin enteric coated 81 milliGRAM(s) Oral daily  clopidogrel Tablet 75 milliGRAM(s) Oral daily  atorvastatin 80 milliGRAM(s) Oral at bedtime  ferrous    sulfate 325 milliGRAM(s) Oral daily  folic acid 1 milliGRAM(s) Oral daily  chlorhexidine 4% Liquid 1 Application(s) Topical <User Schedule>    ICU Vital Signs Last 24 Hrs  T(C): --  T(F): --  HR: 111 (05 Mar 2022 19:26) (111 - 111)  BP: 145/75 (05 Mar 2022 19:26) (145/75 - 145/75)  BP(mean): --  ABP: --  ABP(mean): --  RR: 20 (05 Mar 2022 19:26) (20 - 20)  SpO2: --    I&O's Detail    LABS:                        7.1    6.72  )-----------( 552      ( 05 Mar 2022 20:03 )             23.6     03-05  138  |  100  |  24.2<H>  ----------------------------<  119<H>  4.2   |  22.0  |  1.67<H>  Ca    9.1      05 Mar 2022 20:03  TPro  7.5  /  Alb  3.8  /  TBili  <0.2<L>  /  DBili  x   /  AST  23  /  ALT  13  /  AlkPhos  137<H>  03-05    CARDIAC MARKERS ( 05 Mar 2022 20:03 )  x     / 0.02 ng/mL / x     / x     / x        CAPILLARY BLOOD GLUCOSE    PT/INR - ( 05 Mar 2022 20:04 )   PT: 12.5 sec;   INR: 1.08 ratio    PTT - ( 05 Mar 2022 20:04 )  PTT:28.1 sec    CULTURES:      Physical Examination:  General: Alert, oriented, interactive, nonfocal  HEENT: PERRL.  PULM: Clear to auscultation bilaterally.  CVS: s1/s2.  ABD: Soft, nondistended, nontender, normoactive bowel sounds.  EXT: No edema, nontender  SKIN: Warm.  NEURO: A&Ox3.      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 EKG revealed ST elevations in lateral leads. Code STEMI was activated and pt was urgently taken to catheterization lab. Coronary angiogram revealed no obstructions and no intervention was performed. MICU consulted for further management  Assessment:  1. STEMI  2. ZANDRA  3. Anemia    Plan:  NEURO:  -No acute issues. Analgesia PRN.    CV:  -Maintain goal MAP >65.  -ASA/Statin/Plavix/BB. Amio PO.  -Keep K~4 and Mg>2 for optimal arrhythmia suppression.   -Serial EKG/Troponin.   -TTE in AM.     RESP:  -No acute issues.    RENAL:  -Renal function currently w/ ZANDRA (BL 0.7 - 1.0).   -trend lytes/Scr daily with BMP  -I's and O's, goal UOP 0.5 cc/kg/hr  -renal dose meds and avoid nephrotoxins   -Holding home diuretics due to ZANDRA.     GI:  -DASH/TLC diet.     ENDO:  -Aggressive glycemic control to limit FS glucose to <180mg/dl. BS WNL.    ID:  -Afebrile, no leukocytosis.     HEME:  -Hgb 7.1, will transfuse 2u PRBC. Transfuse for Hgb <8. Keep active t/s. No signs of active bleeding currently. Follows up w/ outpatient Hematologist due to chronic anemia.   -Ferrous sulfate.   -DVT ppx w/ SCDs.     DISPO: D/w ICU intensivist Dr. Montgomery and Cardiology NP. Will admit to MICU.     TIME SPENT: 40 minutes  Evaluating/treating patient, reviewing data/labs/imaging, discussing case with multidisciplinary team, discussing plan/goals of care with patient/family. Non-inclusive of procedure time.   (05 Mar 2022 21:17)      REVIEW OF SYSTEMS:  Constitutional, Eyes, ENT, Cardiovascular, Respiratory, Gastrointestinal, Genitourinary, Musculoskeletal, Integumentary, Neurological, Psychiatric, Endocrine, Heme/Lymph, and Allergic/Immunologic review of systems are otherwise negative except as noted in the HPI.    PAST MEDICAL & SURGICAL HISTORY:  Rheumatoid arthritis    COPD without exacerbation    HTN (hypertension)    Pacemaker    H/O cerebral artery stenosis  left cerebral artery stent per history in 2014.    S/P hysterectomy    S/P cholecystectomy    H/O exploratory laparotomy        FAMILY HISTORY:  FH: dementia  mother    FH: prostate cancer  father        SOCIAL HISTORY:    Allergies    No Known Allergies    Intolerances    morphine (Nausea)      MEDICATIONS  (STANDING):  aMIOdarone    Tablet 200 milliGRAM(s) Oral daily  aspirin enteric coated 81 milliGRAM(s) Oral daily  atorvastatin 80 milliGRAM(s) Oral at bedtime  clopidogrel Tablet 75 milliGRAM(s) Oral daily  ferrous    sulfate 325 milliGRAM(s) Oral daily  folic acid 1 milliGRAM(s) Oral daily  methotrexate 15 milliGRAM(s) Oral <User Schedule>  metoprolol tartrate 12.5 milliGRAM(s) Oral every 12 hours  midodrine 5 milliGRAM(s) Oral every 8 hours  pantoprazole  Injectable 40 milliGRAM(s) IV Push daily    MEDICATIONS  (PRN):  albuterol/ipratropium for Nebulization 3 milliLiter(s) Nebulizer every 6 hours PRN Shortness of Breath and/or Wheezing  polyethylene glycol 3350 17 Gram(s) Oral daily PRN Constipation  sodium chloride 0.65% Nasal 1 Spray(s) Both Nostrils every 4 hours PRN Nasal Congestion      Vital Signs Last 24 Hrs  T(C): 36.7 (06 Mar 2022 14:06), Max: 36.9 (05 Mar 2022 23:30)  T(F): 98.1 (06 Mar 2022 14:06), Max: 98.4 (05 Mar 2022 23:30)  HR: 86 (06 Mar 2022 14:06) (85 - 112)  BP: 108/62 (06 Mar 2022 14:06) (83/53 - 145/75)  BP(mean): 77 (06 Mar 2022 14:00) (61 - 80)  RR: 18 (06 Mar 2022 14:06) (15 - 29)  SpO2: 100% (06 Mar 2022 14:06) (97% - 100%)    PHYSICAL EXAM:    GENERAL: NAD, well-groomed, well-developed  HEAD:  Atraumatic, Normocephalic  EYES: EOMI, PERRLA, conjunctiva and sclera clear  ENMT: No tonsillar erythema, exudates, or enlargement; Moist mucous membranes, Good dentition, No lesions  NECK: Supple, No JVD, Normal thyroid  NERVOUS SYSTEM:  Alert & Oriented X3, Good concentration; Motor Strength 5/5 B/L upper and lower extremities; DTRs 2+ intact and symmetric  CHEST/LUNG: Clear to auscultation bilaterally; No rales, rhonchi, wheezing, or rubs  HEART: Regular rate and rhythm; No murmurs, rubs, or gallops  ABDOMEN: Soft, Nontender, Nondistended; Bowel sounds present  EXTREMITIES:  2+ Peripheral Pulses, No clubbing, cyanosis, or edema  LYMPH: No lymphadenopathy noted  SKIN: No rashes or lesions      LABS:                        7.6    5.50  )-----------( 416      ( 06 Mar 2022 04:21 )             25.0     03-06    141  |  104  |  25.0<H>  ----------------------------<  90  4.1   |  22.0  |  1.44<H>    Ca    8.3<L>      06 Mar 2022 04:21  Phos  4.1     03-06  Mg     2.0     03-06    TPro  7.5  /  Alb  3.8  /  TBili  <0.2<L>  /  DBili  x   /  AST  23  /  ALT  13  /  AlkPhos  137<H>  03-05    PT/INR - ( 05 Mar 2022 20:04 )   PT: 12.5 sec;   INR: 1.08 ratio         PTT - ( 05 Mar 2022 20:04 )  PTT:28.1 sec        RADIOLOGY & ADDITIONAL STUDIES:    PATHOLOGY:         HPI:  70 y/o female with hx of bradyarrhythmia with cardiac arrest, s/p MDT PPM, STEMI 5/2021 c/b acute CVA (residual R arm weakness), PCI with TATA to LM and LAD (6/2021) 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 EKG revealed ST elevations in lateral leads. Code STEMI was activated and pt was urgently taken to catheterization lab. Coronary angiogram revealed no obstructions and no intervention was performed. MICU consulted for further management. AAOx4, HD stable.   Trop negative x1, SCr 1.67, Hgb 7.1.    Patient s/p 27 U PRBC in the past 4-5 months She has had a GI w/u with EGD/ colonoscopy in Oct/ Nov 2021 at [facility].   Patient s/p 2 unit PRBC plan for 3rd unit     Patient saw DR gold in sept 2020         PAST MEDICAL & SURGICAL HISTORY:  Rheumatoid arthritis  COPD without exacerbation  HTN (hypertension)  Pacemaker  H/O cerebral artery stenosis  left cerebral artery stent per history in 2014.  S/P hysterectomy  S/P cholecystectomy  H/O exploratory laparotomy    Review of Systems:  CONSTITUTIONAL: No fever, chills, or fatigue  EYES: No eye pain, visual disturbances, or discharge  ENMT:  No difficulty hearing, tinnitus, vertigo; No sinus or throat pain  NECK: No pain or stiffness  RESPIRATORY: No cough, wheezing, chills or hemoptysis; (+) shortness of breath  CARDIOVASCULAR: No chest pain, (+) palpitations; No dizziness, or leg swelling  GASTROINTESTINAL: No abdominal or epigastric pain. No nausea, vomiting, or hematemesis; No diarrhea or constipation. No melena or hematochezia.  GENITOURINARY: No dysuria, frequency, hematuria, or incontinence  NEUROLOGICAL: No headaches, memory loss, loss of strength, numbness, or tremors  SKIN: No itching, burning, rashes, or lesions   MUSCULOSKELETAL: No joint pain or swelling; No muscle, back, or extremity pain  PSYCHIATRIC: No depression, anxiety, mood swings, or difficulty sleeping    Medications:  aMIOdarone    Tablet 200 milliGRAM(s) Oral daily  carvedilol 3.125 milliGRAM(s) Oral every 12 hours  aspirin enteric coated 81 milliGRAM(s) Oral daily  clopidogrel Tablet 75 milliGRAM(s) Oral daily  atorvastatin 80 milliGRAM(s) Oral at bedtime  ferrous    sulfate 325 milliGRAM(s) Oral daily  folic acid 1 milliGRAM(s) Oral daily  chlorhexidine 4% Liquid 1 Application(s) Topical <User Schedule>    ICU Vital Signs Last 24 Hrs  T(C): --  T(F): --  HR: 111 (05 Mar 2022 19:26) (111 - 111)  BP: 145/75 (05 Mar 2022 19:26) (145/75 - 145/75)  BP(mean): --  ABP: --  ABP(mean): --  RR: 20 (05 Mar 2022 19:26) (20 - 20)  SpO2: --    I&O's Detail    LABS:                        7.1    6.72  )-----------( 552      ( 05 Mar 2022 20:03 )             23.6     03-05  138  |  100  |  24.2<H>  ----------------------------<  119<H>  4.2   |  22.0  |  1.67<H>  Ca    9.1      05 Mar 2022 20:03  TPro  7.5  /  Alb  3.8  /  TBili  <0.2<L>  /  DBili  x   /  AST  23  /  ALT  13  /  AlkPhos  137<H>  03-05    CARDIAC MARKERS ( 05 Mar 2022 20:03 )  x     / 0.02 ng/mL / x     / x     / x        CAPILLARY BLOOD GLUCOSE    PT/INR - ( 05 Mar 2022 20:04 )   PT: 12.5 sec;   INR: 1.08 ratio    PTT - ( 05 Mar 2022 20:04 )  PTT:28.1 sec    CULTURES:      Physical Examination:  General: Alert, oriented, interactive, nonfocal  HEENT: PERRL.  PULM: Clear to auscultation bilaterally.  CVS: s1/s2.  ABD: Soft, nondistended, nontender, normoactive bowel sounds.  EXT: No edema, nontender  SKIN: Warm.  NEURO: A&Ox3.      RADIOLOGY:

## 2022-03-06 NOTE — CONSULT NOTE ADULT - ASSESSMENT
. 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 EKG revealed ST elevations in lateral leads. Code STEMI was activated and pt was urgently taken to catheterization lab. Coronary angiogram revealed no obstructions and no intervention was performed. MICU consulted for further management. AAOx4, HD stable.   Trop negative x1, SCr 1.67, Hgb 7.1.    Patient s/p 27 U PRBC in the past 4-5 months She has had a GI w/u with EGD/ colonoscopy in Oct/ Nov 2021 at [facility].   Patient s/p 2 unit PRBC plan for 3rd unit     Patient saw DR gold in sept 2020     # anemia  - Patient with recurrent anemia, s/p GI bleeding in the past   - She has received 27 + units of PRBCs over the past few months.   - Order Iron profile ( iron/ Tsat/ Transferrin/ TIBC/ Ferritin) to evaluate for iron overload. 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 EKG revealed ST elevations in lateral leads. Code STEMI was activated and pt was urgently taken to catheterization lab. Coronary angiogram revealed no obstructions and no intervention was performed. MICU consulted for further management. AAOx4, HD stable.   Trop negative x1, SCr 1.67, Hgb 7.1.    Patient s/p 27 U PRBC in the past 4-5 months She has had a GI w/u with EGD/ colonoscopy in Oct/ Nov 2021 at [facility].   Patient s/p 2 unit PRBC plan for 3rd unit     Patient saw DR gold in sept 2020     # anemia  - Patient with recurrent anemia, s/p GI bleeding in the past   - She has received 27 + units of PRBCs over the past few months.   - Order Iron profile ( iron/ Tsat/ Transferrin/ TIBC/ Ferritin) to evaluate for iron overload. Send B12 and folate levels  - IN addition order w/u for plasma cell d/o with Quantitative immunoglobin/ free light chains with ratio/ Immunoelectrophoresis / SPEP  - - Consult GI for evaluation given several GI bleeds in the past and requiring Egd/ colonoscopy

## 2022-03-06 NOTE — PROGRESS NOTE ADULT - SUBJECTIVE AND OBJECTIVE BOX
CHIEF COMPLAINT:Patient is a 71y old  Female who presents with a chief complaint of Palpitation (06 Mar 2022 11:03)    INTERVAL HISTORY:  s/p LHC  SOB is at baseline  Getting transfusion  no cp    Allergies  No Known Allergies      MEDICATIONS:  aMIOdarone    Tablet 200 milliGRAM(s) Oral daily  metoprolol tartrate 12.5 milliGRAM(s) Oral every 12 hours  midodrine 5 milliGRAM(s) Oral every 8 hours  albuterol/ipratropium for Nebulization 3 milliLiter(s) Nebulizer every 6 hours PRN  pantoprazole  Injectable 40 milliGRAM(s) IV Push daily  polyethylene glycol 3350 17 Gram(s) Oral daily PRN  atorvastatin 80 milliGRAM(s) Oral at bedtime  aspirin enteric coated 81 milliGRAM(s) Oral daily  clopidogrel Tablet 75 milliGRAM(s) Oral daily  ferrous    sulfate 325 milliGRAM(s) Oral daily  folic acid 1 milliGRAM(s) Oral daily  methotrexate 15 milliGRAM(s) Oral <User Schedule>  sodium chloride 0.65% Nasal 1 Spray(s) Both Nostrils every 4 hours PRN    PHYSICAL EXAM:  T(C): 36.7 (03-06-22 @ 14:06), Max: 36.9 (03-05-22 @ 23:30)  HR: 85 (03-06-22 @ 15:00) (85 - 112)  BP: 101/62 (03-06-22 @ 15:00) (83/53 - 145/75)  RR: 21 (03-06-22 @ 15:00) (15 - 29)  SpO2: 100% (03-06-22 @ 15:00) (97% - 100%)    I&O's Summary    05 Mar 2022 07:01  -  06 Mar 2022 07:00  --------------------------------------------------------  IN: 723 mL / OUT: 300 mL / NET: 423 mL    06 Mar 2022 07:01  -  06 Mar 2022 15:37  --------------------------------------------------------  IN: 1482 mL / OUT: 450 mL / NET: 1032       Appearance: Normal	  HEENT:   NC/AT  Eye: Pale Conjunctiva  Lungs: CTA B/L  CVS: RRR, Normal S1 and S2, right groin normal  Pulses: Normal distal pulses.  Neuro: A&O x3    TELEMETRY: no events    LABS:	 	                         7.6    5.50  )-----------( 416      ( 06 Mar 2022 04:21 )             25.0     03-06    141  |  104  |  25.0<H>  ----------------------------<  90  4.1   |  22.0  |  1.44<H>    Ca    8.3<L>      06 Mar 2022 04:21  Phos  4.1     03-06  Mg     2.0     03-06    TPro  7.5  /  Alb  3.8  /  TBili  <0.2<L>  /  DBili  x   /  AST  23  /  ALT  13  /  AlkPhos  137<H>  03-05    proBNP: Serum Pro-Brain Natriuretic Peptide: 1391 pg/mL (03-05 @ 20:03)      ASSESSMENT/PLAN: 	    1. HFrEF, ICM, MVR.  2. Prior PCI  3. Cath done yesterday, no need for PCI  4. Transfusion and spencer as per hematology  5. Pt's BP is low, euvolemic on exam  6. cont with BB  7. Resume home meds as tolerated.   8. Cont with amiodarone  9. BP does not allow addition of HF meds.   10. Pt has a cardiologist and will follow with them  I will sign off  pls call if needed.      CHIEF COMPLAINT:Patient is a 71y old  Female who presents with a chief complaint of Palpitation (06 Mar 2022 11:03)    INTERVAL HISTORY:  s/p LHC  SOB is at baseline  Getting transfusion  no cp    Allergies  No Known Allergies      MEDICATIONS:  aMIOdarone    Tablet 200 milliGRAM(s) Oral daily  metoprolol tartrate 12.5 milliGRAM(s) Oral every 12 hours  midodrine 5 milliGRAM(s) Oral every 8 hours  albuterol/ipratropium for Nebulization 3 milliLiter(s) Nebulizer every 6 hours PRN  pantoprazole  Injectable 40 milliGRAM(s) IV Push daily  polyethylene glycol 3350 17 Gram(s) Oral daily PRN  atorvastatin 80 milliGRAM(s) Oral at bedtime  aspirin enteric coated 81 milliGRAM(s) Oral daily  clopidogrel Tablet 75 milliGRAM(s) Oral daily  ferrous    sulfate 325 milliGRAM(s) Oral daily  folic acid 1 milliGRAM(s) Oral daily  methotrexate 15 milliGRAM(s) Oral <User Schedule>  sodium chloride 0.65% Nasal 1 Spray(s) Both Nostrils every 4 hours PRN    PHYSICAL EXAM:  T(C): 36.7 (03-06-22 @ 14:06), Max: 36.9 (03-05-22 @ 23:30)  HR: 85 (03-06-22 @ 15:00) (85 - 112)  BP: 101/62 (03-06-22 @ 15:00) (83/53 - 145/75)  RR: 21 (03-06-22 @ 15:00) (15 - 29)  SpO2: 100% (03-06-22 @ 15:00) (97% - 100%)    I&O's Summary    05 Mar 2022 07:01  -  06 Mar 2022 07:00  --------------------------------------------------------  IN: 723 mL / OUT: 300 mL / NET: 423 mL    06 Mar 2022 07:01  -  06 Mar 2022 15:37  --------------------------------------------------------  IN: 1482 mL / OUT: 450 mL / NET: 1032       Appearance: Normal	  HEENT:   NC/AT  Eye: Pale Conjunctiva  Lungs: CTA B/L  CVS: RRR, Normal S1 and S2, right groin normal  Pulses: Normal distal pulses.  Neuro: A&O x3    TELEMETRY: no events    LABS:	 	                         7.6    5.50  )-----------( 416      ( 06 Mar 2022 04:21 )             25.0     03-06    141  |  104  |  25.0<H>  ----------------------------<  90  4.1   |  22.0  |  1.44<H>    Ca    8.3<L>      06 Mar 2022 04:21  Phos  4.1     03-06  Mg     2.0     03-06    TPro  7.5  /  Alb  3.8  /  TBili  <0.2<L>  /  DBili  x   /  AST  23  /  ALT  13  /  AlkPhos  137<H>  03-05    proBNP: Serum Pro-Brain Natriuretic Peptide: 1391 pg/mL (03-05 @ 20:03)      ASSESSMENT/PLAN: 	    1. HFrEF, ICM, MVR.  2. Prior PCI  3. Cath done yesterday, no need for PCI  4. Transfusion and spencer as per hematology  5. Pt's BP is low, euvolemic on exam  6. cont with BB  7. Resume home meds as tolerated.   8. Cont with amiodarone  9. BP does not allow addition of HF meds.   10. Entresto on hold due to low BP.   11. Cr is improving

## 2022-03-06 NOTE — PATIENT PROFILE ADULT - VISION (WITH CORRECTIVE LENSES IF THE PATIENT USUALLY WEARS THEM):
no Partially impaired: cannot see medication labels or newsprint, but can see obstacles in path, and the surrounding layout; can count fingers at arm's length

## 2022-03-06 NOTE — PROGRESS NOTE ADULT - SUBJECTIVE AND OBJECTIVE BOX
TRANSFER NOTE / MICU DOWNGRADE    Hospital Course:  71 years old female with history of Bradyarrhythmia with Cardiac Arrest s/p MDT PPM, STEMI (5/2021) complicated by Acute CVA (residual right arm weakness), PCI 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 Fib (not on AC due to recurrent GIB), Renal Artery Stenosis, RA and COPD on home oxygen presented with worsening shortness of breath and palpitations. Found to have ST elevations in lateral leads. Code STEMI was called in ER and patient was taken to cath lab. Coronary angiogram with patent LM stent and non obstructive CAD. She was admitted to MICU for close observation. She was found to have Acute on Chronic Anemia, no obvious bleeding. She was transfused 2 units of PRBCs and is getting 3rd unit now. Medically stable for downgrade to Hospitalist Service.     Interval History:  Patient was seen and examined at bedside. Feels much better.    Denies chest pain, palpitations, shortness of breath, headache, dizziness, visual symptoms, nausea, vomiting or abdominal pain.  Denies bleeding per rectum/black stools.     ROS:  As per interval history otherwise unremarkable.    PHYSICAL EXAM:  Vital Signs  T(C): 36.7 (06 Mar 2022 10:25), Max: 36.9 (05 Mar 2022 23:30)  T(F): 98.1 (06 Mar 2022 10:25), Max: 98.4 (05 Mar 2022 23:30)  HR: 85 (06 Mar 2022 10:25) (85 - 112)  BP: 93/59 (06 Mar 2022 10:25) (83/53 - 145/75)  BP(mean): 68 (06 Mar 2022 10:00) (61 - 80)  RR: 18 (06 Mar 2022 10:25) (15 - 29)  SpO2: 100% (06 Mar 2022 10:25) (97% - 100%)  General: Elderly female sitting in bed comfortably. No acute distress  HEENT: EOMI. Clear conjunctivae. Moist mucus membrane  Neck: Supple.   Chest: Good air entry. Fine crackles on left base. No wheezing or rhonchi. No chest wall tenderness. Old healed scar in midline.   Heart: Normal S1 & S2. RRR.   Abdomen: Soft. Non-tender. Non-distended. + BS  Ext: No pedal edema. No calf tenderness   Neuro: AAO x 3. No focal deficit. No speech disorder  Skin: Warm and Dry  Psychiatry: Normal mood and affect    I&O's Summary    05 Mar 2022 07:01  -  06 Mar 2022 07:00  --------------------------------------------------------  IN: 723 mL / OUT: 300 mL / NET: 423 mL    06 Mar 2022 07:01  -  06 Mar 2022 11:01  --------------------------------------------------------  IN: 400 mL / OUT: 0 mL / NET: 400 mL    LABS:                       7.6    5.50  )-----------( 416      ( 06 Mar 2022 04:21 )             25.0     03-06    141  |  104  |  25.0<H>  ----------------------------<  90  4.1   |  22.0  |  1.44<H>    Ca    8.3<L>      06 Mar 2022 04:21  Phos  4.1     03-06  Mg     2.0     03-06    TPro  7.5  /  Alb  3.8  /  TBili  <0.2<L>  /  DBili  x   /  AST  23  /  ALT  13  /  AlkPhos  137<H>  03-05    PT/INR - ( 05 Mar 2022 20:04 )   PT: 12.5 sec;   INR: 1.08 ratio       PTT - ( 05 Mar 2022 20:04 )  PTT:28.1 sec    RADIOLOGY & ADDITIONAL STUDIES:  Reviewed     MEDICATIONS  (STANDING):  aMIOdarone    Tablet 200 milliGRAM(s) Oral daily  aspirin enteric coated 81 milliGRAM(s) Oral daily  atorvastatin 80 milliGRAM(s) Oral at bedtime  clopidogrel Tablet 75 milliGRAM(s) Oral daily  ferrous    sulfate 325 milliGRAM(s) Oral daily  folic acid 1 milliGRAM(s) Oral daily  methotrexate 15 milliGRAM(s) Oral <User Schedule>  metoprolol tartrate 12.5 milliGRAM(s) Oral every 12 hours  midodrine 5 milliGRAM(s) Oral every 8 hours  pantoprazole  Injectable 40 milliGRAM(s) IV Push daily    MEDICATIONS  (PRN):  ALBUTerol    0.083% 2.5 milliGRAM(s) Nebulizer every 6 hours PRN Shortness of Breath and/or Wheezing  sodium chloride 0.65% Nasal 1 Spray(s) Both Nostrils every 4 hours PRN Nasal Congestion

## 2022-03-06 NOTE — PATIENT PROFILE ADULT - FALL HARM RISK - HARM RISK INTERVENTIONS

## 2022-03-06 NOTE — PROGRESS NOTE ADULT - SUBJECTIVE AND OBJECTIVE BOX
JAVED CHIN  MRN-[id number]  Patient is a 71y old  Female who presents with a chief complaint of   HPI:  Patient is a 71y old  Female who presents with a chief complaint of     BRIEF HOSPITAL COURSE:   72 y/o female with hx of bradyarrhythmia with cardiac arrest, s/p MDT PPM, STEMI 5/2021 c/b acute CVA (residual R arm weakness), PCI with TATA to LM and LAD (6/2021) 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 EKG revealed ST elevations in lateral leads. Code STEMI was activated and pt was urgently taken to catheterization lab. Coronary angiogram revealed no obstructions and no intervention was performed. MICU consulted for further management. AAOx4, HD stable.   Trop negative x1, SCr 1.67, Hgb 7.1.    PAST MEDICAL & SURGICAL HISTORY:  Rheumatoid arthritis  COPD without exacerbation  HTN (hypertension)  Pacemaker  H/O cerebral artery stenosis  left cerebral artery stent per history in 2014.  S/P hysterectomy  S/P cholecystectomy  H/O exploratory laparotomy    HOme Medications:  aMIOdarone    Tablet 200 milliGRAM(s) Oral daily  carvedilol 3.125 milliGRAM(s) Oral every 12 hours  aspirin enteric coated 81 milliGRAM(s) Oral daily  clopidogrel Tablet 75 milliGRAM(s) Oral daily  atorvastatin 80 milliGRAM(s) Oral at bedtime  ferrous    sulfate 325 milliGRAM(s) Oral daily  folic acid 1 milliGRAM(s) Oral daily  chlorhexidine 4% Liquid 1 Application(s) Topical <User Schedule>       subjective/overnight events:  This morning patient denies any acute complaints including but not limited to palpitation, chest pain, shortness of breath, dizziness, lightheadedness, nausea vomiting diarrhea or any other acute bleeding  Patient responded to 1 unit of blood last night with interval assessment of hemoglobin improving from 7.1-7.6 and since then she has received 1 more unit of blood during nighttime and getting her third unit now with improving blood pressure    REVIEW OF SYSTEMS:  11 systems reviewed. No other symptoms besides mentioned above      Physical Exam:  Vital Signs Last 24 Hrs  T(C): 36.7 (06 Mar 2022 10:25), Max: 36.9 (05 Mar 2022 23:30)  T(F): 98.1 (06 Mar 2022 10:25), Max: 98.4 (05 Mar 2022 23:30)  HR: 85 (06 Mar 2022 11:00) (85 - 112)  BP: 92/55 (06 Mar 2022 11:00) (83/53 - 145/75)  BP(mean): 68 (06 Mar 2022 10:00) (61 - 80)  RR: 19 (06 Mar 2022 11:00) (15 - 29)  SpO2: 100% (06 Mar 2022 11:00) (97% - 100%)    Gen:  Awake, alert, oriented *3 in NAD  CNS: no acute neurological findings, DOLLY   Neck: no JVD  RES : Air entry equal b/l, no Adventitious breath sounds                     CVS: Normal S1/S2, regular rhythm and normal rate   Abd: Soft, non-distended. Bowel sounds present.  Skin: No acute rash.  Ext:  no edema b/l LE, PP+    ============================I/O===========================   I&O's Detail    05 Mar 2022 07:01  -  06 Mar 2022 07:00  --------------------------------------------------------  IN:    Oral Fluid: 70 mL    PRBCs (Packed Red Blood Cells): 653 mL  Total IN: 723 mL    OUT:    Voided (mL): 300 mL  Total OUT: 300 mL    Total NET: 423 mL      06 Mar 2022 07:01  -  06 Mar 2022 11:03  --------------------------------------------------------  IN:    Oral Fluid: 400 mL  Total IN: 400 mL    OUT:    Voided (mL): 0 mL  Total OUT: 0 mL    Total NET: 400 mL        ============================ LABS =========================                        7.6    5.50  )-----------( 416      ( 06 Mar 2022 04:21 )             25.0     03-06    141  |  104  |  25.0<H>  ----------------------------<  90  4.1   |  22.0  |  1.44<H>    Ca    8.3<L>      06 Mar 2022 04:21  Phos  4.1     03-06  Mg     2.0     03-06    TPro  7.5  /  Alb  3.8  /  TBili  <0.2<L>  /  DBili  x   /  AST  23  /  ALT  13  /  AlkPhos  137<H>  03-05    LIVER FUNCTIONS - ( 05 Mar 2022 20:03 )  Alb: 3.8 g/dL / Pro: 7.5 g/dL / ALK PHOS: 137 U/L / ALT: 13 U/L / AST: 23 U/L / GGT: x           PT/INR - ( 05 Mar 2022 20:04 )   PT: 12.5 sec;   INR: 1.08 ratio         PTT - ( 05 Mar 2022 20:04 )  PTT:28.1 sec      ======================Micro/Rad/Cardio=================  Culture: Reviewed   CXR: Reviewed  Echo:Reviewed  ======================================================  PAST MEDICAL & SURGICAL HISTORY:  Rheumatoid arthritis    COPD without exacerbation    HTN (hypertension)    Pacemaker    H/O cerebral artery stenosis  left cerebral artery stent per history in 2014.    S/P hysterectomy    S/P cholecystectomy    H/O exploratory laparotomy      ====================ASSESSMENT ==============    1. STEMI: With no acute coronary artery disease with extensive history of coronary artery disease s/p  drug-eluting stent to left main, LAD complicated by apical pseudoaneurysm and VSD s/p open repair with mitral valve replacement  2. ZANDRA: Improved  3. Anemia of unclear etiology   4: Chronic CHF with a EF of 30 to 35%/history of pacemaker  5: COPD with chronic hypoxic respiratory failure requiring home oxygen  6: Atrial fibrillation not on anticoagulation    ====================== NEUROLOGY=====================   fall and aspiration precaution  Out of bed to chair as tolerated  ==================== RESPIRATORY======================   oxygenating and ventilating fine on supplemental nasal cannula with home O2 requirement  ALBUTerol    0.083% 2.5 milliGRAM(s) Nebulizer every 6 hours PRN Shortness of Breath and/or Wheezing    ====================CARDIOVASCULAR==================  aMIOdarone    Tablet 200 milliGRAM(s) Oral daily  metoprolol tartrate 12.5 milliGRAM(s) Oral every 12 hours  midodrine 5 milliGRAM(s) Oral every 8 hours with holding parameters  aspirin enteric coated 81 milliGRAM(s) Oral daily  clopidogrel Tablet 75 milliGRAM(s) Oral daily  ===================HEMATOLOGIC/ONC ===================   getting third unit of PRBC as mentioned above   hematology consulted  Since there is no active bleeding and this seems to be a chronic issue, next set of blood work for tomorrow morning  ===================== RENAL =========================  Continue monitoring urine output   improving renal function  Avoid Nephrotoxic agents   Adjust medications for renal  function   Close urinary output monitoring   UA  reviewed   nephrology consult as needed  Replacing electrolytes as needed with Goal K> 4, PO> 3, Mg> 2  ==================== GASTROINTESTINAL===================  ferrous    sulfate 325 milliGRAM(s) Oral daily  folic acid 1 milliGRAM(s) Oral daily  pantoprazole  Injectable 40 milliGRAM(s) IV Push daily     diet: D LEOBARDO  =======================    ENDOCRINE  =====================  atorvastatin 80 milliGRAM(s) Oral at bedtime    ========================INFECTIOUS DISEASE================     Surveillance SARS-CoV-2 PCR: negative     can be transferred out of medical ICU: Discussed with Dr. Flores from hospitalist department  Care plan discussed with ICU care team, family and consultants    Patient remained critical; I have spent 35 minutes providing non-routine care, revaluated multiple times during the day.

## 2022-03-07 ENCOUNTER — TRANSCRIPTION ENCOUNTER (OUTPATIENT)
Age: 72
End: 2022-03-07

## 2022-03-07 LAB
ANION GAP SERPL CALC-SCNC: 11 MMOL/L — SIGNIFICANT CHANGE UP (ref 5–17)
APPEARANCE UR: CLEAR — SIGNIFICANT CHANGE UP
BASOPHILS # BLD AUTO: 0.03 K/UL — SIGNIFICANT CHANGE UP (ref 0–0.2)
BASOPHILS NFR BLD AUTO: 0.4 % — SIGNIFICANT CHANGE UP (ref 0–2)
BILIRUB UR-MCNC: NEGATIVE — SIGNIFICANT CHANGE UP
BUN SERPL-MCNC: 22.6 MG/DL — HIGH (ref 8–20)
CALCIUM SERPL-MCNC: 8.7 MG/DL — SIGNIFICANT CHANGE UP (ref 8.6–10.2)
CHLORIDE SERPL-SCNC: 107 MMOL/L — SIGNIFICANT CHANGE UP (ref 98–107)
CO2 SERPL-SCNC: 22 MMOL/L — SIGNIFICANT CHANGE UP (ref 22–29)
COLOR SPEC: YELLOW — SIGNIFICANT CHANGE UP
CREAT SERPL-MCNC: 1.27 MG/DL — SIGNIFICANT CHANGE UP (ref 0.5–1.3)
DIFF PNL FLD: ABNORMAL
EGFR: 45 ML/MIN/1.73M2 — LOW
EOSINOPHIL # BLD AUTO: 0.13 K/UL — SIGNIFICANT CHANGE UP (ref 0–0.5)
EOSINOPHIL NFR BLD AUTO: 1.8 % — SIGNIFICANT CHANGE UP (ref 0–6)
EPI CELLS # UR: SIGNIFICANT CHANGE UP
FERRITIN SERPL-MCNC: 47 NG/ML — SIGNIFICANT CHANGE UP (ref 15–150)
FOLATE SERPL-MCNC: >20 NG/ML — SIGNIFICANT CHANGE UP
GLUCOSE SERPL-MCNC: 90 MG/DL — SIGNIFICANT CHANGE UP (ref 70–99)
GLUCOSE UR QL: NEGATIVE MG/DL — SIGNIFICANT CHANGE UP
HCT VFR BLD CALC: 29.7 % — LOW (ref 34.5–45)
HGB BLD-MCNC: 9.4 G/DL — LOW (ref 11.5–15.5)
IGA FLD-MCNC: 254 MG/DL — SIGNIFICANT CHANGE UP (ref 84–499)
IGG FLD-MCNC: 1016 MG/DL — SIGNIFICANT CHANGE UP (ref 610–1660)
IGM SERPL-MCNC: 98 MG/DL — SIGNIFICANT CHANGE UP (ref 35–242)
IMM GRANULOCYTES NFR BLD AUTO: 0.3 % — SIGNIFICANT CHANGE UP (ref 0–1.5)
IRON SATN MFR SERPL: 178 UG/DL — HIGH (ref 37–145)
IRON SATN MFR SERPL: 50 % — SIGNIFICANT CHANGE UP (ref 14–50)
KAPPA LC SER QL IFE: 3.83 MG/DL — HIGH (ref 0.33–1.94)
KAPPA/LAMBDA FREE LIGHT CHAIN RATIO, SERUM: 1.65 RATIO — SIGNIFICANT CHANGE UP (ref 0.26–1.65)
KETONES UR-MCNC: NEGATIVE — SIGNIFICANT CHANGE UP
LAMBDA LC SER QL IFE: 2.32 MG/DL — SIGNIFICANT CHANGE UP (ref 0.57–2.63)
LEUKOCYTE ESTERASE UR-ACNC: NEGATIVE — SIGNIFICANT CHANGE UP
LYMPHOCYTES # BLD AUTO: 0.85 K/UL — LOW (ref 1–3.3)
LYMPHOCYTES # BLD AUTO: 12.1 % — LOW (ref 13–44)
MAGNESIUM SERPL-MCNC: 2.1 MG/DL — SIGNIFICANT CHANGE UP (ref 1.6–2.6)
MCHC RBC-ENTMCNC: 27.6 PG — SIGNIFICANT CHANGE UP (ref 27–34)
MCHC RBC-ENTMCNC: 31.6 GM/DL — LOW (ref 32–36)
MCV RBC AUTO: 87.1 FL — SIGNIFICANT CHANGE UP (ref 80–100)
MONOCYTES # BLD AUTO: 0.85 K/UL — SIGNIFICANT CHANGE UP (ref 0–0.9)
MONOCYTES NFR BLD AUTO: 12.1 % — SIGNIFICANT CHANGE UP (ref 2–14)
NEUTROPHILS # BLD AUTO: 5.16 K/UL — SIGNIFICANT CHANGE UP (ref 1.8–7.4)
NEUTROPHILS NFR BLD AUTO: 73.3 % — SIGNIFICANT CHANGE UP (ref 43–77)
NITRITE UR-MCNC: NEGATIVE — SIGNIFICANT CHANGE UP
PH UR: 6 — SIGNIFICANT CHANGE UP (ref 5–8)
PLATELET # BLD AUTO: 402 K/UL — HIGH (ref 150–400)
POTASSIUM SERPL-MCNC: 4.3 MMOL/L — SIGNIFICANT CHANGE UP (ref 3.5–5.3)
POTASSIUM SERPL-SCNC: 4.3 MMOL/L — SIGNIFICANT CHANGE UP (ref 3.5–5.3)
PROT SERPL-MCNC: 5.5 G/DL — LOW (ref 6–8.3)
PROT SERPL-MCNC: 5.5 G/DL — LOW (ref 6–8.3)
PROT UR-MCNC: NEGATIVE — SIGNIFICANT CHANGE UP
RBC # BLD: 3.41 M/UL — LOW (ref 3.8–5.2)
RBC # FLD: 17.4 % — HIGH (ref 10.3–14.5)
RBC CASTS # UR COMP ASSIST: SIGNIFICANT CHANGE UP /HPF (ref 0–4)
SODIUM SERPL-SCNC: 140 MMOL/L — SIGNIFICANT CHANGE UP (ref 135–145)
SP GR SPEC: 1.01 — SIGNIFICANT CHANGE UP (ref 1.01–1.02)
TIBC SERPL-MCNC: 353 UG/DL — SIGNIFICANT CHANGE UP (ref 220–430)
TRANSFERRIN SERPL-MCNC: 247 MG/DL — SIGNIFICANT CHANGE UP (ref 192–382)
UROBILINOGEN FLD QL: NEGATIVE MG/DL — SIGNIFICANT CHANGE UP
VIT B12 SERPL-MCNC: 264 PG/ML — SIGNIFICANT CHANGE UP (ref 232–1245)
WBC # BLD: 7.04 K/UL — SIGNIFICANT CHANGE UP (ref 3.8–10.5)
WBC # FLD AUTO: 7.04 K/UL — SIGNIFICANT CHANGE UP (ref 3.8–10.5)
WBC UR QL: NEGATIVE /HPF — SIGNIFICANT CHANGE UP (ref 0–5)

## 2022-03-07 PROCEDURE — 99223 1ST HOSP IP/OBS HIGH 75: CPT

## 2022-03-07 PROCEDURE — 99232 SBSQ HOSP IP/OBS MODERATE 35: CPT

## 2022-03-07 PROCEDURE — 99233 SBSQ HOSP IP/OBS HIGH 50: CPT

## 2022-03-07 RX ORDER — FUROSEMIDE 40 MG
1 TABLET ORAL
Qty: 0 | Refills: 0 | DISCHARGE

## 2022-03-07 RX ORDER — METHOTREXATE 2.5 MG/1
6 TABLET ORAL
Qty: 0 | Refills: 0 | DISCHARGE

## 2022-03-07 RX ORDER — ALBUTEROL 90 UG/1
0 AEROSOL, METERED ORAL
Qty: 0 | Refills: 0 | DISCHARGE

## 2022-03-07 RX ORDER — AMIODARONE HYDROCHLORIDE 400 MG/1
1 TABLET ORAL
Qty: 0 | Refills: 0 | DISCHARGE

## 2022-03-07 RX ORDER — TIOTROPIUM BROMIDE AND OLODATEROL 3.124; 2.736 UG/1; UG/1
2 SPRAY, METERED RESPIRATORY (INHALATION)
Qty: 0 | Refills: 0 | DISCHARGE

## 2022-03-07 RX ORDER — ALPRAZOLAM 0.25 MG
0.25 TABLET ORAL ONCE
Refills: 0 | Status: DISCONTINUED | OUTPATIENT
Start: 2022-03-07 | End: 2022-03-07

## 2022-03-07 RX ORDER — METOPROLOL TARTRATE 50 MG
1 TABLET ORAL
Qty: 0 | Refills: 0 | DISCHARGE

## 2022-03-07 RX ORDER — ROSUVASTATIN CALCIUM 5 MG/1
1 TABLET ORAL
Qty: 0 | Refills: 0 | DISCHARGE

## 2022-03-07 RX ORDER — FERROUS SULFATE 325(65) MG
1 TABLET ORAL
Qty: 0 | Refills: 0 | DISCHARGE

## 2022-03-07 RX ORDER — ALBUTEROL 90 UG/1
3 AEROSOL, METERED ORAL
Qty: 0 | Refills: 0 | DISCHARGE

## 2022-03-07 RX ORDER — ALPRAZOLAM 0.25 MG
1 TABLET ORAL
Qty: 0 | Refills: 0 | DISCHARGE

## 2022-03-07 RX ORDER — CARVEDILOL PHOSPHATE 80 MG/1
1 CAPSULE, EXTENDED RELEASE ORAL
Qty: 0 | Refills: 0 | DISCHARGE

## 2022-03-07 RX ORDER — CLOPIDOGREL BISULFATE 75 MG/1
1 TABLET, FILM COATED ORAL
Qty: 0 | Refills: 0 | DISCHARGE

## 2022-03-07 RX ORDER — ASPIRIN/CALCIUM CARB/MAGNESIUM 324 MG
1 TABLET ORAL
Qty: 0 | Refills: 0 | DISCHARGE

## 2022-03-07 RX ORDER — POTASSIUM CHLORIDE 20 MEQ
1 PACKET (EA) ORAL
Qty: 0 | Refills: 0 | DISCHARGE

## 2022-03-07 RX ORDER — PANTOPRAZOLE SODIUM 20 MG/1
1 TABLET, DELAYED RELEASE ORAL
Qty: 0 | Refills: 0 | DISCHARGE

## 2022-03-07 RX ADMIN — Medication 3 MILLILITER(S): at 21:53

## 2022-03-07 RX ADMIN — AMIODARONE HYDROCHLORIDE 200 MILLIGRAM(S): 400 TABLET ORAL at 05:48

## 2022-03-07 RX ADMIN — Medication 325 MILLIGRAM(S): at 11:42

## 2022-03-07 RX ADMIN — PANTOPRAZOLE SODIUM 40 MILLIGRAM(S): 20 TABLET, DELAYED RELEASE ORAL at 11:43

## 2022-03-07 RX ADMIN — CLOPIDOGREL BISULFATE 75 MILLIGRAM(S): 75 TABLET, FILM COATED ORAL at 11:44

## 2022-03-07 RX ADMIN — MIDODRINE HYDROCHLORIDE 5 MILLIGRAM(S): 2.5 TABLET ORAL at 11:44

## 2022-03-07 RX ADMIN — MIDODRINE HYDROCHLORIDE 5 MILLIGRAM(S): 2.5 TABLET ORAL at 05:47

## 2022-03-07 RX ADMIN — ATORVASTATIN CALCIUM 80 MILLIGRAM(S): 80 TABLET, FILM COATED ORAL at 21:53

## 2022-03-07 RX ADMIN — Medication 0.25 MILLIGRAM(S): at 21:52

## 2022-03-07 RX ADMIN — Medication 1 MILLIGRAM(S): at 11:45

## 2022-03-07 RX ADMIN — Medication 81 MILLIGRAM(S): at 11:42

## 2022-03-07 RX ADMIN — Medication 12.5 MILLIGRAM(S): at 05:47

## 2022-03-07 NOTE — PROGRESS NOTE ADULT - ASSESSMENT
71 years old female with history of Bradyarrhythmia with Cardiac Arrest s/p MDT PPM, STEMI (5/2021) complicated by Acute CVA (residual right arm weakness), PCI 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 Fib (not on AC due to recurrent GIB), Renal Artery Stenosis, RA and COPD on home oxygen presented with worsening shortness of breath and palpitations. Found to have ST elevations in lateral leads. Code STEMI was called in ER and patient was taken to cath lab. Coronary angiogram with patent LM stent and non obstructive CAD. She was admitted to MICU for close observation. She was found to have Acute on Chronic Anemia, no obvious bleeding. She was transfused 2 units of PRBCs and is getting 3rd unit now. Medically stable for downgrade to Hospitalist Service.     1) Abnormal EKG  - ST elevation in lateral leads  - Code STEMI in ER  - s/p cardiac cath: patent LM stent and nonobstructive CAD (50% mLAD and 50% dRCA stenosis)  - History of CAD s/p PCI  - Continue Aspirin, Plavix, Lipitor and Metoprolol   - Cardiology Consult noted    2) Acute on Chronic Anemia  - History of GI bleed   - Extensive GI work up in past. Was supposed to go for capsule endoscopy, unsure if she had one as an outpatient.   - Supposed to see Dr. Wadsworth for chronic anemia however unable to go due to recurrent hospitalizations. Hem was consulted by ICU.   - s/p 2 units of PRBCs, getting 3rd unit now.  - Continue Ferrous Sulfate  - FOBT  - Monitor H&H    3) Chronic A. Fib  - Not on AC due to recurrent GIB  - Continue Metoprolol and Amiodarone     4) Hypotension   - Midodrine was added today    5) Chronic Systolic Heart Failure   - Entresto, Aldactone and Lasix on hold due to ZANDRA  - Continue Metoprolol     6) ZANDRA  - Entresto, Aldactone and Lasix on hold  - Avoid nephrotoxic medications   - Improving   - Monitor renal function     7) COPD  - No exacerbation   - DuoNebs PRN  - Continue supplemental oxygen (on 3LNC at home)     8) Rheumatoid Arthritis   - Continue Methotrexate  - Continue Folic Acid     DVT Prophylaxis -- Venodyne    Dispo: Likely home once medically stable. PT Eval.       
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 Fib (not on AC due to recurrent GIB), Renal Artery Stenosis, RA and COPD on home oxygen presented with worsening shortness of breath and palpitations. Found to have ST elevations in lateral leads. Code STEMI was called in ER and patient was taken to cath lab. Coronary angiogram with patent LM stent and non obstructive CAD. Patient never made a troponin therefore patient did not have STEMI. She was admitted to MICU for close observation. She was found to have Acute on Chronic Anemia, no obvious bleeding. Hb improved s/p 3 units of PRBC.      1) Unstable Angina likely due to demand ischemia in the setting of anemia  - History of CAD s/p PCI  - Code STEMI in ER  - ST elevation in lateral leads but patient never had positive troponin therefore not a STEMI  - s/p cardiac cath with patent LM stent and 50% mLAD and 50% dRCA stenosis  - Continue Aspirin, Plavix, Lipitor and Metoprolol   - f/u further cardio recs    2) Acute on Chronic Anemia  - History of GI bleed   - Hb 9/4 s/p 3 units of PRBC  - Iron studies reviewed; elevated iron level but received PRBCs  - f/u immunofixation  - Hem/onc consult appreciated and GI was consulted based on recs  - GI consult also appreciated; no further GI work up  - Repeat CBC in AM and if stable will plan for discharge with outpatient hem follow up    3) Chronic A. Fib  - Not on AC due to recurrent GIB  - Continue Metoprolol and Amiodarone     4) Hypotension   - BP improved with midodrine  - continue metoprolol given history of CHF    5) Chronic Systolic Heart Failure   - Appears euvolemic  - Entresto, Aldactone and Lasix on hold due to ZANDRA and soft BP  - Continue Metoprolol   - TTE reviewed; EF 20-25 % with WMA  - monitor I/Os, daily weights  - f/u further cardio recommendations    6) ZANDRA   - likely due to prerenal azotemia due to renal hypoperfusion  - creatinine downtrending from 1.67 to 1.27  - check UA  - continue to hold Entresto, Aldactone and Lasix    - monitor I/Os, daily weights  - avoid nephrotoxic agents and renally dose medications  - Monitor renal function     7) COPD on 3 liters of NC at home  - currently on 3 liters   - not in acute exacerbation   - Abigail PRN    8) Rheumatoid Arthritis   - Continue Methotrexate  - Continue Folic Acid     DVT Prophylaxis - SCDs    Dispo: Discharge home on 3/8 if H/H remains stable. PT evaluation     Plan discussed with patient, son at bedside, RN

## 2022-03-07 NOTE — CONSULT NOTE ADULT - ATTENDING COMMENTS
I evaluated this pt. with my NP and have reviewed her previous w/u. Pt has extensive GI w/u including multiple EGD's and colonoscopies and had a negative capsule endoscopy done in 12/2020. She has no clinically evident GI bleeding and has normal iron studies. Agree with Hematology evaluation. No plans or need to repeat GI w/u done most recently @ [facility] in 2021. Would maintain on oral Pantoprazole 40 mg./d. for GI mucosal cytoprotection. Signing off. Reconsult as needed. Thank you.

## 2022-03-07 NOTE — DISCHARGE NOTE NURSING/CASE MANAGEMENT/SOCIAL WORK - PATIENT PORTAL LINK FT
You can access the FollowMyHealth Patient Portal offered by [facility] by registering at the following website: [email or web address]. By joining [facility]’s FollowMyHealth portal, you will also be able to view your health information using other applications (apps) compatible with our system.

## 2022-03-07 NOTE — CONSULT NOTE ADULT - SUBJECTIVE AND OBJECTIVE BOX
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 Code STEMI was activated and pt was urgently taken to catheterization lab. Coronary angiogram revealed no obstructions and no intervention were performed. Today seeing by GI for anemia, as per records 2020 EGD revealed mild antral gastritis and colonoscopy showed adenomatous  polyps and left sided diverticulosis. On 10/2021 patient was admitted to [facility] for acute blood loss anemia and was found as per CTA  positive bleed in ileum, that resolved. As per patient prior to arriving to [facility] on10/ 2021 was admitted to [facility] for palpitations and shortness of breathe, had EGD and Colonoscopy with no signs of active bleeding. At present sitting comfortably, on 2L NC, reporting no complaints,  tolerating oral intake, having regular dark BM (I take Iron every other day), patient seeing by Dr Ruddy Wadsworth (Hematology) for chronic anemia. On arrival Hgb 7.1, no s/p 3 units of  PRBC. Transfuse, now Hemoglobin 9.4 with no signs of active bleeding currently.  Denies nausea, vomiting, abdominal pain, chest pain, shortness of breath, hematemesis, hematochezia, melena.       PAST MEDICAL & SURGICAL HISTORY:  Rheumatoid arthritis  COPD without exacerbation  HTN (hypertension)  Pacemaker  H/O cerebral artery stenosis  left cerebral artery stent per history in 2014.  S/P hysterectomy  S/P cholecystectomy  H/O exploratory laparotomy    Review of Systems:  · ENMT: negative  · Respiratory and Thorax: negative  · Cardiovascular: negative  · Gastrointestinal: see above.  · Genitourinary:	negative  · Musculoskeletal: negative  · Neurological: negative  · Psychiatric: negative  · Hematology/Lymphatics: negative  · Endocrine: negative    Medications:  aMIOdarone    Tablet 200 milliGRAM(s) Oral daily  carvedilol 3.125 milliGRAM(s) Oral every 12 hours  aspirin enteric coated 81 milliGRAM(s) Oral daily  clopidogrel Tablet 75 milliGRAM(s) Oral daily  atorvastatin 80 milliGRAM(s) Oral at bedtime  ferrous    sulfate 325 milliGRAM(s) Oral daily  folic acid 1 milliGRAM(s) Oral daily  chlorhexidine 4% Liquid 1 Application(s) Topical <User Schedule      Physical Examination:  · Constitutional: Found sitting comfortably on 2L NC, in no acute distress.   · Eyes: EOMI; PERRL; no drainage or redness  · ENMT: No oral lesions; no gross abnormalities  · Neck:	No bruits; no thyromegaly or nodules  · Back:	No deformity or limitation of movement  · Respiratory: Breath Sounds equal & clear to percussion & auscultation, no accessory muscle use, on 2L NC  · Cardiovascular: Regular rate & rhythm, normal S1, S2; no murmurs, gallops or rubs; no S3, S4  · Gastrointestinal: Soft, non-tender, no hepatosplenomegaly, normal bowel sounds      PAST MEDICAL & SURGICAL HISTORY:  Rheumatoid arthritis    COPD without exacerbation    HTN (hypertension)    Pacemaker    H/O cerebral artery stenosis  left cerebral artery stent per history in 2014.    S/P hysterectomy    S/P cholecystectomy    H/O exploratory laparotomy        Allergies    No Known Allergies    Intolerances    morphine (Nausea)      MEDICATIONS  (STANDING):  aMIOdarone    Tablet 200 milliGRAM(s) Oral daily  aspirin enteric coated 81 milliGRAM(s) Oral daily  atorvastatin 80 milliGRAM(s) Oral at bedtime  clopidogrel Tablet 75 milliGRAM(s) Oral daily  ferrous    sulfate 325 milliGRAM(s) Oral daily  folic acid 1 milliGRAM(s) Oral daily  methotrexate 15 milliGRAM(s) Oral <User Schedule>  metoprolol tartrate 12.5 milliGRAM(s) Oral every 12 hours  midodrine 5 milliGRAM(s) Oral every 8 hours  pantoprazole  Injectable 40 milliGRAM(s) IV Push daily    MEDICATIONS  (PRN):  albuterol/ipratropium for Nebulization 3 milliLiter(s) Nebulizer every 6 hours PRN Shortness of Breath and/or Wheezing  polyethylene glycol 3350 17 Gram(s) Oral daily PRN Constipation  sodium chloride 0.65% Nasal 1 Spray(s) Both Nostrils every 4 hours PRN Nasal Congestion      Social History:     Last Colonoscopy - 10/2021       (     ) Advanced Directives: (     ) None    (      ) DNR    (     ) DNI    (     ) Health Care Proxy:       Vital Signs Last 24 Hrs  T(C): 36.6 (07 Mar 2022 06:53), Max: 36.7 (06 Mar 2022 10:25)  T(F): 97.8 (07 Mar 2022 06:53), Max: 98.1 (06 Mar 2022 10:25)  HR: 84 (07 Mar 2022 06:53) (84 - 88)  BP: 90/60 (07 Mar 2022 06:53) (83/58 - 120/60)  BP(mean): 82 (06 Mar 2022 17:00) (68 - 82)  RR: 20 (07 Mar 2022 06:53) (18 - 29)  SpO2: 98% (07 Mar 2022 06:53) (98% - 100%)          LABS:                        9.4    7.04  )-----------( 402      ( 07 Mar 2022 05:44 )             29.7     03-07    140  |  107  |  22.6<H>  ----------------------------<  90  4.3   |  22.0  |  1.27    Ca    8.7      07 Mar 2022 05:44  Phos  4.1     03-06  Mg     2.1     03-07    TPro  7.5  /  Alb  3.8  /  TBili  <0.2<L>  /  DBili  x   /  AST  23  /  ALT  13  /  AlkPhos  137<H>  03-05    PT/INR - ( 05 Mar 2022 20:04 )   PT: 12.5 sec;   INR: 1.08 ratio         PTT - ( 05 Mar 2022 20:04 )  PTT:28.1 sec    LIVER FUNCTIONS - ( 05 Mar 2022 20:03 )  Alb: 3.8 g/dL / Pro: 7.5 g/dL / ALK PHOS: 137 U/L / ALT: 13 U/L / AST: 23 U/L / GGT: x

## 2022-03-07 NOTE — DISCHARGE NOTE NURSING/CASE MANAGEMENT/SOCIAL WORK - NSDCFUADDAPPT_GEN_ALL_CORE_FT
STAR DOCUMENTATION:  PT DECLINES MEDS TO BED;PT HAS NO ISSUES GETTING HER MEDS AT [facility] IN [address] [facility]  [phone]  PT WANTS TO SET UP HER OWN CARDIAC F/U APPOINTMENT W/ DR. DIEGO CHENG OF [facility] CARDIOLOGY

## 2022-03-07 NOTE — CONSULT NOTE ADULT - PROBLEM SELECTOR RECOMMENDATION 9
With a history of Chronic anemia, seeing by Dr Ruddy Wadsworth as outpatient  We will recommend Hematology consult,   Since patient had recent extensive GI workup, and no signs of overt bleeding we will not recommend at this time any GI interventions.   Continue to monitor CBC and transfuse as needed.
-admit to MICU  -s/p coronary angiogram vie RFA which revealed patent LM stent and nonobstructive CAD (50% mLAD and 50% dRCA stenosis)  -R groin precautions, keep RLE straight x1 hour  -continue ASA and Plavix  -continue beta blocker and statin

## 2022-03-07 NOTE — PROGRESS NOTE ADULT - PROBLEM SELECTOR PLAN 2
Improving.   No diuretics at this time.   Considering relative hypotension, no plans to introduce any diuretics at this time.   May have to DC on midodrine.

## 2022-03-07 NOTE — DISCHARGE NOTE NURSING/CASE MANAGEMENT/SOCIAL WORK - NSDCPEFALRISK_GEN_ALL_CORE
For information on Fall & Injury Prevention, visit: [email or web address] OR  [email or web address] OR  https://www.cdc.gov/steadi/patient.html

## 2022-03-07 NOTE — PROGRESS NOTE ADULT - SUBJECTIVE AND OBJECTIVE BOX
CHIEF COMPLAINT/INTERVAL HISTORY:    Patient is a 71y old  Female who presents with a chief complaint of SOB, EKG changes  anemia (06 Mar 2022 15:36).    SUBJECTIVE & OBJECTIVE: Pt seen and examined at bedside. No overnight events. Denies any new complaints; reports feeling better. Denies chest pain.    ROS: No chest pain, palpitations, SOB, light headedness, dizziness, headache, nausea/vomiting, fevers/chills, abdominal pain, dysuria or increased urinary frequency.    ICU Vital Signs Last 24 Hrs  T(C): 36.7 (07 Mar 2022 10:28), Max: 36.7 (07 Mar 2022 10:28)  T(F): 98 (07 Mar 2022 10:28), Max: 98 (07 Mar 2022 10:28)  HR: 84 (07 Mar 2022 10:28) (84 - 87)  BP: 113/77 (07 Mar 2022 10:28) (90/60 - 113/77)  BP(mean): 82 (06 Mar 2022 17:00) (82 - 82)  RR: 20 (07 Mar 2022 10:28) (20 - 27)  SpO2: 98% (07 Mar 2022 10:28) (98% - 100%)    MEDICATIONS  (STANDING):  aMIOdarone    Tablet 200 milliGRAM(s) Oral daily  aspirin enteric coated 81 milliGRAM(s) Oral daily  atorvastatin 80 milliGRAM(s) Oral at bedtime  clopidogrel Tablet 75 milliGRAM(s) Oral daily  ferrous    sulfate 325 milliGRAM(s) Oral daily  folic acid 1 milliGRAM(s) Oral daily  methotrexate 15 milliGRAM(s) Oral <User Schedule>  metoprolol tartrate 12.5 milliGRAM(s) Oral every 12 hours  midodrine 5 milliGRAM(s) Oral every 8 hours  pantoprazole  Injectable 40 milliGRAM(s) IV Push daily    MEDICATIONS  (PRN):  albuterol/ipratropium for Nebulization 3 milliLiter(s) Nebulizer every 6 hours PRN Shortness of Breath and/or Wheezing  polyethylene glycol 3350 17 Gram(s) Oral daily PRN Constipation  sodium chloride 0.65% Nasal 1 Spray(s) Both Nostrils every 4 hours PRN Nasal Congestion      LABS:                        9.4    7.04  )-----------( 402      ( 07 Mar 2022 05:44 )             29.7     03-07    140  |  107  |  22.6<H>  ----------------------------<  90  4.3   |  22.0  |  1.27    Ca    8.7      07 Mar 2022 05:44  Phos  4.1     03-06  Mg     2.1     03-07    TPro  5.5<L>  /  Alb  x   /  TBili  x   /  DBili  x   /  AST  x   /  ALT  x   /  AlkPhos  x   03-07    PT/INR - ( 05 Mar 2022 20:04 )   PT: 12.5 sec;   INR: 1.08 ratio         PTT - ( 05 Mar 2022 20:04 )  PTT:28.1 sec    PHYSICAL EXAM:    GENERAL: elderly female, laying in bed, NAD  HEAD:  Atraumatic, Normocephalic  EYES: EOMI, PERRLA, conjunctiva and sclera clear  ENMT: Moist mucous membranes  NECK: Supple   NERVOUS SYSTEM:  Alert & Oriented X3, Motor Strength 5/5 B/L upper and lower extremities   CHEST/LUNG: Clear to auscultation bilaterally; No rales, rhonchi, wheezing, or rubs  HEART: Regular rate and rhythm; + S1/S2  ABDOMEN: Soft, Nontender, Nondistended; Bowel sounds present  EXTREMITIES:  no pedal edema

## 2022-03-07 NOTE — CDI QUERY NOTE - NSCDIOTHERTXTBX_GEN_ALL_CORE_HH
Per Progress note 3/6 :  "presented with worsening shortness of breath and palpitations. Found to have ST elevations in lateral leads. Code STEMI was called in ER and patient was taken to cath lab. Coronary angiogram with patent LM stent and non obstructive CAD. She was admitted to MICU for close observation. She was found to have Acute on Chronic Anemia. She was transfused 2 units of PRBCs and is getting 3rd unit now."    - Code STEMI in ER  -Acute on Chronic Anemia  -Chronic A. Fib  -Chronic Systolic Heart Failure   -ZANDRA    Er documentation : Principal Discharge DX:	ST elevation MI (STEMI).  Critical care PN 3/6 :  STEMI: With no acute coronary artery disease with extensive history of coronary artery disease.    Historical Values  Troponin T, Serum: 0.02 ng/mL (03.06.22 @ 04:21)   Troponin T, Serum: 0.02 ng/mL (03.05.22 @ 20:03)     Historical Values  Hemoglobin: 9.4 g/dL (03.07.22 @ 05:44)   Hemoglobin: 7.6 g/dL (03.06.22 @ 04:21)   Hemoglobin: 7.1 g/dL (03.05.22 @ 20:03)    Can the STEMI be further clarified  a) STEMI type 2 due to Acute on chronic anemia  b) STEMI type 2 due to chronic systolic CHF  c) STEMI type 2 due to ZANDRA  d) STEMI type 2 due to non-obstructive CAD  e) STEMI due to demand ischemia from other source, please clarify  f) Acute STEMI, not  type 2  g) Other, please clarify

## 2022-03-07 NOTE — PROGRESS NOTE ADULT - SUBJECTIVE AND OBJECTIVE BOX
[place] [facility]                                                        Office: [address] [address]                                                       Telephone: [phone]. Fax: [phone]      CC: Shortness of breath    INTERVAL HISTORY: improved symptoms.     MEDICATIONS  (STANDING):  aMIOdarone    Tablet 200 milliGRAM(s) Oral daily  aspirin enteric coated 81 milliGRAM(s) Oral daily  atorvastatin 80 milliGRAM(s) Oral at bedtime  clopidogrel Tablet 75 milliGRAM(s) Oral daily  ferrous    sulfate 325 milliGRAM(s) Oral daily  folic acid 1 milliGRAM(s) Oral daily  methotrexate 15 milliGRAM(s) Oral <User Schedule>  metoprolol tartrate 12.5 milliGRAM(s) Oral every 12 hours  midodrine 5 milliGRAM(s) Oral every 8 hours  pantoprazole  Injectable 40 milliGRAM(s) IV Push daily    ROS: All others negative     PHYSICAL EXAM:  T(C): 37.1 (03-07-22 @ 16:28), Max: 37.1 (03-07-22 @ 16:28)  HR: 88 (03-07-22 @ 16:28) (84 - 88)  BP: 91/58 (03-07-22 @ 16:28) (90/60 - 113/77)  RR: 18 (03-07-22 @ 16:28) (18 - 20)  SpO2: 96% (03-07-22 @ 16:28) (96% - 100%)  Wt(kg): --  I&O's Summary    06 Mar 2022 07:01  -  07 Mar 2022 07:00  --------------------------------------------------------  IN: 1482 mL / OUT: 450 mL / NET: 1032 mL      Appearance: Normal	  HEENT:   Normal oral mucosa, PERRL, EOMI	  Lymphatic: No lymphadenopathy  Cardiovascular: Normal S1 S2,   Respiratory: Lungs clear to auscultation	  Psychiatry: A & O x 3, Mood & affect appropriate  Gastrointestinal:  Soft, Non-tender, + BS	  Skin: No rashes, No ecchymoses, No cyanosis  Neurologic: Non-focal  Extremities: Normal range of motion, No clubbing, cyanosis or edema  Vascular: Peripheral pulses palpable 2+ bilaterally  	      LABS:	 	                        9.4    7.04  )-----------( 402      ( 07 Mar 2022 05:44 )             29.7     03-07    140  |  107  |  22.6<H>  ----------------------------<  90  4.3   |  22.0  |  1.27    Ca    8.7      07 Mar 2022 05:44  Phos  4.1     03-06  Mg     2.1     03-07    TPro  5.5<L>  /  Alb  x   /  TBili  x   /  DBili  x   /  AST  x   /  ALT  x   /  AlkPhos  x   03-07

## 2022-03-08 ENCOUNTER — TRANSCRIPTION ENCOUNTER (OUTPATIENT)
Age: 72
End: 2022-03-08

## 2022-03-08 VITALS
HEART RATE: 90 BPM | OXYGEN SATURATION: 99 % | TEMPERATURE: 98 F | SYSTOLIC BLOOD PRESSURE: 125 MMHG | DIASTOLIC BLOOD PRESSURE: 64 MMHG | RESPIRATION RATE: 18 BRPM

## 2022-03-08 LAB
% ALBUMIN: 51.3 % — SIGNIFICANT CHANGE UP
% ALPHA 1: 7.2 % — SIGNIFICANT CHANGE UP
% ALPHA 2: 10.6 % — SIGNIFICANT CHANGE UP
% BETA: 13.2 % — SIGNIFICANT CHANGE UP
% GAMMA: 17.7 % — SIGNIFICANT CHANGE UP
ALBUMIN SERPL ELPH-MCNC: 2.8 G/DL — LOW (ref 3.6–5.5)
ALBUMIN/GLOB SERPL ELPH: 1 RATIO — SIGNIFICANT CHANGE UP
ALPHA1 GLOB SERPL ELPH-MCNC: 0.4 G/DL — SIGNIFICANT CHANGE UP (ref 0.1–0.4)
ALPHA2 GLOB SERPL ELPH-MCNC: 0.6 G/DL — SIGNIFICANT CHANGE UP (ref 0.5–1)
ANION GAP SERPL CALC-SCNC: 10 MMOL/L — SIGNIFICANT CHANGE UP (ref 5–17)
ANION GAP SERPL CALC-SCNC: 11 MMOL/L — SIGNIFICANT CHANGE UP (ref 5–17)
B-GLOBULIN SERPL ELPH-MCNC: 0.7 G/DL — SIGNIFICANT CHANGE UP (ref 0.5–1)
BUN SERPL-MCNC: 17.2 MG/DL — SIGNIFICANT CHANGE UP (ref 8–20)
BUN SERPL-MCNC: 17.9 MG/DL — SIGNIFICANT CHANGE UP (ref 8–20)
CALCIUM SERPL-MCNC: 8.6 MG/DL — SIGNIFICANT CHANGE UP (ref 8.6–10.2)
CALCIUM SERPL-MCNC: 9 MG/DL — SIGNIFICANT CHANGE UP (ref 8.6–10.2)
CHLORIDE SERPL-SCNC: 105 MMOL/L — SIGNIFICANT CHANGE UP (ref 98–107)
CHLORIDE SERPL-SCNC: 105 MMOL/L — SIGNIFICANT CHANGE UP (ref 98–107)
CO2 SERPL-SCNC: 23 MMOL/L — SIGNIFICANT CHANGE UP (ref 22–29)
CO2 SERPL-SCNC: 24 MMOL/L — SIGNIFICANT CHANGE UP (ref 22–29)
CREAT SERPL-MCNC: 1.05 MG/DL — SIGNIFICANT CHANGE UP (ref 0.5–1.3)
CREAT SERPL-MCNC: 1.06 MG/DL — SIGNIFICANT CHANGE UP (ref 0.5–1.3)
EGFR: 55 ML/MIN/1.73M2 — LOW
EGFR: 56 ML/MIN/1.73M2 — LOW
GAMMA GLOBULIN: 1 G/DL — SIGNIFICANT CHANGE UP (ref 0.6–1.6)
GLUCOSE SERPL-MCNC: 101 MG/DL — HIGH (ref 70–99)
GLUCOSE SERPL-MCNC: 90 MG/DL — SIGNIFICANT CHANGE UP (ref 70–99)
HCT VFR BLD CALC: 30.5 % — LOW (ref 34.5–45)
HGB BLD-MCNC: 9.7 G/DL — LOW (ref 11.5–15.5)
INTERPRETATION SERPL IFE-IMP: SIGNIFICANT CHANGE UP
MAGNESIUM SERPL-MCNC: 2 MG/DL — SIGNIFICANT CHANGE UP (ref 1.6–2.6)
MCHC RBC-ENTMCNC: 28.1 PG — SIGNIFICANT CHANGE UP (ref 27–34)
MCHC RBC-ENTMCNC: 31.8 GM/DL — LOW (ref 32–36)
MCV RBC AUTO: 88.4 FL — SIGNIFICANT CHANGE UP (ref 80–100)
PHOSPHATE SERPL-MCNC: 4.4 MG/DL — SIGNIFICANT CHANGE UP (ref 2.4–4.7)
PLATELET # BLD AUTO: 389 K/UL — SIGNIFICANT CHANGE UP (ref 150–400)
POTASSIUM SERPL-MCNC: 4.4 MMOL/L — SIGNIFICANT CHANGE UP (ref 3.5–5.3)
POTASSIUM SERPL-MCNC: 5.6 MMOL/L — HIGH (ref 3.5–5.3)
POTASSIUM SERPL-SCNC: 4.4 MMOL/L — SIGNIFICANT CHANGE UP (ref 3.5–5.3)
POTASSIUM SERPL-SCNC: 5.6 MMOL/L — HIGH (ref 3.5–5.3)
PROT PATTERN SERPL ELPH-IMP: SIGNIFICANT CHANGE UP
RBC # BLD: 3.45 M/UL — LOW (ref 3.8–5.2)
RBC # FLD: 17.5 % — HIGH (ref 10.3–14.5)
SODIUM SERPL-SCNC: 138 MMOL/L — SIGNIFICANT CHANGE UP (ref 135–145)
SODIUM SERPL-SCNC: 140 MMOL/L — SIGNIFICANT CHANGE UP (ref 135–145)
WBC # BLD: 6.84 K/UL — SIGNIFICANT CHANGE UP (ref 3.8–10.5)
WBC # FLD AUTO: 6.84 K/UL — SIGNIFICANT CHANGE UP (ref 3.8–10.5)

## 2022-03-08 PROCEDURE — 94640 AIRWAY INHALATION TREATMENT: CPT

## 2022-03-08 PROCEDURE — 82728 ASSAY OF FERRITIN: CPT

## 2022-03-08 PROCEDURE — P9016: CPT

## 2022-03-08 PROCEDURE — 86900 BLOOD TYPING SEROLOGIC ABO: CPT

## 2022-03-08 PROCEDURE — 84466 ASSAY OF TRANSFERRIN: CPT

## 2022-03-08 PROCEDURE — 83521 IG LIGHT CHAINS FREE EACH: CPT

## 2022-03-08 PROCEDURE — 80048 BASIC METABOLIC PNL TOTAL CA: CPT

## 2022-03-08 PROCEDURE — 36430 TRANSFUSION BLD/BLD COMPNT: CPT

## 2022-03-08 PROCEDURE — 36415 COLL VENOUS BLD VENIPUNCTURE: CPT

## 2022-03-08 PROCEDURE — C1887: CPT

## 2022-03-08 PROCEDURE — 86850 RBC ANTIBODY SCREEN: CPT

## 2022-03-08 PROCEDURE — 82607 VITAMIN B-12: CPT

## 2022-03-08 PROCEDURE — 83735 ASSAY OF MAGNESIUM: CPT

## 2022-03-08 PROCEDURE — 85730 THROMBOPLASTIN TIME PARTIAL: CPT

## 2022-03-08 PROCEDURE — C1760: CPT

## 2022-03-08 PROCEDURE — 86901 BLOOD TYPING SEROLOGIC RH(D): CPT

## 2022-03-08 PROCEDURE — 83540 ASSAY OF IRON: CPT

## 2022-03-08 PROCEDURE — 82784 ASSAY IGA/IGD/IGG/IGM EACH: CPT

## 2022-03-08 PROCEDURE — 99291 CRITICAL CARE FIRST HOUR: CPT

## 2022-03-08 PROCEDURE — C8929: CPT

## 2022-03-08 PROCEDURE — 86923 COMPATIBILITY TEST ELECTRIC: CPT

## 2022-03-08 PROCEDURE — C1769: CPT

## 2022-03-08 PROCEDURE — 83880 ASSAY OF NATRIURETIC PEPTIDE: CPT

## 2022-03-08 PROCEDURE — 81001 URINALYSIS AUTO W/SCOPE: CPT

## 2022-03-08 PROCEDURE — 99153 MOD SED SAME PHYS/QHP EA: CPT

## 2022-03-08 PROCEDURE — 84100 ASSAY OF PHOSPHORUS: CPT

## 2022-03-08 PROCEDURE — 85610 PROTHROMBIN TIME: CPT

## 2022-03-08 PROCEDURE — 86334 IMMUNOFIX E-PHORESIS SERUM: CPT

## 2022-03-08 PROCEDURE — 85027 COMPLETE CBC AUTOMATED: CPT

## 2022-03-08 PROCEDURE — 82746 ASSAY OF FOLIC ACID SERUM: CPT

## 2022-03-08 PROCEDURE — 71045 X-RAY EXAM CHEST 1 VIEW: CPT

## 2022-03-08 PROCEDURE — 87637 SARSCOV2&INF A&B&RSV AMP PRB: CPT

## 2022-03-08 PROCEDURE — 93005 ELECTROCARDIOGRAM TRACING: CPT

## 2022-03-08 PROCEDURE — 99152 MOD SED SAME PHYS/QHP 5/>YRS: CPT

## 2022-03-08 PROCEDURE — 80053 COMPREHEN METABOLIC PANEL: CPT

## 2022-03-08 PROCEDURE — 93458 L HRT ARTERY/VENTRICLE ANGIO: CPT

## 2022-03-08 PROCEDURE — C1894: CPT

## 2022-03-08 PROCEDURE — 99239 HOSP IP/OBS DSCHRG MGMT >30: CPT

## 2022-03-08 PROCEDURE — 84165 PROTEIN E-PHORESIS SERUM: CPT

## 2022-03-08 PROCEDURE — 83550 IRON BINDING TEST: CPT

## 2022-03-08 PROCEDURE — 84484 ASSAY OF TROPONIN QUANT: CPT

## 2022-03-08 PROCEDURE — 84155 ASSAY OF PROTEIN SERUM: CPT

## 2022-03-08 PROCEDURE — 85025 COMPLETE CBC W/AUTO DIFF WBC: CPT

## 2022-03-08 RX ORDER — LIDOCAINE 4 G/100G
1 CREAM TOPICAL ONCE
Refills: 0 | Status: COMPLETED | OUTPATIENT
Start: 2022-03-08 | End: 2022-03-08

## 2022-03-08 RX ORDER — SPIRONOLACTONE 25 MG/1
1 TABLET, FILM COATED ORAL
Qty: 0 | Refills: 0 | DISCHARGE

## 2022-03-08 RX ORDER — FOLIC ACID 0.8 MG
0 TABLET ORAL
Qty: 0 | Refills: 0 | DISCHARGE

## 2022-03-08 RX ORDER — FOLIC ACID 0.8 MG
1 TABLET ORAL
Qty: 0 | Refills: 0 | DISCHARGE

## 2022-03-08 RX ORDER — MIDODRINE HYDROCHLORIDE 2.5 MG/1
1 TABLET ORAL
Qty: 45 | Refills: 0
Start: 2022-03-08 | End: 2022-03-22

## 2022-03-08 RX ORDER — POTASSIUM CHLORIDE 20 MEQ
1 PACKET (EA) ORAL
Qty: 0 | Refills: 0 | DISCHARGE

## 2022-03-08 RX ADMIN — LIDOCAINE 1 PATCH: 4 CREAM TOPICAL at 15:25

## 2022-03-08 RX ADMIN — Medication 12.5 MILLIGRAM(S): at 05:11

## 2022-03-08 RX ADMIN — CLOPIDOGREL BISULFATE 75 MILLIGRAM(S): 75 TABLET, FILM COATED ORAL at 11:50

## 2022-03-08 RX ADMIN — PANTOPRAZOLE SODIUM 40 MILLIGRAM(S): 20 TABLET, DELAYED RELEASE ORAL at 11:49

## 2022-03-08 RX ADMIN — POLYETHYLENE GLYCOL 3350 17 GRAM(S): 17 POWDER, FOR SOLUTION ORAL at 11:50

## 2022-03-08 RX ADMIN — MIDODRINE HYDROCHLORIDE 5 MILLIGRAM(S): 2.5 TABLET ORAL at 05:11

## 2022-03-08 RX ADMIN — Medication 325 MILLIGRAM(S): at 11:49

## 2022-03-08 RX ADMIN — AMIODARONE HYDROCHLORIDE 200 MILLIGRAM(S): 400 TABLET ORAL at 05:11

## 2022-03-08 RX ADMIN — Medication 1 MILLIGRAM(S): at 11:49

## 2022-03-08 RX ADMIN — Medication 81 MILLIGRAM(S): at 11:49

## 2022-03-08 RX ADMIN — MIDODRINE HYDROCHLORIDE 5 MILLIGRAM(S): 2.5 TABLET ORAL at 11:49

## 2022-03-08 NOTE — DISCHARGE NOTE PROVIDER - NSDCCPCAREPLAN_GEN_ALL_CORE_FT
PRINCIPAL DISCHARGE DIAGNOSIS  Diagnosis: Unstable angina  Assessment and Plan of Treatment: likely due to severe anemia  cardiac cath with patent stent, no intervention was required  please continue aspirin, plavix and statin  follow up with Dr. Zarate within 1 week      SECONDARY DISCHARGE DIAGNOSES  Diagnosis: ZANDRA (acute kidney injury)  Assessment and Plan of Treatment: Your kidney function has improved; creatinine is 1.06 today  please resume entresto today and follow up with your PCP by the end of the week to discuss when you can safely resume Torsemide and aldactone  avoid NSAIDS    Diagnosis: Anemia  Assessment and Plan of Treatment: your Hb is 9.7 today after receiving 3 units of PRBCs  please follow up with Dr. Wadsworth within 1 week for repeat labs to ensure stable blood counts  please also follow up for results of pending tests and further testing as needed  please also get evaluated for possible Procrit injections and IV iron infusions to prevent frequent readmissions to the hospital    Diagnosis: Chronic systolic congestive heart failure  Assessment and Plan of Treatment: your ejection fraction remains 25-30%  please continue metoprolol and resume entresto today   please follow up with Dr. Zarate before the end of the week to discuss with torsemide and aldactone can be safely resume  please hold all of the above medications if your SBP is less than 100  you have been prescribed midodrine to take to help increase your blood pressure in efforts to allow you to take your cardiac medications and this should only be used if your blood pressures are running low (SBP less than 100).  please follow up with your PCP and Dr. Zarate to determine when you can safely stop midodrine and resume torsemide and aldactone as above.    Diagnosis: COPD, moderate  Assessment and Plan of Treatment: continue home oxygen and inhalers as prescribed  follow up with your PCP within 1 week

## 2022-03-08 NOTE — DISCHARGE NOTE PROVIDER - HOSPITAL COURSE
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 Fib (not on AC due to recurrent GIB), Renal Artery Stenosis, RA and COPD on home oxygen presented with worsening shortness of breath and palpitations. Found to have ST elevations in lateral leads. Code STEMI was called in ER and patient was taken to cath lab. Coronary angiogram with patent LM stent and non obstructive CAD. Patient never made a troponin therefore patient did not have STEMI. She was admitted to MICU for close observation. She was found to have Acute on Chronic Anemia, no obvious bleeding. Hb improved s/p 3 units of PRBC.      1) Unstable Angina likely due to demand ischemia in the setting of anemia - resolved  - History of CAD s/p PCI  - Code STEMI in ER  - ST elevation in lateral leads but patient never had positive troponin therefore not a STEMI  - s/p cardiac cath with patent LM stent and 50% mLAD and 50% dRCA stenosis  - Continue Aspirin, Plavix, Lipitor and Metoprolol   - Stable for discharge home with outpatient cardio follow up.    2) Acute on Chronic Anemia  - History of GI bleed   - Hb remains stable at 9.7 s/p 3 units of PRBC  - Iron studies reviewed; elevated iron level but received PRBCs  - f/u immunofixation as outpatient with Dr. Wadsworth and further work up ? procrit vs venofer infusions   - GI consult also appreciated; no further GI work up  - Repeat CBC within 1 week    3) Chronic A. Fib  - Not on AC due to recurrent GIB  - Continue Metoprolol and Amiodarone     4) Hypotension   - BP improved with midodrine  - continue metoprolol given history of CHF  - resume entresto     5) Chronic Systolic Heart Failure   - Appears euvolemic  - Resume entresto; and restart Aldactone and Lasix later this week if BP improves  - Continue Metoprolol   - TTE reviewed; EF 25-30% with WMA (unchanged from previous per patient)  - outpatient follow up with Dr. Zarate    6) ZANDRA   - likely due to prerenal azotemia due to renal hypoperfusion  - creatinine downtrending from 1.67 to 1.06  - UA negative  - resume Entresto today and then resume Aldactone and Lasix if BP remains stable later this weel    7) COPD on 3 liters of NC at home  - currently on 3 liters   - not in acute exacerbation   - Abigail PRN    8) Rheumatoid Arthritis   - Continue Methotrexate  - Continue Folic Acid     Patient is medically stable for discharge home with home care.

## 2022-03-08 NOTE — DISCHARGE NOTE PROVIDER - PROVIDER TOKENS
PROVIDER:[TOKEN:[[id number]]],PROVIDER:[TOKEN:[[id number]]],PROVIDER:[TOKEN:[[id number]]]

## 2022-03-08 NOTE — DISCHARGE NOTE PROVIDER - CARE PROVIDER_API CALL
Ruddy Wadsworth)  Hematology; Internal Medicine; Medical Oncology  [address] [place] [address]  Phone: [phone]  Fax: [phone]  Follow Up Time:     Kesha Sharma)  Hematology; Internal Medicine; Medical Oncology  [address] [place] [address]  Phone: [phone]  Fax: [phone]  Follow Up Time:     Juan Daniel Tabor)  Cardiovascular Disease; Interventional Cardiology  [address] [place] [address], [address]  [place] [address]  Phone: [phone]  Fax: [phone]  Follow Up Time:

## 2022-03-08 NOTE — DISCHARGE NOTE PROVIDER - CARE PROVIDERS DIRECT ADDRESSES
,[email or web address]

## 2022-03-08 NOTE — DISCHARGE NOTE PROVIDER - NSDCMRMEDTOKEN_GEN_ALL_CORE_FT
albuterol 2.5 mg/3 mL (0.083%) inhalation solution: 3 milliliter(s) inhaled every 12 hours, As Needed  ALPRAZolam 0.25 mg oral tablet: 1 tab(s) orally 3 times a day, As Needed  amiodarone 200 mg oral tablet: 1 tab(s) orally once a day  Aspirin Enteric Coated 81 mg oral delayed release tablet: 1 tab(s) orally once a day restart on sunday in two days   ferrous sulfate 324 mg (65 mg elemental iron) oral delayed release tablet: 1 tab(s) orally every 48 hours  folic acid: 1 tab(s) orally once a day  methotrexate 2.5 mg oral tablet: 6 tab(s) orally once a week  Metoprolol Succinate ER 25 mg oral tablet, extended release: 1 tab(s) orally once a day  midodrine 5 mg oral tablet: 1 tab(s) orally every 8 hours, As Needed if SBP &lt; 100  pantoprazole 40 mg oral delayed release tablet: 1 tab(s) orally once a day  Plavix 75 mg oral tablet: 1 tab(s) orally once a day  rosuvastatin 40 mg oral tablet: 1 tab(s) orally once a day  Stiolto Respimat 28 ACT 2.5 mcg-2.5 mcg/inh inhalation aerosol: 2 puff(s) inhaled every 24 hours

## 2022-03-08 NOTE — PHYSICAL THERAPY INITIAL EVALUATION ADULT - GAIT DEVIATIONS NOTED, PT EVAL
August 13, 2018        Alexa Holliday  [facility] [address]  Phone: [phone]  Fax: [phone]             [facility]  [address]  [facility] [address]  Phone: [phone]   Patient: Demetrio Aguiar   MR Number: [id number]   YOB: 1981   Date of Visit: 8/13/2018       Dear Dr. Alexa Holliday    Thank you for referring Demetrio Aguiar to me for evaluation. Attached you will find relevant portions of my assessment and plan of care.    If you have questions, please do not hesitate to call me. I look forward to following Demetrio Aguiar along with you.    Sincerely,    Lucía Polk MD    Enclosure    If you would like to receive this communication electronically, please contact [email or web address] or [phone] to request [employer] access.    [employer] Link is a tool which provides read-only access to select patient information with whom you have a relationship. Its easy to use and provides real time access to review your patients record including encounter summaries, notes, results, and demographic information.    If you feel you have received this communication in error or would no longer like to receive these types of communications, please e-mail [email or web address]        decreased step length/decreased stride length/decreased weight-shifting ability

## 2022-03-08 NOTE — DISCHARGE NOTE PROVIDER - ATTENDING DISCHARGE PHYSICAL EXAMINATION:
PHYSICAL EXAM:    GENERAL: elderly female, laying in bed, NAD  HEAD:  Atraumatic, Normocephalic  EYES: EOMI, PERRLA, conjunctiva and sclera clear  ENMT: Moist mucous membranes  NECK: Supple   NERVOUS SYSTEM:  Alert & Oriented X3, Motor Strength 5/5 B/L upper and lower extremities   CHEST/LUNG: Clear to auscultation bilaterally; No rales, rhonchi, wheezing, or rubs  HEART: Regular rate and rhythm; + S1/S2  ABDOMEN: Soft, Nontender, Nondistended; Bowel sounds present  EXTREMITIES:  no pedal edema

## 2022-03-08 NOTE — DISCHARGE NOTE PROVIDER - NSDCFUADDAPPT_GEN_ALL_CORE_FT
STAR DOCUMENTATION:  PT DECLINES MEDS TO BED;PT HAS NO ISSUES GETTING HER MEDS AT [facility] IN [address] [facility]  [phone]  PT WANTS TO SET UP HER OWN CARDIAC F/U APPOINTMENT W/ DR. DIEGO CHENG OF [facility] CARDIOLOGY

## 2022-03-09 ENCOUNTER — NON-APPOINTMENT (OUTPATIENT)
Age: 72
End: 2022-03-09

## 2022-03-12 ENCOUNTER — OUTPATIENT (OUTPATIENT)
Dept: OUTPATIENT SERVICES | Facility: HOSPITAL | Age: 72
LOS: 1 days | Discharge: HOME | End: 2022-03-12

## 2022-03-12 DIAGNOSIS — D64.9 ANEMIA, UNSPECIFIED: ICD-10-CM

## 2022-03-12 DIAGNOSIS — Z98.890 OTHER SPECIFIED POSTPROCEDURAL STATES: Chronic | ICD-10-CM

## 2022-03-12 DIAGNOSIS — Z90.710 ACQUIRED ABSENCE OF BOTH CERVIX AND UTERUS: Chronic | ICD-10-CM

## 2022-03-12 DIAGNOSIS — Z90.49 ACQUIRED ABSENCE OF OTHER SPECIFIED PARTS OF DIGESTIVE TRACT: Chronic | ICD-10-CM

## 2022-03-15 ENCOUNTER — RESULT REVIEW (OUTPATIENT)
Age: 72
End: 2022-03-15

## 2022-03-15 ENCOUNTER — APPOINTMENT (OUTPATIENT)
Dept: HEMATOLOGY ONCOLOGY | Facility: CLINIC | Age: 72
End: 2022-03-15
Payer: MEDICARE

## 2022-03-15 VITALS
WEIGHT: 159.04 LBS | DIASTOLIC BLOOD PRESSURE: 78 MMHG | OXYGEN SATURATION: 95 % | BODY MASS INDEX: 24.1 KG/M2 | SYSTOLIC BLOOD PRESSURE: 117 MMHG | HEART RATE: 91 BPM | HEIGHT: 68 IN

## 2022-03-15 LAB
ALBUMIN SERPL ELPH-MCNC: 3.9 G/DL
ALP BLD-CCNC: 139 U/L
ALT SERPL-CCNC: 18 U/L
ANION GAP SERPL CALC-SCNC: 14 MMOL/L
AST SERPL-CCNC: 24 U/L
BASOPHILS # BLD AUTO: 0 K/UL — SIGNIFICANT CHANGE UP (ref 0–0.2)
BASOPHILS NFR BLD AUTO: 0.4 % — SIGNIFICANT CHANGE UP (ref 0–2)
BILIRUB SERPL-MCNC: 0.3 MG/DL
BUN SERPL-MCNC: 17 MG/DL
CALCIUM SERPL-MCNC: 9.1 MG/DL
CHLORIDE SERPL-SCNC: 106 MMOL/L
CO2 SERPL-SCNC: 23 MMOL/L
CREAT SERPL-MCNC: 1.39 MG/DL
EGFR: 40 ML/MIN/1.73M2
EOSINOPHIL # BLD AUTO: 0.1 K/UL — SIGNIFICANT CHANGE UP (ref 0–0.5)
EOSINOPHIL NFR BLD AUTO: 1.2 % — SIGNIFICANT CHANGE UP (ref 0–6)
FERRITIN SERPL-MCNC: 130 NG/ML
FOLATE SERPL-MCNC: >20 NG/ML
GLUCOSE SERPL-MCNC: 75 MG/DL
HCT VFR BLD CALC: 35.5 % — SIGNIFICANT CHANGE UP (ref 34.5–45)
HGB BLD-MCNC: 10.7 G/DL — LOW (ref 11.5–15.5)
IRON SATN MFR SERPL: 20 %
IRON SERPL-MCNC: 69 UG/DL
LYMPHOCYTES # BLD AUTO: 0.5 K/UL — LOW (ref 1–3.3)
LYMPHOCYTES # BLD AUTO: 6.5 % — LOW (ref 13–44)
MCHC RBC-ENTMCNC: 28.4 PG — SIGNIFICANT CHANGE UP (ref 27–34)
MCHC RBC-ENTMCNC: 30.2 G/DL — LOW (ref 32–36)
MCV RBC AUTO: 94.2 FL — SIGNIFICANT CHANGE UP (ref 80–100)
MONOCYTES # BLD AUTO: 0.4 K/UL — SIGNIFICANT CHANGE UP (ref 0–0.9)
MONOCYTES NFR BLD AUTO: 4.8 % — SIGNIFICANT CHANGE UP (ref 2–14)
NEUTROPHILS # BLD AUTO: 7.1 K/UL — SIGNIFICANT CHANGE UP (ref 1.8–7.4)
NEUTROPHILS NFR BLD AUTO: 87.1 % — HIGH (ref 43–77)
PLATELET # BLD AUTO: 436 K/UL — HIGH (ref 150–400)
POTASSIUM SERPL-SCNC: 3.9 MMOL/L
PROT SERPL-MCNC: 6.7 G/DL
RBC # BLD: 3.77 M/UL — LOW (ref 3.8–5.2)
RBC # FLD: 17.2 % — HIGH (ref 10.3–14.5)
SODIUM SERPL-SCNC: 144 MMOL/L
TIBC SERPL-MCNC: 342 UG/DL
UIBC SERPL-MCNC: 274 UG/DL
VIT B12 SERPL-MCNC: 339 PG/ML
WBC # BLD: 8.1 K/UL — SIGNIFICANT CHANGE UP (ref 3.8–10.5)
WBC # FLD AUTO: 8.1 K/UL — SIGNIFICANT CHANGE UP (ref 3.8–10.5)

## 2022-03-15 PROCEDURE — 99212 OFFICE O/P EST SF 10 MIN: CPT

## 2022-03-15 RX ORDER — BACILLUS COAGULANS/INULIN 1B-250 MG
CAPSULE ORAL
Refills: 0 | Status: DISCONTINUED | COMMUNITY
End: 2022-03-15

## 2022-03-15 RX ORDER — SACUBITRIL AND VALSARTAN 24; 26 MG/1; MG/1
24-26 TABLET, FILM COATED ORAL
Refills: 0 | Status: ACTIVE | COMMUNITY

## 2022-03-15 RX ORDER — SPIRONOLACTONE 25 MG/1
25 TABLET ORAL
Qty: 90 | Refills: 3 | Status: DISCONTINUED | COMMUNITY
Start: 2022-02-23 | End: 2022-03-15

## 2022-03-15 RX ORDER — LOPERAMIDE HCL 2 MG
CAPSULE ORAL
Refills: 0 | Status: DISCONTINUED | COMMUNITY
End: 2022-03-15

## 2022-03-15 RX ORDER — SACUBITRIL AND VALSARTAN 49; 51 MG/1; MG/1
49-51 TABLET, FILM COATED ORAL TWICE DAILY
Refills: 0 | Status: DISCONTINUED | COMMUNITY
Start: 2022-02-23 | End: 2022-03-15

## 2022-03-15 RX ORDER — DAPAGLIFLOZIN 10 MG/1
10 TABLET, FILM COATED ORAL
Qty: 90 | Refills: 3 | Status: DISCONTINUED | COMMUNITY
Start: 2022-02-23 | End: 2022-03-15

## 2022-03-15 RX ORDER — ASCORBIC ACID 500 MG
TABLET ORAL
Refills: 0 | Status: ACTIVE | COMMUNITY

## 2022-03-15 NOTE — REVIEW OF SYSTEMS
[Fatigue] : fatigue [Shortness Of Breath] : shortness of breath [SOB on Exertion] : shortness of breath during exertion [Joint Pain] : joint pain [Joint Stiffness] : joint stiffness [Negative] : Allergic/Immunologic

## 2022-03-15 NOTE — HISTORY OF PRESENT ILLNESS
[de-identified] : This 71 year-old black woman is referred for anemia. In  August 2020 her hemoglobin was 8.1, hematocrit 27.3, MCV 91. White count was 4.7 and platelets 414,000\par Serum ferritin was 21 serum iron 42 and total iron-binding capacity 306.\par She has been seen by gastroenterology. A long hospitalization at [facility] this summer for small bowel obstruction secondary to adhesions from a prior colectomy and hysterectomy included laparoscopic lysis of adhesions.She has had multiple admissions since last F/U for COPD, CHF and anemia, requiring multiple transfusions. [de-identified] : Since last visit she has been in the hospital with CHF, requiring thoracentesis , removed 600 ml of fluid, Her HGb dropped and she required transfusion, 3 units of packed cells with last admission in early March. She denies evidence of GI bleeding, but has had positive stools for blood in the past. GI work up, endoscopies and colonoscopies, done ,and she states that they had to cauterize some lesions for bleeding. She is now on continuous O 2 for COPD.They discharged her on 1 iron tab per day, which she was not taking regularly prior to this admission, has mild constipation when taking oral iron in the past, but does not have this at this time.

## 2022-03-15 NOTE — ASSESSMENT
[FreeTextEntry1] : This 71 year-old black woman is referred for anemia. In  August 2020 her hemoglobin was 8.1, hematocrit 27.3, MCV 91. White count was 4.7 and platelets 414,000\par Serum ferritin was 21 serum iron 42 and total iron-binding capacity 306.\par She has been seen by gastroenterology. A long hospitalization at [facility] this summer for small bowel obstruction secondary to adhesions from a prior colectomy and hysterectomy included laparoscopic lysis of adhesions.She has had multiple admissions since last F/U for COPD, CHF and anemia, requiring multiple transfusions.. Her HGb today is at 10.7. I am sending off iron studies and CMP to follow renal function. i will discuss case with Dr. Wadsworth after reviewing results and determine if we need to change dose of iron tab and or start Aranesp if needed for renal anemia. F/u will be determined by results and plan.

## 2022-03-15 NOTE — PHYSICAL EXAM
[Restricted in physically strenuous activity but ambulatory and able to carry out work of a light or sedentary nature] : Status 1- Restricted in physically strenuous activity but ambulatory and able to carry out work of a light or sedentary nature, e.g., light house work, office work [Normal] : affect appropriate [de-identified] : multiple chest and abdominal scars, well healed

## 2022-03-16 ENCOUNTER — NON-APPOINTMENT (OUTPATIENT)
Age: 72
End: 2022-03-16

## 2022-03-29 ENCOUNTER — RESULT REVIEW (OUTPATIENT)
Age: 72
End: 2022-03-29

## 2022-03-29 ENCOUNTER — APPOINTMENT (OUTPATIENT)
Age: 72
End: 2022-03-29

## 2022-03-29 LAB
ACANTHOCYTES BLD QL SMEAR: SLIGHT — SIGNIFICANT CHANGE UP
ANISOCYTOSIS BLD QL: SLIGHT — SIGNIFICANT CHANGE UP
BASOPHILS # BLD AUTO: 0 K/UL — SIGNIFICANT CHANGE UP (ref 0–0.2)
BURR CELLS BLD QL SMEAR: PRESENT — SIGNIFICANT CHANGE UP
ELLIPTOCYTES BLD QL SMEAR: SLIGHT — SIGNIFICANT CHANGE UP
EOSINOPHIL # BLD AUTO: 0.1 K/UL — SIGNIFICANT CHANGE UP (ref 0–0.5)
EOSINOPHIL NFR BLD AUTO: 5 % — SIGNIFICANT CHANGE UP (ref 0–6)
HCT VFR BLD CALC: 30.9 % — LOW (ref 34.5–45)
HGB BLD-MCNC: 9.9 G/DL — LOW (ref 11.5–15.5)
LG PLATELETS BLD QL AUTO: SLIGHT — SIGNIFICANT CHANGE UP
LYMPHOCYTES # BLD AUTO: 1.1 K/UL — SIGNIFICANT CHANGE UP (ref 1–3.3)
LYMPHOCYTES # BLD AUTO: 14 % — SIGNIFICANT CHANGE UP (ref 13–44)
MACROCYTES BLD QL: SLIGHT — SIGNIFICANT CHANGE UP
MCHC RBC-ENTMCNC: 29.1 PG — SIGNIFICANT CHANGE UP (ref 27–34)
MCHC RBC-ENTMCNC: 31.9 G/DL — LOW (ref 32–36)
MCV RBC AUTO: 91.1 FL — SIGNIFICANT CHANGE UP (ref 80–100)
MICROCYTES BLD QL: SLIGHT — SIGNIFICANT CHANGE UP
MONOCYTES # BLD AUTO: 0.6 K/UL — SIGNIFICANT CHANGE UP (ref 0–0.9)
MONOCYTES NFR BLD AUTO: 4 % — SIGNIFICANT CHANGE UP (ref 2–14)
NEUTROPHILS # BLD AUTO: 4.1 K/UL — SIGNIFICANT CHANGE UP (ref 1.8–7.4)
NEUTROPHILS NFR BLD AUTO: 77 % — SIGNIFICANT CHANGE UP (ref 43–77)
OVALOCYTES BLD QL SMEAR: SLIGHT — SIGNIFICANT CHANGE UP
PLAT MORPH BLD: NORMAL — SIGNIFICANT CHANGE UP
PLATELET # BLD AUTO: 427 K/UL — HIGH (ref 150–400)
POIKILOCYTOSIS BLD QL AUTO: SLIGHT — SIGNIFICANT CHANGE UP
POLYCHROMASIA BLD QL SMEAR: SLIGHT — SIGNIFICANT CHANGE UP
RBC # BLD: 3.4 M/UL — LOW (ref 3.8–5.2)
RBC # FLD: 17.8 % — HIGH (ref 10.3–14.5)
RBC BLD AUTO: SIGNIFICANT CHANGE UP
WBC # BLD: 5.9 K/UL — SIGNIFICANT CHANGE UP (ref 3.8–10.5)
WBC # FLD AUTO: 5.9 K/UL — SIGNIFICANT CHANGE UP (ref 3.8–10.5)

## 2022-03-30 DIAGNOSIS — N18.30 CHRONIC KIDNEY DISEASE, STAGE 3 UNSPECIFIED: ICD-10-CM

## 2022-03-30 DIAGNOSIS — D63.1 ANEMIA IN CHRONIC KIDNEY DISEASE: ICD-10-CM

## 2022-04-08 ENCOUNTER — OUTPATIENT (OUTPATIENT)
Dept: OUTPATIENT SERVICES | Facility: HOSPITAL | Age: 72
LOS: 1 days | Discharge: HOME | End: 2022-04-08

## 2022-04-08 DIAGNOSIS — Z90.710 ACQUIRED ABSENCE OF BOTH CERVIX AND UTERUS: Chronic | ICD-10-CM

## 2022-04-08 DIAGNOSIS — Z90.49 ACQUIRED ABSENCE OF OTHER SPECIFIED PARTS OF DIGESTIVE TRACT: Chronic | ICD-10-CM

## 2022-04-08 DIAGNOSIS — D64.9 ANEMIA, UNSPECIFIED: ICD-10-CM

## 2022-04-08 DIAGNOSIS — Z98.890 OTHER SPECIFIED POSTPROCEDURAL STATES: Chronic | ICD-10-CM

## 2022-04-12 ENCOUNTER — APPOINTMENT (OUTPATIENT)
Age: 72
End: 2022-04-12

## 2022-04-12 ENCOUNTER — RESULT REVIEW (OUTPATIENT)
Age: 72
End: 2022-04-12

## 2022-04-12 LAB
BASOPHILS # BLD AUTO: 0 K/UL — SIGNIFICANT CHANGE UP (ref 0–0.2)
BASOPHILS NFR BLD AUTO: 0.8 % — SIGNIFICANT CHANGE UP (ref 0–2)
EOSINOPHIL # BLD AUTO: 0.1 K/UL — SIGNIFICANT CHANGE UP (ref 0–0.5)
EOSINOPHIL NFR BLD AUTO: 2 % — SIGNIFICANT CHANGE UP (ref 0–6)
HCT VFR BLD CALC: 34.1 % — LOW (ref 34.5–45)
HGB BLD-MCNC: 10 G/DL — LOW (ref 11.5–15.5)
LYMPHOCYTES # BLD AUTO: 0.6 K/UL — LOW (ref 1–3.3)
LYMPHOCYTES # BLD AUTO: 9.9 % — LOW (ref 13–44)
MCHC RBC-ENTMCNC: 28.1 PG — SIGNIFICANT CHANGE UP (ref 27–34)
MCHC RBC-ENTMCNC: 29.3 G/DL — LOW (ref 32–36)
MCV RBC AUTO: 95.9 FL — SIGNIFICANT CHANGE UP (ref 80–100)
MONOCYTES # BLD AUTO: 0.7 K/UL — SIGNIFICANT CHANGE UP (ref 0–0.9)
MONOCYTES NFR BLD AUTO: 10.9 % — SIGNIFICANT CHANGE UP (ref 2–14)
NEUTROPHILS # BLD AUTO: 4.7 K/UL — SIGNIFICANT CHANGE UP (ref 1.8–7.4)
NEUTROPHILS NFR BLD AUTO: 76.5 % — SIGNIFICANT CHANGE UP (ref 43–77)
PLATELET # BLD AUTO: 373 K/UL — SIGNIFICANT CHANGE UP (ref 150–400)
RBC # BLD: 3.55 M/UL — LOW (ref 3.8–5.2)
RBC # FLD: 18.8 % — HIGH (ref 10.3–14.5)
WBC # BLD: 6.1 K/UL — SIGNIFICANT CHANGE UP (ref 3.8–10.5)
WBC # FLD AUTO: 6.1 K/UL — SIGNIFICANT CHANGE UP (ref 3.8–10.5)

## 2022-04-13 DIAGNOSIS — N18.30 CHRONIC KIDNEY DISEASE, STAGE 3 UNSPECIFIED: ICD-10-CM

## 2022-04-13 DIAGNOSIS — D63.1 ANEMIA IN CHRONIC KIDNEY DISEASE: ICD-10-CM

## 2022-04-22 ENCOUNTER — APPOINTMENT (OUTPATIENT)
Dept: FAMILY MEDICINE | Facility: CLINIC | Age: 72
End: 2022-04-22
Payer: MEDICARE

## 2022-04-22 VITALS
WEIGHT: 162 LBS | TEMPERATURE: 97.5 F | OXYGEN SATURATION: 92 % | DIASTOLIC BLOOD PRESSURE: 72 MMHG | BODY MASS INDEX: 24.55 KG/M2 | HEIGHT: 68 IN | HEART RATE: 90 BPM | SYSTOLIC BLOOD PRESSURE: 124 MMHG

## 2022-04-22 DIAGNOSIS — F41.9 ANXIETY DISORDER, UNSPECIFIED: ICD-10-CM

## 2022-04-22 PROCEDURE — 99213 OFFICE O/P EST LOW 20 MIN: CPT

## 2022-04-22 NOTE — REVIEW OF SYSTEMS
[Negative] : Heme/Lymph [FreeTextEntry5] : s/p heart surgery  [FreeTextEntry6] : on ambulatory oxygen

## 2022-04-22 NOTE — PHYSICAL EXAM
[No Acute Distress] : no acute distress [Well Nourished] : well nourished [Well Developed] : well developed [Normal Sclera/Conjunctiva] : normal sclera/conjunctiva [EOMI] : extraocular movements intact [No Respiratory Distress] : no respiratory distress  [No Accessory Muscle Use] : no accessory muscle use [Clear to Auscultation] : lungs were clear to auscultation bilaterally [Normal Rate] : normal rate  [Regular Rhythm] : with a regular rhythm [Normal S1, S2] : normal S1 and S2 [No Edema] : there was no peripheral edema [Coordination Grossly Intact] : coordination grossly intact [No Focal Deficits] : no focal deficits [Normal Gait] : normal gait [Normal Affect] : the affect was normal [Normal Insight/Judgement] : insight and judgment were intact

## 2022-04-22 NOTE — PLAN
[FreeTextEntry1] : Insomnia 2/2 anxiety:\par - daily anxiety controlled without medication \par - occasionally unable to sleep through the night \par - good sleep hygiene discussed\par - continue xanax PRN

## 2022-04-22 NOTE — HISTORY OF PRESENT ILLNESS
[FreeTextEntry1] : follow up on medication  [de-identified] : 72yo F presents for follow up on anxiety and insomnia. Pt reports she is doing well and has no acute complaints today. Pt taking .25mg PRN for anxiety/insomnia. Pt states her daily anxiety has gotten better since her discharge November 2021. Pt reports she occasionally wakes up in the middle of the night with anxiety and the xanax allows her to sleep through the night peacefully.

## 2022-04-22 NOTE — HISTORY OF PRESENT ILLNESS
[FreeTextEntry1] : follow up on medication  [de-identified] : 72yo F presents for follow up on anxiety and insomnia. Pt reports she is doing well and has no acute complaints today. Pt taking .25mg PRN for anxiety/insomnia. Pt states her daily anxiety has gotten better since her discharge November 2021. Pt reports she occasionally wakes up in the middle of the night with anxiety and the xanax allows her to sleep through the night peacefully.

## 2022-04-26 ENCOUNTER — RESULT REVIEW (OUTPATIENT)
Age: 72
End: 2022-04-26

## 2022-04-26 ENCOUNTER — APPOINTMENT (OUTPATIENT)
Age: 72
End: 2022-04-26

## 2022-04-26 LAB
BASOPHILS # BLD AUTO: 0 K/UL — SIGNIFICANT CHANGE UP (ref 0–0.2)
BASOPHILS NFR BLD AUTO: 0.5 % — SIGNIFICANT CHANGE UP (ref 0–2)
EOSINOPHIL # BLD AUTO: 0.2 K/UL — SIGNIFICANT CHANGE UP (ref 0–0.5)
EOSINOPHIL NFR BLD AUTO: 3.9 % — SIGNIFICANT CHANGE UP (ref 0–6)
HCT VFR BLD CALC: 36.1 % — SIGNIFICANT CHANGE UP (ref 34.5–45)
HGB BLD-MCNC: 11 G/DL — LOW (ref 11.5–15.5)
LYMPHOCYTES # BLD AUTO: 0.7 K/UL — LOW (ref 1–3.3)
LYMPHOCYTES # BLD AUTO: 15.2 % — SIGNIFICANT CHANGE UP (ref 13–44)
MCHC RBC-ENTMCNC: 28.1 PG — SIGNIFICANT CHANGE UP (ref 27–34)
MCHC RBC-ENTMCNC: 30.4 G/DL — LOW (ref 32–36)
MCV RBC AUTO: 92.5 FL — SIGNIFICANT CHANGE UP (ref 80–100)
MONOCYTES # BLD AUTO: 0.3 K/UL — SIGNIFICANT CHANGE UP (ref 0–0.9)
MONOCYTES NFR BLD AUTO: 5.9 % — SIGNIFICANT CHANGE UP (ref 2–14)
NEUTROPHILS # BLD AUTO: 3.6 K/UL — SIGNIFICANT CHANGE UP (ref 1.8–7.4)
NEUTROPHILS NFR BLD AUTO: 74.5 % — SIGNIFICANT CHANGE UP (ref 43–77)
PLATELET # BLD AUTO: 445 K/UL — HIGH (ref 150–400)
RBC # BLD: 3.91 M/UL — SIGNIFICANT CHANGE UP (ref 3.8–5.2)
RBC # FLD: 17.5 % — HIGH (ref 10.3–14.5)
WBC # BLD: 4.9 K/UL — SIGNIFICANT CHANGE UP (ref 3.8–10.5)
WBC # FLD AUTO: 4.9 K/UL — SIGNIFICANT CHANGE UP (ref 3.8–10.5)

## 2022-05-04 ENCOUNTER — OUTPATIENT (OUTPATIENT)
Dept: OUTPATIENT SERVICES | Facility: HOSPITAL | Age: 72
LOS: 1 days | Discharge: HOME | End: 2022-05-04

## 2022-05-04 DIAGNOSIS — Z98.890 OTHER SPECIFIED POSTPROCEDURAL STATES: Chronic | ICD-10-CM

## 2022-05-04 DIAGNOSIS — Z90.49 ACQUIRED ABSENCE OF OTHER SPECIFIED PARTS OF DIGESTIVE TRACT: Chronic | ICD-10-CM

## 2022-05-04 DIAGNOSIS — Z90.710 ACQUIRED ABSENCE OF BOTH CERVIX AND UTERUS: Chronic | ICD-10-CM

## 2022-05-04 DIAGNOSIS — D64.9 ANEMIA, UNSPECIFIED: ICD-10-CM

## 2022-05-10 ENCOUNTER — RESULT REVIEW (OUTPATIENT)
Age: 72
End: 2022-05-10

## 2022-05-10 ENCOUNTER — APPOINTMENT (OUTPATIENT)
Age: 72
End: 2022-05-10

## 2022-05-10 LAB
BASOPHILS # BLD AUTO: 0 K/UL — SIGNIFICANT CHANGE UP (ref 0–0.2)
BASOPHILS NFR BLD AUTO: 0.7 % — SIGNIFICANT CHANGE UP (ref 0–2)
EOSINOPHIL # BLD AUTO: 0.1 K/UL — SIGNIFICANT CHANGE UP (ref 0–0.5)
EOSINOPHIL NFR BLD AUTO: 2.7 % — SIGNIFICANT CHANGE UP (ref 0–6)
HCT VFR BLD CALC: 40 % — SIGNIFICANT CHANGE UP (ref 34.5–45)
HGB BLD-MCNC: 12.2 G/DL — SIGNIFICANT CHANGE UP (ref 11.5–15.5)
LYMPHOCYTES # BLD AUTO: 1 K/UL — SIGNIFICANT CHANGE UP (ref 1–3.3)
LYMPHOCYTES # BLD AUTO: 21.9 % — SIGNIFICANT CHANGE UP (ref 13–44)
MCHC RBC-ENTMCNC: 28.2 PG — SIGNIFICANT CHANGE UP (ref 27–34)
MCHC RBC-ENTMCNC: 30.6 G/DL — LOW (ref 32–36)
MCV RBC AUTO: 92.3 FL — SIGNIFICANT CHANGE UP (ref 80–100)
MONOCYTES # BLD AUTO: 0.5 K/UL — SIGNIFICANT CHANGE UP (ref 0–0.9)
MONOCYTES NFR BLD AUTO: 10.9 % — SIGNIFICANT CHANGE UP (ref 2–14)
NEUTROPHILS # BLD AUTO: 2.8 K/UL — SIGNIFICANT CHANGE UP (ref 1.8–7.4)
NEUTROPHILS NFR BLD AUTO: 63.8 % — SIGNIFICANT CHANGE UP (ref 43–77)
PLATELET # BLD AUTO: 340 K/UL — SIGNIFICANT CHANGE UP (ref 150–400)
RBC # BLD: 4.33 M/UL — SIGNIFICANT CHANGE UP (ref 3.8–5.2)
RBC # FLD: 17.2 % — HIGH (ref 10.3–14.5)
WBC # BLD: 4.4 K/UL — SIGNIFICANT CHANGE UP (ref 3.8–10.5)
WBC # FLD AUTO: 4.4 K/UL — SIGNIFICANT CHANGE UP (ref 3.8–10.5)

## 2022-05-31 ENCOUNTER — APPOINTMENT (OUTPATIENT)
Dept: HEMATOLOGY ONCOLOGY | Facility: CLINIC | Age: 72
End: 2022-05-31

## 2022-05-31 ENCOUNTER — OUTPATIENT (OUTPATIENT)
Dept: OUTPATIENT SERVICES | Facility: HOSPITAL | Age: 72
LOS: 1 days | Discharge: HOME | End: 2022-05-31

## 2022-05-31 DIAGNOSIS — Z90.710 ACQUIRED ABSENCE OF BOTH CERVIX AND UTERUS: Chronic | ICD-10-CM

## 2022-05-31 DIAGNOSIS — Z90.49 ACQUIRED ABSENCE OF OTHER SPECIFIED PARTS OF DIGESTIVE TRACT: Chronic | ICD-10-CM

## 2022-05-31 DIAGNOSIS — D64.9 ANEMIA, UNSPECIFIED: ICD-10-CM

## 2022-05-31 DIAGNOSIS — Z98.890 OTHER SPECIFIED POSTPROCEDURAL STATES: Chronic | ICD-10-CM

## 2022-06-06 ENCOUNTER — RESULT REVIEW (OUTPATIENT)
Age: 72
End: 2022-06-06

## 2022-06-06 ENCOUNTER — APPOINTMENT (OUTPATIENT)
Age: 72
End: 2022-06-06

## 2022-06-06 LAB
BASOPHILS # BLD AUTO: 0 K/UL — SIGNIFICANT CHANGE UP (ref 0–0.2)
BASOPHILS NFR BLD AUTO: 0.6 % — SIGNIFICANT CHANGE UP (ref 0–2)
EOSINOPHIL # BLD AUTO: 0.2 K/UL — SIGNIFICANT CHANGE UP (ref 0–0.5)
EOSINOPHIL NFR BLD AUTO: 3 % — SIGNIFICANT CHANGE UP (ref 0–6)
HCT VFR BLD CALC: 41.2 % — SIGNIFICANT CHANGE UP (ref 34.5–45)
HGB BLD-MCNC: 12.6 G/DL — SIGNIFICANT CHANGE UP (ref 11.5–15.5)
LYMPHOCYTES # BLD AUTO: 0.7 K/UL — LOW (ref 1–3.3)
LYMPHOCYTES # BLD AUTO: 11.3 % — LOW (ref 13–44)
MCHC RBC-ENTMCNC: 27.6 PG — SIGNIFICANT CHANGE UP (ref 27–34)
MCHC RBC-ENTMCNC: 30.6 G/DL — LOW (ref 32–36)
MCV RBC AUTO: 90.2 FL — SIGNIFICANT CHANGE UP (ref 80–100)
MONOCYTES # BLD AUTO: 0.7 K/UL — SIGNIFICANT CHANGE UP (ref 0–0.9)
MONOCYTES NFR BLD AUTO: 11.6 % — SIGNIFICANT CHANGE UP (ref 2–14)
NEUTROPHILS # BLD AUTO: 4.4 K/UL — SIGNIFICANT CHANGE UP (ref 1.8–7.4)
NEUTROPHILS NFR BLD AUTO: 73.5 % — SIGNIFICANT CHANGE UP (ref 43–77)
PLATELET # BLD AUTO: 276 K/UL — SIGNIFICANT CHANGE UP (ref 150–400)
RBC # BLD: 4.57 M/UL — SIGNIFICANT CHANGE UP (ref 3.8–5.2)
RBC # FLD: 17.5 % — HIGH (ref 10.3–14.5)
WBC # BLD: 6 K/UL — SIGNIFICANT CHANGE UP (ref 3.8–10.5)
WBC # FLD AUTO: 6 K/UL — SIGNIFICANT CHANGE UP (ref 3.8–10.5)

## 2022-06-20 ENCOUNTER — APPOINTMENT (OUTPATIENT)
Age: 72
End: 2022-06-20

## 2022-06-21 ENCOUNTER — APPOINTMENT (OUTPATIENT)
Dept: RHEUMATOLOGY | Facility: CLINIC | Age: 72
End: 2022-06-21
Payer: MEDICARE

## 2022-06-21 VITALS
RESPIRATION RATE: 17 BRPM | OXYGEN SATURATION: 98 % | BODY MASS INDEX: 23.79 KG/M2 | HEART RATE: 90 BPM | SYSTOLIC BLOOD PRESSURE: 110 MMHG | HEIGHT: 68 IN | WEIGHT: 157 LBS | DIASTOLIC BLOOD PRESSURE: 72 MMHG

## 2022-06-21 PROCEDURE — 99214 OFFICE O/P EST MOD 30 MIN: CPT

## 2022-06-21 RX ORDER — PANTOPRAZOLE 40 MG/1
40 TABLET, DELAYED RELEASE ORAL DAILY
Qty: 90 | Refills: 1 | Status: DISCONTINUED | COMMUNITY
Start: 2020-11-16 | End: 2022-06-21

## 2022-06-21 RX ORDER — VALACYCLOVIR 1 G/1
1 TABLET, FILM COATED ORAL
Qty: 4 | Refills: 5 | Status: DISCONTINUED | COMMUNITY
Start: 2021-04-21 | End: 2022-06-21

## 2022-06-21 RX ORDER — MULTIVITAMIN
CAPSULE ORAL
Refills: 0 | Status: ACTIVE | COMMUNITY

## 2022-06-21 RX ORDER — POTASSIUM CHLORIDE 1500 MG/1
20 TABLET, FILM COATED, EXTENDED RELEASE ORAL
Qty: 90 | Refills: 3 | Status: DISCONTINUED | COMMUNITY
Start: 2022-02-23 | End: 2022-06-21

## 2022-06-21 RX ORDER — BUDESONIDE AND FORMOTEROL FUMARATE DIHYDRATE 160; 4.5 UG/1; UG/1
160-4.5 AEROSOL RESPIRATORY (INHALATION)
Refills: 0 | Status: DISCONTINUED | COMMUNITY
End: 2022-06-21

## 2022-06-21 RX ORDER — SIMETHICONE 80 MG/1
80 TABLET, CHEWABLE ORAL
Refills: 0 | Status: DISCONTINUED | COMMUNITY
End: 2022-06-21

## 2022-06-21 RX ORDER — POLYETHYLENE GLYOCOL 3350, SODIUM CHLORIDE, SODIUM BICARBONATE AND POTASSIUM CHLORIDE 420; 11.2; 5.72; 1.48 G/4L; G/4L; G/4L; G/4L
420 POWDER, FOR SOLUTION NASOGASTRIC; ORAL
Qty: 1 | Refills: 0 | Status: DISCONTINUED | COMMUNITY
Start: 2020-08-17 | End: 2022-06-21

## 2022-06-21 RX ORDER — ONDANSETRON 4 MG/1
4 TABLET ORAL
Qty: 15 | Refills: 0 | Status: ACTIVE | COMMUNITY
Start: 2021-08-20

## 2022-06-21 RX ORDER — MIDODRINE HYDROCHLORIDE 5 MG/1
5 TABLET ORAL
Qty: 45 | Refills: 0 | Status: ACTIVE | COMMUNITY
Start: 2022-03-08

## 2022-06-21 NOTE — REVIEW OF SYSTEMS
[Arthralgias] : arthralgias [Joint Pain] : joint pain [Negative] : Heme/Lymph [Fever] : no fever [Chills] : no chills [Feeling Poorly] : not feeling poorly [Feeling Tired] : not feeling tired [Dry Eyes] : no dryness of the eyes [Chest Pain] : no chest pain [Palpitations] : no palpitations [Shortness Of Breath] : no shortness of breath [Cough] : no cough [SOB on Exertion] : no shortness of breath during exertion [Abdominal Pain] : no abdominal pain [Joint Swelling] : no joint swelling [Joint Stiffness] : no joint stiffness [Skin Lesions] : no skin lesions [Difficulty Walking] : no difficulty walking [Anxiety] : no anxiety [Depression] : no depression

## 2022-06-21 NOTE — ASSESSMENT
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 \par \par rheumatoid arthritis-\par \par She was on combination of humira and MTX but was taken off the humira after her stroke. Concern was raised at that time that her neurological symptoms may have been exacerbated by humira. She did have a couple of TIAs several years ago but was APL negative.  She has been well controlled on methotrexate 15 mg weekly monotherapy, though currently w/ pain as she ran out about 2 weeks ago. \par - Cont MTX 15mg weekly, folic acid 1mg daily.\par - Flu vaccine (11/21), Prevnar UTD.  Pt received the COVID19 vaccine.\par - Check labs.\par \par COPD-\par -She no longer smokes for many years.\par - under care of pulm\par \par Methotrexate use-\par -she is aware to hold medication while on antibiotics.\par \par Osteopenia- Pharmacologic therapy not indicated based on FRAX\par   - calcium / vit D\par   - weight bearing exercise.\par \par She is aware to call if her sx worsen. followup 3 months.

## 2022-06-21 NOTE — HISTORY OF PRESENT ILLNESS
[FreeTextEntry1] : Pt reports that about 1 year ago, she suffered an MI and subsequently underwent open heart surgery, during which "2 holes in the heart were repaired". She has been doing rehab ever since.  She reports that she was held off MTX while she was in the hospital.  She then experienced a flare, so she was treated with steroids initially, then she was re-started on MTX on which the joint pains again improved.  However, she reports that she ran out of MTX about 2 weeks ago, and is therefore beginning to experience joint pains again.

## 2022-06-21 NOTE — PHYSICAL EXAM
[General Appearance - Alert] : alert [General Appearance - In No Acute Distress] : in no acute distress [General Appearance - Well Nourished] : well nourished [General Appearance - Well Developed] : well developed [Sclera] : the sclera and conjunctiva were normal [Outer Ear] : the ears and nose were normal in appearance [Oropharynx] : the oropharynx was normal [Neck Appearance] : the appearance of the neck was normal [Neck Cervical Mass (___cm)] : no neck mass was observed [Jugular Venous Distention Increased] : there was no jugular-venous distention [Thyroid Diffuse Enlargement] : the thyroid was not enlarged [Thyroid Nodule] : there were no palpable thyroid nodules [Respiration, Rhythm And Depth] : normal respiratory rhythm and effort [Auscultation Breath Sounds / Voice Sounds] : lungs were clear to auscultation bilaterally [Heart Rate And Rhythm] : heart rate was normal and rhythm regular [Heart Sounds] : normal S1 and S2 [Heart Sounds Gallop] : no gallops [Murmurs] : no murmurs [Heart Sounds Pericardial Friction Rub] : no pericardial rub [Edema] : there was no peripheral edema [Bowel Sounds] : normal bowel sounds [Abdomen Soft] : soft [Abdomen Tenderness] : non-tender [Abdomen Mass (___ Cm)] : no abdominal mass palpated [Cervical Lymph Nodes Enlarged Posterior Bilaterally] : posterior cervical [Cervical Lymph Nodes Enlarged Anterior Bilaterally] : anterior cervical [Supraclavicular Lymph Nodes Enlarged Bilaterally] : supraclavicular [No Spinal Tenderness] : no spinal tenderness [Abnormal Walk] : normal gait [Musculoskeletal - Swelling] : no joint swelling seen [Motor Tone] : muscle strength and tone were normal [Skin Color & Pigmentation] : normal skin color and pigmentation [Skin Turgor] : normal skin turgor [] : no rash [Deep Tendon Reflexes (DTR)] : deep tendon reflexes were 2+ and symmetric [Motor Exam] : the motor exam was normal [No Focal Deficits] : no focal deficits [Oriented To Time, Place, And Person] : oriented to person, place, and time [Impaired Insight] : insight and judgment were intact [Affect] : the affect was normal [FreeTextEntry1] : FROM neck, shoulders, elbows, wrists, hands, hips, knees, ankles and feet, including the small joints of the hands and feet without any evidence of inflammatory arthritis , No inflammatory arthritis or synovitis noted. No tender points noted. tightness hamstrings b/l

## 2022-06-22 NOTE — ED ADULT NURSE NOTE - TEMPLATE LIST FOR HEAD TO TOE ASSESSMENT
A balloon catheter was inserted. Supply used: (Catheter Mini Trek 2 2mm 8mm 145cm Otw 2 Lum Ultra).  Abdominal Pain, N/V/D

## 2022-07-05 ENCOUNTER — APPOINTMENT (OUTPATIENT)
Age: 72
End: 2022-07-05

## 2022-07-12 ENCOUNTER — RESULT REVIEW (OUTPATIENT)
Age: 72
End: 2022-07-12

## 2022-07-12 ENCOUNTER — APPOINTMENT (OUTPATIENT)
Dept: HEMATOLOGY ONCOLOGY | Facility: CLINIC | Age: 72
End: 2022-07-12

## 2022-07-12 ENCOUNTER — APPOINTMENT (OUTPATIENT)
Age: 72
End: 2022-07-12

## 2022-07-12 VITALS
OXYGEN SATURATION: 94 % | SYSTOLIC BLOOD PRESSURE: 122 MMHG | BODY MASS INDEX: 23.79 KG/M2 | HEART RATE: 84 BPM | DIASTOLIC BLOOD PRESSURE: 78 MMHG | WEIGHT: 157 LBS | HEIGHT: 68 IN

## 2022-07-12 LAB
ANISOCYTOSIS BLD QL: SLIGHT — SIGNIFICANT CHANGE UP
BASOPHILS # BLD AUTO: 0 K/UL — SIGNIFICANT CHANGE UP (ref 0–0.2)
BASOPHILS NFR BLD AUTO: 1 % — SIGNIFICANT CHANGE UP (ref 0–2)
DACRYOCYTES BLD QL SMEAR: SLIGHT — SIGNIFICANT CHANGE UP
ELLIPTOCYTES BLD QL SMEAR: SLIGHT — SIGNIFICANT CHANGE UP
EOSINOPHIL # BLD AUTO: 0.2 K/UL — SIGNIFICANT CHANGE UP (ref 0–0.5)
EOSINOPHIL NFR BLD AUTO: 6 % — SIGNIFICANT CHANGE UP (ref 0–6)
HCT VFR BLD CALC: 39.9 % — SIGNIFICANT CHANGE UP (ref 34.5–45)
HGB BLD-MCNC: 12.3 G/DL — SIGNIFICANT CHANGE UP (ref 11.5–15.5)
LG PLATELETS BLD QL AUTO: SLIGHT — SIGNIFICANT CHANGE UP
LYMPHOCYTES # BLD AUTO: 0.8 K/UL — LOW (ref 1–3.3)
LYMPHOCYTES # BLD AUTO: 15 % — SIGNIFICANT CHANGE UP (ref 13–44)
MACROCYTES BLD QL: SLIGHT — SIGNIFICANT CHANGE UP
MCHC RBC-ENTMCNC: 28.6 PG — SIGNIFICANT CHANGE UP (ref 27–34)
MCHC RBC-ENTMCNC: 30.9 G/DL — LOW (ref 32–36)
MCV RBC AUTO: 92.6 FL — SIGNIFICANT CHANGE UP (ref 80–100)
MICROCYTES BLD QL: SLIGHT — SIGNIFICANT CHANGE UP
MONOCYTES # BLD AUTO: 0.9 K/UL — SIGNIFICANT CHANGE UP (ref 0–0.9)
MONOCYTES NFR BLD AUTO: 16 % — HIGH (ref 2–14)
NEUTROPHILS # BLD AUTO: 3.3 K/UL — SIGNIFICANT CHANGE UP (ref 1.8–7.4)
NEUTROPHILS NFR BLD AUTO: 62 % — SIGNIFICANT CHANGE UP (ref 43–77)
OVALOCYTES BLD QL SMEAR: SLIGHT — SIGNIFICANT CHANGE UP
PLAT MORPH BLD: NORMAL — SIGNIFICANT CHANGE UP
PLATELET # BLD AUTO: 311 K/UL — SIGNIFICANT CHANGE UP (ref 150–400)
POIKILOCYTOSIS BLD QL AUTO: SLIGHT — SIGNIFICANT CHANGE UP
RBC # BLD: 4.31 M/UL — SIGNIFICANT CHANGE UP (ref 3.8–5.2)
RBC # FLD: 18.2 % — HIGH (ref 10.3–14.5)
RBC BLD AUTO: SIGNIFICANT CHANGE UP
WBC # BLD: 5.3 K/UL — SIGNIFICANT CHANGE UP (ref 3.8–10.5)
WBC # FLD AUTO: 5.3 K/UL — SIGNIFICANT CHANGE UP (ref 3.8–10.5)

## 2022-07-12 PROCEDURE — 99212 OFFICE O/P EST SF 10 MIN: CPT

## 2022-07-12 RX ORDER — LIDOCAINE 40 MG/G
4 PATCH TOPICAL
Refills: 0 | Status: DISCONTINUED | COMMUNITY
Start: 2022-02-23 | End: 2022-07-12

## 2022-07-12 RX ORDER — DEXAMETHASONE 1 MG/1
1 TABLET ORAL
Qty: 17 | Refills: 0 | Status: DISCONTINUED | COMMUNITY
Start: 2022-02-03 | End: 2022-07-12

## 2022-07-13 NOTE — HISTORY OF PRESENT ILLNESS
[de-identified] : This 71 year-old black woman is referred for anemia. In August 2020 her hemoglobin was 8.1, hematocrit 27.3, MCV 91. White count was 4.7 and platelets 414,000\par Serum ferritin was 21 serum iron 42 and total iron-binding capacity 306.\par She has been seen by gastroenterology. A long hospitalization at [facility] this summer for small bowel obstruction secondary to adhesions from a prior colectomy and hysterectomy included laparoscopic lysis of adhesions.She has had multiple admissions and had required multiple transfusions in the past before starting Aranesp. [de-identified] : Her HGb has been over 11 gms since April 2022, and aranesp has been being held. On one ferrous sulfate daily.\par She is feeling much better, fatigue is better, breathing is better. No evidence of bleeding.

## 2022-07-13 NOTE — PHYSICAL EXAM
[Restricted in physically strenuous activity but ambulatory and able to carry out work of a light or sedentary nature] : Status 1- Restricted in physically strenuous activity but ambulatory and able to carry out work of a light or sedentary nature, e.g., light house work, office work [Normal] : affect appropriate [de-identified] : on O2, lungs clear to A/P [de-identified] : pacemaker in place

## 2022-07-13 NOTE — ASSESSMENT
[FreeTextEntry1] : This 71 year-old black woman is referred for anemia. In August 2020 her hemoglobin was 8.1, hematocrit 27.3, MCV 91. White count was 4.7 and platelets 414,000\par Serum ferritin was 21 serum iron 42 and total iron-binding capacity 306.\par She has been seen by gastroenterology. A long hospitalization at [facility] this summer for small bowel obstruction secondary to adhesions from a prior colectomy and hysterectomy included laparoscopic lysis of adhesions.She has had multiple admissions and had required multiple transfusions in the past before starting Aranesp.\par  Her HGb today is 12 gms, therefor will not get Aranesp. we will change the  Aranesp to monthly , and will continue to monitor H/H.\par OV with me in 3 months.

## 2022-07-19 ENCOUNTER — APPOINTMENT (OUTPATIENT)
Age: 72
End: 2022-07-19

## 2022-08-02 ENCOUNTER — APPOINTMENT (OUTPATIENT)
Age: 72
End: 2022-08-02

## 2022-08-02 ENCOUNTER — OUTPATIENT (OUTPATIENT)
Dept: OUTPATIENT SERVICES | Facility: HOSPITAL | Age: 72
LOS: 1 days | Discharge: HOME | End: 2022-08-02

## 2022-08-02 DIAGNOSIS — Z90.49 ACQUIRED ABSENCE OF OTHER SPECIFIED PARTS OF DIGESTIVE TRACT: Chronic | ICD-10-CM

## 2022-08-02 DIAGNOSIS — N18.30 CHRONIC KIDNEY DISEASE, STAGE 3 UNSPECIFIED: ICD-10-CM

## 2022-08-02 DIAGNOSIS — Z98.890 OTHER SPECIFIED POSTPROCEDURAL STATES: Chronic | ICD-10-CM

## 2022-08-02 DIAGNOSIS — Z90.710 ACQUIRED ABSENCE OF BOTH CERVIX AND UTERUS: Chronic | ICD-10-CM

## 2022-08-09 ENCOUNTER — RESULT REVIEW (OUTPATIENT)
Age: 72
End: 2022-08-09

## 2022-08-09 ENCOUNTER — APPOINTMENT (OUTPATIENT)
Age: 72
End: 2022-08-09

## 2022-08-09 LAB
BASOPHILS # BLD AUTO: 0 K/UL — SIGNIFICANT CHANGE UP (ref 0–0.2)
BASOPHILS NFR BLD AUTO: 1.1 % — SIGNIFICANT CHANGE UP (ref 0–2)
EOSINOPHIL # BLD AUTO: 0.2 K/UL — SIGNIFICANT CHANGE UP (ref 0–0.5)
EOSINOPHIL NFR BLD AUTO: 3.5 % — SIGNIFICANT CHANGE UP (ref 0–6)
HCT VFR BLD CALC: 37.2 % — SIGNIFICANT CHANGE UP (ref 34.5–45)
HGB BLD-MCNC: 11.7 G/DL — SIGNIFICANT CHANGE UP (ref 11.5–15.5)
LYMPHOCYTES # BLD AUTO: 1 K/UL — SIGNIFICANT CHANGE UP (ref 1–3.3)
LYMPHOCYTES # BLD AUTO: 21.2 % — SIGNIFICANT CHANGE UP (ref 13–44)
MCHC RBC-ENTMCNC: 29.4 PG — SIGNIFICANT CHANGE UP (ref 27–34)
MCHC RBC-ENTMCNC: 31.4 G/DL — LOW (ref 32–36)
MCV RBC AUTO: 93.4 FL — SIGNIFICANT CHANGE UP (ref 80–100)
MONOCYTES # BLD AUTO: 0.8 K/UL — SIGNIFICANT CHANGE UP (ref 0–0.9)
MONOCYTES NFR BLD AUTO: 16.3 % — HIGH (ref 2–14)
NEUTROPHILS # BLD AUTO: 2.7 K/UL — SIGNIFICANT CHANGE UP (ref 1.8–7.4)
NEUTROPHILS NFR BLD AUTO: 58 % — SIGNIFICANT CHANGE UP (ref 43–77)
PLATELET # BLD AUTO: 306 K/UL — SIGNIFICANT CHANGE UP (ref 150–400)
RBC # BLD: 3.98 M/UL — SIGNIFICANT CHANGE UP (ref 3.8–5.2)
RBC # FLD: 18.1 % — HIGH (ref 10.3–14.5)
WBC # BLD: 4.7 K/UL — SIGNIFICANT CHANGE UP (ref 3.8–10.5)
WBC # FLD AUTO: 4.7 K/UL — SIGNIFICANT CHANGE UP (ref 3.8–10.5)

## 2022-08-24 ENCOUNTER — RX RENEWAL (OUTPATIENT)
Age: 72
End: 2022-08-24

## 2022-08-24 ENCOUNTER — APPOINTMENT (OUTPATIENT)
Dept: FAMILY MEDICINE | Facility: CLINIC | Age: 72
End: 2022-08-24

## 2022-08-24 VITALS
HEIGHT: 68 IN | DIASTOLIC BLOOD PRESSURE: 78 MMHG | HEART RATE: 82 BPM | SYSTOLIC BLOOD PRESSURE: 120 MMHG | WEIGHT: 157 LBS | BODY MASS INDEX: 23.79 KG/M2 | OXYGEN SATURATION: 99 %

## 2022-08-24 VITALS — DIASTOLIC BLOOD PRESSURE: 65 MMHG | SYSTOLIC BLOOD PRESSURE: 105 MMHG

## 2022-08-24 DIAGNOSIS — J30.2 OTHER SEASONAL ALLERGIC RHINITIS: ICD-10-CM

## 2022-08-24 DIAGNOSIS — G47.00 INSOMNIA, UNSPECIFIED: ICD-10-CM

## 2022-08-24 DIAGNOSIS — B34.9 VIRAL INFECTION, UNSPECIFIED: ICD-10-CM

## 2022-08-24 PROCEDURE — 99214 OFFICE O/P EST MOD 30 MIN: CPT

## 2022-08-24 RX ORDER — AMIODARONE HYDROCHLORIDE 200 MG/1
200 TABLET ORAL
Qty: 90 | Refills: 0 | Status: COMPLETED | COMMUNITY
Start: 2022-05-16 | End: 2022-08-24

## 2022-08-24 RX ORDER — EMPAGLIFLOZIN 10 MG/1
10 TABLET, FILM COATED ORAL
Qty: 30 | Refills: 0 | Status: COMPLETED | COMMUNITY
Start: 2022-04-08 | End: 2022-08-24

## 2022-08-24 NOTE — PLAN
[FreeTextEntry1] : aggressive hydration!!!!!!!!\par cont OTC Zyrtec 10mg hs \par \par Start Montelukast  10mg hs \par \par \par \par Trial of increasing  Alprazolam 0.5mg tid (see lab orders)  \par \par Start Prednisone 20mg qd x 5d \par \par STABLE , cont Entresto 24-26 and Torsemide 20mg qd \par \par f/u for [facility]CR Annual Wellness Exam

## 2022-08-24 NOTE — HISTORY OF PRESENT ILLNESS
[FreeTextEntry8] : Pt. c/o nasal  congestion/  going on for a week \par \par \par \par 72 yo female c 7-8 day hx of sinus/nasal congestion c yellow d/c.    +ve intermittent sore throat    \par no f/c/s  \par +ve good appetite  \par no N/V/D no abd pain     \par no ear pain B/L \par reports increased home O2 demand.  Baseline 2-2.5L of O2 via NC,  pt states has been on 3L via NC over the last 5 days \par \par +ve cold contact \par \par self medicated c Amoxil 500mg tid x 3-4 days reports no change in symptoms \par mild improvement  c OTC Zyrtec 10mg x 2d \par +ve cat dander contact 3 weeks ago

## 2022-08-24 NOTE — PHYSICAL EXAM
[No Acute Distress] : no acute distress [Well Nourished] : well nourished [Well Developed] : well developed [Well-Appearing] : well-appearing [EOMI] : extraocular movements intact [Normal Outer Ear/Nose] : the outer ears and nose were normal in appearance [No JVD] : no jugular venous distention [Normal Rate] : normal rate  [Regular Rhythm] : with a regular rhythm [Normal S1, S2] : normal S1 and S2 [No Carotid Bruits] : no carotid bruits [No Edema] : there was no peripheral edema [Non-distended] : non-distended [No CVA Tenderness] : no CVA  tenderness [Grossly Normal Strength/Tone] : grossly normal strength/tone [No Rash] : no rash [Coordination Grossly Intact] : coordination grossly intact [Speech Grossly Normal] : speech grossly normal [Normal Affect] : the affect was normal [Normal Mood] : the mood was normal [Normal Insight/Judgement] : insight and judgment were intact [de-identified] : +ve increased scleral vascularity B/L  [de-identified] : +ve PND, post pharynx  mild erythema post pharynx  [de-identified] : coarse BS B/L

## 2022-08-25 RX ORDER — MONTELUKAST 10 MG/1
10 TABLET, FILM COATED ORAL
Qty: 90 | Refills: 0 | Status: ACTIVE | COMMUNITY
Start: 2022-08-24 | End: 1900-01-01

## 2022-09-02 ENCOUNTER — APPOINTMENT (OUTPATIENT)
Age: 72
End: 2022-09-02

## 2022-09-02 ENCOUNTER — RESULT REVIEW (OUTPATIENT)
Age: 72
End: 2022-09-02

## 2022-09-02 ENCOUNTER — NON-APPOINTMENT (OUTPATIENT)
Age: 72
End: 2022-09-02

## 2022-09-02 LAB
ANISOCYTOSIS BLD QL: SLIGHT — SIGNIFICANT CHANGE UP
BASO STIPL BLD QL SMEAR: PRESENT — SIGNIFICANT CHANGE UP
BASOPHILS # BLD AUTO: 0 K/UL — SIGNIFICANT CHANGE UP (ref 0–0.2)
BASOPHILS NFR BLD AUTO: 1 % — SIGNIFICANT CHANGE UP (ref 0–2)
EOSINOPHIL # BLD AUTO: 0.1 K/UL — SIGNIFICANT CHANGE UP (ref 0–0.5)
EOSINOPHIL NFR BLD AUTO: 2 % — SIGNIFICANT CHANGE UP (ref 0–6)
HCT VFR BLD CALC: 38.1 % — SIGNIFICANT CHANGE UP (ref 34.5–45)
HGB BLD-MCNC: 11.8 G/DL — SIGNIFICANT CHANGE UP (ref 11.5–15.5)
LYMPHOCYTES # BLD AUTO: 1 K/UL — SIGNIFICANT CHANGE UP (ref 1–3.3)
LYMPHOCYTES # BLD AUTO: 17 % — SIGNIFICANT CHANGE UP (ref 13–44)
MACROCYTES BLD QL: SLIGHT — SIGNIFICANT CHANGE UP
MCHC RBC-ENTMCNC: 29.9 PG — SIGNIFICANT CHANGE UP (ref 27–34)
MCHC RBC-ENTMCNC: 31.1 G/DL — LOW (ref 32–36)
MCV RBC AUTO: 96.2 FL — SIGNIFICANT CHANGE UP (ref 80–100)
MICROCYTES BLD QL: SLIGHT — SIGNIFICANT CHANGE UP
MONOCYTES # BLD AUTO: 0.7 K/UL — SIGNIFICANT CHANGE UP (ref 0–0.9)
MONOCYTES NFR BLD AUTO: 6 % — SIGNIFICANT CHANGE UP (ref 2–14)
NEUTROPHILS # BLD AUTO: 6.5 K/UL — SIGNIFICANT CHANGE UP (ref 1.8–7.4)
NEUTROPHILS NFR BLD AUTO: 74 % — SIGNIFICANT CHANGE UP (ref 43–77)
NEUTS HYPERSEG # BLD: PRESENT — SIGNIFICANT CHANGE UP
PLAT MORPH BLD: NORMAL — SIGNIFICANT CHANGE UP
PLATELET # BLD AUTO: 338 K/UL — SIGNIFICANT CHANGE UP (ref 150–400)
POIKILOCYTOSIS BLD QL AUTO: SLIGHT — SIGNIFICANT CHANGE UP
POLYCHROMASIA BLD QL SMEAR: SLIGHT — SIGNIFICANT CHANGE UP
RBC # BLD: 3.96 M/UL — SIGNIFICANT CHANGE UP (ref 3.8–5.2)
RBC # FLD: 17.5 % — HIGH (ref 10.3–14.5)
RBC BLD AUTO: SIGNIFICANT CHANGE UP
WBC # BLD: 8.3 K/UL — SIGNIFICANT CHANGE UP (ref 3.8–10.5)
WBC # FLD AUTO: 8.3 K/UL — SIGNIFICANT CHANGE UP (ref 3.8–10.5)

## 2022-09-07 ENCOUNTER — NON-APPOINTMENT (OUTPATIENT)
Age: 72
End: 2022-09-07

## 2022-09-07 LAB
ALBUMIN SERPL ELPH-MCNC: 4.1 G/DL
ALP BLD-CCNC: 110 U/L
ALT SERPL-CCNC: 22 U/L
ANION GAP SERPL CALC-SCNC: 13 MMOL/L
AST SERPL-CCNC: 22 U/L
BILIRUB SERPL-MCNC: 0.3 MG/DL
BUN SERPL-MCNC: 19 MG/DL
CALCIUM SERPL-MCNC: 9.1 MG/DL
CHLORIDE SERPL-SCNC: 101 MMOL/L
CO2 SERPL-SCNC: 26 MMOL/L
CREAT SERPL-MCNC: 1.42 MG/DL
EGFR: 39 ML/MIN/1.73M2
FERRITIN SERPL-MCNC: 150 NG/ML
FOLATE SERPL-MCNC: >20 NG/ML
GLUCOSE SERPL-MCNC: 83 MG/DL
IRON SATN MFR SERPL: 10 %
IRON SERPL-MCNC: 29 UG/DL
POTASSIUM SERPL-SCNC: 4.1 MMOL/L
PROT SERPL-MCNC: 7.1 G/DL
SODIUM SERPL-SCNC: 140 MMOL/L
TIBC SERPL-MCNC: 291 UG/DL
UIBC SERPL-MCNC: 262 UG/DL
VIT B12 SERPL-MCNC: 495 PG/ML

## 2022-09-29 ENCOUNTER — OUTPATIENT (OUTPATIENT)
Dept: OUTPATIENT SERVICES | Facility: HOSPITAL | Age: 72
LOS: 1 days | Discharge: HOME | End: 2022-09-29

## 2022-09-29 DIAGNOSIS — Z90.710 ACQUIRED ABSENCE OF BOTH CERVIX AND UTERUS: Chronic | ICD-10-CM

## 2022-09-29 DIAGNOSIS — Z90.49 ACQUIRED ABSENCE OF OTHER SPECIFIED PARTS OF DIGESTIVE TRACT: Chronic | ICD-10-CM

## 2022-09-29 DIAGNOSIS — Z98.890 OTHER SPECIFIED POSTPROCEDURAL STATES: Chronic | ICD-10-CM

## 2022-09-29 DIAGNOSIS — D64.9 ANEMIA, UNSPECIFIED: ICD-10-CM

## 2022-10-03 ENCOUNTER — APPOINTMENT (OUTPATIENT)
Age: 72
End: 2022-10-03

## 2022-10-03 ENCOUNTER — RESULT REVIEW (OUTPATIENT)
Age: 72
End: 2022-10-03

## 2022-10-03 LAB
ANISOCYTOSIS BLD QL: SLIGHT — SIGNIFICANT CHANGE UP
BASOPHILS # BLD AUTO: 0 K/UL — SIGNIFICANT CHANGE UP (ref 0–0.2)
BASOPHILS NFR BLD AUTO: 1 % — SIGNIFICANT CHANGE UP (ref 0–2)
BURR CELLS BLD QL SMEAR: PRESENT — SIGNIFICANT CHANGE UP
ELLIPTOCYTES BLD QL SMEAR: SLIGHT — SIGNIFICANT CHANGE UP
EOSINOPHIL # BLD AUTO: 0.1 K/UL — SIGNIFICANT CHANGE UP (ref 0–0.5)
EOSINOPHIL NFR BLD AUTO: 3 % — SIGNIFICANT CHANGE UP (ref 0–6)
GIANT PLATELETS BLD QL SMEAR: PRESENT — SIGNIFICANT CHANGE UP
HCT VFR BLD CALC: 39.8 % — SIGNIFICANT CHANGE UP (ref 34.5–45)
HGB BLD-MCNC: 12.5 G/DL — SIGNIFICANT CHANGE UP (ref 11.5–15.5)
LG PLATELETS BLD QL AUTO: SLIGHT — SIGNIFICANT CHANGE UP
LYMPHOCYTES # BLD AUTO: 0.8 K/UL — LOW (ref 1–3.3)
LYMPHOCYTES # BLD AUTO: 19 % — SIGNIFICANT CHANGE UP (ref 13–44)
MACROCYTES BLD QL: SLIGHT — SIGNIFICANT CHANGE UP
MCHC RBC-ENTMCNC: 31.1 PG — SIGNIFICANT CHANGE UP (ref 27–34)
MCHC RBC-ENTMCNC: 31.4 G/DL — LOW (ref 32–36)
MCV RBC AUTO: 99.3 FL — SIGNIFICANT CHANGE UP (ref 80–100)
MICROCYTES BLD QL: SLIGHT — SIGNIFICANT CHANGE UP
MONOCYTES # BLD AUTO: 0.7 K/UL — SIGNIFICANT CHANGE UP (ref 0–0.9)
MONOCYTES NFR BLD AUTO: 16 % — HIGH (ref 2–14)
NEUTROPHILS # BLD AUTO: 2.5 K/UL — SIGNIFICANT CHANGE UP (ref 1.8–7.4)
NEUTROPHILS NFR BLD AUTO: 58 % — SIGNIFICANT CHANGE UP (ref 43–77)
OVALOCYTES BLD QL SMEAR: SLIGHT — SIGNIFICANT CHANGE UP
PLAT MORPH BLD: NORMAL — SIGNIFICANT CHANGE UP
PLATELET # BLD AUTO: 317 K/UL — SIGNIFICANT CHANGE UP (ref 150–400)
POIKILOCYTOSIS BLD QL AUTO: SLIGHT — SIGNIFICANT CHANGE UP
RBC # BLD: 4.01 M/UL — SIGNIFICANT CHANGE UP (ref 3.8–5.2)
RBC # FLD: 14.4 % — SIGNIFICANT CHANGE UP (ref 10.3–14.5)
RBC BLD AUTO: SIGNIFICANT CHANGE UP
SCHISTOCYTES BLD QL AUTO: SLIGHT — SIGNIFICANT CHANGE UP
VARIANT LYMPHS # BLD: 3 % — SIGNIFICANT CHANGE UP (ref 0–6)
WBC # BLD: 4.2 K/UL — SIGNIFICANT CHANGE UP (ref 3.8–10.5)
WBC # FLD AUTO: 4.2 K/UL — SIGNIFICANT CHANGE UP (ref 3.8–10.5)

## 2022-10-18 ENCOUNTER — TRANSCRIPTION ENCOUNTER (OUTPATIENT)
Age: 72
End: 2022-10-18

## 2022-10-25 ENCOUNTER — APPOINTMENT (OUTPATIENT)
Dept: FAMILY MEDICINE | Facility: CLINIC | Age: 72
End: 2022-10-25

## 2022-10-25 ENCOUNTER — LABORATORY RESULT (OUTPATIENT)
Age: 72
End: 2022-10-25

## 2022-10-25 VITALS
OXYGEN SATURATION: 98 % | SYSTOLIC BLOOD PRESSURE: 102 MMHG | WEIGHT: 164 LBS | DIASTOLIC BLOOD PRESSURE: 72 MMHG | HEIGHT: 68 IN | HEART RATE: 78 BPM | TEMPERATURE: 98.2 F | BODY MASS INDEX: 24.86 KG/M2

## 2022-10-25 DIAGNOSIS — Z23 ENCOUNTER FOR IMMUNIZATION: ICD-10-CM

## 2022-10-25 DIAGNOSIS — E04.2 NONTOXIC MULTINODULAR GOITER: ICD-10-CM

## 2022-10-25 DIAGNOSIS — Z12.31 ENCOUNTER FOR SCREENING MAMMOGRAM FOR MALIGNANT NEOPLASM OF BREAST: ICD-10-CM

## 2022-10-25 PROCEDURE — G0444 DEPRESSION SCREEN ANNUAL: CPT

## 2022-10-25 PROCEDURE — 0134A: CPT

## 2022-10-25 PROCEDURE — G0439: CPT

## 2022-10-25 RX ORDER — MIDODRINE HYDROCHLORIDE 2.5 MG/1
2.5 TABLET ORAL
Qty: 90 | Refills: 0 | Status: ACTIVE | COMMUNITY
Start: 2022-07-15

## 2022-10-25 RX ORDER — MIDODRINE HYDROCHLORIDE 10 MG/1
10 TABLET ORAL
Qty: 270 | Refills: 0 | Status: ACTIVE | COMMUNITY
Start: 2022-09-12

## 2022-10-25 RX ORDER — AMOXICILLIN AND CLAVULANATE POTASSIUM 875; 125 MG/1; MG/1
875-125 TABLET, COATED ORAL
Qty: 14 | Refills: 0 | Status: COMPLETED | COMMUNITY
Start: 2022-09-08

## 2022-10-26 NOTE — PHYSICAL EXAM
[No Acute Distress] : no acute distress [Well Nourished] : well nourished [Well Developed] : well developed [Well-Appearing] : well-appearing [Normal Sclera/Conjunctiva] : normal sclera/conjunctiva [PERRL] : pupils equal round and reactive to light [EOMI] : extraocular movements intact [Normal Outer Ear/Nose] : the outer ears and nose were normal in appearance [Normal Oropharynx] : the oropharynx was normal [No JVD] : no jugular venous distention [No Lymphadenopathy] : no lymphadenopathy [Supple] : supple [Thyroid Normal, No Nodules] : the thyroid was normal and there were no nodules present [No Respiratory Distress] : no respiratory distress  [No Accessory Muscle Use] : no accessory muscle use [Clear to Auscultation] : lungs were clear to auscultation bilaterally [Normal Rate] : normal rate  [Regular Rhythm] : with a regular rhythm [Normal S1, S2] : normal S1 and S2 [No Murmur] : no murmur heard [No Carotid Bruits] : no carotid bruits [No Abdominal Bruit] : a ~M bruit was not heard ~T in the abdomen [No Varicosities] : no varicosities [Pedal Pulses Present] : the pedal pulses are present [No Edema] : there was no peripheral edema [No Palpable Aorta] : no palpable aorta [No Extremity Clubbing/Cyanosis] : no extremity clubbing/cyanosis [Soft] : abdomen soft [Non Tender] : non-tender [Non-distended] : non-distended [No Masses] : no abdominal mass palpated [No HSM] : no HSM [Normal Bowel Sounds] : normal bowel sounds [Normal Posterior Cervical Nodes] : no posterior cervical lymphadenopathy [Normal Anterior Cervical Nodes] : no anterior cervical lymphadenopathy [No CVA Tenderness] : no CVA  tenderness [No Spinal Tenderness] : no spinal tenderness [No Joint Swelling] : no joint swelling [Grossly Normal Strength/Tone] : grossly normal strength/tone [No Rash] : no rash [Coordination Grossly Intact] : coordination grossly intact [No Focal Deficits] : no focal deficits [Normal Gait] : normal gait [Deep Tendon Reflexes (DTR)] : deep tendon reflexes were 2+ and symmetric [Normal Affect] : the affect was normal [Normal Insight/Judgement] : insight and judgment were intact [de-identified] : RT lateral epicondylitis

## 2022-10-26 NOTE — PLAN
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

## 2022-10-26 NOTE — HEALTH RISK ASSESSMENT
[Patient reported mammogram was normal] : Patient reported mammogram was normal [Good] : ~his/her~  mood as  good [Never] : Never [No] : In the past 12 months have you used drugs other than those required for medical reasons? No [No falls in past year] : Patient reported no falls in the past year [Assistive Device] : Patient uses an assistive device [None] : None [With Family] : lives with family [Retired] : retired [] :  [Feels Safe at Home] : Feels safe at home [Independent] : feeding [Some assistance needed] : walking [Fully functional (using the telephone, shopping, preparing meals, housekeeping, doing laundry, using] : Fully functional and needs no help or supervision to perform IADLs (using the telephone, shopping, preparing meals, housekeeping, doing laundry, using transportation, managing medications and managing finances) [de-identified] : bennett [MammogramDate] : 01/21 [MammogramComments] : Pt. need rx  [BoneDensityDate] : 01/21 [BoneDensityComments] : Osteopenia - Pt. need rx  [ColonoscopyDate] : 12/20

## 2022-10-26 NOTE — HISTORY OF PRESENT ILLNESS
[Family Member] : family member [FreeTextEntry1] : KELSEY [de-identified] : Pt is a 73 y/o female here for her annual wellness exam. Mood is good. Pt eats a well balanced diet. Uses a walker to ambulate. Currently taking ASA 81 mg, Clopidogrel Bisulfate 75 mg, Entresto 24-26 mg, Folic Acid 1 mg, Methotrexate 2.5 mg, Metoprolol 25 mg, Rosuvastatin 40 mg, Torsemide 20 mg daily. Pt states she has been doing well, no hospitalization for anemia x1 year. Follows up with hemonc regularly, taking oral iron supplements. Follows up with rheum q 6 months for RA.Follows up with [facility] regularly. Follows up with Pulm  for lung management. Previously had Thyroid US in 7/2020 which revealed multiple large nodules. Pt presents today c/o RT hand and forearm discomfort. She is also c/o toenail fungus

## 2022-10-28 LAB
25(OH)D3 SERPL-MCNC: 43.7 NG/ML
ALBUMIN SERPL ELPH-MCNC: 4.4 G/DL
ALP BLD-CCNC: 132 U/L
ALT SERPL-CCNC: 20 U/L
ANION GAP SERPL CALC-SCNC: 15 MMOL/L
APPEARANCE: CLEAR
AST SERPL-CCNC: 26 U/L
BACTERIA: NEGATIVE
BASOPHILS # BLD AUTO: 0.03 K/UL
BASOPHILS NFR BLD AUTO: 0.6 %
BILIRUB SERPL-MCNC: 0.3 MG/DL
BILIRUBIN URINE: NEGATIVE
BLOOD URINE: NEGATIVE
BUN SERPL-MCNC: 23 MG/DL
CALCIUM SERPL-MCNC: 9.7 MG/DL
CHLORIDE SERPL-SCNC: 104 MMOL/L
CHOLEST SERPL-MCNC: 172 MG/DL
CO2 SERPL-SCNC: 24 MMOL/L
COLOR: YELLOW
CREAT SERPL-MCNC: 1.32 MG/DL
EGFR: 42 ML/MIN/1.73M2
EOSINOPHIL # BLD AUTO: 0.13 K/UL
EOSINOPHIL NFR BLD AUTO: 2.7 %
ESTIMATED AVERAGE GLUCOSE: 108 MG/DL
FERRITIN SERPL-MCNC: 86 NG/ML
GLUCOSE QUALITATIVE U: NEGATIVE
GLUCOSE SERPL-MCNC: 86 MG/DL
HBA1C MFR BLD HPLC: 5.4 %
HCT VFR BLD CALC: 37.4 %
HDLC SERPL-MCNC: 77 MG/DL
HGB BLD-MCNC: 11.6 G/DL
HYALINE CASTS: 1 /LPF
IMM GRANULOCYTES NFR BLD AUTO: 0.2 %
IRON SATN MFR SERPL: 10 %
IRON SERPL-MCNC: 35 UG/DL
KETONES URINE: NEGATIVE
LDLC SERPL CALC-MCNC: 82 MG/DL
LEUKOCYTE ESTERASE URINE: ABNORMAL
LYMPHOCYTES # BLD AUTO: 0.87 K/UL
LYMPHOCYTES NFR BLD AUTO: 17.9 %
MAN DIFF?: NORMAL
MCHC RBC-ENTMCNC: 30.1 PG
MCHC RBC-ENTMCNC: 31 GM/DL
MCV RBC AUTO: 97.1 FL
MICROSCOPIC-UA: NORMAL
MONOCYTES # BLD AUTO: 0.39 K/UL
MONOCYTES NFR BLD AUTO: 8 %
NEUTROPHILS # BLD AUTO: 3.42 K/UL
NEUTROPHILS NFR BLD AUTO: 70.6 %
NITRITE URINE: NEGATIVE
NONHDLC SERPL-MCNC: 95 MG/DL
PH URINE: 6
PLATELET # BLD AUTO: 317 K/UL
POTASSIUM SERPL-SCNC: 4.1 MMOL/L
PROT SERPL-MCNC: 7 G/DL
PROTEIN URINE: ABNORMAL
RBC # BLD: 3.85 M/UL
RBC # FLD: 15.2 %
RED BLOOD CELLS URINE: 1 /HPF
SODIUM SERPL-SCNC: 143 MMOL/L
SPECIFIC GRAVITY URINE: 1.03
SQUAMOUS EPITHELIAL CELLS: 3 /HPF
TIBC SERPL-MCNC: 340 UG/DL
TRIGL SERPL-MCNC: 66 MG/DL
TSH SERPL-ACNC: 0.25 UIU/ML
UIBC SERPL-MCNC: 305 UG/DL
URATE SERPL-MCNC: 4.8 MG/DL
UROBILINOGEN URINE: NORMAL
WBC # FLD AUTO: 4.85 K/UL
WHITE BLOOD CELLS URINE: 5 /HPF

## 2022-11-01 ENCOUNTER — RESULT REVIEW (OUTPATIENT)
Age: 72
End: 2022-11-01

## 2022-11-01 ENCOUNTER — APPOINTMENT (OUTPATIENT)
Age: 72
End: 2022-11-01

## 2022-11-01 LAB
BASOPHILS # BLD AUTO: 0 K/UL — SIGNIFICANT CHANGE UP (ref 0–0.2)
BASOPHILS NFR BLD AUTO: 0.8 % — SIGNIFICANT CHANGE UP (ref 0–2)
EOSINOPHIL # BLD AUTO: 0.1 K/UL — SIGNIFICANT CHANGE UP (ref 0–0.5)
EOSINOPHIL NFR BLD AUTO: 2.7 % — SIGNIFICANT CHANGE UP (ref 0–6)
HCT VFR BLD CALC: 37 % — SIGNIFICANT CHANGE UP (ref 34.5–45)
HGB BLD-MCNC: 11.4 G/DL — LOW (ref 11.5–15.5)
LYMPHOCYTES # BLD AUTO: 1 K/UL — SIGNIFICANT CHANGE UP (ref 1–3.3)
LYMPHOCYTES # BLD AUTO: 21.2 % — SIGNIFICANT CHANGE UP (ref 13–44)
MCHC RBC-ENTMCNC: 30.4 PG — SIGNIFICANT CHANGE UP (ref 27–34)
MCHC RBC-ENTMCNC: 30.8 G/DL — LOW (ref 32–36)
MCV RBC AUTO: 98.7 FL — SIGNIFICANT CHANGE UP (ref 80–100)
MONOCYTES # BLD AUTO: 0.4 K/UL — SIGNIFICANT CHANGE UP (ref 0–0.9)
MONOCYTES NFR BLD AUTO: 7.7 % — SIGNIFICANT CHANGE UP (ref 2–14)
NEUTROPHILS # BLD AUTO: 3.3 K/UL — SIGNIFICANT CHANGE UP (ref 1.8–7.4)
NEUTROPHILS NFR BLD AUTO: 67.6 % — SIGNIFICANT CHANGE UP (ref 43–77)
PLATELET # BLD AUTO: 258 K/UL — SIGNIFICANT CHANGE UP (ref 150–400)
RBC # BLD: 3.76 M/UL — LOW (ref 3.8–5.2)
RBC # FLD: 14.4 % — SIGNIFICANT CHANGE UP (ref 10.3–14.5)
WBC # BLD: 4.8 K/UL — SIGNIFICANT CHANGE UP (ref 3.8–10.5)
WBC # FLD AUTO: 4.8 K/UL — SIGNIFICANT CHANGE UP (ref 3.8–10.5)

## 2022-11-08 ENCOUNTER — NON-APPOINTMENT (OUTPATIENT)
Age: 72
End: 2022-11-08

## 2022-11-25 ENCOUNTER — OUTPATIENT (OUTPATIENT)
Dept: OUTPATIENT SERVICES | Facility: HOSPITAL | Age: 72
LOS: 1 days | Discharge: HOME | End: 2022-11-25

## 2022-11-25 DIAGNOSIS — D63.8 ANEMIA IN OTHER CHRONIC DISEASES CLASSIFIED ELSEWHERE: ICD-10-CM

## 2022-11-25 DIAGNOSIS — Z90.710 ACQUIRED ABSENCE OF BOTH CERVIX AND UTERUS: Chronic | ICD-10-CM

## 2022-11-25 DIAGNOSIS — Z90.49 ACQUIRED ABSENCE OF OTHER SPECIFIED PARTS OF DIGESTIVE TRACT: Chronic | ICD-10-CM

## 2022-11-25 DIAGNOSIS — Z98.890 OTHER SPECIFIED POSTPROCEDURAL STATES: Chronic | ICD-10-CM

## 2022-11-29 ENCOUNTER — APPOINTMENT (OUTPATIENT)
Age: 72
End: 2022-11-29

## 2022-11-30 ENCOUNTER — RESULT REVIEW (OUTPATIENT)
Age: 72
End: 2022-11-30

## 2022-11-30 ENCOUNTER — APPOINTMENT (OUTPATIENT)
Dept: HEMATOLOGY ONCOLOGY | Facility: CLINIC | Age: 72
End: 2022-11-30

## 2022-11-30 VITALS
WEIGHT: 169.01 LBS | DIASTOLIC BLOOD PRESSURE: 78 MMHG | SYSTOLIC BLOOD PRESSURE: 120 MMHG | OXYGEN SATURATION: 100 % | HEART RATE: 86 BPM | HEIGHT: 68 IN | BODY MASS INDEX: 25.61 KG/M2

## 2022-11-30 LAB
BASOPHILS # BLD AUTO: 0 K/UL — SIGNIFICANT CHANGE UP (ref 0–0.2)
BASOPHILS NFR BLD AUTO: 0.7 % — SIGNIFICANT CHANGE UP (ref 0–2)
EOSINOPHIL # BLD AUTO: 0.1 K/UL — SIGNIFICANT CHANGE UP (ref 0–0.5)
EOSINOPHIL NFR BLD AUTO: 2.3 % — SIGNIFICANT CHANGE UP (ref 0–6)
HCT VFR BLD CALC: 38.1 % — SIGNIFICANT CHANGE UP (ref 34.5–45)
HGB BLD-MCNC: 12.3 G/DL — SIGNIFICANT CHANGE UP (ref 11.5–15.5)
LYMPHOCYTES # BLD AUTO: 0.7 K/UL — LOW (ref 1–3.3)
LYMPHOCYTES # BLD AUTO: 14.7 % — SIGNIFICANT CHANGE UP (ref 13–44)
MCHC RBC-ENTMCNC: 31.3 PG — SIGNIFICANT CHANGE UP (ref 27–34)
MCHC RBC-ENTMCNC: 32.3 G/DL — SIGNIFICANT CHANGE UP (ref 32–36)
MCV RBC AUTO: 96.9 FL — SIGNIFICANT CHANGE UP (ref 80–100)
MONOCYTES # BLD AUTO: 0.5 K/UL — SIGNIFICANT CHANGE UP (ref 0–0.9)
MONOCYTES NFR BLD AUTO: 9.4 % — SIGNIFICANT CHANGE UP (ref 2–14)
NEUTROPHILS # BLD AUTO: 3.6 K/UL — SIGNIFICANT CHANGE UP (ref 1.8–7.4)
NEUTROPHILS NFR BLD AUTO: 72.9 % — SIGNIFICANT CHANGE UP (ref 43–77)
PLATELET # BLD AUTO: 300 K/UL — SIGNIFICANT CHANGE UP (ref 150–400)
RBC # BLD: 3.93 M/UL — SIGNIFICANT CHANGE UP (ref 3.8–5.2)
RBC # FLD: 15.1 % — HIGH (ref 10.3–14.5)
WBC # BLD: 5 K/UL — SIGNIFICANT CHANGE UP (ref 3.8–10.5)
WBC # FLD AUTO: 5 K/UL — SIGNIFICANT CHANGE UP (ref 3.8–10.5)

## 2022-11-30 PROCEDURE — 99212 OFFICE O/P EST SF 10 MIN: CPT

## 2022-11-30 RX ORDER — EMPAGLIFLOZIN 10 MG/1
10 TABLET, FILM COATED ORAL
Refills: 0 | Status: DISCONTINUED | COMMUNITY
End: 2022-11-30

## 2022-11-30 RX ORDER — ALPRAZOLAM 0.25 MG/1
0.25 TABLET ORAL
Qty: 20 | Refills: 0 | Status: DISCONTINUED | COMMUNITY
Start: 2021-11-10 | End: 2022-11-30

## 2022-11-30 NOTE — HISTORY OF PRESENT ILLNESS
[de-identified] : This 72 year-old black woman is referred for anemia. In August 2020 her hemoglobin was 8.1, hematocrit 27.3, MCV 91. White count was 4.7 and platelets 414,000\par Serum ferritin was 21 serum iron 42 and total iron-binding capacity 306.\par She has been seen by gastroenterology. A long hospitalization at [facility] this summer for small bowel obstruction secondary to adhesions from a prior colectomy and hysterectomy included laparoscopic lysis of adhesions.She has had multiple admissions and had required multiple transfusions in the past before starting Aranesp.  [de-identified] : doing well, has not needed Aranesp since April 2022, HGB has been over 11gms. She feels bloated today, no abdominal pain, nausea or vomiting.Is on oral iron.

## 2022-11-30 NOTE — ASSESSMENT
[FreeTextEntry1] : This 72 year-old black woman is referred for anemia. In August 2020 her hemoglobin was 8.1, hematocrit 27.3, MCV 91. White count was 4.7 and platelets 414,000\par Serum ferritin was 21 serum iron 42 and total iron-binding capacity 306.\par She has been seen by gastroenterology. A long hospitalization at [facility] this summer for small bowel obstruction secondary to adhesions from a prior colectomy and hysterectomy included laparoscopic lysis of adhesions.She has had multiple admissions and had required multiple transfusions in the past before starting Aranesp. \par Today her CBC shows WBC-5.0, HGB-12.3, HCT-38.1, Plat-300,000, MCV-96.\par  No intervention is needed at this time, we will recheck her lab work in 3 months.

## 2022-12-07 LAB
ALBUMIN SERPL ELPH-MCNC: 4.7 G/DL
ALP BLD-CCNC: 149 U/L
ALT SERPL-CCNC: 20 U/L
ANION GAP SERPL CALC-SCNC: 15 MMOL/L
AST SERPL-CCNC: 27 U/L
BILIRUB SERPL-MCNC: 0.2 MG/DL
BUN SERPL-MCNC: 19 MG/DL
CALCIUM SERPL-MCNC: 9.3 MG/DL
CHLORIDE SERPL-SCNC: 103 MMOL/L
CO2 SERPL-SCNC: 25 MMOL/L
CREAT SERPL-MCNC: 1.37 MG/DL
EGFR: 41 ML/MIN/1.73M2
FERRITIN SERPL-MCNC: 74 NG/ML
GLUCOSE SERPL-MCNC: 92 MG/DL
IRON SATN MFR SERPL: 13 %
IRON SERPL-MCNC: 50 UG/DL
POTASSIUM SERPL-SCNC: 4.3 MMOL/L
PROT SERPL-MCNC: 7.5 G/DL
SODIUM SERPL-SCNC: 143 MMOL/L
TIBC SERPL-MCNC: 371 UG/DL
UIBC SERPL-MCNC: 321 UG/DL

## 2022-12-13 ENCOUNTER — APPOINTMENT (OUTPATIENT)
Dept: RHEUMATOLOGY | Facility: CLINIC | Age: 72
End: 2022-12-13

## 2022-12-27 ENCOUNTER — RESULT REVIEW (OUTPATIENT)
Age: 72
End: 2022-12-27

## 2022-12-27 ENCOUNTER — APPOINTMENT (OUTPATIENT)
Age: 72
End: 2022-12-27

## 2022-12-27 LAB
BASOPHILS # BLD AUTO: 0.1 K/UL — SIGNIFICANT CHANGE UP (ref 0–0.2)
BASOPHILS NFR BLD AUTO: 1 % — SIGNIFICANT CHANGE UP (ref 0–2)
EOSINOPHIL # BLD AUTO: 0.1 K/UL — SIGNIFICANT CHANGE UP (ref 0–0.5)
EOSINOPHIL NFR BLD AUTO: 1.4 % — SIGNIFICANT CHANGE UP (ref 0–6)
HCT VFR BLD CALC: 37.5 % — SIGNIFICANT CHANGE UP (ref 34.5–45)
HGB BLD-MCNC: 12 G/DL — SIGNIFICANT CHANGE UP (ref 11.5–15.5)
LYMPHOCYTES # BLD AUTO: 0.8 K/UL — LOW (ref 1–3.3)
LYMPHOCYTES # BLD AUTO: 11.6 % — LOW (ref 13–44)
MCHC RBC-ENTMCNC: 29.9 PG — SIGNIFICANT CHANGE UP (ref 27–34)
MCHC RBC-ENTMCNC: 31.9 G/DL — LOW (ref 32–36)
MCV RBC AUTO: 93.8 FL — SIGNIFICANT CHANGE UP (ref 80–100)
MONOCYTES # BLD AUTO: 0.8 K/UL — SIGNIFICANT CHANGE UP (ref 0–0.9)
MONOCYTES NFR BLD AUTO: 11.6 % — SIGNIFICANT CHANGE UP (ref 2–14)
NEUTROPHILS # BLD AUTO: 5.1 K/UL — SIGNIFICANT CHANGE UP (ref 1.8–7.4)
NEUTROPHILS NFR BLD AUTO: 74.5 % — SIGNIFICANT CHANGE UP (ref 43–77)
PLATELET # BLD AUTO: 351 K/UL — SIGNIFICANT CHANGE UP (ref 150–400)
RBC # BLD: 4 M/UL — SIGNIFICANT CHANGE UP (ref 3.8–5.2)
RBC # FLD: 15.1 % — HIGH (ref 10.3–14.5)
WBC # BLD: 6.9 K/UL — SIGNIFICANT CHANGE UP (ref 3.8–10.5)
WBC # FLD AUTO: 6.9 K/UL — SIGNIFICANT CHANGE UP (ref 3.8–10.5)

## 2022-12-28 ENCOUNTER — APPOINTMENT (OUTPATIENT)
Dept: FAMILY MEDICINE | Facility: CLINIC | Age: 72
End: 2022-12-28
Payer: MEDICARE

## 2022-12-28 VITALS
DIASTOLIC BLOOD PRESSURE: 70 MMHG | BODY MASS INDEX: 25.46 KG/M2 | SYSTOLIC BLOOD PRESSURE: 110 MMHG | WEIGHT: 168 LBS | OXYGEN SATURATION: 99 % | TEMPERATURE: 97.1 F | HEART RATE: 84 BPM | HEIGHT: 68 IN

## 2022-12-28 DIAGNOSIS — B00.1 HERPESVIRAL VESICULAR DERMATITIS: ICD-10-CM

## 2022-12-28 PROCEDURE — 99213 OFFICE O/P EST LOW 20 MIN: CPT

## 2022-12-28 RX ORDER — ACYCLOVIR 50 MG/G
5 OINTMENT TOPICAL 3 TIMES DAILY
Qty: 1 | Refills: 1 | Status: ACTIVE | COMMUNITY
Start: 2022-12-28 | End: 1900-01-01

## 2022-12-28 NOTE — PLAN
[FreeTextEntry1] : Start augmentin BID x 7 days\par Encourage tylenol, dayquil, nyquil as needed\par Start acyclovir ointment\par f/u if symptoms worsen or don’t improve\par pt declined viral panel swab

## 2022-12-28 NOTE — HISTORY OF PRESENT ILLNESS
[FreeTextEntry8] : 71yo F presents with sinus pressure, cough, congestion x 7 days. Pt reports she has been trying OTC cold medicines and flonase with no improvement in symptoms. Pt states last time she had sinusitis azithromycin did not improve symptoms and she needed augmentin. Pt denies any fevers, chills, SOB, wheezing or CP. Pt reports her family did have the flu recently but she has quarantined from them. \par Pt also complaining of HSV outbreak.

## 2022-12-28 NOTE — PHYSICAL EXAM
[Normal Sclera/Conjunctiva] : normal sclera/conjunctiva [EOMI] : extraocular movements intact [Normal Outer Ear/Nose] : the outer ears and nose were normal in appearance [Normal Oropharynx] : the oropharynx was normal [Normal TMs] : both tympanic membranes were normal [No Lymphadenopathy] : no lymphadenopathy [Normal] : normal rate, regular rhythm, normal S1 and S2 and no murmur heard [Normal Gait] : normal gait [Normal Affect] : the affect was normal [de-identified] : +yellow/green nasal discharge

## 2023-01-05 NOTE — H&P ADULT - BIRTH SEX
Female Wartpeel Pregnancy And Lactation Text: This medication is Pregnancy Category X and contraindicated in pregnancy and in women who may become pregnant. It is unknown if this medication is excreted in breast milk.

## 2023-02-14 ENCOUNTER — OUTPATIENT (OUTPATIENT)
Dept: OUTPATIENT SERVICES | Facility: HOSPITAL | Age: 73
LOS: 1 days | Discharge: HOME | End: 2023-02-14

## 2023-02-14 DIAGNOSIS — D63.8 ANEMIA IN OTHER CHRONIC DISEASES CLASSIFIED ELSEWHERE: ICD-10-CM

## 2023-02-14 DIAGNOSIS — Z90.710 ACQUIRED ABSENCE OF BOTH CERVIX AND UTERUS: Chronic | ICD-10-CM

## 2023-02-14 DIAGNOSIS — Z90.49 ACQUIRED ABSENCE OF OTHER SPECIFIED PARTS OF DIGESTIVE TRACT: Chronic | ICD-10-CM

## 2023-02-14 DIAGNOSIS — Z98.890 OTHER SPECIFIED POSTPROCEDURAL STATES: Chronic | ICD-10-CM

## 2023-02-21 DIAGNOSIS — D64.9 ANEMIA, UNSPECIFIED: ICD-10-CM

## 2023-02-22 ENCOUNTER — APPOINTMENT (OUTPATIENT)
Dept: HEMATOLOGY ONCOLOGY | Facility: CLINIC | Age: 73
End: 2023-02-22

## 2023-03-03 ENCOUNTER — APPOINTMENT (OUTPATIENT)
Dept: FAMILY MEDICINE | Facility: CLINIC | Age: 73
End: 2023-03-03
Payer: MEDICARE

## 2023-03-03 VITALS
BODY MASS INDEX: 26.07 KG/M2 | OXYGEN SATURATION: 98 % | HEART RATE: 78 BPM | SYSTOLIC BLOOD PRESSURE: 118 MMHG | TEMPERATURE: 97.7 F | WEIGHT: 172 LBS | DIASTOLIC BLOOD PRESSURE: 70 MMHG | HEIGHT: 68 IN

## 2023-03-03 DIAGNOSIS — G56.21 LESION OF ULNAR NERVE, RIGHT UPPER LIMB: ICD-10-CM

## 2023-03-03 DIAGNOSIS — J32.9 CHRONIC SINUSITIS, UNSPECIFIED: ICD-10-CM

## 2023-03-03 PROCEDURE — 99213 OFFICE O/P EST LOW 20 MIN: CPT

## 2023-03-03 RX ORDER — AMOXICILLIN AND CLAVULANATE POTASSIUM 875; 125 MG/1; MG/1
875-125 TABLET, COATED ORAL
Qty: 14 | Refills: 0 | Status: ACTIVE | COMMUNITY
Start: 2022-12-28 | End: 1900-01-01

## 2023-03-03 NOTE — PLAN
[FreeTextEntry1] : Start Augmentin\par Referral hand surgeon\par Continue PT \par Continue Xanax for sleep \par f/u if no improvement in symptoms

## 2023-03-03 NOTE — HISTORY OF PRESENT ILLNESS
[FreeTextEntry8] : 73yo F presents with sinus pressure, headache, congestion and teeth pain x 2.5 weeks. Pt reports she has been trying OTC medication without any improvement in symptoms. Pt reports no fevers, chills, known sick contacts. Pt reports she is concerned about her right arm weakness and reports pain from the right elbow into her hand. Pt reports her right wrist is getting thinner. Pt reports numbness/tingling down her right arm.

## 2023-03-03 NOTE — PHYSICAL EXAM
[Normal Outer Ear/Nose] : the outer ears and nose were normal in appearance [Normal Oropharynx] : the oropharynx was normal [Normal TMs] : both tympanic membranes were normal [No Lymphadenopathy] : no lymphadenopathy [Normal] : normal rate, regular rhythm, normal S1 and S2 and no murmur heard [Normal Affect] : the affect was normal [Normal Insight/Judgement] : insight and judgment were intact [de-identified] : yellow/green nasal discharge  [de-identified] : decrease right  strength, patient reports worse then baseline strength.  [de-identified] : baseline gait stable, walks with cane

## 2023-04-19 ENCOUNTER — APPOINTMENT (OUTPATIENT)
Dept: ORTHOPEDIC SURGERY | Facility: CLINIC | Age: 73
End: 2023-04-19

## 2023-05-11 NOTE — PATIENT PROFILE ADULT - IS PATIENT POST-MENOPAUSAL?
Caller: Zulema Sousa    Relationship to patient: Self    Best call back number: [phone]    Patient is needing: HAS INJECTION APPT 5.22.23 AND WOULD LIKE TO RESCHEDULE   yes

## 2023-07-10 RX ORDER — AMOXICILLIN 500 MG/1
500 CAPSULE ORAL
Qty: 4 | Refills: 0 | Status: ACTIVE | COMMUNITY
Start: 2021-12-29 | End: 1900-01-01

## 2023-08-17 ENCOUNTER — APPOINTMENT (OUTPATIENT)
Dept: RHEUMATOLOGY | Facility: CLINIC | Age: 73
End: 2023-08-17
Payer: MEDICARE

## 2023-08-17 VITALS
OXYGEN SATURATION: 98 % | BODY MASS INDEX: 25.07 KG/M2 | HEART RATE: 78 BPM | DIASTOLIC BLOOD PRESSURE: 70 MMHG | SYSTOLIC BLOOD PRESSURE: 118 MMHG | HEIGHT: 68 IN | WEIGHT: 165.4 LBS | RESPIRATION RATE: 17 BRPM

## 2023-08-17 PROCEDURE — 99214 OFFICE O/P EST MOD 30 MIN: CPT

## 2023-08-17 RX ORDER — EMPAGLIFLOZIN 10 MG/1
10 TABLET, FILM COATED ORAL
Refills: 0 | Status: ACTIVE | COMMUNITY

## 2023-08-17 NOTE — HISTORY OF PRESENT ILLNESS
[FreeTextEntry1] : Pt reports that she ran out of MTX about 2 months ago, and now c/o increasing "achiness" in her hand joints, worst in the mornings.  No joint swelling, no AM stiffness.  No other complaints.

## 2023-08-17 NOTE — ASSESSMENT
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   rheumatoid arthritis-  She was on combination of humira and MTX but was taken off the humira after her stroke. Concern was raised at that time that her neurological symptoms may have been exacerbated by humira. She did have a couple of TIAs several years ago but was APL negative.  She has been well controlled on methotrexate 15 mg weekly monotherapy, though currently w/ pain in her hand joints as she ran out about 2 months ago.  - Restart MTX 15mg weekly, folic acid 1mg daily. - [id number] UTD.  Pt received the COVID19 vaccine.  COPD- -She no longer smokes for many years. - under care of pulm  Methotrexate use- -she is aware to hold medication while on antibiotics.  Osteopenia- Pharmacologic therapy not indicated based on FRAX   - calcium / vit D   - weight bearing exercise.  She is aware to call if her sx worsen. followup 3 months.

## 2023-08-17 NOTE — PHYSICAL EXAM
[General Appearance - Alert] : alert [General Appearance - In No Acute Distress] : in no acute distress [General Appearance - Well Nourished] : well nourished [General Appearance - Well Developed] : well developed [Sclera] : the sclera and conjunctiva were normal [Outer Ear] : the ears and nose were normal in appearance [Oropharynx] : the oropharynx was normal [Neck Appearance] : the appearance of the neck was normal [Neck Cervical Mass (___cm)] : no neck mass was observed [Jugular Venous Distention Increased] : there was no jugular-venous distention [Thyroid Diffuse Enlargement] : the thyroid was not enlarged [Thyroid Nodule] : there were no palpable thyroid nodules [Respiration, Rhythm And Depth] : normal respiratory rhythm and effort [Auscultation Breath Sounds / Voice Sounds] : lungs were clear to auscultation bilaterally [Heart Rate And Rhythm] : heart rate was normal and rhythm regular [Heart Sounds] : normal S1 and S2 [Heart Sounds Gallop] : no gallops [Murmurs] : no murmurs [Heart Sounds Pericardial Friction Rub] : no pericardial rub [Edema] : there was no peripheral edema [Bowel Sounds] : normal bowel sounds [Abdomen Soft] : soft [Abdomen Tenderness] : non-tender [Abdomen Mass (___ Cm)] : no abdominal mass palpated [Cervical Lymph Nodes Enlarged Posterior Bilaterally] : posterior cervical [Cervical Lymph Nodes Enlarged Anterior Bilaterally] : anterior cervical [Supraclavicular Lymph Nodes Enlarged Bilaterally] : supraclavicular [No Spinal Tenderness] : no spinal tenderness [Abnormal Walk] : normal gait [Musculoskeletal - Swelling] : no joint swelling seen [Motor Tone] : muscle strength and tone were normal [Skin Color & Pigmentation] : normal skin color and pigmentation [] : no rash [Skin Turgor] : normal skin turgor [Deep Tendon Reflexes (DTR)] : deep tendon reflexes were 2+ and symmetric [Motor Exam] : the motor exam was normal [No Focal Deficits] : no focal deficits [Oriented To Time, Place, And Person] : oriented to person, place, and time [Impaired Insight] : insight and judgment were intact [Affect] : the affect was normal [FreeTextEntry1] : FROM neck, shoulders, elbows, wrists, hands, hips, knees, ankles and feet, including the small joints of the hands and feet without any evidence of inflammatory arthritis , No inflammatory arthritis or synovitis noted. No tender points noted. tightness hamstrings b/l

## 2023-10-17 NOTE — ED ADULT TRIAGE NOTE - AS TEMP SITE
LOV 1-3-2023  NOV nothing pending    Refill request for    alendronate (FOSAMAX) 70 MG tablet 12 tablet 3 1/3/2023 --    Sig - Route: Take 1 tablet by mouth every 7 days.          oral

## 2023-10-26 ENCOUNTER — APPOINTMENT (OUTPATIENT)
Dept: FAMILY MEDICINE | Facility: CLINIC | Age: 73
End: 2023-10-26
Payer: MEDICARE

## 2023-10-26 DIAGNOSIS — Z23 ENCOUNTER FOR IMMUNIZATION: ICD-10-CM

## 2023-10-26 PROCEDURE — 90662 IIV NO PRSV INCREASED AG IM: CPT

## 2023-10-26 PROCEDURE — G0008: CPT

## 2023-11-02 ENCOUNTER — APPOINTMENT (OUTPATIENT)
Dept: RHEUMATOLOGY | Facility: CLINIC | Age: 73
End: 2023-11-02
Payer: MEDICARE

## 2023-11-02 DIAGNOSIS — M05.742 RHEUMATOID ARTHRITIS WITH RHEUMATOID FACTOR OF RIGHT HAND W/OUT ORGAN OR SYSTEMS INVOLVEMENT: ICD-10-CM

## 2023-11-02 DIAGNOSIS — R20.8 OTHER DISTURBANCES OF SKIN SENSATION: ICD-10-CM

## 2023-11-02 DIAGNOSIS — M05.741 RHEUMATOID ARTHRITIS WITH RHEUMATOID FACTOR OF RIGHT HAND W/OUT ORGAN OR SYSTEMS INVOLVEMENT: ICD-10-CM

## 2023-11-02 DIAGNOSIS — Z79.631 LONG TERM (CURRENT) USE OF ANTIMETABOLITE AGENT: ICD-10-CM

## 2023-11-02 PROCEDURE — 99214 OFFICE O/P EST MOD 30 MIN: CPT

## 2023-11-02 RX ORDER — METHOTREXATE 2.5 MG/1
2.5 TABLET ORAL
Qty: 24 | Refills: 3 | Status: ACTIVE | COMMUNITY
Start: 2023-11-02 | End: 1900-01-01

## 2023-11-28 ENCOUNTER — NON-APPOINTMENT (OUTPATIENT)
Age: 73
End: 2023-11-28

## 2023-11-29 ENCOUNTER — APPOINTMENT (OUTPATIENT)
Dept: FAMILY MEDICINE | Facility: CLINIC | Age: 73
End: 2023-11-29
Payer: MEDICARE

## 2023-11-29 VITALS
WEIGHT: 170 LBS | OXYGEN SATURATION: 98 % | DIASTOLIC BLOOD PRESSURE: 70 MMHG | HEIGHT: 68 IN | BODY MASS INDEX: 25.76 KG/M2 | HEART RATE: 83 BPM | SYSTOLIC BLOOD PRESSURE: 100 MMHG

## 2023-11-29 DIAGNOSIS — J44.9 CHRONIC OBSTRUCTIVE PULMONARY DISEASE, UNSPECIFIED: ICD-10-CM

## 2023-11-29 DIAGNOSIS — M85.80 OTHER SPECIFIED DISORDERS OF BONE DENSITY AND STRUCTURE, UNSPECIFIED SITE: ICD-10-CM

## 2023-11-29 DIAGNOSIS — I50.9 HEART FAILURE, UNSPECIFIED: ICD-10-CM

## 2023-11-29 DIAGNOSIS — H61.20 IMPACTED CERUMEN, UNSPECIFIED EAR: ICD-10-CM

## 2023-11-29 DIAGNOSIS — I25.10 ATHEROSCLEROTIC HEART DISEASE OF NATIVE CORONARY ARTERY W/OUT ANGINA PECTORIS: ICD-10-CM

## 2023-11-29 DIAGNOSIS — Z00.00 ENCOUNTER FOR GENERAL ADULT MEDICAL EXAMINATION W/OUT ABNORMAL FINDINGS: ICD-10-CM

## 2023-11-29 PROCEDURE — G0439: CPT

## 2023-11-29 RX ORDER — AMOXICILLIN AND CLAVULANATE POTASSIUM 875; 125 MG/1; MG/1
875-125 TABLET, COATED ORAL
Qty: 14 | Refills: 0 | Status: ACTIVE | COMMUNITY
Start: 2023-11-29 | End: 1900-01-01

## 2023-11-29 RX ORDER — PREDNISONE 20 MG/1
20 TABLET ORAL
Qty: 5 | Refills: 0 | Status: COMPLETED | COMMUNITY
Start: 2022-08-24 | End: 2023-11-29

## 2023-11-30 ENCOUNTER — APPOINTMENT (OUTPATIENT)
Age: 73
End: 2023-11-30

## 2023-11-30 PROBLEM — J44.9 COPD (CHRONIC OBSTRUCTIVE PULMONARY DISEASE): Status: ACTIVE | Noted: 2018-03-20

## 2023-11-30 PROBLEM — I25.10 CAD (CORONARY ARTERY DISEASE): Status: ACTIVE | Noted: 2021-08-24

## 2023-11-30 PROBLEM — I50.9 CHRONIC CONGESTIVE HEART FAILURE, UNSPECIFIED HEART FAILURE TYPE: Status: ACTIVE | Noted: 2022-02-23

## 2023-11-30 LAB
25(OH)D3 SERPL-MCNC: 33.2 NG/ML
ALBUMIN SERPL ELPH-MCNC: 4.5 G/DL
ALP BLD-CCNC: 148 U/L
ALT SERPL-CCNC: 44 U/L
ANION GAP SERPL CALC-SCNC: 14 MMOL/L
APPEARANCE: CLEAR
AST SERPL-CCNC: 54 U/L
BACTERIA: NEGATIVE /HPF
BASOPHILS # BLD AUTO: 0.03 K/UL
BASOPHILS NFR BLD AUTO: 0.5 %
BILIRUB SERPL-MCNC: 0.4 MG/DL
BILIRUBIN URINE: NEGATIVE
BLOOD URINE: NEGATIVE
BUN SERPL-MCNC: 23 MG/DL
CALCIUM SERPL-MCNC: 9.7 MG/DL
CAST: 0 /LPF
CHLORIDE SERPL-SCNC: 105 MMOL/L
CHOLEST SERPL-MCNC: 184 MG/DL
CO2 SERPL-SCNC: 23 MMOL/L
COLOR: YELLOW
CREAT SERPL-MCNC: 1.64 MG/DL
EGFR: 32 ML/MIN/1.73M2
EOSINOPHIL # BLD AUTO: 0.09 K/UL
EOSINOPHIL NFR BLD AUTO: 1.4 %
EPITHELIAL CELLS: 1 /HPF
ESTIMATED AVERAGE GLUCOSE: 123 MG/DL
FERRITIN SERPL-MCNC: 62 NG/ML
GLUCOSE QUALITATIVE U: >=1000 MG/DL
GLUCOSE SERPL-MCNC: 81 MG/DL
HBA1C MFR BLD HPLC: 5.9 %
HCT VFR BLD CALC: 40.5 %
HDLC SERPL-MCNC: 84 MG/DL
HGB BLD-MCNC: 12.7 G/DL
IMM GRANULOCYTES NFR BLD AUTO: 0.5 %
IRON SATN MFR SERPL: 22 %
IRON SERPL-MCNC: 95 UG/DL
KETONES URINE: NEGATIVE MG/DL
LDLC SERPL CALC-MCNC: 85 MG/DL
LEUKOCYTE ESTERASE URINE: NEGATIVE
LYMPHOCYTES # BLD AUTO: 0.79 K/UL
LYMPHOCYTES NFR BLD AUTO: 12.6 %
MAN DIFF?: NORMAL
MCHC RBC-ENTMCNC: 30.2 PG
MCHC RBC-ENTMCNC: 31.4 GM/DL
MCV RBC AUTO: 96.2 FL
MICROSCOPIC-UA: NORMAL
MONOCYTES # BLD AUTO: 0.78 K/UL
MONOCYTES NFR BLD AUTO: 12.4 %
NEUTROPHILS # BLD AUTO: 4.57 K/UL
NEUTROPHILS NFR BLD AUTO: 72.6 %
NITRITE URINE: NEGATIVE
NONHDLC SERPL-MCNC: 100 MG/DL
PH URINE: 6.5
PLATELET # BLD AUTO: 296 K/UL
POTASSIUM SERPL-SCNC: 4.9 MMOL/L
PROT SERPL-MCNC: 7.6 G/DL
PROTEIN URINE: 100 MG/DL
RBC # BLD: 4.21 M/UL
RBC # FLD: 18.5 %
RED BLOOD CELLS URINE: 1 /HPF
SODIUM SERPL-SCNC: 142 MMOL/L
SPECIFIC GRAVITY URINE: 1.03
TIBC SERPL-MCNC: 428 UG/DL
TRIGL SERPL-MCNC: 80 MG/DL
TSH SERPL-ACNC: 0.33 UIU/ML
UIBC SERPL-MCNC: 332 UG/DL
URATE SERPL-MCNC: 5.5 MG/DL
UROBILINOGEN URINE: 1 MG/DL
WBC # FLD AUTO: 6.29 K/UL
WHITE BLOOD CELLS URINE: 0 /HPF

## 2023-12-16 ENCOUNTER — INPATIENT (INPATIENT)
Facility: HOSPITAL | Age: 73
LOS: 2 days | Discharge: HOME | DRG: 378 | End: 2023-12-19
Attending: HOSPITALIST | Admitting: FAMILY MEDICINE
Payer: MEDICARE

## 2023-12-16 VITALS
WEIGHT: 169.09 LBS | SYSTOLIC BLOOD PRESSURE: 132 MMHG | OXYGEN SATURATION: 93 % | DIASTOLIC BLOOD PRESSURE: 70 MMHG | RESPIRATION RATE: 20 BRPM | HEIGHT: 68 IN | HEART RATE: 90 BPM | TEMPERATURE: 97 F

## 2023-12-16 DIAGNOSIS — Z90.49 ACQUIRED ABSENCE OF OTHER SPECIFIED PARTS OF DIGESTIVE TRACT: Chronic | ICD-10-CM

## 2023-12-16 DIAGNOSIS — Z90.710 ACQUIRED ABSENCE OF BOTH CERVIX AND UTERUS: Chronic | ICD-10-CM

## 2023-12-16 DIAGNOSIS — K92.2 GASTROINTESTINAL HEMORRHAGE, UNSPECIFIED: ICD-10-CM

## 2023-12-16 DIAGNOSIS — Z98.890 OTHER SPECIFIED POSTPROCEDURAL STATES: Chronic | ICD-10-CM

## 2023-12-16 DIAGNOSIS — Z01.810 ENCOUNTER FOR PREPROCEDURAL CARDIOVASCULAR EXAMINATION: ICD-10-CM

## 2023-12-16 LAB
ALBUMIN SERPL ELPH-MCNC: 4.3 G/DL — SIGNIFICANT CHANGE UP (ref 3.3–5.2)
ALBUMIN SERPL ELPH-MCNC: 4.3 G/DL — SIGNIFICANT CHANGE UP (ref 3.3–5.2)
ALP SERPL-CCNC: 134 U/L — HIGH (ref 40–120)
ALP SERPL-CCNC: 134 U/L — HIGH (ref 40–120)
ALT FLD-CCNC: 26 U/L — SIGNIFICANT CHANGE UP
ALT FLD-CCNC: 26 U/L — SIGNIFICANT CHANGE UP
ANION GAP SERPL CALC-SCNC: 15 MMOL/L — SIGNIFICANT CHANGE UP (ref 5–17)
ANION GAP SERPL CALC-SCNC: 15 MMOL/L — SIGNIFICANT CHANGE UP (ref 5–17)
APPEARANCE UR: CLEAR — SIGNIFICANT CHANGE UP
APPEARANCE UR: CLEAR — SIGNIFICANT CHANGE UP
AST SERPL-CCNC: 40 U/L — HIGH
AST SERPL-CCNC: 40 U/L — HIGH
BACTERIA # UR AUTO: NEGATIVE /HPF — SIGNIFICANT CHANGE UP
BACTERIA # UR AUTO: NEGATIVE /HPF — SIGNIFICANT CHANGE UP
BASOPHILS # BLD AUTO: 0.04 K/UL — SIGNIFICANT CHANGE UP (ref 0–0.2)
BASOPHILS # BLD AUTO: 0.04 K/UL — SIGNIFICANT CHANGE UP (ref 0–0.2)
BASOPHILS NFR BLD AUTO: 0.6 % — SIGNIFICANT CHANGE UP (ref 0–2)
BASOPHILS NFR BLD AUTO: 0.6 % — SIGNIFICANT CHANGE UP (ref 0–2)
BILIRUB SERPL-MCNC: 0.3 MG/DL — LOW (ref 0.4–2)
BILIRUB SERPL-MCNC: 0.3 MG/DL — LOW (ref 0.4–2)
BILIRUB UR-MCNC: NEGATIVE — SIGNIFICANT CHANGE UP
BILIRUB UR-MCNC: NEGATIVE — SIGNIFICANT CHANGE UP
BLD GP AB SCN SERPL QL: SIGNIFICANT CHANGE UP
BLD GP AB SCN SERPL QL: SIGNIFICANT CHANGE UP
BUN SERPL-MCNC: 36.7 MG/DL — HIGH (ref 8–20)
BUN SERPL-MCNC: 36.7 MG/DL — HIGH (ref 8–20)
CALCIUM SERPL-MCNC: 9.4 MG/DL — SIGNIFICANT CHANGE UP (ref 8.4–10.5)
CALCIUM SERPL-MCNC: 9.4 MG/DL — SIGNIFICANT CHANGE UP (ref 8.4–10.5)
CHLORIDE SERPL-SCNC: 103 MMOL/L — SIGNIFICANT CHANGE UP (ref 96–108)
CHLORIDE SERPL-SCNC: 103 MMOL/L — SIGNIFICANT CHANGE UP (ref 96–108)
CO2 SERPL-SCNC: 24 MMOL/L — SIGNIFICANT CHANGE UP (ref 22–29)
CO2 SERPL-SCNC: 24 MMOL/L — SIGNIFICANT CHANGE UP (ref 22–29)
COLOR SPEC: YELLOW — SIGNIFICANT CHANGE UP
COLOR SPEC: YELLOW — SIGNIFICANT CHANGE UP
CREAT SERPL-MCNC: 1.48 MG/DL — HIGH (ref 0.5–1.3)
CREAT SERPL-MCNC: 1.48 MG/DL — HIGH (ref 0.5–1.3)
DIFF PNL FLD: ABNORMAL
DIFF PNL FLD: ABNORMAL
EGFR: 37 ML/MIN/1.73M2 — LOW
EGFR: 37 ML/MIN/1.73M2 — LOW
EOSINOPHIL # BLD AUTO: 0.02 K/UL — SIGNIFICANT CHANGE UP (ref 0–0.5)
EOSINOPHIL # BLD AUTO: 0.02 K/UL — SIGNIFICANT CHANGE UP (ref 0–0.5)
EOSINOPHIL NFR BLD AUTO: 0.3 % — SIGNIFICANT CHANGE UP (ref 0–6)
EOSINOPHIL NFR BLD AUTO: 0.3 % — SIGNIFICANT CHANGE UP (ref 0–6)
GLUCOSE SERPL-MCNC: 87 MG/DL — SIGNIFICANT CHANGE UP (ref 70–99)
GLUCOSE SERPL-MCNC: 87 MG/DL — SIGNIFICANT CHANGE UP (ref 70–99)
GLUCOSE UR QL: >=1000 MG/DL
GLUCOSE UR QL: >=1000 MG/DL
HCT VFR BLD CALC: 29.9 % — LOW (ref 34.5–45)
HCT VFR BLD CALC: 29.9 % — LOW (ref 34.5–45)
HCT VFR BLD CALC: 34.7 % — SIGNIFICANT CHANGE UP (ref 34.5–45)
HCT VFR BLD CALC: 34.7 % — SIGNIFICANT CHANGE UP (ref 34.5–45)
HGB BLD-MCNC: 11.3 G/DL — LOW (ref 11.5–15.5)
HGB BLD-MCNC: 11.3 G/DL — LOW (ref 11.5–15.5)
HGB BLD-MCNC: 9.5 G/DL — LOW (ref 11.5–15.5)
HGB BLD-MCNC: 9.5 G/DL — LOW (ref 11.5–15.5)
IMM GRANULOCYTES NFR BLD AUTO: 0.3 % — SIGNIFICANT CHANGE UP (ref 0–0.9)
IMM GRANULOCYTES NFR BLD AUTO: 0.3 % — SIGNIFICANT CHANGE UP (ref 0–0.9)
KETONES UR-MCNC: ABNORMAL MG/DL
KETONES UR-MCNC: ABNORMAL MG/DL
LEUKOCYTE ESTERASE UR-ACNC: NEGATIVE — SIGNIFICANT CHANGE UP
LEUKOCYTE ESTERASE UR-ACNC: NEGATIVE — SIGNIFICANT CHANGE UP
LIDOCAIN IGE QN: 24 U/L — SIGNIFICANT CHANGE UP (ref 22–51)
LIDOCAIN IGE QN: 24 U/L — SIGNIFICANT CHANGE UP (ref 22–51)
LYMPHOCYTES # BLD AUTO: 0.78 K/UL — LOW (ref 1–3.3)
LYMPHOCYTES # BLD AUTO: 0.78 K/UL — LOW (ref 1–3.3)
LYMPHOCYTES # BLD AUTO: 12.3 % — LOW (ref 13–44)
LYMPHOCYTES # BLD AUTO: 12.3 % — LOW (ref 13–44)
MCHC RBC-ENTMCNC: 30.3 PG — SIGNIFICANT CHANGE UP (ref 27–34)
MCHC RBC-ENTMCNC: 30.3 PG — SIGNIFICANT CHANGE UP (ref 27–34)
MCHC RBC-ENTMCNC: 30.6 PG — SIGNIFICANT CHANGE UP (ref 27–34)
MCHC RBC-ENTMCNC: 30.6 PG — SIGNIFICANT CHANGE UP (ref 27–34)
MCHC RBC-ENTMCNC: 31.8 GM/DL — LOW (ref 32–36)
MCHC RBC-ENTMCNC: 31.8 GM/DL — LOW (ref 32–36)
MCHC RBC-ENTMCNC: 32.6 GM/DL — SIGNIFICANT CHANGE UP (ref 32–36)
MCHC RBC-ENTMCNC: 32.6 GM/DL — SIGNIFICANT CHANGE UP (ref 32–36)
MCV RBC AUTO: 93 FL — SIGNIFICANT CHANGE UP (ref 80–100)
MCV RBC AUTO: 93 FL — SIGNIFICANT CHANGE UP (ref 80–100)
MCV RBC AUTO: 96.5 FL — SIGNIFICANT CHANGE UP (ref 80–100)
MCV RBC AUTO: 96.5 FL — SIGNIFICANT CHANGE UP (ref 80–100)
MONOCYTES # BLD AUTO: 0.54 K/UL — SIGNIFICANT CHANGE UP (ref 0–0.9)
MONOCYTES # BLD AUTO: 0.54 K/UL — SIGNIFICANT CHANGE UP (ref 0–0.9)
MONOCYTES NFR BLD AUTO: 8.5 % — SIGNIFICANT CHANGE UP (ref 2–14)
MONOCYTES NFR BLD AUTO: 8.5 % — SIGNIFICANT CHANGE UP (ref 2–14)
NEUTROPHILS # BLD AUTO: 4.96 K/UL — SIGNIFICANT CHANGE UP (ref 1.8–7.4)
NEUTROPHILS # BLD AUTO: 4.96 K/UL — SIGNIFICANT CHANGE UP (ref 1.8–7.4)
NEUTROPHILS NFR BLD AUTO: 78 % — HIGH (ref 43–77)
NEUTROPHILS NFR BLD AUTO: 78 % — HIGH (ref 43–77)
NITRITE UR-MCNC: NEGATIVE — SIGNIFICANT CHANGE UP
NITRITE UR-MCNC: NEGATIVE — SIGNIFICANT CHANGE UP
OB PNL STL: POSITIVE
OB PNL STL: POSITIVE
PH UR: 6.5 — SIGNIFICANT CHANGE UP (ref 5–8)
PH UR: 6.5 — SIGNIFICANT CHANGE UP (ref 5–8)
PLATELET # BLD AUTO: 266 K/UL — SIGNIFICANT CHANGE UP (ref 150–400)
PLATELET # BLD AUTO: 266 K/UL — SIGNIFICANT CHANGE UP (ref 150–400)
PLATELET # BLD AUTO: 356 K/UL — SIGNIFICANT CHANGE UP (ref 150–400)
PLATELET # BLD AUTO: 356 K/UL — SIGNIFICANT CHANGE UP (ref 150–400)
POTASSIUM SERPL-MCNC: 4.2 MMOL/L — SIGNIFICANT CHANGE UP (ref 3.5–5.3)
POTASSIUM SERPL-MCNC: 4.2 MMOL/L — SIGNIFICANT CHANGE UP (ref 3.5–5.3)
POTASSIUM SERPL-SCNC: 4.2 MMOL/L — SIGNIFICANT CHANGE UP (ref 3.5–5.3)
POTASSIUM SERPL-SCNC: 4.2 MMOL/L — SIGNIFICANT CHANGE UP (ref 3.5–5.3)
PROT SERPL-MCNC: 7.9 G/DL — SIGNIFICANT CHANGE UP (ref 6.6–8.7)
PROT SERPL-MCNC: 7.9 G/DL — SIGNIFICANT CHANGE UP (ref 6.6–8.7)
PROT UR-MCNC: 30 MG/DL
PROT UR-MCNC: 30 MG/DL
RBC # BLD: 3.1 M/UL — LOW (ref 3.8–5.2)
RBC # BLD: 3.1 M/UL — LOW (ref 3.8–5.2)
RBC # BLD: 3.73 M/UL — LOW (ref 3.8–5.2)
RBC # BLD: 3.73 M/UL — LOW (ref 3.8–5.2)
RBC # FLD: 17 % — HIGH (ref 10.3–14.5)
RBC # FLD: 17 % — HIGH (ref 10.3–14.5)
RBC # FLD: 17.1 % — HIGH (ref 10.3–14.5)
RBC # FLD: 17.1 % — HIGH (ref 10.3–14.5)
RBC CASTS # UR COMP ASSIST: 2 /HPF — SIGNIFICANT CHANGE UP (ref 0–4)
RBC CASTS # UR COMP ASSIST: 2 /HPF — SIGNIFICANT CHANGE UP (ref 0–4)
SODIUM SERPL-SCNC: 142 MMOL/L — SIGNIFICANT CHANGE UP (ref 135–145)
SODIUM SERPL-SCNC: 142 MMOL/L — SIGNIFICANT CHANGE UP (ref 135–145)
SP GR SPEC: >1.03 — HIGH (ref 1–1.03)
SP GR SPEC: >1.03 — HIGH (ref 1–1.03)
SQUAMOUS # UR AUTO: 1 /HPF — SIGNIFICANT CHANGE UP (ref 0–5)
SQUAMOUS # UR AUTO: 1 /HPF — SIGNIFICANT CHANGE UP (ref 0–5)
TROPONIN T, HIGH SENSITIVITY RESULT: 16 NG/L — SIGNIFICANT CHANGE UP (ref 0–51)
TROPONIN T, HIGH SENSITIVITY RESULT: 16 NG/L — SIGNIFICANT CHANGE UP (ref 0–51)
TROPONIN T, HIGH SENSITIVITY RESULT: 18 NG/L — SIGNIFICANT CHANGE UP (ref 0–51)
TROPONIN T, HIGH SENSITIVITY RESULT: 18 NG/L — SIGNIFICANT CHANGE UP (ref 0–51)
UROBILINOGEN FLD QL: 1 MG/DL — SIGNIFICANT CHANGE UP (ref 0.2–1)
UROBILINOGEN FLD QL: 1 MG/DL — SIGNIFICANT CHANGE UP (ref 0.2–1)
WBC # BLD: 6.1 K/UL — SIGNIFICANT CHANGE UP (ref 3.8–10.5)
WBC # BLD: 6.1 K/UL — SIGNIFICANT CHANGE UP (ref 3.8–10.5)
WBC # BLD: 6.36 K/UL — SIGNIFICANT CHANGE UP (ref 3.8–10.5)
WBC # BLD: 6.36 K/UL — SIGNIFICANT CHANGE UP (ref 3.8–10.5)
WBC # FLD AUTO: 6.1 K/UL — SIGNIFICANT CHANGE UP (ref 3.8–10.5)
WBC # FLD AUTO: 6.1 K/UL — SIGNIFICANT CHANGE UP (ref 3.8–10.5)
WBC # FLD AUTO: 6.36 K/UL — SIGNIFICANT CHANGE UP (ref 3.8–10.5)
WBC # FLD AUTO: 6.36 K/UL — SIGNIFICANT CHANGE UP (ref 3.8–10.5)
WBC UR QL: 6 /HPF — HIGH (ref 0–5)
WBC UR QL: 6 /HPF — HIGH (ref 0–5)

## 2023-12-16 PROCEDURE — 99291 CRITICAL CARE FIRST HOUR: CPT | Mod: GC

## 2023-12-16 PROCEDURE — 74174 CTA ABD&PLVS W/CONTRAST: CPT | Mod: 26,MA

## 2023-12-16 PROCEDURE — 99223 1ST HOSP IP/OBS HIGH 75: CPT

## 2023-12-16 PROCEDURE — 71045 X-RAY EXAM CHEST 1 VIEW: CPT | Mod: 26

## 2023-12-16 PROCEDURE — 93010 ELECTROCARDIOGRAM REPORT: CPT | Mod: 76

## 2023-12-16 RX ORDER — ONDANSETRON 8 MG/1
4 TABLET, FILM COATED ORAL EVERY 8 HOURS
Refills: 0 | Status: DISCONTINUED | OUTPATIENT
Start: 2023-12-16 | End: 2023-12-19

## 2023-12-16 RX ORDER — AMIODARONE HYDROCHLORIDE 400 MG/1
200 TABLET ORAL DAILY
Refills: 0 | Status: DISCONTINUED | OUTPATIENT
Start: 2023-12-16 | End: 2023-12-19

## 2023-12-16 RX ORDER — PANTOPRAZOLE SODIUM 20 MG/1
80 TABLET, DELAYED RELEASE ORAL ONCE
Refills: 0 | Status: COMPLETED | OUTPATIENT
Start: 2023-12-16 | End: 2023-12-16

## 2023-12-16 RX ORDER — PANTOPRAZOLE SODIUM 20 MG/1
8 TABLET, DELAYED RELEASE ORAL
Qty: 80 | Refills: 0 | Status: DISCONTINUED | OUTPATIENT
Start: 2023-12-16 | End: 2023-12-16

## 2023-12-16 RX ORDER — ALPRAZOLAM 0.25 MG
0.25 TABLET ORAL THREE TIMES A DAY
Refills: 0 | Status: DISCONTINUED | OUTPATIENT
Start: 2023-12-16 | End: 2023-12-19

## 2023-12-16 RX ORDER — SACUBITRIL AND VALSARTAN 24; 26 MG/1; MG/1
1 TABLET, FILM COATED ORAL
Refills: 0 | Status: DISCONTINUED | OUTPATIENT
Start: 2023-12-16 | End: 2023-12-19

## 2023-12-16 RX ORDER — EMPAGLIFLOZIN 10 MG/1
1 TABLET, FILM COATED ORAL
Refills: 0 | DISCHARGE

## 2023-12-16 RX ORDER — ACETAMINOPHEN 500 MG
650 TABLET ORAL EVERY 6 HOURS
Refills: 0 | Status: DISCONTINUED | OUTPATIENT
Start: 2023-12-16 | End: 2023-12-19

## 2023-12-16 RX ORDER — ATORVASTATIN CALCIUM 80 MG/1
80 TABLET, FILM COATED ORAL AT BEDTIME
Refills: 0 | Status: DISCONTINUED | OUTPATIENT
Start: 2023-12-16 | End: 2023-12-19

## 2023-12-16 RX ORDER — SACUBITRIL AND VALSARTAN 24; 26 MG/1; MG/1
1 TABLET, FILM COATED ORAL
Refills: 0 | DISCHARGE

## 2023-12-16 RX ORDER — PANTOPRAZOLE SODIUM 20 MG/1
40 TABLET, DELAYED RELEASE ORAL EVERY 12 HOURS
Refills: 0 | Status: DISCONTINUED | OUTPATIENT
Start: 2023-12-16 | End: 2023-12-19

## 2023-12-16 RX ORDER — LANOLIN ALCOHOL/MO/W.PET/CERES
3 CREAM (GRAM) TOPICAL AT BEDTIME
Refills: 0 | Status: DISCONTINUED | OUTPATIENT
Start: 2023-12-16 | End: 2023-12-19

## 2023-12-16 RX ORDER — METOPROLOL TARTRATE 50 MG
25 TABLET ORAL DAILY
Refills: 0 | Status: DISCONTINUED | OUTPATIENT
Start: 2023-12-16 | End: 2023-12-19

## 2023-12-16 RX ORDER — ALBUTEROL 90 UG/1
2.5 AEROSOL, METERED ORAL EVERY 12 HOURS
Refills: 0 | Status: DISCONTINUED | OUTPATIENT
Start: 2023-12-16 | End: 2023-12-19

## 2023-12-16 RX ORDER — FOLIC ACID 0.8 MG
1 TABLET ORAL DAILY
Refills: 0 | Status: DISCONTINUED | OUTPATIENT
Start: 2023-12-16 | End: 2023-12-19

## 2023-12-16 RX ORDER — SODIUM CHLORIDE 9 MG/ML
1000 INJECTION INTRAMUSCULAR; INTRAVENOUS; SUBCUTANEOUS ONCE
Refills: 0 | Status: COMPLETED | OUTPATIENT
Start: 2023-12-16 | End: 2023-12-16

## 2023-12-16 RX ORDER — METHOTREXATE 2.5 MG/1
15 TABLET ORAL
Refills: 0 | Status: DISCONTINUED | OUTPATIENT
Start: 2023-12-16 | End: 2023-12-19

## 2023-12-16 RX ORDER — MIDODRINE HYDROCHLORIDE 2.5 MG/1
10 TABLET ORAL EVERY 8 HOURS
Refills: 0 | Status: DISCONTINUED | OUTPATIENT
Start: 2023-12-16 | End: 2023-12-19

## 2023-12-16 RX ADMIN — PANTOPRAZOLE SODIUM 80 MILLIGRAM(S): 20 TABLET, DELAYED RELEASE ORAL at 10:56

## 2023-12-16 RX ADMIN — SACUBITRIL AND VALSARTAN 1 TABLET(S): 24; 26 TABLET, FILM COATED ORAL at 17:22

## 2023-12-16 RX ADMIN — ONDANSETRON 4 MILLIGRAM(S): 8 TABLET, FILM COATED ORAL at 18:23

## 2023-12-16 RX ADMIN — PANTOPRAZOLE SODIUM 40 MILLIGRAM(S): 20 TABLET, DELAYED RELEASE ORAL at 18:20

## 2023-12-16 RX ADMIN — ATORVASTATIN CALCIUM 80 MILLIGRAM(S): 80 TABLET, FILM COATED ORAL at 21:43

## 2023-12-16 RX ADMIN — MIDODRINE HYDROCHLORIDE 10 MILLIGRAM(S): 2.5 TABLET ORAL at 19:14

## 2023-12-16 RX ADMIN — PANTOPRAZOLE SODIUM 10 MG/HR: 20 TABLET, DELAYED RELEASE ORAL at 12:16

## 2023-12-16 RX ADMIN — SODIUM CHLORIDE 1000 MILLILITER(S): 9 INJECTION INTRAMUSCULAR; INTRAVENOUS; SUBCUTANEOUS at 10:56

## 2023-12-16 RX ADMIN — AMIODARONE HYDROCHLORIDE 200 MILLIGRAM(S): 400 TABLET ORAL at 17:22

## 2023-12-16 NOTE — ED STATDOCS - CLINICAL SUMMARY MEDICAL DECISION MAKING FREE TEXT BOX
73yoF; with COPD (on home O2 3L), Chronic A. 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  Pt to be moved to main ED for complete evaluation by another provider.

## 2023-12-16 NOTE — ED PROVIDER NOTE - OBJECTIVE STATEMENT
The patient is a 73 year old female presents with rectal bleed with diarrhea for one day  Mildly LH and generalized fatigue  History of GI bleed and also chronic anemia  No abd pain, No CP, No SOB

## 2023-12-16 NOTE — ED PROVIDER NOTE - ATTENDING CONTRIBUTION TO CARE
The patient seen immediately due to critical conditions    Lower GI bleed    I, Darin Bone, performed the initial face to face bedside interview with this patient regarding history of present illness, review of symptoms and relevant past medical, social and family history.  I completed an independent physical examination.  I was the initial provider who evaluated this patient. I have signed out the follow up of any pending tests (i.e. labs, radiological studies) to the resident.  I have communicated the patient’s plan of care and disposition with the resident

## 2023-12-16 NOTE — PROGRESS NOTE ADULT - PROBLEM SELECTOR PLAN 1
- pt denies chest pain, palpitations, chest pressure, and anginal equivalent symptoms     Cardiac Risk Stratification for Procedure  - METS >4  - RCRI Risk Stratification: Class IV  Risk 15.0%     Risk of Major Cardiac Event      No active chest pain, evidence of ischemia, decompensated CHF, significant valvular abnormality, or unstable arrhythmia then therefore no absolute cardiac contraindication to the planned surgical procedure.  No further cardiac work up is needed.     Further cardiac work up will not change risk of the patient. Proceed for surgery as indicated.     - pending TTE for evaluation of cardiac function and any valvular abnormalities

## 2023-12-16 NOTE — H&P ADULT - ASSESSMENT
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 Fib (not on AC due to recurrent GIB), Renal Artery Stenosis, RA and COPD on home oxygen presented with worsening shortness of breath and palpitations. Found to have ST elevations in lateral leads. Code STEMI was called in ER and patient was taken to cath lab. Coronary angiogram with patent LM stent and non obstructive CAD. Patient never made a troponin therefore patient did not have STEMI. She was admitted to MICU for close observation. She was found to have Acute on Chronic Anemia, no obvious bleeding. Hb improved s/p 3 units of PRBC.      1) Unstable Angina likely due to demand ischemia in the setting of anemia  - History of CAD s/p PCI  - Code STEMI in ER  - ST elevation in lateral leads but patient never had positive troponin therefore not a STEMI  - s/p cardiac cath with patent LM stent and 50% mLAD and 50% dRCA stenosis  - Continue Aspirin, Plavix, Lipitor and Metoprolol   - f/u further cardio recs    2) Acute on Chronic Anemia  - History of GI bleed   - Hb 9/4 s/p 3 units of PRBC  - Iron studies reviewed; elevated iron level but received PRBCs  - f/u immunofixation  - Hem/onc consult appreciated and GI was consulted based on recs  - GI consult also appreciated; no further GI work up  - Repeat CBC in AM and if stable will plan for discharge with outpatient hem follow up    3) Chronic A. Fib  - Not on AC due to recurrent GIB  - Continue Metoprolol and Amiodarone     4) Hypotension   - BP improved with midodrine  - continue metoprolol given history of CHF    5) Chronic Systolic Heart Failure   - Appears euvolemic  - Entresto, Aldactone and Lasix on hold due to ZANDRA and soft BP  - Continue Metoprolol   - TTE reviewed; EF 20-25 % with WMA  - monitor I/Os, daily weights  - f/u further cardio recommendations    6) ZANDAR   - likely due to prerenal azotemia due to renal hypoperfusion  - creatinine downtrending from 1.67 to 1.27  - check UA  - continue to hold Entresto, Aldactone and Lasix    - monitor I/Os, daily weights  - avoid nephrotoxic agents and renally dose medications  - Monitor renal function     7) COPD on 3 liters of NC at home  - currently on 3 liters   - not in acute exacerbation   - Abigail PRN    8) Rheumatoid Arthritis   - Continue Methotrexate  - Continue Folic Acid     DVT Prophylaxis - SCDs   Patient is a 71 year old female with history of Bradyarrhythmia with Cardiac Arrest s/p MDT PPM, STEMI (5/2021) complicated by Acute CVA (residual right arm weakness), PCI 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 Fib (not on AC due to recurrent GIB), Renal Artery Stenosis, RA and COPD on home oxygen presented with worsening shortness of breath and palpitations. Found to have ST elevations in lateral leads. Code STEMI was called in ER and patient was taken to cath lab. Coronary angiogram with patent LM stent and non obstructive CAD. Patient never made a troponin therefore patient did not have STEMI. She was admitted to MICU for close observation. She was found to have Acute on Chronic Anemia, no obvious bleeding. Hb improved s/p 3 units of PRBC.      1) Unstable Angina likely due to demand ischemia in the setting of anemia  - History of CAD s/p PCI  - Code STEMI in ER  - ST elevation in lateral leads but patient never had positive troponin therefore not a STEMI  - s/p cardiac cath with patent LM stent and 50% mLAD and 50% dRCA stenosis  - Continue Aspirin, Plavix, Lipitor and Metoprolol   - f/u further cardio recs    2) Acute on Chronic Anemia  - History of GI bleed   - Hb 9/4 s/p 3 units of PRBC  - Iron studies reviewed; elevated iron level but received PRBCs  - f/u immunofixation  - Hem/onc consult appreciated and GI was consulted based on recs  - GI consult also appreciated; no further GI work up  - Repeat CBC in AM and if stable will plan for discharge with outpatient hem follow up    3) Chronic A. Fib  - Not on AC due to recurrent GIB  - Continue Metoprolol and Amiodarone     4) Hypotension   - BP improved with midodrine  - continue metoprolol given history of CHF    5) Chronic Systolic Heart Failure   - Appears euvolemic  - Entresto, Aldactone and Lasix on hold due to ZANDRA and soft BP  - Continue Metoprolol   - TTE reviewed; EF 20-25 % with WMA  - monitor I/Os, daily weights  - f/u further cardio recommendations    6) ZANDRA   - likely due to prerenal azotemia due to renal hypoperfusion  - creatinine downtrending from 1.67 to 1.27  - check UA  - continue to hold Entresto, Aldactone and Lasix    - monitor I/Os, daily weights  - avoid nephrotoxic agents and renally dose medications  - Monitor renal function     7) COPD on 3 liters of NC at home  - currently on 3 liters   - not in acute exacerbation   - Abigail PRN    8) Rheumatoid Arthritis   - Continue Methotrexate  - Continue Folic Acid     DVT Prophylaxis - SCDs   73 year old woman with a significant medical history of copd on home o2,chronic anemia following , GI bleed , freddy-arrythmia with cardiac arrest, s/p MDT PPM, STEMI 5/2021, CVA, PCI with TATA to LM and LAD (6/2021) 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 Patient reports 4 episodes of watery diarrhaea with  blood clots yesterday and 1 small rectal bleed early am. Denies abdominal pain, nausea, vomiting, cp, sob. Pt is on ASA/Plavix-last dose yesterday.In ED her hemoglobin  is 11.3gm. CTA abd showed w/o active bleed/ sigmoid diverticulitis.  Last EGD/ colonoscopy was on 11/2020 revealed mild antral gastritis and colonoscopy showed adenomatous polyps and left sided diverticulosis, old blood all throughout the colon, .      GI bleed    -Hb stable'  -monitor cbc  -hold asa,plavix now,f/u cardiology rec  -Protonix   -c/s cardiology for cleance for colonoscopy  -Clear liquid diet today and tomorrow. keep NPO after midnight on Sunday after midnight for colonoscopy on Monday 12/18 if cleared by cardiology  -f/u gi rec         Chronic Anemia  - History of GI bleed   - hb stable  -will f/u  -following hemology out pt     Chronic A. Fib  - Not on AC due to recurrent GIB  - Continue Metoprolol and Amiodarone       Htn   with hx Hypotension   -  midodrine  - continue metoprolol     Chronic Systolic Heart Failure   - Appears euvolemic  - Entresto, Lasix, Metoprolol   - TTE ; EF 20-25 % with WMA  - monitor I/Os, daily weights  - f/u further cardio recommendations    ZANDRA  - likely due to prerenal  -CR 1.4  - continue Entresto,  Lasix now ,will hold if worsen renal function  - monitor I/Os, daily weights  - avoid nephrotoxic agents and renally dose medications  - Monitor renal function     COPD on 3 liters of NC at home  - currently on 3 liters   - not in acute exacerbation   - albuterol PRN    Rheumatoid Arthritis   - Continue Methotrexate  - Continue Folic Acid     DVT Prophylaxis - SCDs    plan of care dw pt    73 year old woman with a significant medical history of copd on home o2,chronic anemia following , GI bleed , freddy-arrythmia with cardiac arrest, s/p MDT PPM, STEMI 5/2021, CVA, PCI with TATA to LM and LAD (6/2021) 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 Patient reports 4 episodes of watery diarrhaea with  blood clots yesterday and 1 small rectal bleed early am. Denies abdominal pain, nausea, vomiting, cp, sob. Pt is on ASA/Plavix-last dose yesterday.In ED her hemoglobin  is 11.3gm. CTA abd showed w/o active bleed/ sigmoid diverticulitis.  Last EGD/ colonoscopy was on 11/2020 revealed mild antral gastritis and colonoscopy showed adenomatous polyps and left sided diverticulosis, old blood all throughout the colon, .      GI bleed/rectal bleed  -Hb stable'  -monitor cbc  -hold asa,plavix now,f/u cardiology rec  -Protonix   -c/s cardiology for cleance for colonoscopy  -Clear liquid diet today and tomorrow. keep NPO after midnight on Sunday after midnight for colonoscopy on Monday 12/18 if cleared by cardiology  -f/u gi rec         Chronic Anemia  - History of GI bleed   - hb stable  -will f/u  -following hemology out pt     Chronic A. Fib  - Not on AC due to recurrent GIB  - Continue Metoprolol and Amiodarone       Htn   with hx Hypotension   -  midodrine  - continue metoprolol     Chronic Systolic Heart Failure   - Appears euvolemic  - Entresto, Lasix, Metoprolol   - TTE ; EF 20-25 % with WMA  - monitor I/Os, daily weights  - f/u further cardio recommendations    ZANDRA  - likely due to prerenal  -CR 1.4  - continue Entresto,  Lasix now ,will hold if worsen renal function  - monitor I/Os, daily weights  - avoid nephrotoxic agents and renally dose medications  - Monitor renal function     COPD on 3 liters of NC at home  - currently on 3 liters   - not in acute exacerbation   - albuterol PRN    Rheumatoid Arthritis   - Continue Methotrexate  - Continue Folic Acid     DVT Prophylaxis - SCDs    plan of care jem pt

## 2023-12-16 NOTE — ED ADULT NURSE REASSESSMENT NOTE - NS ED NURSE REASSESS COMMENT FT1
report given to [facility] nurse Carbajal. pt breathing even and unlabored with no complaints at this time. cardiac monitor maintained. pt to be moved to bed 6881-02 report given to [facility] nurse Carbajal. pt breathing even and unlabored with no complaints at this time. cardiac monitor maintained. pt to be moved to bed 4955-47

## 2023-12-16 NOTE — ED ADULT TRIAGE NOTE - CHIEF COMPLAINT QUOTE
" I started to have rectal bleeding last night" pt also co feeling queasy, denies any vomiting, fever chills.

## 2023-12-16 NOTE — ED PROVIDER NOTE - LOCATION
Sorry for the confusion. Pt needs pre-op clearance and pt was just seen yesterday. Needs EKG order and will call pt to do the pending labs that were ordered in the system. Pls. Address EKG order. Thanks. abdominal region

## 2023-12-16 NOTE — H&P ADULT - NSHPPHYSICALEXAM_GEN_ALL_CORE
T(C): 36.3 (12-16-23 @ 09:15), Max: 36.3 (12-16-23 @ 09:15)  HR: 90 (12-16-23 @ 09:15) (90 - 90)  BP: 132/70 (12-16-23 @ 09:15) (132/70 - 132/70)  RR: 20 (12-16-23 @ 09:15) (20 - 20)  SpO2: 93% (12-16-23 @ 09:15) (93% - 93%)    GEN - NAD  HEENT - NCAT, EOMI, DOLLY,  RESP - CTA BL, no wheeze/stridor/rhonchi/crackles. on supplemental O2.  CARDIO - NS1S2, +  murmurs/  ABD - Soft/Non tender/Non distended. Normal BS x4 quadrants.   Ext - No CHITRA. n  MSK - full ROM of BL upper and lower extremities without pain or restriction. BL 5/5 strength on upper and lower extremities.   Neuro - cn 2-12 grossly intact. cerebellar function intact. no visible seizure activity appreciated. no tremor. gait not observed.   Psych- AAOx3. . appropriate behaviour. attentive. normal affect.

## 2023-12-16 NOTE — ED ADULT NURSE NOTE - NSFALLRISKASMTTYPE_ED_ALL_ED
Chief Complaint:  Patient is a 69y old  Male who presents with a chief complaint of leg swelling (2022 07:02)      Date of service 22 @ 11:28      Interval Events:   Patient seen and examined.   S/P endoscopy yesterday which showed spurting duodenal ulcer.   Now intubated in MICU.  H/H stable  No episodes of melena, brbpr since endoscopy yesterday.     Hospital Medications:  atorvastatin 80 milliGRAM(s) Oral at bedtime  carbidopa/levodopa  25/100 2.5 Tablet(s) Oral <User Schedule>  carbidopa/levodopa  25/100 2 Tablet(s) Oral <User Schedule>  chlorhexidine 0.12% Liquid 15 milliLiter(s) Oral Mucosa every 12 hours  chlorhexidine 4% Liquid 1 Application(s) Topical <User Schedule>  chlorhexidine 4% Liquid 1 Application(s) Topical <User Schedule>  dextrose 40% Gel 15 Gram(s) Oral once  dextrose 5%. 1000 milliLiter(s) IV Continuous <Continuous>  dextrose 5%. 1000 milliLiter(s) IV Continuous <Continuous>  dextrose 5%. 1000 milliLiter(s) IV Continuous <Continuous>  dextrose 50% Injectable 25 Gram(s) IV Push once  dextrose 50% Injectable 25 Gram(s) IV Push once  dextrose 50% Injectable 12.5 Gram(s) IV Push once  dextrose 50% Injectable 25 Gram(s) IV Push once  diVALproex Sprinkle 250 milliGRAM(s) Oral three times a day  doxazosin 1 milliGRAM(s) Oral at bedtime  DULoxetine 20 milliGRAM(s) Oral daily  folic acid 1 milliGRAM(s) Oral daily  glucagon  Injectable 1 milliGRAM(s) IntraMuscular once  insulin lispro (ADMELOG) corrective regimen sliding scale   SubCutaneous every 6 hours  levothyroxine Injectable 30 MICROGram(s) IV Push at bedtime  memantine 5 milliGRAM(s) Oral at bedtime  metoprolol tartrate Injectable 5 milliGRAM(s) IV Push every 6 hours  midodrine 10 milliGRAM(s) Oral <User Schedule>  mirtazapine 7.5 milliGRAM(s) Oral daily  Nephro-celeste 1 Tablet(s) Oral daily  norepinephrine Infusion 0.08 MICROgram(s)/kG/Min IV Continuous <Continuous>  pantoprazole Infusion 8 mG/Hr IV Continuous <Continuous>  propofol Infusion 20 MICROgram(s)/kG/Min IV Continuous <Continuous>  QUEtiapine 50 milliGRAM(s) Oral at bedtime  sodium chloride 0.9% lock flush 10 milliLiter(s) IV Push every 1 hour PRN  trihexyphenidyl 2 milliGRAM(s) Oral three times a day  vancomycin    Solution 125 milliGRAM(s) Oral every 6 hours        Review of Systems:  unable to obtain    PHYSICAL EXAM:   Vital Signs:  Vital Signs Last 24 Hrs  T(C): 36.8 (2022 08:00), Max: 36.8 (2022 08:00)  T(F): 98.2 (2022 08:00), Max: 98.2 (2022 08:00)  HR: 125 (2022 10:00) (95 - 128)  BP: 119/62 (2022 19:45) (104/71 - 119/62)  BP(mean): 84 (2022 19:45) (84 - 84)  RR: 18 (2022 10:00) (12 - 45)  SpO2: 100% (2022 10:00) (97% - 100%)  Daily Height in cm: 180.34 (2022 17:35)    Daily Weight in k.4 (2022 19:45)      PHYSICAL EXAM:     GENERAL:  Appears stated age, well-groomed, well-nourished, no distress  HEENT:  NC/AT,  conjunctivae anicteric, clear and pink, +ETT  NECK: supple, trachea midline  CHEST:  Full & symmetric excursion, no increased effort, breath sounds clear  HEART:  Regular rhythm, no JVD  ABDOMEN:  Soft, non-tender, non-distended, normoactive bowel sounds,  no masses , no hepatosplenomegaly, +gastrostomy   EXTREMITIES:  no cyanosis,clubbing or edema  SKIN:  No rash, erythema, or, ecchymoses, no jaundice  NEURO:  non-focal, no asterixis  RECTAL: Deferred      LABS Personally reviewed by me:                        9.9    13.96 )-----------( 174      ( 2022 09:57 )             28.6     Mean Cell Volume: 89.1 fl (22 @ 09:57)        130<L>  |  96  |  75<H>  ----------------------------<  154<H>  3.7   |  19<L>  |  2.78<H>    Ca    8.2<L>      2022 00:23  Phos  3.7       Mg     1.7         TPro  5.6<L>  /  Alb  1.8<L>  /  TBili  0.6  /  DBili  x   /  AST  23  /  ALT  <5<L>  /  AlkPhos  65      LIVER FUNCTIONS - ( 2022 00:23 )  Alb: 1.8 g/dL / Pro: 5.6 g/dL / ALK PHOS: 65 U/L / ALT: <5 U/L / AST: 23 U/L / GGT: x           PT/INR - ( 2022 01:19 )   PT: 11.8 sec;   INR: 0.98 ratio         PTT - ( 2022 01:19 )  PTT:37.5 sec                            9.9    13.96 )-----------( 174      ( 2022 09:57 )             28.6                         9.8    13.36 )-----------( 157      ( 2022 01:19 )             28.5                         10.1   12.96 )-----------( 163      ( 2022 00:23 )             29.4                         10.0   13.16 )-----------( 152      ( 2022 21:35 )             29.4                         7.0    13.23 )-----------( 177      ( 2022 15:23 )             20.9       Imaging personally reviewed by me:           Initial (On Arrival)

## 2023-12-16 NOTE — CONSULT NOTE ADULT - ASSESSMENT
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     Rectal bleed:  Most likely diverticular bleed. Last EGD/ colonoscopy (11/ 2020) revealed mild antral gastritis and colonoscopy showed adenomatous polyps and left sided diverticulosis, old blood all throughout the colon, no AVM's seen. She had a a normal VCE on 12/02/2020.  CTA abd showed sigmoid diverticulitis. No active GI bleed.  Hemoglobin 11.3. ADRIAN showed some blood    Plan:  Trend CBC, transfuse to Hgb 8 or higher  Avoid use of NSAIDs  Protonix 40 gm daily. Cab d/c Protonix infusion  Monitor renal function, Avoid dehydration, hypotension   Needs cardiology evaluation. (patient follows with [facility] cardiology). Will plan for colonoscopy once cleared by cardiology      Clear liquid diet today and tomorrow. 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     Rectal bleed:  Most likely diverticular bleed. Last EGD/ colonoscopy (11/ 2020) revealed mild antral gastritis and colonoscopy showed adenomatous polyps and left sided diverticulosis, old blood all throughout the colon, no AVM's seen. She had a a normal VCE on 12/02/2020.  CTA abd showed sigmoid diverticulitis. No active GI bleed.  Hemoglobin 11.3. ADRIAN showed some blood    Plan:  Trend CBC, transfuse to Hgb 8 or higher  Avoid use of NSAIDs  Protonix 40 gm daily. Cab d/c Protonix infusion  Monitor renal function, Avoid dehydration, hypotension   Needs cardiology evaluation. (patient follows with [facility] cardiology). Will plan for colonoscopy once cleared by cardiology      Clear liquid diet today and tomorrow. 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     Rectal bleed:  Most likely diverticular bleed. Last EGD/ colonoscopy (11/ 2020) revealed mild antral gastritis and colonoscopy showed adenomatous polyps and left sided diverticulosis, old blood all throughout the colon, no AVM's seen. She had a a normal VCE on 12/02/2020.  CTA abd showed sigmoid diverticulosis. No active GI bleed. No diverticulitis.  Hemoglobin 11.3. ADRIAN showed some blood    Plan:  Trend CBC, transfuse to Hgb 8 or higher  Avoid use of NSAIDs  Protonix 40 gm daily. Cab d/c Protonix infusion  Monitor renal function, Avoid dehydration, hypotension   Needs cardiology evaluation. (patient follows with [facility] cardiology). Will plan for colonoscopy once cleared by cardiology      Clear liquid diet today and tomorrow. Please keep NPO after midnight on Sunday after midnight for colonoscopy on Monday 12/18 if cleared by cardiology  Please HOLD Plavix in the setting of GI bleed if clonically appropriate  Plan d/w ED attending, patient and her daughter in law via phone Natalia who is an RN

## 2023-12-16 NOTE — ED ADULT NURSE REASSESSMENT NOTE - NS ED NURSE REASSESS COMMENT FT1
assumed care of pt @ 1930 from previous RN Amelia. Pt breathing even and unlabored at this time, no acute distress noted. Pt resting comfortably, awaiting 2BRK bed.

## 2023-12-16 NOTE — H&P ADULT - NSVTERISKREFERASSESS_GEN_ALL_CORE
Refer to the Assessment tab to view/cancel completed assessment. Complex Repair And Rotation Flap Text: The defect edges were debeveled with a #15 scalpel blade.  The primary defect was closed partially with a complex linear closure.  Given the location of the remaining defect, shape of the defect and the proximity to free margins a rotation flap was deemed most appropriate for complete closure of the defect.  Using a sterile surgical marker, an appropriate advancement flap was drawn incorporating the defect and placing the expected incisions within the relaxed skin tension lines where possible.    The area thus outlined was incised deep to adipose tissue with a #15 scalpel blade.  The skin margins were undermined to an appropriate distance in all directions utilizing iris scissors.

## 2023-12-16 NOTE — CONSULT NOTE ADULT - SUBJECTIVE AND OBJECTIVE BOX
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 Patient reports several episodes of watery diarrhaea with  blood clots. Denies abdominal pain, nausea, vomiting. Reported shortness of breath and JEFF. She uses home O2 as needed after her heart surgery on June 2021. She was admitted to [facility] with rectal bleed on March 2022. Per chart review (all script) her last EGD/ colonoscopy was on 11/2020 revealed mild antral gastritis and colonoscopy showed adenomatous polyps and left sided diverticulosis, old blood all throughout the colon, no AVM's seen. She had a a normal VCE on 12/02/2020. Patient In ED her hemoglobin  is 11.3gm. CTA abd showed sigmoid diverticulitis. No active GI bleed. Patent denies chest pain, palpitation, shortness of breath. She follows with hematology Dr. Wadsworth for chronic anemia. She is not on any anticoagulation. Takes Plavix and Aspirin 81 mg, last dose was yesterday.     Review of Systems:  . Constitutional: No fever, chills  . HEENT: Negative  · Respiratory and Thorax: No shortness of breath, no cough  · Cardiovascular: No chest pain, palpitation, no dizziness  · Gastrointestinal: see above  · Genitourinary: No hematuria  · Musculoskeletal: Negative  · Neurological: negative  · Psychiatric: no agitation, no anxiety      PAST MEDICAL/SURGICAL HISTORY:  Rheumatoid arthritis    COPD without exacerbation    HTN (hypertension)    Pacemaker    H/O cerebral artery stenosis  left cerebral artery stent per history in 2014.    S/P hysterectomy    S/P cholecystectomy    H/O exploratory laparotomy      SOCIAL HISTORY:  - TOBACCO: Former smoker, Quit 14 years ago  - ALCOHOL: Denies  - ILLICIT DRUG USE: Denies    FAMILY HISTORY:  No known history of gastrointestinal or liver disease;  FH: dementia  mother    FH: prostate cancer  father        HOME MEDICATIONS:  albuterol 2.5 mg/3 mL (0.083%) inhalation solution: 3 milliliter(s) inhaled every 12 hours, As Needed (07 Mar 2022 09:54)  ALPRAZolam 0.25 mg oral tablet: 1 tab(s) orally 3 times a day, As Needed (07 Mar 2022 09:54)  amiodarone 200 mg oral tablet: 1 tab(s) orally once a day (07 Mar 2022 09:54)  Aspirin Enteric Coated 81 mg oral delayed release tablet: 1 tab(s) orally once a day restart on sunday in two days  (07 Mar 2022 09:53)  ferrous sulfate 324 mg (65 mg elemental iron) oral delayed release tablet: 1 tab(s) orally every 48 hours (07 Mar 2022 09:53)  folic acid: 1 tab(s) orally once a day (08 Mar 2022 12:44)  methotrexate 2.5 mg oral tablet: 6 tab(s) orally once a week (07 Mar 2022 09:54)  Metoprolol Succinate ER 25 mg oral tablet, extended release: 1 tab(s) orally once a day (07 Mar 2022 09:54)  pantoprazole 40 mg oral delayed release tablet: 1 tab(s) orally once a day (07 Mar 2022 09:54)  Plavix 75 mg oral tablet: 1 tab(s) orally once a day (07 Mar 2022 09:54)  rosuvastatin 40 mg oral tablet: 1 tab(s) orally once a day (07 Mar 2022 09:54)  Stiolto Respimat 28 ACT 2.5 mcg-2.5 mcg/inh inhalation aerosol: 2 puff(s) inhaled every 24 hours (07 Mar 2022 09:54)    INPATIENT MEDICATIONS:  MEDICATIONS  (STANDING):  pantoprazole Infusion 8 mG/Hr (10 mL/Hr) IV Continuous <Continuous>    MEDICATIONS  (PRN):    ALLERGIES:  No Known Allergies    T(C): 36.3 (12-16-23 @ 09:15), Max: 36.3 (12-16-23 @ 09:15)  HR: 90 (12-16-23 @ 09:15) (90 - 90)  BP: 132/70 (12-16-23 @ 09:15) (132/70 - 132/70)  RR: 20 (12-16-23 @ 09:15) (20 - 20)  SpO2: 93% (12-16-23 @ 09:15) (93% - 93%)      PHYSICAL EXAM:    Constitutional: No acute distress  Neuro: Awake alert, oriented  HEENT: anicteric sclerae  CV: regular rate, regular rhythm  Pulm/chest: lung sounds clear/diminished bilaterally, no accessory muscle use noted  Abd: soft, nontender, nonditended, +bowel sounds. No rigidity, rebound tenderness, or guarding  Rectal exam: Soft stool with blood on the glove.   Ext: no edema  Skin: warm, no jaundice   Psych: calm, cooperative        LABS:             11.3   6.36  )-----------( 356      ( 12-16 @ 08:25 )             34.7         12-16    142  |  103  |  36.7<H>  ----------------------------<  87  4.2   |  24.0  |  1.48<H>    Ca    9.4      16 Dec 2023 08:25    TPro  7.9  /  Alb  4.3  /  TBili  0.3<L>  /  DBili  x   /  AST  40<H>  /  ALT  26  /  AlkPhos  134<H>  12-16    LIVER FUNCTIONS - ( 16 Dec 2023 08:25 )  Alb: 4.3 g/dL / Pro: 7.9 g/dL / ALK PHOS: 134 U/L / ALT: 26 U/L / AST: 40 U/L / GGT: x             Lipase: 24 U/L (12-16-23 @ 08:25)        Urinalysis Basic - ( 16 Dec 2023 08:25 )    Color: x / Appearance: x / SG: x / pH: x  Gluc: 87 mg/dL / Ketone: x  / Bili: x / Urobili: x   Blood: x / Protein: x / Nitrite: x   Leuk Esterase: x / RBC: x / WBC x   Sq Epi: x / Non Sq Epi: x / Bacteria: x      < from: CT Angio Abdomen and Pelvis w/ IV Cont (12.16.23 @ 11:44) >  ed.    FINDINGS:  LOWER CHEST: Cardiomegaly with calcified LEFT ventricle/ventricular   aneurysm, unchanged. Median sternotomy and mitral valve replacement.   Coronary artery calcification and/or stents. Intracardiac electrodes.   Basilar vascular prominence/groundglass density and dependent   pleuroparenchymal reactive change    LIVER: Within normal limits.  BILE DUCTS: Post cholecystectomy biliary duct prominence.  GALLBLADDER: Cholecystectomy.  SPLEEN: Within normal limits.  PANCREAS: Within normal limits.  ADRENALS: Stable adrenal thickening  KIDNEYS/URETERS: Symmetric renal enhancement without calculi/ obstructive   uropathy. Too small to characterize LEFT renal hypodensity, is   statistically of no significance.    BLADDER: Within normal limits.  REPRODUCTIVE ORGANS: Hysterectomy. No adnexal mass    BOWEL: No bowel obstruction. Normal appendix. Sigmoid diverticulosis   without diverticulitis. No acute GI hemorrhage  PERITONEUM: No ascites.  VESSELS: Atherosclerotic changes.  RETROPERITONEUM/LYMPH NODES: No lymphadenopathy.  ABDOMINAL WALL: Within normal limits.  BONES: Osteopenia.. 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 Patient reports several episodes of watery diarrhaea with  blood clots. Denies abdominal pain, nausea, vomiting. Reported shortness of breath and JEFF. She uses home O2 as needed after her heart surgery on June 2021. She was admitted to [facility] with rectal bleed on March 2022. Per chart review (all script) her last EGD/ colonoscopy was on 11/2020 revealed mild antral gastritis and colonoscopy showed adenomatous polyps and left sided diverticulosis, old blood all throughout the colon, no AVM's seen. She had a a normal VCE on 12/02/2020. Patient In ED her hemoglobin  is 11.3gm. CTA abd showed sigmoid diverticulitis. No active GI bleed. Patent denies chest pain, palpitation, shortness of breath. She follows with hematology Dr. Wadsworth for chronic anemia. She is not on any anticoagulation. Takes Plavix and Aspirin 81 mg, last dose was yesterday.     Review of Systems:  . Constitutional: No fever, chills  . HEENT: Negative  · Respiratory and Thorax: No shortness of breath, no cough  · Cardiovascular: No chest pain, palpitation, no dizziness  · Gastrointestinal: see above  · Genitourinary: No hematuria  · Musculoskeletal: Negative  · Neurological: negative  · Psychiatric: no agitation, no anxiety      PAST MEDICAL/SURGICAL HISTORY:  Rheumatoid arthritis    COPD without exacerbation    HTN (hypertension)    Pacemaker    H/O cerebral artery stenosis  left cerebral artery stent per history in 2014.    S/P hysterectomy    S/P cholecystectomy    H/O exploratory laparotomy      SOCIAL HISTORY:  - TOBACCO: Former smoker, Quit 14 years ago  - ALCOHOL: Denies  - ILLICIT DRUG USE: Denies    FAMILY HISTORY:  No known history of gastrointestinal or liver disease;  FH: dementia  mother    FH: prostate cancer  father        HOME MEDICATIONS:  albuterol 2.5 mg/3 mL (0.083%) inhalation solution: 3 milliliter(s) inhaled every 12 hours, As Needed (07 Mar 2022 09:54)  ALPRAZolam 0.25 mg oral tablet: 1 tab(s) orally 3 times a day, As Needed (07 Mar 2022 09:54)  amiodarone 200 mg oral tablet: 1 tab(s) orally once a day (07 Mar 2022 09:54)  Aspirin Enteric Coated 81 mg oral delayed release tablet: 1 tab(s) orally once a day restart on sunday in two days  (07 Mar 2022 09:53)  ferrous sulfate 324 mg (65 mg elemental iron) oral delayed release tablet: 1 tab(s) orally every 48 hours (07 Mar 2022 09:53)  folic acid: 1 tab(s) orally once a day (08 Mar 2022 12:44)  methotrexate 2.5 mg oral tablet: 6 tab(s) orally once a week (07 Mar 2022 09:54)  Metoprolol Succinate ER 25 mg oral tablet, extended release: 1 tab(s) orally once a day (07 Mar 2022 09:54)  pantoprazole 40 mg oral delayed release tablet: 1 tab(s) orally once a day (07 Mar 2022 09:54)  Plavix 75 mg oral tablet: 1 tab(s) orally once a day (07 Mar 2022 09:54)  rosuvastatin 40 mg oral tablet: 1 tab(s) orally once a day (07 Mar 2022 09:54)  Stiolto Respimat 28 ACT 2.5 mcg-2.5 mcg/inh inhalation aerosol: 2 puff(s) inhaled every 24 hours (07 Mar 2022 09:54)    INPATIENT MEDICATIONS:  MEDICATIONS  (STANDING):  pantoprazole Infusion 8 mG/Hr (10 mL/Hr) IV Continuous <Continuous>    MEDICATIONS  (PRN):    ALLERGIES:  No Known Allergies    T(C): 36.3 (12-16-23 @ 09:15), Max: 36.3 (12-16-23 @ 09:15)  HR: 90 (12-16-23 @ 09:15) (90 - 90)  BP: 132/70 (12-16-23 @ 09:15) (132/70 - 132/70)  RR: 20 (12-16-23 @ 09:15) (20 - 20)  SpO2: 93% (12-16-23 @ 09:15) (93% - 93%)      PHYSICAL EXAM:    Constitutional: No acute distress  Neuro: Awake alert, oriented  HEENT: anicteric sclerae  CV: regular rate, regular rhythm  Pulm/chest: lung sounds clear/diminished bilaterally, no accessory muscle use noted  Abd: soft, nontender, nonditended, +bowel sounds. No rigidity, rebound tenderness, or guarding  Rectal exam: Soft stool with blood on the glove.   Ext: no edema  Skin: warm, no jaundice   Psych: calm, cooperative        LABS:             11.3   6.36  )-----------( 356      ( 12-16 @ 08:25 )             34.7         12-16    142  |  103  |  36.7<H>  ----------------------------<  87  4.2   |  24.0  |  1.48<H>    Ca    9.4      16 Dec 2023 08:25    TPro  7.9  /  Alb  4.3  /  TBili  0.3<L>  /  DBili  x   /  AST  40<H>  /  ALT  26  /  AlkPhos  134<H>  12-16    LIVER FUNCTIONS - ( 16 Dec 2023 08:25 )  Alb: 4.3 g/dL / Pro: 7.9 g/dL / ALK PHOS: 134 U/L / ALT: 26 U/L / AST: 40 U/L / GGT: x             Lipase: 24 U/L (12-16-23 @ 08:25)        Urinalysis Basic - ( 16 Dec 2023 08:25 )    Color: x / Appearance: x / SG: x / pH: x  Gluc: 87 mg/dL / Ketone: x  / Bili: x / Urobili: x   Blood: x / Protein: x / Nitrite: x   Leuk Esterase: x / RBC: x / WBC x   Sq Epi: x / Non Sq Epi: x / Bacteria: x      < from: CT Angio Abdomen and Pelvis w/ IV Cont (12.16.23 @ 11:44) >  ed.    FINDINGS:  LOWER CHEST: Cardiomegaly with calcified LEFT ventricle/ventricular   aneurysm, unchanged. Median sternotomy and mitral valve replacement.   Coronary artery calcification and/or stents. Intracardiac electrodes.   Basilar vascular prominence/groundglass density and dependent   pleuroparenchymal reactive change    LIVER: Within normal limits.  BILE DUCTS: Post cholecystectomy biliary duct prominence.  GALLBLADDER: Cholecystectomy.  SPLEEN: Within normal limits.  PANCREAS: Within normal limits.  ADRENALS: Stable adrenal thickening  KIDNEYS/URETERS: Symmetric renal enhancement without calculi/ obstructive   uropathy. Too small to characterize LEFT renal hypodensity, is   statistically of no significance.    BLADDER: Within normal limits.  REPRODUCTIVE ORGANS: Hysterectomy. No adnexal mass    BOWEL: No bowel obstruction. Normal appendix. Sigmoid diverticulosis   without diverticulitis. No acute GI hemorrhage  PERITONEUM: No ascites.  VESSELS: Atherosclerotic changes.  RETROPERITONEUM/LYMPH NODES: No lymphadenopathy.  ABDOMINAL WALL: Within normal limits.  BONES: Osteopenia.. 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 Patient reports several episodes of watery diarrhaea with  blood clots. Denies abdominal pain, nausea, vomiting. Reported shortness of breath and JEFF. She uses home O2 as needed after her heart surgery on June 2021. She was admitted to [facility] with rectal bleed on March 2022. Per chart review (all script) her last EGD/ colonoscopy was on 11/2020 revealed mild antral gastritis and colonoscopy showed adenomatous polyps and left sided diverticulosis, old blood all throughout the colon, no AVM's seen. She had a a normal VCE on 12/02/2020. Patient In ED her hemoglobin  is 11.3gm. CTA abd showed sigmoid diverticulitis. No active GI bleed. Patent denies chest pain, palpitation, shortness of breath. She follows with hematology Dr. Wadsworth for chronic anemia. She is not on any anticoagulation. Takes Plavix and Aspirin 81 mg, last dose was yesterday.     Review of Systems:  . Constitutional: No fever, chills  . HEENT: Negative  · Respiratory and Thorax: Supplemental home O2 as needed, shortness of breath with excertion, no cough  · Cardiovascular: No chest pain, palpitation, no dizziness, +JEFF  · Gastrointestinal: see above  · Genitourinary: No hematuria  · Musculoskeletal: Negative  · Neurological: negative  · Psychiatric: no agitation, no anxiety      PAST MEDICAL/SURGICAL HISTORY:  Rheumatoid arthritis    COPD without exacerbation    HTN (hypertension)    Pacemaker    H/O cerebral artery stenosis  left cerebral artery stent per history in 2014.    S/P hysterectomy    S/P cholecystectomy    H/O exploratory laparotomy      SOCIAL HISTORY:  - TOBACCO: Former smoker, Quit 14 years ago  - ALCOHOL: Denies  - ILLICIT DRUG USE: Denies    FAMILY HISTORY:  No known history of gastrointestinal or liver disease;  FH: dementia  mother    FH: prostate cancer  father        HOME MEDICATIONS:  albuterol 2.5 mg/3 mL (0.083%) inhalation solution: 3 milliliter(s) inhaled every 12 hours, As Needed (07 Mar 2022 09:54)  ALPRAZolam 0.25 mg oral tablet: 1 tab(s) orally 3 times a day, As Needed (07 Mar 2022 09:54)  amiodarone 200 mg oral tablet: 1 tab(s) orally once a day (07 Mar 2022 09:54)  Aspirin Enteric Coated 81 mg oral delayed release tablet: 1 tab(s) orally once a day restart on sunday in two days  (07 Mar 2022 09:53)  ferrous sulfate 324 mg (65 mg elemental iron) oral delayed release tablet: 1 tab(s) orally every 48 hours (07 Mar 2022 09:53)  folic acid: 1 tab(s) orally once a day (08 Mar 2022 12:44)  methotrexate 2.5 mg oral tablet: 6 tab(s) orally once a week (07 Mar 2022 09:54)  Metoprolol Succinate ER 25 mg oral tablet, extended release: 1 tab(s) orally once a day (07 Mar 2022 09:54)  pantoprazole 40 mg oral delayed release tablet: 1 tab(s) orally once a day (07 Mar 2022 09:54)  Plavix 75 mg oral tablet: 1 tab(s) orally once a day (07 Mar 2022 09:54)  rosuvastatin 40 mg oral tablet: 1 tab(s) orally once a day (07 Mar 2022 09:54)  Stiolto Respimat 28 ACT 2.5 mcg-2.5 mcg/inh inhalation aerosol: 2 puff(s) inhaled every 24 hours (07 Mar 2022 09:54)    INPATIENT MEDICATIONS:  MEDICATIONS  (STANDING):  pantoprazole Infusion 8 mG/Hr (10 mL/Hr) IV Continuous <Continuous>    MEDICATIONS  (PRN):    ALLERGIES:  No Known Allergies    T(C): 36.3 (12-16-23 @ 09:15), Max: 36.3 (12-16-23 @ 09:15)  HR: 90 (12-16-23 @ 09:15) (90 - 90)  BP: 132/70 (12-16-23 @ 09:15) (132/70 - 132/70)  RR: 20 (12-16-23 @ 09:15) (20 - 20)  SpO2: 93% (12-16-23 @ 09:15) (93% - 93%)      PHYSICAL EXAM:    Constitutional: No acute distress  Neuro: Awake alert, oriented  HEENT: anicteric sclerae  CV: regular rate, regular rhythm  Pulm/chest: lung sounds diminished bilaterally, increased work of breathing. on 3L O2  Abd: soft, nontender, nonditended, +bowel sounds. No rigidity, rebound tenderness, or guarding  Rectal exam: Soft stool with blood on the glove.   Ext: no edema  Skin: warm, no jaundice   Psych: calm, cooperative        LABS:             11.3   6.36  )-----------( 356      ( 12-16 @ 08:25 )             34.7         12-16    142  |  103  |  36.7<H>  ----------------------------<  87  4.2   |  24.0  |  1.48<H>    Ca    9.4      16 Dec 2023 08:25    TPro  7.9  /  Alb  4.3  /  TBili  0.3<L>  /  DBili  x   /  AST  40<H>  /  ALT  26  /  AlkPhos  134<H>  12-16    LIVER FUNCTIONS - ( 16 Dec 2023 08:25 )  Alb: 4.3 g/dL / Pro: 7.9 g/dL / ALK PHOS: 134 U/L / ALT: 26 U/L / AST: 40 U/L / GGT: x             Lipase: 24 U/L (12-16-23 @ 08:25)        Urinalysis Basic - ( 16 Dec 2023 08:25 )    Color: x / Appearance: x / SG: x / pH: x  Gluc: 87 mg/dL / Ketone: x  / Bili: x / Urobili: x   Blood: x / Protein: x / Nitrite: x   Leuk Esterase: x / RBC: x / WBC x   Sq Epi: x / Non Sq Epi: x / Bacteria: x      < from: CT Angio Abdomen and Pelvis w/ IV Cont (12.16.23 @ 11:44) >  ed.    FINDINGS:  LOWER CHEST: Cardiomegaly with calcified LEFT ventricle/ventricular   aneurysm, unchanged. Median sternotomy and mitral valve replacement.   Coronary artery calcification and/or stents. Intracardiac electrodes.   Basilar vascular prominence/groundglass density and dependent   pleuroparenchymal reactive change    LIVER: Within normal limits.  BILE DUCTS: Post cholecystectomy biliary duct prominence.  GALLBLADDER: Cholecystectomy.  SPLEEN: Within normal limits.  PANCREAS: Within normal limits.  ADRENALS: Stable adrenal thickening  KIDNEYS/URETERS: Symmetric renal enhancement without calculi/ obstructive   uropathy. Too small to characterize LEFT renal hypodensity, is   statistically of no significance.    BLADDER: Within normal limits.  REPRODUCTIVE ORGANS: Hysterectomy. No adnexal mass    BOWEL: No bowel obstruction. Normal appendix. Sigmoid diverticulosis   without diverticulitis. No acute GI hemorrhage  PERITONEUM: No ascites.  VESSELS: Atherosclerotic changes.  RETROPERITONEUM/LYMPH NODES: No lymphadenopathy.  ABDOMINAL WALL: Within normal limits.  BONES: Osteopenia.. 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 Patient reports several episodes of watery diarrhaea with  blood clots. Denies abdominal pain, nausea, vomiting. Reported shortness of breath and JEFF. She uses home O2 as needed after her heart surgery on June 2021. She was admitted to [facility] with rectal bleed on March 2022. Per chart review (all script) her last EGD/ colonoscopy was on 11/2020 revealed mild antral gastritis and colonoscopy showed adenomatous polyps and left sided diverticulosis, old blood all throughout the colon, no AVM's seen. She had a a normal VCE on 12/02/2020. Patient In ED her hemoglobin  is 11.3gm. CTA abd showed sigmoid diverticulitis. No active GI bleed. Patent denies chest pain, palpitation, shortness of breath. She follows with hematology Dr. Wadsworth for chronic anemia. She is not on any anticoagulation. Takes Plavix and Aspirin 81 mg, last dose was yesterday.     Review of Systems:  . Constitutional: No fever, chills  . HEENT: Negative  · Respiratory and Thorax: Supplemental home O2 as needed, shortness of breath with excertion, no cough  · Cardiovascular: No chest pain, palpitation, no dizziness, +JEFF  · Gastrointestinal: see above  · Genitourinary: No hematuria  · Musculoskeletal: Negative  · Neurological: negative  · Psychiatric: no agitation, no anxiety      PAST MEDICAL/SURGICAL HISTORY:  Rheumatoid arthritis    COPD without exacerbation    HTN (hypertension)    Pacemaker    H/O cerebral artery stenosis  left cerebral artery stent per history in 2014.    S/P hysterectomy    S/P cholecystectomy    H/O exploratory laparotomy      SOCIAL HISTORY:  - TOBACCO: Former smoker, Quit 14 years ago  - ALCOHOL: Denies  - ILLICIT DRUG USE: Denies    FAMILY HISTORY:  No known history of gastrointestinal or liver disease;  FH: dementia  mother    FH: prostate cancer  father        HOME MEDICATIONS:  albuterol 2.5 mg/3 mL (0.083%) inhalation solution: 3 milliliter(s) inhaled every 12 hours, As Needed (07 Mar 2022 09:54)  ALPRAZolam 0.25 mg oral tablet: 1 tab(s) orally 3 times a day, As Needed (07 Mar 2022 09:54)  amiodarone 200 mg oral tablet: 1 tab(s) orally once a day (07 Mar 2022 09:54)  Aspirin Enteric Coated 81 mg oral delayed release tablet: 1 tab(s) orally once a day restart on sunday in two days  (07 Mar 2022 09:53)  ferrous sulfate 324 mg (65 mg elemental iron) oral delayed release tablet: 1 tab(s) orally every 48 hours (07 Mar 2022 09:53)  folic acid: 1 tab(s) orally once a day (08 Mar 2022 12:44)  methotrexate 2.5 mg oral tablet: 6 tab(s) orally once a week (07 Mar 2022 09:54)  Metoprolol Succinate ER 25 mg oral tablet, extended release: 1 tab(s) orally once a day (07 Mar 2022 09:54)  pantoprazole 40 mg oral delayed release tablet: 1 tab(s) orally once a day (07 Mar 2022 09:54)  Plavix 75 mg oral tablet: 1 tab(s) orally once a day (07 Mar 2022 09:54)  rosuvastatin 40 mg oral tablet: 1 tab(s) orally once a day (07 Mar 2022 09:54)  Stiolto Respimat 28 ACT 2.5 mcg-2.5 mcg/inh inhalation aerosol: 2 puff(s) inhaled every 24 hours (07 Mar 2022 09:54)    INPATIENT MEDICATIONS:  MEDICATIONS  (STANDING):  pantoprazole Infusion 8 mG/Hr (10 mL/Hr) IV Continuous <Continuous>    MEDICATIONS  (PRN):    ALLERGIES:  No Known Allergies    T(C): 36.3 (12-16-23 @ 09:15), Max: 36.3 (12-16-23 @ 09:15)  HR: 90 (12-16-23 @ 09:15) (90 - 90)  BP: 132/70 (12-16-23 @ 09:15) (132/70 - 132/70)  RR: 20 (12-16-23 @ 09:15) (20 - 20)  SpO2: 93% (12-16-23 @ 09:15) (93% - 93%)      PHYSICAL EXAM:    Constitutional: No acute distress  Neuro: Awake alert, oriented  HEENT: anicteric sclerae  CV: regular rate, regular rhythm  Pulm/chest: lung sounds diminished bilaterally, increased work of breathing. on 3L O2  Abd: soft, nontender, nonditended, +bowel sounds. No rigidity, rebound tenderness, or guarding  Rectal exam: Soft stool with blood on the glove.   Ext: no edema  Skin: warm, no jaundice   Psych: calm, cooperative        LABS:             11.3   6.36  )-----------( 356      ( 12-16 @ 08:25 )             34.7         12-16    142  |  103  |  36.7<H>  ----------------------------<  87  4.2   |  24.0  |  1.48<H>    Ca    9.4      16 Dec 2023 08:25    TPro  7.9  /  Alb  4.3  /  TBili  0.3<L>  /  DBili  x   /  AST  40<H>  /  ALT  26  /  AlkPhos  134<H>  12-16    LIVER FUNCTIONS - ( 16 Dec 2023 08:25 )  Alb: 4.3 g/dL / Pro: 7.9 g/dL / ALK PHOS: 134 U/L / ALT: 26 U/L / AST: 40 U/L / GGT: x             Lipase: 24 U/L (12-16-23 @ 08:25)        Urinalysis Basic - ( 16 Dec 2023 08:25 )    Color: x / Appearance: x / SG: x / pH: x  Gluc: 87 mg/dL / Ketone: x  / Bili: x / Urobili: x   Blood: x / Protein: x / Nitrite: x   Leuk Esterase: x / RBC: x / WBC x   Sq Epi: x / Non Sq Epi: x / Bacteria: x      < from: CT Angio Abdomen and Pelvis w/ IV Cont (12.16.23 @ 11:44) >  ed.    FINDINGS:  LOWER CHEST: Cardiomegaly with calcified LEFT ventricle/ventricular   aneurysm, unchanged. Median sternotomy and mitral valve replacement.   Coronary artery calcification and/or stents. Intracardiac electrodes.   Basilar vascular prominence/groundglass density and dependent   pleuroparenchymal reactive change    LIVER: Within normal limits.  BILE DUCTS: Post cholecystectomy biliary duct prominence.  GALLBLADDER: Cholecystectomy.  SPLEEN: Within normal limits.  PANCREAS: Within normal limits.  ADRENALS: Stable adrenal thickening  KIDNEYS/URETERS: Symmetric renal enhancement without calculi/ obstructive   uropathy. Too small to characterize LEFT renal hypodensity, is   statistically of no significance.    BLADDER: Within normal limits.  REPRODUCTIVE ORGANS: Hysterectomy. No adnexal mass    BOWEL: No bowel obstruction. Normal appendix. Sigmoid diverticulosis   without diverticulitis. No acute GI hemorrhage  PERITONEUM: No ascites.  VESSELS: Atherosclerotic changes.  RETROPERITONEUM/LYMPH NODES: No lymphadenopathy.  ABDOMINAL WALL: Within normal limits.  BONES: Osteopenia.. 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 Patient reports several episodes of watery diarrhaea with  blood clots. Denies abdominal pain, nausea, vomiting. Reported shortness of breath and JEFF. She uses home O2 as needed after her heart surgery on June 2021. She was admitted to [facility] with rectal bleed on March 2022. Per chart review (all script) her last EGD/ colonoscopy was on 11/2020 revealed mild antral gastritis and colonoscopy showed adenomatous polyps and left sided diverticulosis, old blood all throughout the colon, no AVM's seen. She had a a normal VCE on 12/02/2020. Patient In ED her hemoglobin  is 11.3gm. CTA abd showed sigmoid diverticulosis. No active GI bleed. Patent denies chest pain, palpitation, shortness of breath. She follows with hematology Dr. Wadsworth for chronic anemia. She is not on any anticoagulation. Takes Plavix and Aspirin 81 mg, last dose was yesterday.     Review of Systems:  . Constitutional: No fever, chills  . HEENT: Negative  · Respiratory and Thorax: Supplemental home O2 as needed, shortness of breath with excertion, no cough  · Cardiovascular: No chest pain, palpitation, no dizziness, +JEFF  · Gastrointestinal: see above  · Genitourinary: No hematuria  · Musculoskeletal: Negative  · Neurological: negative  · Psychiatric: no agitation, no anxiety      PAST MEDICAL/SURGICAL HISTORY:  Rheumatoid arthritis    COPD without exacerbation    HTN (hypertension)    Pacemaker    H/O cerebral artery stenosis  left cerebral artery stent per history in 2014.    S/P hysterectomy    S/P cholecystectomy    H/O exploratory laparotomy      SOCIAL HISTORY:  - TOBACCO: Former smoker, Quit 14 years ago  - ALCOHOL: Denies  - ILLICIT DRUG USE: Denies    FAMILY HISTORY:  No known history of gastrointestinal or liver disease;  FH: dementia  mother    FH: prostate cancer  father        HOME MEDICATIONS:  albuterol 2.5 mg/3 mL (0.083%) inhalation solution: 3 milliliter(s) inhaled every 12 hours, As Needed (07 Mar 2022 09:54)  ALPRAZolam 0.25 mg oral tablet: 1 tab(s) orally 3 times a day, As Needed (07 Mar 2022 09:54)  amiodarone 200 mg oral tablet: 1 tab(s) orally once a day (07 Mar 2022 09:54)  Aspirin Enteric Coated 81 mg oral delayed release tablet: 1 tab(s) orally once a day restart on sunday in two days  (07 Mar 2022 09:53)  ferrous sulfate 324 mg (65 mg elemental iron) oral delayed release tablet: 1 tab(s) orally every 48 hours (07 Mar 2022 09:53)  folic acid: 1 tab(s) orally once a day (08 Mar 2022 12:44)  methotrexate 2.5 mg oral tablet: 6 tab(s) orally once a week (07 Mar 2022 09:54)  Metoprolol Succinate ER 25 mg oral tablet, extended release: 1 tab(s) orally once a day (07 Mar 2022 09:54)  pantoprazole 40 mg oral delayed release tablet: 1 tab(s) orally once a day (07 Mar 2022 09:54)  Plavix 75 mg oral tablet: 1 tab(s) orally once a day (07 Mar 2022 09:54)  rosuvastatin 40 mg oral tablet: 1 tab(s) orally once a day (07 Mar 2022 09:54)  Stiolto Respimat 28 ACT 2.5 mcg-2.5 mcg/inh inhalation aerosol: 2 puff(s) inhaled every 24 hours (07 Mar 2022 09:54)    INPATIENT MEDICATIONS:  MEDICATIONS  (STANDING):  pantoprazole Infusion 8 mG/Hr (10 mL/Hr) IV Continuous <Continuous>    MEDICATIONS  (PRN):    ALLERGIES:  No Known Allergies    T(C): 36.3 (12-16-23 @ 09:15), Max: 36.3 (12-16-23 @ 09:15)  HR: 90 (12-16-23 @ 09:15) (90 - 90)  BP: 132/70 (12-16-23 @ 09:15) (132/70 - 132/70)  RR: 20 (12-16-23 @ 09:15) (20 - 20)  SpO2: 93% (12-16-23 @ 09:15) (93% - 93%)      PHYSICAL EXAM:    Constitutional: No acute distress  Neuro: Awake alert, oriented  HEENT: anicteric sclerae  CV: regular rate, regular rhythm  Pulm/chest: lung sounds diminished bilaterally, increased work of breathing. on 3L O2  Abd: soft, nontender, nonditended, +bowel sounds. No rigidity, rebound tenderness, or guarding  Rectal exam: Soft stool with blood on the glove.   Ext: no edema  Skin: warm, no jaundice   Psych: calm, cooperative        LABS:             11.3   6.36  )-----------( 356      ( 12-16 @ 08:25 )             34.7         12-16    142  |  103  |  36.7<H>  ----------------------------<  87  4.2   |  24.0  |  1.48<H>    Ca    9.4      16 Dec 2023 08:25    TPro  7.9  /  Alb  4.3  /  TBili  0.3<L>  /  DBili  x   /  AST  40<H>  /  ALT  26  /  AlkPhos  134<H>  12-16    LIVER FUNCTIONS - ( 16 Dec 2023 08:25 )  Alb: 4.3 g/dL / Pro: 7.9 g/dL / ALK PHOS: 134 U/L / ALT: 26 U/L / AST: 40 U/L / GGT: x             Lipase: 24 U/L (12-16-23 @ 08:25)        Urinalysis Basic - ( 16 Dec 2023 08:25 )    Color: x / Appearance: x / SG: x / pH: x  Gluc: 87 mg/dL / Ketone: x  / Bili: x / Urobili: x   Blood: x / Protein: x / Nitrite: x   Leuk Esterase: x / RBC: x / WBC x   Sq Epi: x / Non Sq Epi: x / Bacteria: x      < from: CT Angio Abdomen and Pelvis w/ IV Cont (12.16.23 @ 11:44) >  ed.    FINDINGS:  LOWER CHEST: Cardiomegaly with calcified LEFT ventricle/ventricular   aneurysm, unchanged. Median sternotomy and mitral valve replacement.   Coronary artery calcification and/or stents. Intracardiac electrodes.   Basilar vascular prominence/groundglass density and dependent   pleuroparenchymal reactive change    LIVER: Within normal limits.  BILE DUCTS: Post cholecystectomy biliary duct prominence.  GALLBLADDER: Cholecystectomy.  SPLEEN: Within normal limits.  PANCREAS: Within normal limits.  ADRENALS: Stable adrenal thickening  KIDNEYS/URETERS: Symmetric renal enhancement without calculi/ obstructive   uropathy. Too small to characterize LEFT renal hypodensity, is   statistically of no significance.    BLADDER: Within normal limits.  REPRODUCTIVE ORGANS: Hysterectomy. No adnexal mass    BOWEL: No bowel obstruction. Normal appendix. Sigmoid diverticulosis   without diverticulitis. No acute GI hemorrhage  PERITONEUM: No ascites.  VESSELS: Atherosclerotic changes.  RETROPERITONEUM/LYMPH NODES: No lymphadenopathy.  ABDOMINAL WALL: Within normal limits.  BONES: Osteopenia.. 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 Patient reports several episodes of watery diarrhaea with  blood clots. Denies abdominal pain, nausea, vomiting. Reported shortness of breath and JEFF. She uses home O2 as needed after her heart surgery on June 2021. She was admitted to [facility] with rectal bleed on March 2022. Per chart review (all script) her last EGD/ colonoscopy was on 11/2020 revealed mild antral gastritis and colonoscopy showed adenomatous polyps and left sided diverticulosis, old blood all throughout the colon, no AVM's seen. She had a a normal VCE on 12/02/2020. Patient In ED her hemoglobin  is 11.3gm. CTA abd showed sigmoid diverticulosis. No active GI bleed. Patent denies chest pain, palpitation, shortness of breath. She follows with hematology Dr. Wadsworth for chronic anemia. She is not on any anticoagulation. Takes Plavix and Aspirin 81 mg, last dose was yesterday.     Review of Systems:  . Constitutional: No fever, chills  . HEENT: Negative  · Respiratory and Thorax: Supplemental home O2 as needed, shortness of breath with excertion, no cough  · Cardiovascular: No chest pain, palpitation, no dizziness, +JEFF  · Gastrointestinal: see above  · Genitourinary: No hematuria  · Musculoskeletal: Negative  · Neurological: negative  · Psychiatric: no agitation, no anxiety      PAST MEDICAL/SURGICAL HISTORY:  Rheumatoid arthritis    COPD without exacerbation    HTN (hypertension)    Pacemaker    H/O cerebral artery stenosis  left cerebral artery stent per history in 2014.    S/P hysterectomy    S/P cholecystectomy    H/O exploratory laparotomy      SOCIAL HISTORY:  - TOBACCO: Former smoker, Quit 14 years ago  - ALCOHOL: Denies  - ILLICIT DRUG USE: Denies    FAMILY HISTORY:  No known history of gastrointestinal or liver disease;  FH: dementia  mother    FH: prostate cancer  father        HOME MEDICATIONS:  albuterol 2.5 mg/3 mL (0.083%) inhalation solution: 3 milliliter(s) inhaled every 12 hours, As Needed (07 Mar 2022 09:54)  ALPRAZolam 0.25 mg oral tablet: 1 tab(s) orally 3 times a day, As Needed (07 Mar 2022 09:54)  amiodarone 200 mg oral tablet: 1 tab(s) orally once a day (07 Mar 2022 09:54)  Aspirin Enteric Coated 81 mg oral delayed release tablet: 1 tab(s) orally once a day restart on sunday in two days  (07 Mar 2022 09:53)  ferrous sulfate 324 mg (65 mg elemental iron) oral delayed release tablet: 1 tab(s) orally every 48 hours (07 Mar 2022 09:53)  folic acid: 1 tab(s) orally once a day (08 Mar 2022 12:44)  methotrexate 2.5 mg oral tablet: 6 tab(s) orally once a week (07 Mar 2022 09:54)  Metoprolol Succinate ER 25 mg oral tablet, extended release: 1 tab(s) orally once a day (07 Mar 2022 09:54)  pantoprazole 40 mg oral delayed release tablet: 1 tab(s) orally once a day (07 Mar 2022 09:54)  Plavix 75 mg oral tablet: 1 tab(s) orally once a day (07 Mar 2022 09:54)  rosuvastatin 40 mg oral tablet: 1 tab(s) orally once a day (07 Mar 2022 09:54)  Stiolto Respimat 28 ACT 2.5 mcg-2.5 mcg/inh inhalation aerosol: 2 puff(s) inhaled every 24 hours (07 Mar 2022 09:54)    INPATIENT MEDICATIONS:  MEDICATIONS  (STANDING):  pantoprazole Infusion 8 mG/Hr (10 mL/Hr) IV Continuous <Continuous>    MEDICATIONS  (PRN):    ALLERGIES:  No Known Allergies    T(C): 36.3 (12-16-23 @ 09:15), Max: 36.3 (12-16-23 @ 09:15)  HR: 90 (12-16-23 @ 09:15) (90 - 90)  BP: 132/70 (12-16-23 @ 09:15) (132/70 - 132/70)  RR: 20 (12-16-23 @ 09:15) (20 - 20)  SpO2: 93% (12-16-23 @ 09:15) (93% - 93%)      PHYSICAL EXAM:    Constitutional: No acute distress  Neuro: Awake alert, oriented  HEENT: anicteric sclerae  CV: regular rate, regular rhythm  Pulm/chest: lung sounds diminished bilaterally, increased work of breathing. on 3L O2  Abd: soft, nontender, nonditended, +bowel sounds. No rigidity, rebound tenderness, or guarding  Rectal exam: Soft stool with blood on the glove.   Ext: no edema  Skin: warm, no jaundice   Psych: calm, cooperative        LABS:             11.3   6.36  )-----------( 356      ( 12-16 @ 08:25 )             34.7         12-16    142  |  103  |  36.7<H>  ----------------------------<  87  4.2   |  24.0  |  1.48<H>    Ca    9.4      16 Dec 2023 08:25    TPro  7.9  /  Alb  4.3  /  TBili  0.3<L>  /  DBili  x   /  AST  40<H>  /  ALT  26  /  AlkPhos  134<H>  12-16    LIVER FUNCTIONS - ( 16 Dec 2023 08:25 )  Alb: 4.3 g/dL / Pro: 7.9 g/dL / ALK PHOS: 134 U/L / ALT: 26 U/L / AST: 40 U/L / GGT: x             Lipase: 24 U/L (12-16-23 @ 08:25)        Urinalysis Basic - ( 16 Dec 2023 08:25 )    Color: x / Appearance: x / SG: x / pH: x  Gluc: 87 mg/dL / Ketone: x  / Bili: x / Urobili: x   Blood: x / Protein: x / Nitrite: x   Leuk Esterase: x / RBC: x / WBC x   Sq Epi: x / Non Sq Epi: x / Bacteria: x      < from: CT Angio Abdomen and Pelvis w/ IV Cont (12.16.23 @ 11:44) >  ed.    FINDINGS:  LOWER CHEST: Cardiomegaly with calcified LEFT ventricle/ventricular   aneurysm, unchanged. Median sternotomy and mitral valve replacement.   Coronary artery calcification and/or stents. Intracardiac electrodes.   Basilar vascular prominence/groundglass density and dependent   pleuroparenchymal reactive change    LIVER: Within normal limits.  BILE DUCTS: Post cholecystectomy biliary duct prominence.  GALLBLADDER: Cholecystectomy.  SPLEEN: Within normal limits.  PANCREAS: Within normal limits.  ADRENALS: Stable adrenal thickening  KIDNEYS/URETERS: Symmetric renal enhancement without calculi/ obstructive   uropathy. Too small to characterize LEFT renal hypodensity, is   statistically of no significance.    BLADDER: Within normal limits.  REPRODUCTIVE ORGANS: Hysterectomy. No adnexal mass    BOWEL: No bowel obstruction. Normal appendix. Sigmoid diverticulosis   without diverticulitis. No acute GI hemorrhage  PERITONEUM: No ascites.  VESSELS: Atherosclerotic changes.  RETROPERITONEUM/LYMPH NODES: No lymphadenopathy.  ABDOMINAL WALL: Within normal limits.  BONES: Osteopenia.. Minimal lumbar degenerative change produces up to   moderate L4-5 central canal stenosis    IMPRESSION:  No CT evidence of active gastrointestinal hemorrhage    < end of copied text >

## 2023-12-16 NOTE — PROGRESS NOTE ADULT - ASSESSMENT
74 y/o F [facility] hx of COPD (wear home oxygen), chronic anemia (follows with Dr. Wadsworth), freddy-arrhythmia with cardiac arrest s/p MDT PPM, STEMI in May 2021, CAD with PCI to TATA to LM and LAD in June 2021 c/b apical pseudoaneruysm and VSD s/p open repair with MV replacement, HFrEF (EF 30-35%), renal artery stenosis, AFib (not on AC due to hx of GIB), SBO s/p small bowel resection, CVA, TIA, presenting to the ED with complaints of rectal bleeding that started one day prior to arrival to the ED. Patient reports having 4 episodes of watery diarrhea with blood clots and one small rectal bleed the morning before coming to the ED. Cardiology consulted for risk stratification for colonoscopy.      72 y/o F [facility] hx of COPD (wear home oxygen), chronic anemia (follows with Dr. Wadsworth), freddy-arrhythmia with cardiac arrest s/p MDT PPM, STEMI in May 2021, CAD with PCI to TATA to LM and LAD in June 2021 c/b apical pseudoaneruysm and VSD s/p open repair with MV replacement, HFrEF (EF 30-35%), renal artery stenosis, AFib (not on AC due to hx of GIB), SBO s/p small bowel resection, CVA, TIA, presenting to the ED with complaints of rectal bleeding that started one day prior to arrival to the ED. Patient reports having 4 episodes of watery diarrhea with blood clots and one small rectal bleed the morning before coming to the ED. Cardiology consulted for risk stratification for colonoscopy.

## 2023-12-16 NOTE — CONSULT NOTE ADULT - NS ATTEND AMEND GEN_ALL_CORE FT
I evaluated and examined this pt. with my ACP in order to formulate the above assessment and management plan. Pt. with painless hematochezia. CTA negative for active GI hemorrhage. Will need colonoscopy Monday. Pre procedure cardiac clearance needed. Repeat labs ordered for the AM/. IV Pantoprazole for GI mucosal cytoprotection.

## 2023-12-16 NOTE — CONSULT NOTE ADULT - TIME BILLING
Reviewing CT images and lab work and speaking with both the pt and her daughter in law over the phone to discuss the above assessment and management plan. Reviewing CT images and lab work and speaking with both the pt and her daughter in law over the phone to discuss the above assessment and management plan. Pt with extensive past medical history which was thoroughly reviewed.

## 2023-12-16 NOTE — ED PROVIDER NOTE - CLINICAL SUMMARY MEDICAL DECISION MAKING FREE TEXT BOX
The patient is a 73 year old female presents with rectal bleed with diarrhea for one day. Currently c/o weakness and SOB. Bright red blood noted on ADRIAN. GI consulted and labs ordered. CT angio abd negative for active bleeding source. GI recommend colonoscopy on Monday, patient to be placed NPO after Sunday midnight. Cardiology consulted to clear patient.

## 2023-12-16 NOTE — ED ADULT NURSE NOTE - OBJECTIVE STATEMENT
Pt presents to ED for rectal bleeding that started last pm with bowel movements. Pt states she has need blood transfusions In the past from rectal bleeding. On 3L O2 at baseline at home for COPD.

## 2023-12-16 NOTE — PROGRESS NOTE ADULT - NS ATTEND AMEND GEN_ALL_CORE FT
Patient follows with [facility], will be followed by them going forward Patient follows with [facility], will be followed by them going forward

## 2023-12-16 NOTE — ED ADULT NURSE NOTE - NSFALLUNIVINTERV_ED_ALL_ED
Bed/Stretcher in lowest position, wheels locked, appropriate side rails in place/Call bell, personal items and telephone in reach/Instruct patient to call for assistance before getting out of bed/chair/stretcher/Non-slip footwear applied when patient is off stretcher/[place] to call system/Physically safe environment - no spills, clutter or unnecessary equipment/Purposeful proactive rounding/Room/bathroom lighting operational, light cord in reach Bed/Stretcher in lowest position, wheels locked, appropriate side rails in place/Call bell, personal items and telephone in reach/Instruct patient to call for assistance before getting out of bed/chair/stretcher/Non-slip footwear applied when patient is off stretcher/[place] to call system/Physically safe environment - no spills, clutter or unnecessary equipment/Purposeful proactive rounding/Room/bathroom lighting operational, light cord in reach

## 2023-12-16 NOTE — H&P ADULT - HISTORY OF PRESENT ILLNESS
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 Patient reports 4 episodes of watery diarrhaea with  blood clots yesterday and 1 small rectal bleed early am. Denies abdominal pain, nausea, vomiting, cp, sob. Pt is on ASA/Plavix-last dose yesterday.In ED her hemoglobin  is 11.3gm. CTA abd showed w/o sigmoid diverticulitis.  Last EGD/ colonoscopy was on 11/2020 revealed mild antral gastritis and colonoscopy showed adenomatous polyps and left sided diverticulosis, old blood all throughout the colon, .      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    S/P hysterectomy    S/P cholecystectomy    H/O exploratory laparotomy      < from: CT Angio Abdomen and Pelvis w/ IV Cont (12.16.23 @ 11:44) >  IMPRESSION:  No CT evidence of active gastrointestinal hemorrhage    < end of copied text >  < from: CT Angio Abdomen and Pelvis w/ IV Cont (12.16.23 @ 11:44) >    BOWEL: No bowel obstruction. Normal appendix. Sigmoid diverticulosis   without diverticulitis. No acute GI hemorrhage    < end of copied text >  SOCIAL HISTORY:  - TOBACCO: Former smoker, Quit 14 years ago  - ALCOHOL: Denies  - ILLICIT DRUG USE: Denies    FAMILY HISTORY:   dementia. 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 Patient reports 4 episodes of watery diarrhaea with  blood clots yesterday and 1 small rectal bleed early am. Denies abdominal pain, nausea, vomiting, cp, sob. Pt is on ASA/Plavix-last dose yesterday.In ED her hemoglobin  is 11.3gm. CTA abd showed w/o active bleed/ sigmoid diverticulitis.  Last EGD/ colonoscopy was on 11/2020 revealed mild antral gastritis and colonoscopy showed adenomatous polyps and left sided diverticulosis, old blood all throughout the colon, .      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    S/P hysterectomy    S/P cholecystectomy    H/O exploratory laparotomy      < from: CT Angio Abdomen and Pelvis w/ IV Cont (12.16.23 @ 11:44) >  IMPRESSION:  No CT evidence of active gastrointestinal hemorrhage    < end of copied text >  < from: CT Angio Abdomen and Pelvis w/ IV Cont (12.16.23 @ 11:44) >    BOWEL: No bowel obstruction. Normal appendix. Sigmoid diverticulosis   without diverticulitis. No acute GI hemorrhage    < end of copied text >  SOCIAL HISTORY:  - TOBACCO: Former smoker, Quit 14 years ago  - ALCOHOL: Denies  - ILLICIT DRUG USE: Denies    FAMILY HISTORY:   dementia. prostate cancer

## 2023-12-16 NOTE — PROGRESS NOTE ADULT - SUBJECTIVE AND OBJECTIVE BOX
[facility]                                              CARDIOLOGY AT [address], [place] [address]                                             Telephone: [phone]. Fax:[phone]                                                       CARDIOLOGY CONSULTATION NOTE                                                                                             History obtained by: Patient and medical record  Community Cardiologist: Dr Zarate    obtained: Yes [  ] No [ x ]  Reason for Consultation: cardiac clearance   Available out pt records reviewed: Yes [ x ] No [  ]    Chief complaint:    Patient is a 73y old  Female who presents with a chief complaint of Gi bleed (16 Dec 2023 14:47)      HPI:  72 y/o F witih hx of COPD (wear home oxygen), chronic anemia (follows with Dr. Wadsworth), freddy-arrhythmia with cardiac arrest s/p MDT PPM, STEMI in May 2021, CAD with PCI to TATA to LM and LAD in June 2021 c/b apical pseudoaneruysm and VSD s/p open repair with MV replacement, HFrEF (EF 30-35%), renal artery stenosis, AFib (not on AC due to hx of GIB), SBO s/p small bowel resection, CVA, TIA, presenting to the ED with complaints of rectal bleeding that started one day prior to arrival to the ED. Patient reports having 4 episodes of watery diarrhea with blood clots and one small rectal bleed the morning before coming to the ED. Patient denies chest pain, SOB, JEFF, chest pressure, anginal equivalent symptoms, nausea, vomiting, and recent sick contact.        CARDIAC TESTING   ECHO: < from: TTE Echo Complete w/ Contrast w/ Doppler (03.06.22 @ 12:23) >   1. Technically good study.   2. Endocardial visualization was enhanced with intravenous echo contrast.   3. Aneurysm of the mid to distal inferior and inferolateral walls.   4. Severely decreased global left ventricular systolic function.   5. Left ventricular ejection fraction, by visual estimation, is 25 to   30%.   6. Multiple left ventricular regional wall motion abnormalities exist.   See wall motion findings.   7. Spectral Doppler shows impaired relaxation pattern of left   ventricular myocardial filling (Grade I diastolic dysfunction).   8. Mildly reduced RV systolic function.   9. Trace mitral valve regurgitation.  10. Sclerotic aortic valve with normal opening.  11. Mild aortic regurgitation.  12. Moderate tricuspid regurgitation.  13. Mitral valve mean gradient is 4.9 mmHg consistent with mild mitral   stenosis. S/P Mitral valve replacement.  14. Estimated pulmonary artery systolic pressure is 39.3 mmHg assuming a   right atrial pressure of 5 mmHg, which is consistent with borderline   pulmonary hypertension.  15. There is no evidence of pericardial effusion.    < end of copied text >    per patient, had an echocardiogram in the outpatient office and her EF improved to 30%.     STRESS: none    CATH: < from: Cardiac Catheterization (03.05.22 @ 20:03) >  CORONARY VESSELS: The coronary circulation is right dominant.  LM:   --  LM: There was a 10 % stenosis within the stented segment.  LAD:   --  Proximal LAD: There was a 0 % stenosis in-stent. In a second  lesion, there was a 50 % stenosis.  CX:   --  OM1: There was a 40 % stenosis.  RCA:   --  Proximal RCA: There was a 20 % stenosis.  --  Distal RCA: There was a 40 % stenosis. In a second lesion, there was a  60 % stenosis.  COMPLICATIONS: No complications occurred during the cath lab visit.  DIAGNOSTIC IMPRESSIONS: Patent LM stent.  Low LVEDP.  DIAGNOSTIC RECOMMENDATIONS: Acute kidney injury, acute anemia.  Unclear etiology.  Patient needs PRBC transfusion. Renal consult.  Chest X-ray to rule outpleural effusion (recent thoracentesis)    < end of copied text >      ELECTROPHYSIOLOGY: s/p MDT PPM     PAST MEDICAL HISTORY  No pertinent past medical history    Rheumatoid arthritis    COPD without exacerbation    HTN (hypertension)    Pacemaker    H/O cerebral artery stenosis        PAST SURGICAL HISTORY  No significant past surgical history    S/P hysterectomy    S/P cholecystectomy    H/O exploratory laparotomy        SOCIAL HISTORY: denies alcohol and drug use  CIGARETTES:    former smoker. quit in 2014   ALCOHOL:  DRUGS:    FAMILY HISTORY:  FH: dementia  mother    FH: prostate cancer  father      Family History of Cardiovascular Disease:  Yes [  ] No [ x ]  Coronary Artery Disease in first degree relative: Yes [  ] No [  x]  Sudden Cardiac Death in First degree relative: Yes [  ] No [ x ]    HOME MEDICATIONS:  albuterol 2.5 mg/3 mL (0.083%) inhalation solution: 3 milliliter(s) inhaled every 12 hours, As Needed (07 Mar 2022 09:54)  ALPRAZolam 0.25 mg oral tablet: 1 tab(s) orally 3 times a day, As Needed (07 Mar 2022 09:54)  amiodarone 200 mg oral tablet: 1 tab(s) orally once a day (07 Mar 2022 09:54)  Aspirin Enteric Coated 81 mg oral delayed release tablet: 1 tab(s) orally once a day restart on sunday in two days  (07 Mar 2022 09:53)  Entresto 24 mg-26 mg oral tablet: 1 tab(s) orally 2 times a day hold if sbp&lt;100 (16 Dec 2023 14:52)  ferrous sulfate 324 mg (65 mg elemental iron) oral delayed release tablet: 1 tab(s) orally every 48 hours (07 Mar 2022 09:53)  folic acid: 1 tab(s) orally once a day (08 Mar 2022 12:44)  Jardiance 10 mg oral tablet: 1 tab(s) orally once a day (16 Dec 2023 14:53)  methotrexate 2.5 mg oral tablet: 6 tab(s) orally once a week (07 Mar 2022 09:54)  Metoprolol Succinate ER 25 mg oral tablet, extended release: 1 tab(s) orally once a day (07 Mar 2022 09:54)  pantoprazole 40 mg oral delayed release tablet: 1 tab(s) orally once a day (07 Mar 2022 09:54)  Plavix 75 mg oral tablet: 1 tab(s) orally once a day (07 Mar 2022 09:54)  rosuvastatin 40 mg oral tablet: 1 tab(s) orally once a day (07 Mar 2022 09:54)  Stiolto Respimat 28 ACT 2.5 mcg-2.5 mcg/inh inhalation aerosol: 2 puff(s) inhaled every 24 hours (07 Mar 2022 09:54)  torsemide 20 mg oral tablet: 1 tab(s) orally once a day (16 Dec 2023 14:53)      CURRENT CARDIAC MEDICATIONS:  aMIOdarone    Tablet 200 milliGRAM(s) Oral daily  metoprolol succinate ER 25 milliGRAM(s) Oral daily  midodrine 10 milliGRAM(s) Oral every 8 hours PRN hold if sbp>130  sacubitril 24 mG/valsartan 26 mG 1 Tablet(s) Oral two times a day  torsemide 20 milliGRAM(s) Oral daily      CURRENT OTHER MEDICATIONS:  albuterol    0.083% 2.5 milliGRAM(s) Nebulizer every 12 hours  acetaminophen     Tablet .. 650 milliGRAM(s) Oral every 6 hours PRN Temp greater or equal to 38C (100.4F), Mild Pain (1 - 3)  ALPRAZolam 0.25 milliGRAM(s) Oral three times a day PRN anxiety  melatonin 3 milliGRAM(s) Oral at bedtime PRN Insomnia  ondansetron Injectable 4 milliGRAM(s) IV Push every 8 hours PRN Nausea and/or Vomiting  aluminum hydroxide/magnesium hydroxide/simethicone Suspension 30 milliLiter(s) Oral every 4 hours PRN Dyspepsia  pantoprazole  Injectable 40 milliGRAM(s) IV Push every 12 hours  atorvastatin 80 milliGRAM(s) Oral at bedtime  folic acid 1 milliGRAM(s) Oral daily  methotrexate 15 milliGRAM(s) Oral every week      ALLERGIES:   morphine (Nausea)  No Known Allergies      REVIEW OF SYMPTOMS:   CONSTITUTIONAL: No fever, no chills, no weight loss, no weight gain, no fatigue   ENMT:  No vertigo; No sinus or throat pain  NECK: No pain or stiffness  CARDIOVASCULAR: No chest pain, no dyspnea, no syncope/presyncope, no palpitations, no dizziness, no Orthopnea, no Paroxsymal nocturnal dyspnea  RESPIRATORY: no Shortness of breath, no cough, no wheezing  : No dysuria, no hematuria   GI: No dark color stool, no nausea, no diarrhea, no constipation, no abdominal pain   NEURO: No headache, no slurred speech   MUSCULOSKELETAL: No joint pain or swelling; No muscle, back, or extremity pain  PSYCH: No agitation, no anxiety.    ALL OTHER REVIEW OF SYSTEMS ARE NEGATIVE.    VITAL SIGNS:  T(C): 36.3 (12-16-23 @ 09:15), Max: 36.3 (12-16-23 @ 09:15)  T(F): 97.4 (12-16-23 @ 09:15), Max: 97.4 (12-16-23 @ 09:15)  HR: 90 (12-16-23 @ 09:15) (90 - 90)  BP: 132/70 (12-16-23 @ 09:15) (132/70 - 132/70)  RR: 20 (12-16-23 @ 09:15) (20 - 20)  SpO2: 93% (12-16-23 @ 09:15) (93% - 93%)    INTAKE AND OUTPUT:       PHYSICAL EXAM:  Constitutional: Comfortable . No acute distress.   HEENT: Atraumatic and normocephalic , neck is supple . no JVD. No carotid bruit.  CNS: A&Ox3. No focal deficits.   Respiratory: CTAB, unlabored   Cardiovascular: RRR normal s1 s2. No murmur. No rubs or gallop.  Gastrointestinal: Soft, non-tender. +Bowel sounds.   Extremities: 2+ Peripheral Pulses, No clubbing, cyanosis, or edema  Psychiatric: Calm . no agitation.   Skin: Warm and dry, no ulcers on extremities     LABS:                            11.3   6.36  )-----------( 356      ( 16 Dec 2023 08:25 )             34.7     12-16    142  |  103  |  36.7<H>  ----------------------------<  87  4.2   |  24.0  |  1.48<H>    Ca    9.4      16 Dec 2023 08:25    TPro  7.9  /  Alb  4.3  /  TBili  0.3<L>  /  DBili  x   /  AST  40<H>  /  ALT  26  /  AlkPhos  134<H>  12-16      Urinalysis Basic - ( 16 Dec 2023 14:31 )    Color: Yellow / Appearance: Clear / SG: >1.030 / pH: x  Gluc: x / Ketone: Trace mg/dL  / Bili: Negative / Urobili: 1.0 mg/dL   Blood: x / Protein: 30 mg/dL / Nitrite: Negative   Leuk Esterase: Negative / RBC: 2 /HPF / WBC 6 /HPF   Sq Epi: x / Non Sq Epi: 1 /HPF / Bacteria: Negative /HPF              INTERPRETATION OF TELEMETRY: not on telemetry     ECG:   Prior ECG: Yes [ x ] No [  ]    RADIOLOGY & ADDITIONAL STUDIES:    X-ray:  < from: Xray Chest 1 View- PORTABLE-Urgent (12.16.23 @ 10:45) >  IMPRESSION: Prior CHF has cleared. Other findings stable.    < end of copied text >    CT scan: < from: CT Angio Abdomen and Pelvis w/ IV Cont (12.16.23 @ 11:44) >  IMPRESSION:  No CT evidence of active gastrointestinal hemorrhage    < end of copied text >    MRI:   US:                                                [facility]                                              CARDIOLOGY AT [address], [place] [address]                                             Telephone: [phone]. Fax:[phone]                                                       CARDIOLOGY CONSULTATION NOTE                                                                                             History obtained by: Patient and medical record  Community Cardiologist: Dr Zarate    obtained: Yes [  ] No [ x ]  Reason for Consultation: cardiac clearance   Available out pt records reviewed: Yes [ x ] No [  ]    Chief complaint:    Patient is a 73y old  Female who presents with a chief complaint of Gi bleed (16 Dec 2023 14:47)      HPI:  74 y/o F witih hx of COPD (wear home oxygen), chronic anemia (follows with Dr. Wadsworth), freddy-arrhythmia with cardiac arrest s/p MDT PPM, STEMI in May 2021, CAD with PCI to TATA to LM and LAD in June 2021 c/b apical pseudoaneruysm and VSD s/p open repair with MV replacement, HFrEF (EF 30-35%), renal artery stenosis, AFib (not on AC due to hx of GIB), SBO s/p small bowel resection, CVA, TIA, presenting to the ED with complaints of rectal bleeding that started one day prior to arrival to the ED. Patient reports having 4 episodes of watery diarrhea with blood clots and one small rectal bleed the morning before coming to the ED. Patient denies chest pain, SOB, JEFF, chest pressure, anginal equivalent symptoms, nausea, vomiting, and recent sick contact.        CARDIAC TESTING   ECHO: < from: TTE Echo Complete w/ Contrast w/ Doppler (03.06.22 @ 12:23) >   1. Technically good study.   2. Endocardial visualization was enhanced with intravenous echo contrast.   3. Aneurysm of the mid to distal inferior and inferolateral walls.   4. Severely decreased global left ventricular systolic function.   5. Left ventricular ejection fraction, by visual estimation, is 25 to   30%.   6. Multiple left ventricular regional wall motion abnormalities exist.   See wall motion findings.   7. Spectral Doppler shows impaired relaxation pattern of left   ventricular myocardial filling (Grade I diastolic dysfunction).   8. Mildly reduced RV systolic function.   9. Trace mitral valve regurgitation.  10. Sclerotic aortic valve with normal opening.  11. Mild aortic regurgitation.  12. Moderate tricuspid regurgitation.  13. Mitral valve mean gradient is 4.9 mmHg consistent with mild mitral   stenosis. S/P Mitral valve replacement.  14. Estimated pulmonary artery systolic pressure is 39.3 mmHg assuming a   right atrial pressure of 5 mmHg, which is consistent with borderline   pulmonary hypertension.  15. There is no evidence of pericardial effusion.    < end of copied text >    per patient, had an echocardiogram in the outpatient office and her EF improved to 30%.     STRESS: none    CATH: < from: Cardiac Catheterization (03.05.22 @ 20:03) >  CORONARY VESSELS: The coronary circulation is right dominant.  LM:   --  LM: There was a 10 % stenosis within the stented segment.  LAD:   --  Proximal LAD: There was a 0 % stenosis in-stent. In a second  lesion, there was a 50 % stenosis.  CX:   --  OM1: There was a 40 % stenosis.  RCA:   --  Proximal RCA: There was a 20 % stenosis.  --  Distal RCA: There was a 40 % stenosis. In a second lesion, there was a  60 % stenosis.  COMPLICATIONS: No complications occurred during the cath lab visit.  DIAGNOSTIC IMPRESSIONS: Patent LM stent.  Low LVEDP.  DIAGNOSTIC RECOMMENDATIONS: Acute kidney injury, acute anemia.  Unclear etiology.  Patient needs PRBC transfusion. Renal consult.  Chest X-ray to rule outpleural effusion (recent thoracentesis)    < end of copied text >      ELECTROPHYSIOLOGY: s/p MDT PPM     PAST MEDICAL HISTORY  No pertinent past medical history    Rheumatoid arthritis    COPD without exacerbation    HTN (hypertension)    Pacemaker    H/O cerebral artery stenosis        PAST SURGICAL HISTORY  No significant past surgical history    S/P hysterectomy    S/P cholecystectomy    H/O exploratory laparotomy        SOCIAL HISTORY: denies alcohol and drug use  CIGARETTES:    former smoker. quit in 2014   ALCOHOL:  DRUGS:    FAMILY HISTORY:  FH: dementia  mother    FH: prostate cancer  father      Family History of Cardiovascular Disease:  Yes [  ] No [ x ]  Coronary Artery Disease in first degree relative: Yes [  ] No [  x]  Sudden Cardiac Death in First degree relative: Yes [  ] No [ x ]    HOME MEDICATIONS:  albuterol 2.5 mg/3 mL (0.083%) inhalation solution: 3 milliliter(s) inhaled every 12 hours, As Needed (07 Mar 2022 09:54)  ALPRAZolam 0.25 mg oral tablet: 1 tab(s) orally 3 times a day, As Needed (07 Mar 2022 09:54)  amiodarone 200 mg oral tablet: 1 tab(s) orally once a day (07 Mar 2022 09:54)  Aspirin Enteric Coated 81 mg oral delayed release tablet: 1 tab(s) orally once a day restart on sunday in two days  (07 Mar 2022 09:53)  Entresto 24 mg-26 mg oral tablet: 1 tab(s) orally 2 times a day hold if sbp&lt;100 (16 Dec 2023 14:52)  ferrous sulfate 324 mg (65 mg elemental iron) oral delayed release tablet: 1 tab(s) orally every 48 hours (07 Mar 2022 09:53)  folic acid: 1 tab(s) orally once a day (08 Mar 2022 12:44)  Jardiance 10 mg oral tablet: 1 tab(s) orally once a day (16 Dec 2023 14:53)  methotrexate 2.5 mg oral tablet: 6 tab(s) orally once a week (07 Mar 2022 09:54)  Metoprolol Succinate ER 25 mg oral tablet, extended release: 1 tab(s) orally once a day (07 Mar 2022 09:54)  pantoprazole 40 mg oral delayed release tablet: 1 tab(s) orally once a day (07 Mar 2022 09:54)  Plavix 75 mg oral tablet: 1 tab(s) orally once a day (07 Mar 2022 09:54)  rosuvastatin 40 mg oral tablet: 1 tab(s) orally once a day (07 Mar 2022 09:54)  Stiolto Respimat 28 ACT 2.5 mcg-2.5 mcg/inh inhalation aerosol: 2 puff(s) inhaled every 24 hours (07 Mar 2022 09:54)  torsemide 20 mg oral tablet: 1 tab(s) orally once a day (16 Dec 2023 14:53)      CURRENT CARDIAC MEDICATIONS:  aMIOdarone    Tablet 200 milliGRAM(s) Oral daily  metoprolol succinate ER 25 milliGRAM(s) Oral daily  midodrine 10 milliGRAM(s) Oral every 8 hours PRN hold if sbp>130  sacubitril 24 mG/valsartan 26 mG 1 Tablet(s) Oral two times a day  torsemide 20 milliGRAM(s) Oral daily      CURRENT OTHER MEDICATIONS:  albuterol    0.083% 2.5 milliGRAM(s) Nebulizer every 12 hours  acetaminophen     Tablet .. 650 milliGRAM(s) Oral every 6 hours PRN Temp greater or equal to 38C (100.4F), Mild Pain (1 - 3)  ALPRAZolam 0.25 milliGRAM(s) Oral three times a day PRN anxiety  melatonin 3 milliGRAM(s) Oral at bedtime PRN Insomnia  ondansetron Injectable 4 milliGRAM(s) IV Push every 8 hours PRN Nausea and/or Vomiting  aluminum hydroxide/magnesium hydroxide/simethicone Suspension 30 milliLiter(s) Oral every 4 hours PRN Dyspepsia  pantoprazole  Injectable 40 milliGRAM(s) IV Push every 12 hours  atorvastatin 80 milliGRAM(s) Oral at bedtime  folic acid 1 milliGRAM(s) Oral daily  methotrexate 15 milliGRAM(s) Oral every week      ALLERGIES:   morphine (Nausea)  No Known Allergies      REVIEW OF SYMPTOMS:   CONSTITUTIONAL: No fever, no chills, no weight loss, no weight gain, no fatigue   ENMT:  No vertigo; No sinus or throat pain  NECK: No pain or stiffness  CARDIOVASCULAR: No chest pain, no dyspnea, no syncope/presyncope, no palpitations, no dizziness, no Orthopnea, no Paroxsymal nocturnal dyspnea  RESPIRATORY: no Shortness of breath, no cough, no wheezing  : No dysuria, no hematuria   GI: No dark color stool, no nausea, no diarrhea, no constipation, no abdominal pain   NEURO: No headache, no slurred speech   MUSCULOSKELETAL: No joint pain or swelling; No muscle, back, or extremity pain  PSYCH: No agitation, no anxiety.    ALL OTHER REVIEW OF SYSTEMS ARE NEGATIVE.    VITAL SIGNS:  T(C): 36.3 (12-16-23 @ 09:15), Max: 36.3 (12-16-23 @ 09:15)  T(F): 97.4 (12-16-23 @ 09:15), Max: 97.4 (12-16-23 @ 09:15)  HR: 90 (12-16-23 @ 09:15) (90 - 90)  BP: 132/70 (12-16-23 @ 09:15) (132/70 - 132/70)  RR: 20 (12-16-23 @ 09:15) (20 - 20)  SpO2: 93% (12-16-23 @ 09:15) (93% - 93%)    INTAKE AND OUTPUT:       PHYSICAL EXAM:  Constitutional: Comfortable . No acute distress.   HEENT: Atraumatic and normocephalic , neck is supple . no JVD. No carotid bruit.  CNS: A&Ox3. No focal deficits.   Respiratory: CTAB, unlabored   Cardiovascular: RRR normal s1 s2. No murmur. No rubs or gallop.  Gastrointestinal: Soft, non-tender. +Bowel sounds.   Extremities: 2+ Peripheral Pulses, No clubbing, cyanosis, or edema  Psychiatric: Calm . no agitation.   Skin: Warm and dry, no ulcers on extremities     LABS:                            11.3   6.36  )-----------( 356      ( 16 Dec 2023 08:25 )             34.7     12-16    142  |  103  |  36.7<H>  ----------------------------<  87  4.2   |  24.0  |  1.48<H>    Ca    9.4      16 Dec 2023 08:25    TPro  7.9  /  Alb  4.3  /  TBili  0.3<L>  /  DBili  x   /  AST  40<H>  /  ALT  26  /  AlkPhos  134<H>  12-16      Urinalysis Basic - ( 16 Dec 2023 14:31 )    Color: Yellow / Appearance: Clear / SG: >1.030 / pH: x  Gluc: x / Ketone: Trace mg/dL  / Bili: Negative / Urobili: 1.0 mg/dL   Blood: x / Protein: 30 mg/dL / Nitrite: Negative   Leuk Esterase: Negative / RBC: 2 /HPF / WBC 6 /HPF   Sq Epi: x / Non Sq Epi: 1 /HPF / Bacteria: Negative /HPF              INTERPRETATION OF TELEMETRY: not on telemetry     ECG:   Prior ECG: Yes [ x ] No [  ]    RADIOLOGY & ADDITIONAL STUDIES:    X-ray:  < from: Xray Chest 1 View- PORTABLE-Urgent (12.16.23 @ 10:45) >  IMPRESSION: Prior CHF has cleared. Other findings stable.    < end of copied text >    CT scan: < from: CT Angio Abdomen and Pelvis w/ IV Cont (12.16.23 @ 11:44) >  IMPRESSION:  No CT evidence of active gastrointestinal hemorrhage    < end of copied text >    MRI:   US:

## 2023-12-17 ENCOUNTER — TRANSCRIPTION ENCOUNTER (OUTPATIENT)
Age: 73
End: 2023-12-17

## 2023-12-17 LAB
ANION GAP SERPL CALC-SCNC: 12 MMOL/L — SIGNIFICANT CHANGE UP (ref 5–17)
ANION GAP SERPL CALC-SCNC: 12 MMOL/L — SIGNIFICANT CHANGE UP (ref 5–17)
BUN SERPL-MCNC: 35.4 MG/DL — HIGH (ref 8–20)
BUN SERPL-MCNC: 35.4 MG/DL — HIGH (ref 8–20)
CALCIUM SERPL-MCNC: 7.8 MG/DL — LOW (ref 8.4–10.5)
CALCIUM SERPL-MCNC: 7.8 MG/DL — LOW (ref 8.4–10.5)
CHLORIDE SERPL-SCNC: 109 MMOL/L — HIGH (ref 96–108)
CHLORIDE SERPL-SCNC: 109 MMOL/L — HIGH (ref 96–108)
CO2 SERPL-SCNC: 19 MMOL/L — LOW (ref 22–29)
CO2 SERPL-SCNC: 19 MMOL/L — LOW (ref 22–29)
CREAT SERPL-MCNC: 1.29 MG/DL — SIGNIFICANT CHANGE UP (ref 0.5–1.3)
CREAT SERPL-MCNC: 1.29 MG/DL — SIGNIFICANT CHANGE UP (ref 0.5–1.3)
EGFR: 43 ML/MIN/1.73M2 — LOW
EGFR: 43 ML/MIN/1.73M2 — LOW
GLUCOSE SERPL-MCNC: 79 MG/DL — SIGNIFICANT CHANGE UP (ref 70–99)
GLUCOSE SERPL-MCNC: 79 MG/DL — SIGNIFICANT CHANGE UP (ref 70–99)
HCT VFR BLD CALC: 24.1 % — LOW (ref 34.5–45)
HCT VFR BLD CALC: 24.1 % — LOW (ref 34.5–45)
HGB BLD-MCNC: 7.2 G/DL — LOW (ref 11.5–15.5)
HGB BLD-MCNC: 7.2 G/DL — LOW (ref 11.5–15.5)
MCHC RBC-ENTMCNC: 28.9 PG — SIGNIFICANT CHANGE UP (ref 27–34)
MCHC RBC-ENTMCNC: 28.9 PG — SIGNIFICANT CHANGE UP (ref 27–34)
MCHC RBC-ENTMCNC: 29.9 GM/DL — LOW (ref 32–36)
MCHC RBC-ENTMCNC: 29.9 GM/DL — LOW (ref 32–36)
MCV RBC AUTO: 96.8 FL — SIGNIFICANT CHANGE UP (ref 80–100)
MCV RBC AUTO: 96.8 FL — SIGNIFICANT CHANGE UP (ref 80–100)
PLATELET # BLD AUTO: 260 K/UL — SIGNIFICANT CHANGE UP (ref 150–400)
PLATELET # BLD AUTO: 260 K/UL — SIGNIFICANT CHANGE UP (ref 150–400)
POTASSIUM SERPL-MCNC: 3.8 MMOL/L — SIGNIFICANT CHANGE UP (ref 3.5–5.3)
POTASSIUM SERPL-MCNC: 3.8 MMOL/L — SIGNIFICANT CHANGE UP (ref 3.5–5.3)
POTASSIUM SERPL-SCNC: 3.8 MMOL/L — SIGNIFICANT CHANGE UP (ref 3.5–5.3)
POTASSIUM SERPL-SCNC: 3.8 MMOL/L — SIGNIFICANT CHANGE UP (ref 3.5–5.3)
RBC # BLD: 2.49 M/UL — LOW (ref 3.8–5.2)
RBC # BLD: 2.49 M/UL — LOW (ref 3.8–5.2)
RBC # FLD: 17.2 % — HIGH (ref 10.3–14.5)
RBC # FLD: 17.2 % — HIGH (ref 10.3–14.5)
SODIUM SERPL-SCNC: 140 MMOL/L — SIGNIFICANT CHANGE UP (ref 135–145)
SODIUM SERPL-SCNC: 140 MMOL/L — SIGNIFICANT CHANGE UP (ref 135–145)
WBC # BLD: 4.54 K/UL — SIGNIFICANT CHANGE UP (ref 3.8–10.5)
WBC # BLD: 4.54 K/UL — SIGNIFICANT CHANGE UP (ref 3.8–10.5)
WBC # FLD AUTO: 4.54 K/UL — SIGNIFICANT CHANGE UP (ref 3.8–10.5)
WBC # FLD AUTO: 4.54 K/UL — SIGNIFICANT CHANGE UP (ref 3.8–10.5)

## 2023-12-17 PROCEDURE — 93010 ELECTROCARDIOGRAM REPORT: CPT

## 2023-12-17 PROCEDURE — 99233 SBSQ HOSP IP/OBS HIGH 50: CPT

## 2023-12-17 PROCEDURE — 99232 SBSQ HOSP IP/OBS MODERATE 35: CPT

## 2023-12-17 RX ORDER — SOD SULF/SODIUM/NAHCO3/KCL/PEG
4000 SOLUTION, RECONSTITUTED, ORAL ORAL ONCE
Refills: 0 | Status: COMPLETED | OUTPATIENT
Start: 2023-12-17 | End: 2023-12-17

## 2023-12-17 RX ORDER — BUDESONIDE AND FORMOTEROL FUMARATE DIHYDRATE 160; 4.5 UG/1; UG/1
2 AEROSOL RESPIRATORY (INHALATION)
Refills: 0 | Status: DISCONTINUED | OUTPATIENT
Start: 2023-12-17 | End: 2023-12-19

## 2023-12-17 RX ORDER — FUROSEMIDE 40 MG
20 TABLET ORAL ONCE
Refills: 0 | Status: COMPLETED | OUTPATIENT
Start: 2023-12-17 | End: 2023-12-17

## 2023-12-17 RX ADMIN — ALBUTEROL 2.5 MILLIGRAM(S): 90 AEROSOL, METERED ORAL at 22:05

## 2023-12-17 RX ADMIN — PANTOPRAZOLE SODIUM 40 MILLIGRAM(S): 20 TABLET, DELAYED RELEASE ORAL at 17:42

## 2023-12-17 RX ADMIN — Medication 20 MILLIGRAM(S): at 17:42

## 2023-12-17 RX ADMIN — AMIODARONE HYDROCHLORIDE 200 MILLIGRAM(S): 400 TABLET ORAL at 12:35

## 2023-12-17 RX ADMIN — BUDESONIDE AND FORMOTEROL FUMARATE DIHYDRATE 2 PUFF(S): 160; 4.5 AEROSOL RESPIRATORY (INHALATION) at 22:06

## 2023-12-17 RX ADMIN — ATORVASTATIN CALCIUM 80 MILLIGRAM(S): 80 TABLET, FILM COATED ORAL at 23:06

## 2023-12-17 RX ADMIN — PANTOPRAZOLE SODIUM 40 MILLIGRAM(S): 20 TABLET, DELAYED RELEASE ORAL at 05:44

## 2023-12-17 RX ADMIN — ALBUTEROL 2.5 MILLIGRAM(S): 90 AEROSOL, METERED ORAL at 09:50

## 2023-12-17 RX ADMIN — BUDESONIDE AND FORMOTEROL FUMARATE DIHYDRATE 2 PUFF(S): 160; 4.5 AEROSOL RESPIRATORY (INHALATION) at 10:13

## 2023-12-17 RX ADMIN — Medication 1 MILLIGRAM(S): at 12:32

## 2023-12-17 RX ADMIN — SACUBITRIL AND VALSARTAN 1 TABLET(S): 24; 26 TABLET, FILM COATED ORAL at 17:42

## 2023-12-17 RX ADMIN — Medication 20 MILLIGRAM(S): at 17:40

## 2023-12-17 RX ADMIN — Medication 4000 MILLILITER(S): at 17:36

## 2023-12-17 NOTE — PROGRESS NOTE ADULT - ASSESSMENT
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     Rectal bleed:  Most likely diverticular bleed. Last EGD/ colonoscopy (11/ 2020) revealed mild antral gastritis and colonoscopy showed adenomatous polyps and left sided diverticulosis, old blood all throughout the colon, no AVM's seen. She had a a normal VCE on 12/02/2020.  CTA abd showed sigmoid diverticulosis. No active GI bleed. No diverticulitis.  Hemoglobin 11.3 on admission, dropped to 9.5 --> 7.2 gm this morning.    Plan:  Trend CBC, transfuse to Hgb 8 or higher  Avoid use of NSAIDs  Protonix 40 IV daily  Monitor renal function, Avoid dehydration, hypotension   Clear liquid diet today  Please keep NPO after midnight for colonoscopy on Monday 12/18. Cardiology clearance obtained.   Please complete bowel prep to ensure complete evaluation of the colon    Please HOLD Plavix in the setting of GI bleed  INR, CBC in AM  Plan d/w patient and her daughter in law via phone Natalia who is an RN

## 2023-12-17 NOTE — PATIENT PROFILE ADULT - FUNCTIONAL ASSESSMENT - BASIC MOBILITY 1.

## 2023-12-17 NOTE — PROGRESS NOTE ADULT - SUBJECTIVE AND OBJECTIVE BOX
Chief Complaint: This is a 73y old woman patient being seen in follow-up consultation for rectal bleed    Interval HPI / 24H events:  Patient is seen and examined this morning. Last BM yesterday afternoon with BRBPR. No BM this morning. Her hemoglobin dropped to 7.2 gm this morning (11.3--> 9.5-->7.2) for which she her order for 2 units of PRBCs. Denies chest pain, abdominal pain, nausea, vomiting, dizziness. Tolerating clear liquids.     Review of Systems:  . Constitutional: No fever, chills  . HEENT: Negative  · Respiratory and Thorax: No shortness of breath, no cough  · Cardiovascular: No chest pain, palpitation, no dizziness  · Gastrointestinal: see above  · Genitourinary: No hematuria  · Musculoskeletal: Negative  · Neurological: negative  · Psychiatric: no agitation, no anxiety      PAST MEDICAL/SURGICAL HISTORY:  Rheumatoid arthritis    COPD without exacerbation    HTN (hypertension)    Pacemaker    H/O cerebral artery stenosis  left cerebral artery stent per history in 2014.    S/P hysterectomy    S/P cholecystectomy    H/O exploratory laparotomy      MEDICATIONS  (STANDING):  albuterol    0.083% 2.5 milliGRAM(s) Nebulizer every 12 hours  aMIOdarone    Tablet 200 milliGRAM(s) Oral daily  atorvastatin 80 milliGRAM(s) Oral at bedtime  budesonide 160 MICROgram(s)/formoterol 4.5 MICROgram(s) Inhaler 2 Puff(s) Inhalation two times a day  folic acid 1 milliGRAM(s) Oral daily  methotrexate 15 milliGRAM(s) Oral <User Schedule>  metoprolol succinate ER 25 milliGRAM(s) Oral daily  pantoprazole  Injectable 40 milliGRAM(s) IV Push every 12 hours  sacubitril 24 mG/valsartan 26 mG 1 Tablet(s) Oral two times a day  torsemide 20 milliGRAM(s) Oral daily    MEDICATIONS  (PRN):  acetaminophen     Tablet .. 650 milliGRAM(s) Oral every 6 hours PRN Temp greater or equal to 38C (100.4F), Mild Pain (1 - 3)  ALPRAZolam 0.25 milliGRAM(s) Oral three times a day PRN anxiety  aluminum hydroxide/magnesium hydroxide/simethicone Suspension 30 milliLiter(s) Oral every 4 hours PRN Dyspepsia  melatonin 3 milliGRAM(s) Oral at bedtime PRN Insomnia  midodrine 10 milliGRAM(s) Oral every 8 hours PRN hold if sbp>130  ondansetron Injectable 4 milliGRAM(s) IV Push every 8 hours PRN Nausea and/or Vomiting    morphine (Nausea)  No Known Allergies    T(C): 36.4 (12-17-23 @ 11:46), Max: 36.7 (12-17-23 @ 04:28)  HR: 84 (12-17-23 @ 11:46) (60 - 87)  BP: 94/59 (12-17-23 @ 11:46) (94/59 - 123/71)  RR: 18 (12-17-23 @ 11:46) (17 - 18)  SpO2: 97% (12-17-23 @ 11:46) (93% - 98%)    I&O's Summary    16 Dec 2023 07:01  -  17 Dec 2023 07:00  --------------------------------------------------------  IN: 500 mL / OUT: 0 mL / NET: 500 mL      PHYSICAL EXAM:    Constitutional: No acute distress  Neuro: Awake alert, oriented  HEENT: anicteric sclerae  CV: regular rate, regular rhythm  Pulm/chest: lung sounds diminished bilaterally, increased work of breathing. on 3L O2  Abd: soft, nontender, nonditended, +bowel sounds. No rigidity, rebound tenderness, or guarding  Ext: no edema  Skin: warm, no jaundice   Psych: calm, cooperative      LABS:               7.2    4.54  )-----------( 260      ( 12-17 @ 06:41 )             24.1                9.5    6.10  )-----------( 266      ( 12-16 @ 15:48 )             29.9                11.3   6.36  )-----------( 356      ( 12-16 @ 08:25 )             34.7       12-17    140  |  109<H>  |  35.4<H>  ----------------------------<  79  3.8   |  19.0<L>  |  1.29    Ca    7.8<L>      17 Dec 2023 06:41    TPro  7.9  /  Alb  4.3  /  TBili  0.3<L>  /  DBili  x   /  AST  40<H>  /  ALT  26  /  AlkPhos  134<H>  12-16    LIVER FUNCTIONS - ( 16 Dec 2023 08:25 )  Alb: 4.3 g/dL / Pro: 7.9 g/dL / ALK PHOS: 134 U/L / ALT: 26 U/L / AST: 40 U/L / GGT: x             Lipase: 24 U/L (12-16-23 @ 08:25)    < from: CT Angio Abdomen and Pelvis w/ IV Cont (12.16.23 @ 11:44) >    FINDINGS:  LOWER CHEST: Cardiomegaly with calcified LEFT ventricle/ventricular   aneurysm, unchanged. Median sternotomy and mitral valve replacement.   Coronary artery calcification and/or stents. Intracardiac electrodes.   Basilar vascular prominence/groundglass density and dependent   pleuroparenchymal reactive change    LIVER: Within normal limits.  BILE DUCTS: Post cholecystectomy biliary duct prominence.  GALLBLADDER: Cholecystectomy.  SPLEEN: Within normal limits.  PANCREAS: Within normal limits.  ADRENALS: Stable adrenal thickening  KIDNEYS/URETERS: Symmetric renal enhancement without calculi/ obstructive   uropathy. Too small to characterize LEFT renal hypodensity, is   statistically of no significance.    BLADDER: Within normal limits.  REPRODUCTIVE ORGANS: Hysterectomy. No adnexal mass    BOWEL: No bowel obstruction. Normal appendix. Sigmoid diverticulosis   without diverticulitis. No acute GI hemorrhage  PERITONEUM: No ascites.  VESSELS: Atherosclerotic changes.  RETROPERITONEUM/LYMPH NODES: No lymphadenopathy.  ABDOMINAL WALL: Within normal limits.  BONES: Osteopenia.. Minimal lumbar degenerative change produces up to   moderate L4-5 central canal stenosis    IMPRESSION:  No CT evidence of active gastrointestinal hemorrhage      < end of copied text >

## 2023-12-17 NOTE — PROGRESS NOTE ADULT - SUBJECTIVE AND OBJECTIVE BOX
[facility] Division of Hospital Medicine  Vik Montes MD, SHIRIN  I'm reachable on [employer] Teams    Patient is a 73y old  Female who presents with a chief complaint of Gi bleed (17 Dec 2023 12:11)      SUBJECTIVE / OVERNIGHT EVENTS:  Complaints of having BRBPR overnight. But this seems to have stopped today. Plan of care discussed with the patient and daughter-in-law by phone today.     MEDICATIONS  (STANDING):  albuterol    0.083% 2.5 milliGRAM(s) Nebulizer every 12 hours  aMIOdarone    Tablet 200 milliGRAM(s) Oral daily  atorvastatin 80 milliGRAM(s) Oral at bedtime  budesonide 160 MICROgram(s)/formoterol 4.5 MICROgram(s) Inhaler 2 Puff(s) Inhalation two times a day  folic acid 1 milliGRAM(s) Oral daily  methotrexate 15 milliGRAM(s) Oral <User Schedule>  metoprolol succinate ER 25 milliGRAM(s) Oral daily  pantoprazole  Injectable 40 milliGRAM(s) IV Push every 12 hours  polyethylene glycol/electrolyte Solution. 4000 milliLiter(s) Oral once  sacubitril 24 mG/valsartan 26 mG 1 Tablet(s) Oral two times a day  torsemide 20 milliGRAM(s) Oral daily    MEDICATIONS  (PRN):  acetaminophen     Tablet .. 650 milliGRAM(s) Oral every 6 hours PRN Temp greater or equal to 38C (100.4F), Mild Pain (1 - 3)  ALPRAZolam 0.25 milliGRAM(s) Oral three times a day PRN anxiety  aluminum hydroxide/magnesium hydroxide/simethicone Suspension 30 milliLiter(s) Oral every 4 hours PRN Dyspepsia  melatonin 3 milliGRAM(s) Oral at bedtime PRN Insomnia  midodrine 10 milliGRAM(s) Oral every 8 hours PRN hold if sbp>130  ondansetron Injectable 4 milliGRAM(s) IV Push every 8 hours PRN Nausea and/or Vomiting    CAPILLARY BLOOD GLUCOSE        I&O's Summary    16 Dec 2023 07:01  -  17 Dec 2023 07:00  --------------------------------------------------------  IN: 500 mL / OUT: 0 mL / NET: 500 mL        PHYSICAL EXAM:  Vital Signs Last 24 Hrs  T(C): 36.8 (17 Dec 2023 16:27), Max: 36.8 (17 Dec 2023 16:27)  T(F): 98.3 (17 Dec 2023 16:27), Max: 98.3 (17 Dec 2023 16:27)  HR: 85 (17 Dec 2023 16:27) (60 - 87)  BP: 124/74 (17 Dec 2023 16:27) (94/59 - 124/74)  BP(mean): --  RR: 16 (17 Dec 2023 16:27) (16 - 18)  SpO2: 92% (17 Dec 2023 16:27) (92% - 99%)    Parameters below as of 17 Dec 2023 16:27  Patient On (Oxygen Delivery Method): room air    GEN - NAD  HEENT - MMM, EOMI  RESP - CTA BL  CARDIO - rrr, normal S1, S2  ABD - Soft/Non tender/Non distended. Normal BS x4 quadrants.   Ext - No LE edema  Neuro - cn 2-12 grossly intact.   Psych- AAOx3. Appropriate behaviour. attentive. Normal affect.    LABS:                        7.2    4.54  )-----------( 260      ( 17 Dec 2023 06:41 )             24.1     12-17    140  |  109<H>  |  35.4<H>  ----------------------------<  79  3.8   |  19.0<L>  |  1.29    Ca    7.8<L>      17 Dec 2023 06:41    TPro  7.9  /  Alb  4.3  /  TBili  0.3<L>  /  DBili  x   /  AST  40<H>  /  ALT  26  /  AlkPhos  134<H>  12-16          Urinalysis Basic - ( 17 Dec 2023 06:41 )    Color: x / Appearance: x / SG: x / pH: x  Gluc: 79 mg/dL / Ketone: x  / Bili: x / Urobili: x   Blood: x / Protein: x / Nitrite: x   Leuk Esterase: x / RBC: x / WBC x   Sq Epi: x / Non Sq Epi: x / Bacteria: x       [facility] of [facility] Medicine  Vik Montes MD, SHIRIN  I'm reachable on [employer] Teams    Patient is a 73y old  Female who presents with a chief complaint of Gi bleed (17 Dec 2023 12:11)      SUBJECTIVE / OVERNIGHT EVENTS:  Complaints of having BRBPR overnight. But this seems to have stopped today. Plan of care discussed with the patient and daughter-in-law by phone today.     MEDICATIONS  (STANDING):  albuterol    0.083% 2.5 milliGRAM(s) Nebulizer every 12 hours  aMIOdarone    Tablet 200 milliGRAM(s) Oral daily  atorvastatin 80 milliGRAM(s) Oral at bedtime  budesonide 160 MICROgram(s)/formoterol 4.5 MICROgram(s) Inhaler 2 Puff(s) Inhalation two times a day  folic acid 1 milliGRAM(s) Oral daily  methotrexate 15 milliGRAM(s) Oral <User Schedule>  metoprolol succinate ER 25 milliGRAM(s) Oral daily  pantoprazole  Injectable 40 milliGRAM(s) IV Push every 12 hours  polyethylene glycol/electrolyte Solution. 4000 milliLiter(s) Oral once  sacubitril 24 mG/valsartan 26 mG 1 Tablet(s) Oral two times a day  torsemide 20 milliGRAM(s) Oral daily    MEDICATIONS  (PRN):  acetaminophen     Tablet .. 650 milliGRAM(s) Oral every 6 hours PRN Temp greater or equal to 38C (100.4F), Mild Pain (1 - 3)  ALPRAZolam 0.25 milliGRAM(s) Oral three times a day PRN anxiety  aluminum hydroxide/magnesium hydroxide/simethicone Suspension 30 milliLiter(s) Oral every 4 hours PRN Dyspepsia  melatonin 3 milliGRAM(s) Oral at bedtime PRN Insomnia  midodrine 10 milliGRAM(s) Oral every 8 hours PRN hold if sbp>130  ondansetron Injectable 4 milliGRAM(s) IV Push every 8 hours PRN Nausea and/or Vomiting    CAPILLARY BLOOD GLUCOSE        I&O's Summary    16 Dec 2023 07:01  -  17 Dec 2023 07:00  --------------------------------------------------------  IN: 500 mL / OUT: 0 mL / NET: 500 mL        PHYSICAL EXAM:  Vital Signs Last 24 Hrs  T(C): 36.8 (17 Dec 2023 16:27), Max: 36.8 (17 Dec 2023 16:27)  T(F): 98.3 (17 Dec 2023 16:27), Max: 98.3 (17 Dec 2023 16:27)  HR: 85 (17 Dec 2023 16:27) (60 - 87)  BP: 124/74 (17 Dec 2023 16:27) (94/59 - 124/74)  BP(mean): --  RR: 16 (17 Dec 2023 16:27) (16 - 18)  SpO2: 92% (17 Dec 2023 16:27) (92% - 99%)    Parameters below as of 17 Dec 2023 16:27  Patient On (Oxygen Delivery Method): room air    GEN - NAD  HEENT - MMM, EOMI  RESP - CTA BL  CARDIO - rrr, normal S1, S2  ABD - Soft/Non tender/Non distended. Normal BS x4 quadrants.   Ext - No LE edema  Neuro - cn 2-12 grossly intact.   Psych- AAOx3. Appropriate behaviour. attentive. Normal affect.    LABS:                        7.2    4.54  )-----------( 260      ( 17 Dec 2023 06:41 )             24.1     12-17    140  |  109<H>  |  35.4<H>  ----------------------------<  79  3.8   |  19.0<L>  |  1.29    Ca    7.8<L>      17 Dec 2023 06:41    TPro  7.9  /  Alb  4.3  /  TBili  0.3<L>  /  DBili  x   /  AST  40<H>  /  ALT  26  /  AlkPhos  134<H>  12-16          Urinalysis Basic - ( 17 Dec 2023 06:41 )    Color: x / Appearance: x / SG: x / pH: x  Gluc: 79 mg/dL / Ketone: x  / Bili: x / Urobili: x   Blood: x / Protein: x / Nitrite: x   Leuk Esterase: x / RBC: x / WBC x   Sq Epi: x / Non Sq Epi: x / Bacteria: x

## 2023-12-17 NOTE — PROGRESS NOTE ADULT - NS ATTEND AMEND GEN_ALL_CORE FT
I evaluated and examined this pt. with my ACP and agree with the above assessment and management plan. For colonoscopy tomorrow. Cardiac clearance appreciated. Bowel prfep ordered.

## 2023-12-17 NOTE — CONSULT NOTE ADULT - SUBJECTIVE AND OBJECTIVE BOX
[place] [facility], [facility]                                   [address] [place] [address]                                                      PHONE: [phone]                                                         FAX: [phone]  [email or web address]  ---------------------------------------------------------------------------------------------------------------------------------    HPI:  JAVED CHIN is an 73y Female with a hx of copd on home O2, anemia, PPM, STEMI 05/2021 -> PCI of LM and LAD, 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  Pt denies guadarrama, chest pain, orthopnea or le edema.    Patient reports that she can walk up a flight of stairs without stopping. No hx of dm.     PAST MEDICAL & SURGICAL HISTORY:  Rheumatoid arthritis      COPD without exacerbation      HTN (hypertension)      Pacemaker      H/O cerebral artery stenosis  left cerebral artery stent per history in 2014.      S/P hysterectomy      S/P cholecystectomy      H/O exploratory laparotomy          morphine (Nausea)  No Known Allergies      MEDICATIONS  (STANDING):  albuterol    0.083% 2.5 milliGRAM(s) Nebulizer every 12 hours  aMIOdarone    Tablet 200 milliGRAM(s) Oral daily  atorvastatin 80 milliGRAM(s) Oral at bedtime  budesonide 160 MICROgram(s)/formoterol 4.5 MICROgram(s) Inhaler 2 Puff(s) Inhalation two times a day  folic acid 1 milliGRAM(s) Oral daily  methotrexate 15 milliGRAM(s) Oral <User Schedule>  metoprolol succinate ER 25 milliGRAM(s) Oral daily  pantoprazole  Injectable 40 milliGRAM(s) IV Push every 12 hours  sacubitril 24 mG/valsartan 26 mG 1 Tablet(s) Oral two times a day  torsemide 20 milliGRAM(s) Oral daily    MEDICATIONS  (PRN):  acetaminophen     Tablet .. 650 milliGRAM(s) Oral every 6 hours PRN Temp greater or equal to 38C (100.4F), Mild Pain (1 - 3)  ALPRAZolam 0.25 milliGRAM(s) Oral three times a day PRN anxiety  aluminum hydroxide/magnesium hydroxide/simethicone Suspension 30 milliLiter(s) Oral every 4 hours PRN Dyspepsia  melatonin 3 milliGRAM(s) Oral at bedtime PRN Insomnia  midodrine 10 milliGRAM(s) Oral every 8 hours PRN hold if sbp>130  ondansetron Injectable 4 milliGRAM(s) IV Push every 8 hours PRN Nausea and/or Vomiting      Family History: Pt denies hx of early cad, SCD, or congenital heart disease.      Social History:  Cigarettes:           no         Alchohol:   no              Illicit Drug Abuse:  no    ROS:    Extensively Reviewed with pertinents as per HPI the remainder were negative.      Vital Signs Last 24 Hrs  T(C): 36.4 (17 Dec 2023 08:32), Max: 36.7 (17 Dec 2023 04:28)  T(F): 97.5 (17 Dec 2023 08:32), Max: 98 (17 Dec 2023 04:28)  HR: 60 (17 Dec 2023 08:32) (60 - 90)  BP: 123/71 (17 Dec 2023 08:32) (100/57 - 132/70)  BP(mean): --  RR: 18 (17 Dec 2023 08:32) (17 - 20)  SpO2: 98% (17 Dec 2023 08:32) (93% - 98%)    Parameters below as of 17 Dec 2023 08:32  Patient On (Oxygen Delivery Method): room air      ICU Vital Signs Last 24 Hrs  JAVED CHIN  I&O's Detail    16 Dec 2023 07:01  -  17 Dec 2023 07:00  --------------------------------------------------------  IN:    Oral Fluid: 500 mL  Total IN: 500 mL    OUT:  Total OUT: 0 mL    Total NET: 500 mL        I&O's Summary    16 Dec 2023 07:01  -  17 Dec 2023 07:00  --------------------------------------------------------  IN: 500 mL / OUT: 0 mL / NET: 500 mL      Drug Dosing Weight  JAVED APODACAER      PHYSICAL EXAM:  General: Appears well developed, alert and cooperative.  HEENT: Head; normocephalic, atraumatic.  Eyes: Pupils reactive, cornea wnl.  Neck: Supple, no nodes adenopathy, no JVD, no carotid bruit  CARDIOVASCULAR: Normal S1 and S2, No murmur, rub, gallop or lift.   LUNGS: No rales, rhonchi or wheeze. Normal breath sounds bilaterally.  ABDOMEN: Soft, nontender, nondistended  EXTREMITIES: No clubbing or edema. Distal pulses wnl.   SKIN: warm and dry with normal turgor.  NEURO: Alert/oriented x 3/normal motor exam.   PSYCH: normal affect.        LABS:                        7.2    4.54  )-----------( 260      ( 17 Dec 2023 06:41 )             24.1     12-16    142  |  103  |  36.7<H>  ----------------------------<  87  4.2   |  24.0  |  1.48<H>    Ca    9.4      16 Dec 2023 08:25    TPro  7.9  /  Alb  4.3  /  TBili  0.3<L>  /  DBili  x   /  AST  40<H>  /  ALT  26  /  AlkPhos  134<H>  12-16    [facility]        Urinalysis Basic - ( 16 Dec 2023 14:31 )    Color: Yellow / Appearance: Clear / SG: >1.030 / pH: x  Gluc: x / Ketone: Trace mg/dL  / Bili: Negative / Urobili: 1.0 mg/dL   Blood: x / Protein: 30 mg/dL / Nitrite: Negative   Leuk Esterase: Negative / RBC: 2 /HPF / WBC 6 /HPF   Sq Epi: x / Non Sq Epi: 1 /HPF / Bacteria: Negative /HPF        RADIOLOGY & ADDITIONAL STUDIES:    INTERPRETATION OF TELEMETRY (personally reviewed):    ECG:    ECHO:    STRESS TEST:    CARDIAC CATHETERIZATION:    Assessment and Plan:  In summary, JAVED CHIN is an 73y Female with a hx of copd on home O2, anemia, PPM, STEMI 05/2021 -> PCI of LM and LAD, 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    Preoperative risk assessment- patient is a high risk for a low risk procedure such as colonoscopy or endoscopy.  She can perform 4 METS.  She is actively bleeding.  Would perform EKG.  Otherwise, would proceed to endoscopic exam without further cardiovascular workup.    -can hold asa/plavix until bleeding resolved  -continue toprol xl    HLD- continue crestor    CAD- would restart asa 81mg daily when acceptable per GI      Thank you for allowing [facility] to participate in the care of this patient.  Please feel free to call with any questions or concerns.                  [place] [facility], [facility]                                   [address] [place] [address]                                                      PHONE: [phone]                                                         FAX: [phone]  [email or web address]  ---------------------------------------------------------------------------------------------------------------------------------    HPI:  JAVED CHIN is an 73y Female with a hx of copd on home O2, anemia, PPM, STEMI 05/2021 -> PCI of LM and LAD, 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  Pt denies guadarrama, chest pain, orthopnea or le edema.    Patient reports that she can walk up a flight of stairs without stopping. No hx of dm.     PAST MEDICAL & SURGICAL HISTORY:  Rheumatoid arthritis      COPD without exacerbation      HTN (hypertension)      Pacemaker      H/O cerebral artery stenosis  left cerebral artery stent per history in 2014.      S/P hysterectomy      S/P cholecystectomy      H/O exploratory laparotomy          morphine (Nausea)  No Known Allergies      MEDICATIONS  (STANDING):  albuterol    0.083% 2.5 milliGRAM(s) Nebulizer every 12 hours  aMIOdarone    Tablet 200 milliGRAM(s) Oral daily  atorvastatin 80 milliGRAM(s) Oral at bedtime  budesonide 160 MICROgram(s)/formoterol 4.5 MICROgram(s) Inhaler 2 Puff(s) Inhalation two times a day  folic acid 1 milliGRAM(s) Oral daily  methotrexate 15 milliGRAM(s) Oral <User Schedule>  metoprolol succinate ER 25 milliGRAM(s) Oral daily  pantoprazole  Injectable 40 milliGRAM(s) IV Push every 12 hours  sacubitril 24 mG/valsartan 26 mG 1 Tablet(s) Oral two times a day  torsemide 20 milliGRAM(s) Oral daily    MEDICATIONS  (PRN):  acetaminophen     Tablet .. 650 milliGRAM(s) Oral every 6 hours PRN Temp greater or equal to 38C (100.4F), Mild Pain (1 - 3)  ALPRAZolam 0.25 milliGRAM(s) Oral three times a day PRN anxiety  aluminum hydroxide/magnesium hydroxide/simethicone Suspension 30 milliLiter(s) Oral every 4 hours PRN Dyspepsia  melatonin 3 milliGRAM(s) Oral at bedtime PRN Insomnia  midodrine 10 milliGRAM(s) Oral every 8 hours PRN hold if sbp>130  ondansetron Injectable 4 milliGRAM(s) IV Push every 8 hours PRN Nausea and/or Vomiting      Family History: Pt denies hx of early cad, SCD, or congenital heart disease.      Social History:  Cigarettes:           no         Alchohol:   no              Illicit Drug Abuse:  no    ROS:    Extensively Reviewed with pertinents as per HPI the remainder were negative.      Vital Signs Last 24 Hrs  T(C): 36.4 (17 Dec 2023 08:32), Max: 36.7 (17 Dec 2023 04:28)  T(F): 97.5 (17 Dec 2023 08:32), Max: 98 (17 Dec 2023 04:28)  HR: 60 (17 Dec 2023 08:32) (60 - 90)  BP: 123/71 (17 Dec 2023 08:32) (100/57 - 132/70)  BP(mean): --  RR: 18 (17 Dec 2023 08:32) (17 - 20)  SpO2: 98% (17 Dec 2023 08:32) (93% - 98%)    Parameters below as of 17 Dec 2023 08:32  Patient On (Oxygen Delivery Method): room air      ICU Vital Signs Last 24 Hrs  JAVED CHIN  I&O's Detail    16 Dec 2023 07:01  -  17 Dec 2023 07:00  --------------------------------------------------------  IN:    Oral Fluid: 500 mL  Total IN: 500 mL    OUT:  Total OUT: 0 mL    Total NET: 500 mL        I&O's Summary    16 Dec 2023 07:01  -  17 Dec 2023 07:00  --------------------------------------------------------  IN: 500 mL / OUT: 0 mL / NET: 500 mL      Drug Dosing Weight  JAVED APODACAER      PHYSICAL EXAM:  General: Appears well developed, alert and cooperative.  HEENT: Head; normocephalic, atraumatic.  Eyes: Pupils reactive, cornea wnl.  Neck: Supple, no nodes adenopathy, no JVD, no carotid bruit  CARDIOVASCULAR: Normal S1 and S2, No murmur, rub, gallop or lift.   LUNGS: No rales, rhonchi or wheeze. Normal breath sounds bilaterally.  ABDOMEN: Soft, nontender, nondistended  EXTREMITIES: No clubbing or edema. Distal pulses wnl.   SKIN: warm and dry with normal turgor.  NEURO: Alert/oriented x 3/normal motor exam.   PSYCH: normal affect.        LABS:                        7.2    4.54  )-----------( 260      ( 17 Dec 2023 06:41 )             24.1     12-16    142  |  103  |  36.7<H>  ----------------------------<  87  4.2   |  24.0  |  1.48<H>    Ca    9.4      16 Dec 2023 08:25    TPro  7.9  /  Alb  4.3  /  TBili  0.3<L>  /  DBili  x   /  AST  40<H>  /  ALT  26  /  AlkPhos  134<H>  12-16    [facility]        Urinalysis Basic - ( 16 Dec 2023 14:31 )    Color: Yellow / Appearance: Clear / SG: >1.030 / pH: x  Gluc: x / Ketone: Trace mg/dL  / Bili: Negative / Urobili: 1.0 mg/dL   Blood: x / Protein: 30 mg/dL / Nitrite: Negative   Leuk Esterase: Negative / RBC: 2 /HPF / WBC 6 /HPF   Sq Epi: x / Non Sq Epi: 1 /HPF / Bacteria: Negative /HPF        RADIOLOGY & ADDITIONAL STUDIES:    INTERPRETATION OF TELEMETRY (personally reviewed):    ECG:    ECHO:    STRESS TEST:    CARDIAC CATHETERIZATION:    Assessment and Plan:  In summary, JAVED CHIN is an 73y Female with a hx of copd on home O2, anemia, PPM, STEMI 05/2021 -> PCI of LM and LAD, 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    Preoperative risk assessment- patient is a high risk for a low risk procedure such as colonoscopy or endoscopy.  She can perform 4 METS.  She is actively bleeding.  Would perform EKG.  Otherwise, would proceed to endoscopic exam without further cardiovascular workup.    -can hold asa/plavix until bleeding resolved  -continue toprol xl    HLD- continue crestor    CAD- would restart asa 81mg daily when acceptable per GI      Thank you for allowing [facility] to participate in the care of this patient.  Please feel free to call with any questions or concerns.                  [place] [facility], [facility]                                   [address] [place] [address]                                                      PHONE: [phone]                                                         FAX: [phone]  [email or web address]  ---------------------------------------------------------------------------------------------------------------------------------    HPI:  JAVED CHIN is an 73y Female with a hx of copd on home O2, anemia, PPM, STEMI 05/2021 -> PCI of LM and LAD, 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  Pt denies guadarrama, chest pain, orthopnea or le edema.    Patient reports that she can walk up a flight of stairs without stopping. No hx of dm.     PAST MEDICAL & SURGICAL HISTORY:  Rheumatoid arthritis      COPD without exacerbation      HTN (hypertension)      Pacemaker      H/O cerebral artery stenosis  left cerebral artery stent per history in 2014.      S/P hysterectomy      S/P cholecystectomy      H/O exploratory laparotomy          morphine (Nausea)  No Known Allergies      MEDICATIONS  (STANDING):  albuterol    0.083% 2.5 milliGRAM(s) Nebulizer every 12 hours  aMIOdarone    Tablet 200 milliGRAM(s) Oral daily  atorvastatin 80 milliGRAM(s) Oral at bedtime  budesonide 160 MICROgram(s)/formoterol 4.5 MICROgram(s) Inhaler 2 Puff(s) Inhalation two times a day  folic acid 1 milliGRAM(s) Oral daily  methotrexate 15 milliGRAM(s) Oral <User Schedule>  metoprolol succinate ER 25 milliGRAM(s) Oral daily  pantoprazole  Injectable 40 milliGRAM(s) IV Push every 12 hours  sacubitril 24 mG/valsartan 26 mG 1 Tablet(s) Oral two times a day  torsemide 20 milliGRAM(s) Oral daily    MEDICATIONS  (PRN):  acetaminophen     Tablet .. 650 milliGRAM(s) Oral every 6 hours PRN Temp greater or equal to 38C (100.4F), Mild Pain (1 - 3)  ALPRAZolam 0.25 milliGRAM(s) Oral three times a day PRN anxiety  aluminum hydroxide/magnesium hydroxide/simethicone Suspension 30 milliLiter(s) Oral every 4 hours PRN Dyspepsia  melatonin 3 milliGRAM(s) Oral at bedtime PRN Insomnia  midodrine 10 milliGRAM(s) Oral every 8 hours PRN hold if sbp>130  ondansetron Injectable 4 milliGRAM(s) IV Push every 8 hours PRN Nausea and/or Vomiting      Family History: Pt denies hx of early cad, SCD, or congenital heart disease.      Social History:  Cigarettes:           no         Alchohol:   no              Illicit Drug Abuse:  no    ROS:    Extensively Reviewed with pertinents as per HPI the remainder were negative.      Vital Signs Last 24 Hrs  T(C): 36.4 (17 Dec 2023 08:32), Max: 36.7 (17 Dec 2023 04:28)  T(F): 97.5 (17 Dec 2023 08:32), Max: 98 (17 Dec 2023 04:28)  HR: 60 (17 Dec 2023 08:32) (60 - 90)  BP: 123/71 (17 Dec 2023 08:32) (100/57 - 132/70)  BP(mean): --  RR: 18 (17 Dec 2023 08:32) (17 - 20)  SpO2: 98% (17 Dec 2023 08:32) (93% - 98%)    Parameters below as of 17 Dec 2023 08:32  Patient On (Oxygen Delivery Method): room air      ICU Vital Signs Last 24 Hrs  JAVED CHIN  I&O's Detail    16 Dec 2023 07:01  -  17 Dec 2023 07:00  --------------------------------------------------------  IN:    Oral Fluid: 500 mL  Total IN: 500 mL    OUT:  Total OUT: 0 mL    Total NET: 500 mL        I&O's Summary    16 Dec 2023 07:01  -  17 Dec 2023 07:00  --------------------------------------------------------  IN: 500 mL / OUT: 0 mL / NET: 500 mL      Drug Dosing Weight  JAVED APODACAER      PHYSICAL EXAM:  General: Appears well developed, alert and cooperative.  HEENT: Head; normocephalic, atraumatic.  Eyes: Pupils reactive, cornea wnl.  Neck: Supple, no nodes adenopathy, no JVD, no carotid bruit  CARDIOVASCULAR: Normal S1 and S2, No murmur, rub, gallop or lift.   LUNGS: No rales, rhonchi or wheeze. Normal breath sounds bilaterally.  ABDOMEN: Soft, nontender, nondistended  EXTREMITIES: No clubbing or edema. Distal pulses wnl.   SKIN: warm and dry with normal turgor.  NEURO: Alert/oriented x 3/normal motor exam.   PSYCH: normal affect.        LABS:                        7.2    4.54  )-----------( 260      ( 17 Dec 2023 06:41 )             24.1     12-16    142  |  103  |  36.7<H>  ----------------------------<  87  4.2   |  24.0  |  1.48<H>    Ca    9.4      16 Dec 2023 08:25    TPro  7.9  /  Alb  4.3  /  TBili  0.3<L>  /  DBili  x   /  AST  40<H>  /  ALT  26  /  AlkPhos  134<H>  12-16    [facility]        Urinalysis Basic - ( 16 Dec 2023 14:31 )    Color: Yellow / Appearance: Clear / SG: >1.030 / pH: x  Gluc: x / Ketone: Trace mg/dL  / Bili: Negative / Urobili: 1.0 mg/dL   Blood: x / Protein: 30 mg/dL / Nitrite: Negative   Leuk Esterase: Negative / RBC: 2 /HPF / WBC 6 /HPF   Sq Epi: x / Non Sq Epi: 1 /HPF / Bacteria: Negative /HPF        RADIOLOGY & ADDITIONAL STUDIES:    INTERPRETATION OF TELEMETRY (personally reviewed):    ECG: NSR, normal axis, no st or t wave changes    ECHO:    STRESS TEST:    CARDIAC CATHETERIZATION:    Assessment and Plan:  In summary, JAVED CHIN is an 73y Female with a hx of copd on home O2, anemia, PPM, STEMI 05/2021 -> PCI of LM and LAD, 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    Preoperative risk assessment- patient is a high risk for a low risk procedure such as colonoscopy or endoscopy.  She can perform 4 METS.  She is actively bleeding.  Would proceed to endoscopic exam without further cardiovascular workup.    -can hold asa/plavix until bleeding resolved  -continue toprol xl    HLD- continue crestor    CAD- would restart asa 81mg daily when acceptable per GI      Thank you for allowing [facility] to participate in the care of this patient.  Please feel free to call with any questions or concerns.                  [place] [facility], [facility]                                   [address] [place] [address]                                                      PHONE: [phone]                                                         FAX: [phone]  [email or web address]  ---------------------------------------------------------------------------------------------------------------------------------    HPI:  JAVED CHIN is an 73y Female with a hx of copd on home O2, anemia, PPM, STEMI 05/2021 -> PCI of LM and LAD, 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  Pt denies guadarrama, chest pain, orthopnea or le edema.    Patient reports that she can walk up a flight of stairs without stopping. No hx of dm.     PAST MEDICAL & SURGICAL HISTORY:  Rheumatoid arthritis      COPD without exacerbation      HTN (hypertension)      Pacemaker      H/O cerebral artery stenosis  left cerebral artery stent per history in 2014.      S/P hysterectomy      S/P cholecystectomy      H/O exploratory laparotomy          morphine (Nausea)  No Known Allergies      MEDICATIONS  (STANDING):  albuterol    0.083% 2.5 milliGRAM(s) Nebulizer every 12 hours  aMIOdarone    Tablet 200 milliGRAM(s) Oral daily  atorvastatin 80 milliGRAM(s) Oral at bedtime  budesonide 160 MICROgram(s)/formoterol 4.5 MICROgram(s) Inhaler 2 Puff(s) Inhalation two times a day  folic acid 1 milliGRAM(s) Oral daily  methotrexate 15 milliGRAM(s) Oral <User Schedule>  metoprolol succinate ER 25 milliGRAM(s) Oral daily  pantoprazole  Injectable 40 milliGRAM(s) IV Push every 12 hours  sacubitril 24 mG/valsartan 26 mG 1 Tablet(s) Oral two times a day  torsemide 20 milliGRAM(s) Oral daily    MEDICATIONS  (PRN):  acetaminophen     Tablet .. 650 milliGRAM(s) Oral every 6 hours PRN Temp greater or equal to 38C (100.4F), Mild Pain (1 - 3)  ALPRAZolam 0.25 milliGRAM(s) Oral three times a day PRN anxiety  aluminum hydroxide/magnesium hydroxide/simethicone Suspension 30 milliLiter(s) Oral every 4 hours PRN Dyspepsia  melatonin 3 milliGRAM(s) Oral at bedtime PRN Insomnia  midodrine 10 milliGRAM(s) Oral every 8 hours PRN hold if sbp>130  ondansetron Injectable 4 milliGRAM(s) IV Push every 8 hours PRN Nausea and/or Vomiting      Family History: Pt denies hx of early cad, SCD, or congenital heart disease.      Social History:  Cigarettes:           no         Alchohol:   no              Illicit Drug Abuse:  no    ROS:    Extensively Reviewed with pertinents as per HPI the remainder were negative.      Vital Signs Last 24 Hrs  T(C): 36.4 (17 Dec 2023 08:32), Max: 36.7 (17 Dec 2023 04:28)  T(F): 97.5 (17 Dec 2023 08:32), Max: 98 (17 Dec 2023 04:28)  HR: 60 (17 Dec 2023 08:32) (60 - 90)  BP: 123/71 (17 Dec 2023 08:32) (100/57 - 132/70)  BP(mean): --  RR: 18 (17 Dec 2023 08:32) (17 - 20)  SpO2: 98% (17 Dec 2023 08:32) (93% - 98%)    Parameters below as of 17 Dec 2023 08:32  Patient On (Oxygen Delivery Method): room air      ICU Vital Signs Last 24 Hrs  JAVED CHIN  I&O's Detail    16 Dec 2023 07:01  -  17 Dec 2023 07:00  --------------------------------------------------------  IN:    Oral Fluid: 500 mL  Total IN: 500 mL    OUT:  Total OUT: 0 mL    Total NET: 500 mL        I&O's Summary    16 Dec 2023 07:01  -  17 Dec 2023 07:00  --------------------------------------------------------  IN: 500 mL / OUT: 0 mL / NET: 500 mL      Drug Dosing Weight  JAVED APODACAER      PHYSICAL EXAM:  General: Appears well developed, alert and cooperative.  HEENT: Head; normocephalic, atraumatic.  Eyes: Pupils reactive, cornea wnl.  Neck: Supple, no nodes adenopathy, no JVD, no carotid bruit  CARDIOVASCULAR: Normal S1 and S2, No murmur, rub, gallop or lift.   LUNGS: No rales, rhonchi or wheeze. Normal breath sounds bilaterally.  ABDOMEN: Soft, nontender, nondistended  EXTREMITIES: No clubbing or edema. Distal pulses wnl.   SKIN: warm and dry with normal turgor.  NEURO: Alert/oriented x 3/normal motor exam.   PSYCH: normal affect.        LABS:                        7.2    4.54  )-----------( 260      ( 17 Dec 2023 06:41 )             24.1     12-16    142  |  103  |  36.7<H>  ----------------------------<  87  4.2   |  24.0  |  1.48<H>    Ca    9.4      16 Dec 2023 08:25    TPro  7.9  /  Alb  4.3  /  TBili  0.3<L>  /  DBili  x   /  AST  40<H>  /  ALT  26  /  AlkPhos  134<H>  12-16    [facility]        Urinalysis Basic - ( 16 Dec 2023 14:31 )    Color: Yellow / Appearance: Clear / SG: >1.030 / pH: x  Gluc: x / Ketone: Trace mg/dL  / Bili: Negative / Urobili: 1.0 mg/dL   Blood: x / Protein: 30 mg/dL / Nitrite: Negative   Leuk Esterase: Negative / RBC: 2 /HPF / WBC 6 /HPF   Sq Epi: x / Non Sq Epi: 1 /HPF / Bacteria: Negative /HPF        RADIOLOGY & ADDITIONAL STUDIES:    INTERPRETATION OF TELEMETRY (personally reviewed):    ECG: NSR, normal axis, no st or t wave changes    ECHO:    STRESS TEST:    CARDIAC CATHETERIZATION:    Assessment and Plan:  In summary, JAVED CHIN is an 73y Female with a hx of copd on home O2, anemia, PPM, STEMI 05/2021 -> PCI of LM and LAD, 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    Preoperative risk assessment- patient is a high risk for a low risk procedure such as colonoscopy or endoscopy.  She can perform 4 METS.  She is actively bleeding.  Would proceed to endoscopic exam without further cardiovascular workup.    -can hold asa/plavix until bleeding resolved  -continue toprol xl    HLD- continue crestor    CAD- would restart asa 81mg daily when acceptable per GI      Thank you for allowing [facility] to participate in the care of this patient.  Please feel free to call with any questions or concerns.

## 2023-12-17 NOTE — PROGRESS NOTE ADULT - ASSESSMENT
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 Patient reports 4 episodes of watery diarrhaea with  blood clots yesterday and 1 small rectal bleed early am. Denies abdominal pain, nausea, vomiting, cp, sob. Pt is on ASA/Plavix-last dose yesterday.In ED her hemoglobin  is 11.3gm. CTA abd showed w/o active bleed/ sigmoid diverticulitis.  Last EGD/ colonoscopy was on 11/2020 revealed mild antral gastritis and colonoscopy showed adenomatous polyps and left sided diverticulosis, old blood all throughout the colon, .    Acute blood loss anemia due to lower BI bleeding (likely diverticular bleed)  - Hb acutely dropping  - Transfuse 2 units PRBC today  - GI following - clears today, Golytely [id number], NPO midnight for colonoscopy Monday 12/18   - monitor labs  - hold asa & plavix now, f/u cardiology rec  - Protonix 40mg IV BID for now (if confirmed low GI bleed, will change dose)  - cardiology risk stratification completed.   - following hemology out pt    Chronic A. Fib  - Not on AC due to recurrent GIB  - Continue Metoprolol and Amiodarone     HTN  - continue metoprolol 25mg daily  hx Hypotension - midodrine 10mg PO q8h PRN hypotension (chronic medication)    Chronic Systolic Heart Failure   - Appears euvolemic  - s/p 2 units PRBC this morning 12/17 - give dose of lasix 20mg IV x1 to prevent acute HF complications  - Entresto, Metoprolol   - TTE ; EF 20-25 % with WMA  - monitor I/Os, daily weights  - f/u further cardio recommendations    ZANDRA, likely prerenal state  - likely due to prerenal  - Cr 1.48 improved to 1.29 today  - continue Entresto  - consider resuming lasix  - monitor I/Os, daily weights  - avoid nephrotoxic agents and renally dose medications  - Monitor renal function     Chronic COPD (on 3 liters of NC at home)  - currently on 3 liters   - not in acute exacerbation   - albuterol PRN    Rheumatoid Arthritis   - Continue Methotrexate  - Continue Folic Acid     DVT Prophylaxis - SCDs 73 year old woman with a significant medical history of copd on home o2,chronic anemia following , GI bleed , freddy-arrythmia with cardiac arrest, s/p MDT PPM, STEMI 5/2021, CVA, PCI with TATA to LM and LAD (6/2021) 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 Patient reports 4 episodes of watery diarrhaea with  blood clots yesterday and 1 small rectal bleed early am. Denies abdominal pain, nausea, vomiting, cp, sob. Pt is on ASA/Plavix-last dose yesterday.In ED her hemoglobin  is 11.3gm. CTA abd showed w/o active bleed/ sigmoid diverticulitis.  Last EGD/ colonoscopy was on 11/2020 revealed mild antral gastritis and colonoscopy showed adenomatous polyps and left sided diverticulosis, old blood all throughout the colon, .    Acute blood loss anemia due to lower BI bleeding (likely diverticular bleed)  - Hb acutely dropping  - Transfuse 2 units PRBC today  - GI following - clears today, Golytely [id number], NPO midnight for colonoscopy Monday 12/18   - monitor labs  - hold asa & plavix now, f/u cardiology rec  - Protonix 40mg IV BID for now (if confirmed low GI bleed, will change dose)  - cardiology risk stratification completed.   - following hemology out pt    Chronic A. Fib  - Not on AC due to recurrent GIB  - Continue Metoprolol and Amiodarone     HTN  - continue metoprolol 25mg daily  hx Hypotension - midodrine 10mg PO q8h PRN hypotension (chronic medication)    Chronic Systolic Heart Failure   - Appears euvolemic  - s/p 2 units PRBC this morning 12/17 - give dose of lasix 20mg IV x1 to prevent acute HF complications  - Entresto, Metoprolol   - TTE ; EF 20-25 % with WMA  - monitor I/Os, daily weights  - f/u further cardio recommendations    ZANDRA, likely prerenal state  - likely due to prerenal  - Cr 1.48 improved to 1.29 today  - continue Entresto  - consider resuming lasix  - monitor I/Os, daily weights  - avoid nephrotoxic agents and renally dose medications  - Monitor renal function     Chronic COPD (on 3 liters of NC at home)  - currently on 3 liters   - not in acute exacerbation   - albuterol PRN    Rheumatoid Arthritis   - Continue Methotrexate  - Continue Folic Acid     DVT Prophylaxis - SCDs

## 2023-12-17 NOTE — PROGRESS NOTE ADULT - TIME-BASED BILLING (NON-CRITICAL CARE)
Time-based billing (NON-critical care) Slit Excision Additional Text (Leave Blank If You Do Not Want): A linear line was drawn on the skin overlying the lesion. An incision was made slowly until the lesion was visualized.  Once visualized, the lesion was removed with blunt dissection.

## 2023-12-17 NOTE — PATIENT PROFILE ADULT - NSPRESCRALCFREQ_GEN_A_NUR
Encounter addended by: Saqib Jasmine RN on: 8/28/2019 12:07 PM   Actions taken: Charge Capture section accepted
Never

## 2023-12-17 NOTE — PATIENT PROFILE ADULT - FALL HARM RISK - HARM RISK INTERVENTIONS
Assistance with ambulation/Assistance OOB with selected safe patient handling equipment/Communicate Risk of Fall with Harm to all staff/Reinforce activity limits and safety measures with patient and family/Tailored Fall Risk Interventions/Visual Cue: Yellow wristband and red socks/Bed in lowest position, wheels locked, appropriate side rails in place/Call bell, personal items and telephone in reach/Instruct patient to call for assistance before getting out of bed or chair/Non-slip footwear when patient is out of bed/[place] to call system/Physically safe environment - no spills, clutter or unnecessary equipment/Purposeful Proactive Rounding/Room/bathroom lighting operational, light cord in reach Assistance with ambulation/Assistance OOB with selected safe patient handling equipment/Communicate Risk of Fall with Harm to all staff/Reinforce activity limits and safety measures with patient and family/Tailored Fall Risk Interventions/Visual Cue: Yellow wristband and red socks/Bed in lowest position, wheels locked, appropriate side rails in place/Call bell, personal items and telephone in reach/Instruct patient to call for assistance before getting out of bed or chair/Non-slip footwear when patient is out of bed/[place] to call system/Physically safe environment - no spills, clutter or unnecessary equipment/Purposeful Proactive Rounding/Room/bathroom lighting operational, light cord in reach

## 2023-12-18 DIAGNOSIS — K64.8 OTHER HEMORRHOIDS: ICD-10-CM

## 2023-12-18 DIAGNOSIS — K57.90 DIVERTICULOSIS OF INTESTINE, PART UNSPECIFIED, WITHOUT PERFORATION OR ABSCESS WITHOUT BLEEDING: ICD-10-CM

## 2023-12-18 DIAGNOSIS — K62.5 HEMORRHAGE OF ANUS AND RECTUM: ICD-10-CM

## 2023-12-18 LAB
ANION GAP SERPL CALC-SCNC: 14 MMOL/L — SIGNIFICANT CHANGE UP (ref 5–17)
ANION GAP SERPL CALC-SCNC: 14 MMOL/L — SIGNIFICANT CHANGE UP (ref 5–17)
BASOPHILS # BLD AUTO: 0.02 K/UL — SIGNIFICANT CHANGE UP (ref 0–0.2)
BASOPHILS # BLD AUTO: 0.02 K/UL — SIGNIFICANT CHANGE UP (ref 0–0.2)
BASOPHILS NFR BLD AUTO: 0.3 % — SIGNIFICANT CHANGE UP (ref 0–2)
BASOPHILS NFR BLD AUTO: 0.3 % — SIGNIFICANT CHANGE UP (ref 0–2)
BUN SERPL-MCNC: 30.8 MG/DL — HIGH (ref 8–20)
BUN SERPL-MCNC: 30.8 MG/DL — HIGH (ref 8–20)
CALCIUM SERPL-MCNC: 8.1 MG/DL — LOW (ref 8.4–10.5)
CALCIUM SERPL-MCNC: 8.1 MG/DL — LOW (ref 8.4–10.5)
CHLORIDE SERPL-SCNC: 108 MMOL/L — SIGNIFICANT CHANGE UP (ref 96–108)
CHLORIDE SERPL-SCNC: 108 MMOL/L — SIGNIFICANT CHANGE UP (ref 96–108)
CO2 SERPL-SCNC: 24 MMOL/L — SIGNIFICANT CHANGE UP (ref 22–29)
CO2 SERPL-SCNC: 24 MMOL/L — SIGNIFICANT CHANGE UP (ref 22–29)
CREAT SERPL-MCNC: 1.34 MG/DL — HIGH (ref 0.5–1.3)
CREAT SERPL-MCNC: 1.34 MG/DL — HIGH (ref 0.5–1.3)
EGFR: 41 ML/MIN/1.73M2 — LOW
EGFR: 41 ML/MIN/1.73M2 — LOW
EOSINOPHIL # BLD AUTO: 0.06 K/UL — SIGNIFICANT CHANGE UP (ref 0–0.5)
EOSINOPHIL # BLD AUTO: 0.06 K/UL — SIGNIFICANT CHANGE UP (ref 0–0.5)
EOSINOPHIL NFR BLD AUTO: 0.8 % — SIGNIFICANT CHANGE UP (ref 0–6)
EOSINOPHIL NFR BLD AUTO: 0.8 % — SIGNIFICANT CHANGE UP (ref 0–6)
GLUCOSE SERPL-MCNC: 88 MG/DL — SIGNIFICANT CHANGE UP (ref 70–99)
GLUCOSE SERPL-MCNC: 88 MG/DL — SIGNIFICANT CHANGE UP (ref 70–99)
HCT VFR BLD CALC: 28.5 % — LOW (ref 34.5–45)
HCT VFR BLD CALC: 28.5 % — LOW (ref 34.5–45)
HCT VFR BLD CALC: 30.5 % — LOW (ref 34.5–45)
HCT VFR BLD CALC: 30.5 % — LOW (ref 34.5–45)
HGB BLD-MCNC: 10.2 G/DL — LOW (ref 11.5–15.5)
HGB BLD-MCNC: 10.2 G/DL — LOW (ref 11.5–15.5)
HGB BLD-MCNC: 9.7 G/DL — LOW (ref 11.5–15.5)
HGB BLD-MCNC: 9.7 G/DL — LOW (ref 11.5–15.5)
IMM GRANULOCYTES NFR BLD AUTO: 0.4 % — SIGNIFICANT CHANGE UP (ref 0–0.9)
IMM GRANULOCYTES NFR BLD AUTO: 0.4 % — SIGNIFICANT CHANGE UP (ref 0–0.9)
INR BLD: 1.15 RATIO — SIGNIFICANT CHANGE UP (ref 0.85–1.18)
INR BLD: 1.15 RATIO — SIGNIFICANT CHANGE UP (ref 0.85–1.18)
LYMPHOCYTES # BLD AUTO: 0.8 K/UL — LOW (ref 1–3.3)
LYMPHOCYTES # BLD AUTO: 0.8 K/UL — LOW (ref 1–3.3)
LYMPHOCYTES # BLD AUTO: 10.4 % — LOW (ref 13–44)
LYMPHOCYTES # BLD AUTO: 10.4 % — LOW (ref 13–44)
MCHC RBC-ENTMCNC: 30.8 PG — SIGNIFICANT CHANGE UP (ref 27–34)
MCHC RBC-ENTMCNC: 30.8 PG — SIGNIFICANT CHANGE UP (ref 27–34)
MCHC RBC-ENTMCNC: 30.9 PG — SIGNIFICANT CHANGE UP (ref 27–34)
MCHC RBC-ENTMCNC: 30.9 PG — SIGNIFICANT CHANGE UP (ref 27–34)
MCHC RBC-ENTMCNC: 33.4 GM/DL — SIGNIFICANT CHANGE UP (ref 32–36)
MCHC RBC-ENTMCNC: 33.4 GM/DL — SIGNIFICANT CHANGE UP (ref 32–36)
MCHC RBC-ENTMCNC: 34 GM/DL — SIGNIFICANT CHANGE UP (ref 32–36)
MCHC RBC-ENTMCNC: 34 GM/DL — SIGNIFICANT CHANGE UP (ref 32–36)
MCV RBC AUTO: 90.8 FL — SIGNIFICANT CHANGE UP (ref 80–100)
MCV RBC AUTO: 90.8 FL — SIGNIFICANT CHANGE UP (ref 80–100)
MCV RBC AUTO: 92.1 FL — SIGNIFICANT CHANGE UP (ref 80–100)
MCV RBC AUTO: 92.1 FL — SIGNIFICANT CHANGE UP (ref 80–100)
MONOCYTES # BLD AUTO: 0.72 K/UL — SIGNIFICANT CHANGE UP (ref 0–0.9)
MONOCYTES # BLD AUTO: 0.72 K/UL — SIGNIFICANT CHANGE UP (ref 0–0.9)
MONOCYTES NFR BLD AUTO: 9.4 % — SIGNIFICANT CHANGE UP (ref 2–14)
MONOCYTES NFR BLD AUTO: 9.4 % — SIGNIFICANT CHANGE UP (ref 2–14)
NEUTROPHILS # BLD AUTO: 6.07 K/UL — SIGNIFICANT CHANGE UP (ref 1.8–7.4)
NEUTROPHILS # BLD AUTO: 6.07 K/UL — SIGNIFICANT CHANGE UP (ref 1.8–7.4)
NEUTROPHILS NFR BLD AUTO: 78.7 % — HIGH (ref 43–77)
NEUTROPHILS NFR BLD AUTO: 78.7 % — HIGH (ref 43–77)
PLATELET # BLD AUTO: 243 K/UL — SIGNIFICANT CHANGE UP (ref 150–400)
PLATELET # BLD AUTO: 243 K/UL — SIGNIFICANT CHANGE UP (ref 150–400)
PLATELET # BLD AUTO: 248 K/UL — SIGNIFICANT CHANGE UP (ref 150–400)
PLATELET # BLD AUTO: 248 K/UL — SIGNIFICANT CHANGE UP (ref 150–400)
POTASSIUM SERPL-MCNC: 3.1 MMOL/L — LOW (ref 3.5–5.3)
POTASSIUM SERPL-MCNC: 3.1 MMOL/L — LOW (ref 3.5–5.3)
POTASSIUM SERPL-SCNC: 3.1 MMOL/L — LOW (ref 3.5–5.3)
POTASSIUM SERPL-SCNC: 3.1 MMOL/L — LOW (ref 3.5–5.3)
PROTHROM AB SERPL-ACNC: 12.7 SEC — SIGNIFICANT CHANGE UP (ref 9.5–13)
PROTHROM AB SERPL-ACNC: 12.7 SEC — SIGNIFICANT CHANGE UP (ref 9.5–13)
RBC # BLD: 3.14 M/UL — LOW (ref 3.8–5.2)
RBC # BLD: 3.14 M/UL — LOW (ref 3.8–5.2)
RBC # BLD: 3.31 M/UL — LOW (ref 3.8–5.2)
RBC # BLD: 3.31 M/UL — LOW (ref 3.8–5.2)
RBC # FLD: 17.1 % — HIGH (ref 10.3–14.5)
RBC # FLD: 17.1 % — HIGH (ref 10.3–14.5)
RBC # FLD: 17.2 % — HIGH (ref 10.3–14.5)
RBC # FLD: 17.2 % — HIGH (ref 10.3–14.5)
SODIUM SERPL-SCNC: 146 MMOL/L — HIGH (ref 135–145)
SODIUM SERPL-SCNC: 146 MMOL/L — HIGH (ref 135–145)
WBC # BLD: 7.7 K/UL — SIGNIFICANT CHANGE UP (ref 3.8–10.5)
WBC # BLD: 7.7 K/UL — SIGNIFICANT CHANGE UP (ref 3.8–10.5)
WBC # BLD: 9.23 K/UL — SIGNIFICANT CHANGE UP (ref 3.8–10.5)
WBC # BLD: 9.23 K/UL — SIGNIFICANT CHANGE UP (ref 3.8–10.5)
WBC # FLD AUTO: 7.7 K/UL — SIGNIFICANT CHANGE UP (ref 3.8–10.5)
WBC # FLD AUTO: 7.7 K/UL — SIGNIFICANT CHANGE UP (ref 3.8–10.5)
WBC # FLD AUTO: 9.23 K/UL — SIGNIFICANT CHANGE UP (ref 3.8–10.5)
WBC # FLD AUTO: 9.23 K/UL — SIGNIFICANT CHANGE UP (ref 3.8–10.5)

## 2023-12-18 PROCEDURE — 99233 SBSQ HOSP IP/OBS HIGH 50: CPT

## 2023-12-18 PROCEDURE — 45378 DIAGNOSTIC COLONOSCOPY: CPT

## 2023-12-18 DEVICE — NAIL OSTEO 1.5X16MM STRL: Type: IMPLANTABLE DEVICE | Status: FUNCTIONAL

## 2023-12-18 RX ORDER — ASPIRIN/CALCIUM CARB/MAGNESIUM 324 MG
81 TABLET ORAL DAILY
Refills: 0 | Status: DISCONTINUED | OUTPATIENT
Start: 2023-12-18 | End: 2023-12-19

## 2023-12-18 RX ORDER — CLOPIDOGREL BISULFATE 75 MG/1
75 TABLET, FILM COATED ORAL DAILY
Refills: 0 | Status: DISCONTINUED | OUTPATIENT
Start: 2023-12-18 | End: 2023-12-19

## 2023-12-18 RX ORDER — POTASSIUM CHLORIDE 20 MEQ
10 PACKET (EA) ORAL
Refills: 0 | Status: COMPLETED | OUTPATIENT
Start: 2023-12-18 | End: 2023-12-18

## 2023-12-18 RX ORDER — LIDOCAINE 4 G/100G
1 CREAM TOPICAL ONCE
Refills: 0 | Status: COMPLETED | OUTPATIENT
Start: 2023-12-18 | End: 2023-12-18

## 2023-12-18 RX ADMIN — Medication 3 MILLIGRAM(S): at 21:45

## 2023-12-18 RX ADMIN — Medication 100 MILLIEQUIVALENT(S): at 16:12

## 2023-12-18 RX ADMIN — ATORVASTATIN CALCIUM 80 MILLIGRAM(S): 80 TABLET, FILM COATED ORAL at 21:45

## 2023-12-18 RX ADMIN — Medication 81 MILLIGRAM(S): at 13:23

## 2023-12-18 RX ADMIN — Medication 650 MILLIGRAM(S): at 15:40

## 2023-12-18 RX ADMIN — Medication 650 MILLIGRAM(S): at 21:45

## 2023-12-18 RX ADMIN — SACUBITRIL AND VALSARTAN 1 TABLET(S): 24; 26 TABLET, FILM COATED ORAL at 17:22

## 2023-12-18 RX ADMIN — Medication 20 MILLIGRAM(S): at 05:15

## 2023-12-18 RX ADMIN — Medication 1 MILLIGRAM(S): at 13:23

## 2023-12-18 RX ADMIN — BUDESONIDE AND FORMOTEROL FUMARATE DIHYDRATE 2 PUFF(S): 160; 4.5 AEROSOL RESPIRATORY (INHALATION) at 08:59

## 2023-12-18 RX ADMIN — BUDESONIDE AND FORMOTEROL FUMARATE DIHYDRATE 2 PUFF(S): 160; 4.5 AEROSOL RESPIRATORY (INHALATION) at 21:56

## 2023-12-18 RX ADMIN — PANTOPRAZOLE SODIUM 40 MILLIGRAM(S): 20 TABLET, DELAYED RELEASE ORAL at 21:45

## 2023-12-18 RX ADMIN — Medication 100 MILLIEQUIVALENT(S): at 13:25

## 2023-12-18 RX ADMIN — PANTOPRAZOLE SODIUM 40 MILLIGRAM(S): 20 TABLET, DELAYED RELEASE ORAL at 05:15

## 2023-12-18 RX ADMIN — ALBUTEROL 2.5 MILLIGRAM(S): 90 AEROSOL, METERED ORAL at 21:55

## 2023-12-18 RX ADMIN — ALBUTEROL 2.5 MILLIGRAM(S): 90 AEROSOL, METERED ORAL at 08:59

## 2023-12-18 RX ADMIN — Medication 100 MILLIEQUIVALENT(S): at 09:24

## 2023-12-18 RX ADMIN — Medication 650 MILLIGRAM(S): at 22:45

## 2023-12-18 RX ADMIN — AMIODARONE HYDROCHLORIDE 200 MILLIGRAM(S): 400 TABLET ORAL at 13:04

## 2023-12-18 RX ADMIN — Medication 650 MILLIGRAM(S): at 14:49

## 2023-12-18 RX ADMIN — CLOPIDOGREL BISULFATE 75 MILLIGRAM(S): 75 TABLET, FILM COATED ORAL at 13:23

## 2023-12-18 NOTE — CHART NOTE - NSCHARTNOTEFT_GEN_A_CORE
colon - no blood. Pandiverticulosis with dense diverticula in the left colon. Internal hemorrhoids   Please start regular diet  D/C  home in am  Plan discussed with Dr Payne

## 2023-12-18 NOTE — PROGRESS NOTE ADULT - ASSESSMENT
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 Patient reports 4 episodes of watery diarrhaea with  blood clots yesterday and 1 small rectal bleed early am. Denies abdominal pain, nausea, vomiting, cp, sob. Pt is on ASA/Plavix-last dose yesterday.In ED her hemoglobin  is 11.3gm. CTA abd showed w/o active bleed/ sigmoid diverticulitis.  Last EGD/ colonoscopy was on 11/2020 revealed mild antral gastritis and colonoscopy showed adenomatous polyps and left sided diverticulosis, old blood all throughout the colon, .    Acute blood loss anemia due to lower BI bleeding (likely diverticular bleed)  - Transfused 2 units PRBC  - GI recs appreciated  - Cardiology recs appreciated  - s/p colonoscopy showing pandiverticulosis and internal hemorrhoids  - Restart Diet  -  Outpatient hematology f/u  - Restart Aspirin and Plavix  - Monitor CBC    Chronic A. Fib  - Not on AC due to recurrent GIB  - Continue Metoprolol and Amiodarone     HTN  - continue metoprolol 25mg daily  hx Hypotension - midodrine 10mg PO q8h PRN hypotension (chronic medication)    Chronic Systolic Heart Failure   - Appears euvolemic  - s/p 2 units PRBC this morning 12/17 - give dose of lasix 20mg IV x1 to prevent acute HF complications  - Entresto, Metoprolol   - TTE ; EF 20-25 % with WMA  - monitor I/Os, daily weights  - f/u further cardio recommendations    ZANDRA, likely prerenal state  - Monitor BMP    Chronic COPD (on 3 liters of NC at home)  - currently on 3 liters   - not in acute exacerbation   - albuterol PRN    Rheumatoid Arthritis   - Continue Methotrexate  - Continue Folic Acid     DVT Prophylaxis - SCDs

## 2023-12-18 NOTE — PROGRESS NOTE ADULT - SUBJECTIVE AND OBJECTIVE BOX
Chief complaint: Anemia    Patient seen and examined at bedside. No acute overnight events reported. No fever, chills, cough, nausea or vomiting.     Vital Signs Last 24 Hrs  T(F): 98.1 (18 Dec 2023 08:51), Max: 98.3 (17 Dec 2023 16:27)  HR: 84 (18 Dec 2023 08:51) (76 - 88)  BP: 100/62 (18 Dec 2023 08:51) (93/55 - 148/60)  RR: 17 (18 Dec 2023 08:51) (16 - 18)  SpO2: 93% (18 Dec 2023 08:51) (92% - 99%)    Physical Exam:  Constitutional: alert and oriented, in no acute distress   Neck: Soft and supple  Respiratory: Good air entry b/l  Cardiovascular: Irregular rhythm  Gastrointestinal: Soft, non-tender to palpation, +bs  Vascular: 2+ peripheral pulses  Neurological: A/O x 3  Musculoskeletal: 5/5 strength b/l upper and lower extremities, no lower extremity edema bilaterally    Labs:                        9.7    7.70  )-----------( 243      ( 18 Dec 2023 05:24 )             28.5   12-18    146<H>  |  108  |  30.8<H>  ----------------------------<  88  3.1<L>   |  24.0  |  1.34<H>    Ca    8.1<L>      18 Dec 2023 05:24

## 2023-12-19 ENCOUNTER — NON-APPOINTMENT (OUTPATIENT)
Age: 73
End: 2023-12-19

## 2023-12-19 ENCOUNTER — TRANSCRIPTION ENCOUNTER (OUTPATIENT)
Age: 73
End: 2023-12-19

## 2023-12-19 VITALS
RESPIRATION RATE: 18 BRPM | TEMPERATURE: 98 F | HEART RATE: 84 BPM | OXYGEN SATURATION: 94 % | DIASTOLIC BLOOD PRESSURE: 66 MMHG | SYSTOLIC BLOOD PRESSURE: 108 MMHG

## 2023-12-19 LAB
ANION GAP SERPL CALC-SCNC: 11 MMOL/L — SIGNIFICANT CHANGE UP (ref 5–17)
ANION GAP SERPL CALC-SCNC: 11 MMOL/L — SIGNIFICANT CHANGE UP (ref 5–17)
BASOPHILS # BLD AUTO: 0.03 K/UL — SIGNIFICANT CHANGE UP (ref 0–0.2)
BASOPHILS # BLD AUTO: 0.03 K/UL — SIGNIFICANT CHANGE UP (ref 0–0.2)
BASOPHILS NFR BLD AUTO: 0.4 % — SIGNIFICANT CHANGE UP (ref 0–2)
BASOPHILS NFR BLD AUTO: 0.4 % — SIGNIFICANT CHANGE UP (ref 0–2)
BUN SERPL-MCNC: 16.8 MG/DL — SIGNIFICANT CHANGE UP (ref 8–20)
BUN SERPL-MCNC: 16.8 MG/DL — SIGNIFICANT CHANGE UP (ref 8–20)
CALCIUM SERPL-MCNC: 8.4 MG/DL — SIGNIFICANT CHANGE UP (ref 8.4–10.5)
CALCIUM SERPL-MCNC: 8.4 MG/DL — SIGNIFICANT CHANGE UP (ref 8.4–10.5)
CHLORIDE SERPL-SCNC: 112 MMOL/L — HIGH (ref 96–108)
CHLORIDE SERPL-SCNC: 112 MMOL/L — HIGH (ref 96–108)
CO2 SERPL-SCNC: 25 MMOL/L — SIGNIFICANT CHANGE UP (ref 22–29)
CO2 SERPL-SCNC: 25 MMOL/L — SIGNIFICANT CHANGE UP (ref 22–29)
CREAT SERPL-MCNC: 1.31 MG/DL — HIGH (ref 0.5–1.3)
CREAT SERPL-MCNC: 1.31 MG/DL — HIGH (ref 0.5–1.3)
EGFR: 42 ML/MIN/1.73M2 — LOW
EGFR: 42 ML/MIN/1.73M2 — LOW
EOSINOPHIL # BLD AUTO: 0.3 K/UL — SIGNIFICANT CHANGE UP (ref 0–0.5)
EOSINOPHIL # BLD AUTO: 0.3 K/UL — SIGNIFICANT CHANGE UP (ref 0–0.5)
EOSINOPHIL NFR BLD AUTO: 4.1 % — SIGNIFICANT CHANGE UP (ref 0–6)
EOSINOPHIL NFR BLD AUTO: 4.1 % — SIGNIFICANT CHANGE UP (ref 0–6)
GLUCOSE SERPL-MCNC: 91 MG/DL — SIGNIFICANT CHANGE UP (ref 70–99)
GLUCOSE SERPL-MCNC: 91 MG/DL — SIGNIFICANT CHANGE UP (ref 70–99)
HCT VFR BLD CALC: 28.4 % — LOW (ref 34.5–45)
HCT VFR BLD CALC: 28.4 % — LOW (ref 34.5–45)
HGB BLD-MCNC: 9.4 G/DL — LOW (ref 11.5–15.5)
HGB BLD-MCNC: 9.4 G/DL — LOW (ref 11.5–15.5)
IMM GRANULOCYTES NFR BLD AUTO: 0.3 % — SIGNIFICANT CHANGE UP (ref 0–0.9)
IMM GRANULOCYTES NFR BLD AUTO: 0.3 % — SIGNIFICANT CHANGE UP (ref 0–0.9)
LYMPHOCYTES # BLD AUTO: 0.71 K/UL — LOW (ref 1–3.3)
LYMPHOCYTES # BLD AUTO: 0.71 K/UL — LOW (ref 1–3.3)
LYMPHOCYTES # BLD AUTO: 9.6 % — LOW (ref 13–44)
LYMPHOCYTES # BLD AUTO: 9.6 % — LOW (ref 13–44)
MCHC RBC-ENTMCNC: 30.5 PG — SIGNIFICANT CHANGE UP (ref 27–34)
MCHC RBC-ENTMCNC: 30.5 PG — SIGNIFICANT CHANGE UP (ref 27–34)
MCHC RBC-ENTMCNC: 33.1 GM/DL — SIGNIFICANT CHANGE UP (ref 32–36)
MCHC RBC-ENTMCNC: 33.1 GM/DL — SIGNIFICANT CHANGE UP (ref 32–36)
MCV RBC AUTO: 92.2 FL — SIGNIFICANT CHANGE UP (ref 80–100)
MCV RBC AUTO: 92.2 FL — SIGNIFICANT CHANGE UP (ref 80–100)
MONOCYTES # BLD AUTO: 0.83 K/UL — SIGNIFICANT CHANGE UP (ref 0–0.9)
MONOCYTES # BLD AUTO: 0.83 K/UL — SIGNIFICANT CHANGE UP (ref 0–0.9)
MONOCYTES NFR BLD AUTO: 11.2 % — SIGNIFICANT CHANGE UP (ref 2–14)
MONOCYTES NFR BLD AUTO: 11.2 % — SIGNIFICANT CHANGE UP (ref 2–14)
NEUTROPHILS # BLD AUTO: 5.51 K/UL — SIGNIFICANT CHANGE UP (ref 1.8–7.4)
NEUTROPHILS # BLD AUTO: 5.51 K/UL — SIGNIFICANT CHANGE UP (ref 1.8–7.4)
NEUTROPHILS NFR BLD AUTO: 74.4 % — SIGNIFICANT CHANGE UP (ref 43–77)
NEUTROPHILS NFR BLD AUTO: 74.4 % — SIGNIFICANT CHANGE UP (ref 43–77)
PLATELET # BLD AUTO: 240 K/UL — SIGNIFICANT CHANGE UP (ref 150–400)
PLATELET # BLD AUTO: 240 K/UL — SIGNIFICANT CHANGE UP (ref 150–400)
POTASSIUM SERPL-MCNC: 3.3 MMOL/L — LOW (ref 3.5–5.3)
POTASSIUM SERPL-MCNC: 3.3 MMOL/L — LOW (ref 3.5–5.3)
POTASSIUM SERPL-SCNC: 3.3 MMOL/L — LOW (ref 3.5–5.3)
POTASSIUM SERPL-SCNC: 3.3 MMOL/L — LOW (ref 3.5–5.3)
RBC # BLD: 3.08 M/UL — LOW (ref 3.8–5.2)
RBC # BLD: 3.08 M/UL — LOW (ref 3.8–5.2)
RBC # FLD: 17.5 % — HIGH (ref 10.3–14.5)
RBC # FLD: 17.5 % — HIGH (ref 10.3–14.5)
SODIUM SERPL-SCNC: 148 MMOL/L — HIGH (ref 135–145)
SODIUM SERPL-SCNC: 148 MMOL/L — HIGH (ref 135–145)
WBC # BLD: 7.4 K/UL — SIGNIFICANT CHANGE UP (ref 3.8–10.5)
WBC # BLD: 7.4 K/UL — SIGNIFICANT CHANGE UP (ref 3.8–10.5)
WBC # FLD AUTO: 7.4 K/UL — SIGNIFICANT CHANGE UP (ref 3.8–10.5)
WBC # FLD AUTO: 7.4 K/UL — SIGNIFICANT CHANGE UP (ref 3.8–10.5)

## 2023-12-19 PROCEDURE — 99285 EMERGENCY DEPT VISIT HI MDM: CPT

## 2023-12-19 PROCEDURE — 83690 ASSAY OF LIPASE: CPT

## 2023-12-19 PROCEDURE — 86923 COMPATIBILITY TEST ELECTRIC: CPT

## 2023-12-19 PROCEDURE — P9016: CPT

## 2023-12-19 PROCEDURE — 36415 COLL VENOUS BLD VENIPUNCTURE: CPT

## 2023-12-19 PROCEDURE — 84484 ASSAY OF TROPONIN QUANT: CPT

## 2023-12-19 PROCEDURE — 86850 RBC ANTIBODY SCREEN: CPT

## 2023-12-19 PROCEDURE — 85027 COMPLETE CBC AUTOMATED: CPT

## 2023-12-19 PROCEDURE — 93005 ELECTROCARDIOGRAM TRACING: CPT

## 2023-12-19 PROCEDURE — P9040: CPT

## 2023-12-19 PROCEDURE — 81001 URINALYSIS AUTO W/SCOPE: CPT

## 2023-12-19 PROCEDURE — 99233 SBSQ HOSP IP/OBS HIGH 50: CPT

## 2023-12-19 PROCEDURE — 85610 PROTHROMBIN TIME: CPT

## 2023-12-19 PROCEDURE — 85025 COMPLETE CBC W/AUTO DIFF WBC: CPT

## 2023-12-19 PROCEDURE — 71045 X-RAY EXAM CHEST 1 VIEW: CPT

## 2023-12-19 PROCEDURE — 94640 AIRWAY INHALATION TREATMENT: CPT

## 2023-12-19 PROCEDURE — 80053 COMPREHEN METABOLIC PANEL: CPT

## 2023-12-19 PROCEDURE — 99239 HOSP IP/OBS DSCHRG MGMT >30: CPT

## 2023-12-19 PROCEDURE — 96374 THER/PROPH/DIAG INJ IV PUSH: CPT

## 2023-12-19 PROCEDURE — 86900 BLOOD TYPING SEROLOGIC ABO: CPT

## 2023-12-19 PROCEDURE — 36430 TRANSFUSION BLD/BLD COMPNT: CPT

## 2023-12-19 PROCEDURE — 80048 BASIC METABOLIC PNL TOTAL CA: CPT

## 2023-12-19 PROCEDURE — 74174 CTA ABD&PLVS W/CONTRAST: CPT | Mod: MA

## 2023-12-19 PROCEDURE — 82272 OCCULT BLD FECES 1-3 TESTS: CPT

## 2023-12-19 PROCEDURE — 86901 BLOOD TYPING SEROLOGIC RH(D): CPT

## 2023-12-19 RX ORDER — POTASSIUM CHLORIDE 20 MEQ
40 PACKET (EA) ORAL ONCE
Refills: 0 | Status: COMPLETED | OUTPATIENT
Start: 2023-12-19 | End: 2023-12-19

## 2023-12-19 RX ADMIN — PANTOPRAZOLE SODIUM 40 MILLIGRAM(S): 20 TABLET, DELAYED RELEASE ORAL at 08:36

## 2023-12-19 RX ADMIN — BUDESONIDE AND FORMOTEROL FUMARATE DIHYDRATE 2 PUFF(S): 160; 4.5 AEROSOL RESPIRATORY (INHALATION) at 09:30

## 2023-12-19 RX ADMIN — Medication 1 MILLIGRAM(S): at 12:18

## 2023-12-19 RX ADMIN — Medication 25 MILLIGRAM(S): at 06:33

## 2023-12-19 RX ADMIN — METHOTREXATE 15 MILLIGRAM(S): 2.5 TABLET ORAL at 12:15

## 2023-12-19 RX ADMIN — LIDOCAINE 1 PATCH: 4 CREAM TOPICAL at 00:05

## 2023-12-19 RX ADMIN — Medication 40 MILLIEQUIVALENT(S): at 08:37

## 2023-12-19 RX ADMIN — Medication 81 MILLIGRAM(S): at 12:18

## 2023-12-19 RX ADMIN — AMIODARONE HYDROCHLORIDE 200 MILLIGRAM(S): 400 TABLET ORAL at 06:33

## 2023-12-19 RX ADMIN — SACUBITRIL AND VALSARTAN 1 TABLET(S): 24; 26 TABLET, FILM COATED ORAL at 06:33

## 2023-12-19 RX ADMIN — ALBUTEROL 2.5 MILLIGRAM(S): 90 AEROSOL, METERED ORAL at 09:30

## 2023-12-19 RX ADMIN — LIDOCAINE 1 PATCH: 4 CREAM TOPICAL at 07:45

## 2023-12-19 RX ADMIN — CLOPIDOGREL BISULFATE 75 MILLIGRAM(S): 75 TABLET, FILM COATED ORAL at 12:17

## 2023-12-19 RX ADMIN — Medication 20 MILLIGRAM(S): at 06:33

## 2023-12-19 RX ADMIN — ONDANSETRON 4 MILLIGRAM(S): 8 TABLET, FILM COATED ORAL at 10:35

## 2023-12-19 NOTE — PROGRESS NOTE ADULT - ASSESSMENT
73 F h/o MV replacement, ASA/plavix, several other medical issues also like STEMI, PPM, AF, SBO s/p resection '20, TIA, here with rectal bleeding. Coon yesterday with dense tics, L>R. Hb stable. Savanah tic bleed, pt states this is second time this has happened - last time 1 y ago. She might need to be evaluated for L darcie if this happens again. Meanwhile, stable for discharge from GI perspective.

## 2023-12-19 NOTE — DISCHARGE NOTE PROVIDER - CARE PROVIDER_API CALL
Shanon Tucker  Gastroenterology  [address]  [place] [address]  Phone: [phone]  Fax: [phone]  Follow Up Time: 2 weeks   Shanon Tucker  Gastroenterology  [address], [address]  [place] [address]  Phone: [phone]  Fax: [phone]  Follow Up Time: 2 weeks

## 2023-12-19 NOTE — DISCHARGE NOTE PROVIDER - HOSPITAL COURSE
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  Patient reports 4 episodes of watery diarrhaea with  blood clots  and 1 small rectal bleed early am. Denies abdominal pain, nausea, vomiting, cp, sob. Pt is on ASA/Plavix .In ED her hemoglobin  is 11.3gm. CTA abd showed w/o active bleed/ sigmoid diverticulitis. Last EGD/ colonoscopy was on 11/2020 revealed mild antral gastritis and colonoscopy showed adenomatous polyps and left sided diverticulosis, old blood all throughout the colon, . SHe was seen by GI and cardiology. Asa and plavix held and she received  2 units PRBC. She underwent colonoscopy that showed Moderate diverticulosis of the whole colon ( very dense in the left colon ). She is advised to follow a high fiber diet. asa and plavix restarted. Her hgb is stable and can be discharged home. No further bleeding.

## 2023-12-19 NOTE — DISCHARGE NOTE NURSING/CASE MANAGEMENT/SOCIAL WORK - PATIENT PORTAL LINK FT
You can access the FollowMyHealth Patient Portal offered by [facility] by registering at the following website: [email or web address]. By joining [employer]’s FollowMyHealth portal, you will also be able to view your health information using other applications (apps) compatible with our system. You can access the FollowMyHealth Patient Portal offered by [facility] by registering at the following website: [email or web address]. By joining [employer]’s FollowMyHealth portal, you will also be able to view your health information using other applications (apps) compatible with our system.

## 2023-12-19 NOTE — DISCHARGE NOTE PROVIDER - NSDCMRMEDTOKEN_GEN_ALL_CORE_FT
albuterol 2.5 mg/3 mL (0.083%) inhalation solution: 3 milliliter(s) inhaled every 12 hours, As Needed  ALPRAZolam 0.25 mg oral tablet: 1 tab(s) orally 3 times a day, As Needed  amiodarone 200 mg oral tablet: 1 tab(s) orally once a day  Aspirin Enteric Coated 81 mg oral delayed release tablet: 1 tab(s) orally once a day restart on sunday in two days   Entresto 24 mg-26 mg oral tablet: 1 tab(s) orally 2 times a day hold if sbp&lt;100  ferrous sulfate 324 mg (65 mg elemental iron) oral delayed release tablet: 1 tab(s) orally every 48 hours  folic acid: 1 tab(s) orally once a day  Jardiance 10 mg oral tablet: 1 tab(s) orally once a day  methotrexate 2.5 mg oral tablet: 6 tab(s) orally once a week  Metoprolol Succinate ER 25 mg oral tablet, extended release: 1 tab(s) orally once a day  midodrine 5 mg oral tablet: 1 tab(s) orally every 8 hours, As Needed if SBP &lt; 100  pantoprazole 40 mg oral delayed release tablet: 1 tab(s) orally once a day  Plavix 75 mg oral tablet: 1 tab(s) orally once a day  rosuvastatin 40 mg oral tablet: 1 tab(s) orally once a day  Stiolto Respimat 28 ACT 2.5 mcg-2.5 mcg/inh inhalation aerosol: 2 puff(s) inhaled every 24 hours  torsemide 20 mg oral tablet: 1 tab(s) orally once a day

## 2023-12-19 NOTE — PROGRESS NOTE ADULT - SUBJECTIVE AND OBJECTIVE BOX
Chief Complaint:  Patient is a 73y old  Female who presents with a chief complaint of Gi bleed (18 Dec 2023 11:31)      Interval Events / Subjective: Patient seen and examined at bedside. Hates food here, thinks this is why she vomited this am. Also thought soup yesterday was too salty. No further bleeding. Hb stable.       REVIEW OF SYSTEMS:   General: Negative  HEENT: Negative  CV: Negative  Respiratory: Negative  GI: See HPI  : Negative  MSK: Negative  Hematologic: Negative  Skin: Negative    MEDICATIONS:   MEDICATIONS  (STANDING):  albuterol    0.083% 2.5 milliGRAM(s) Nebulizer every 12 hours  aMIOdarone    Tablet 200 milliGRAM(s) Oral daily  aspirin enteric coated 81 milliGRAM(s) Oral daily  atorvastatin 80 milliGRAM(s) Oral at bedtime  budesonide 160 MICROgram(s)/formoterol 4.5 MICROgram(s) Inhaler 2 Puff(s) Inhalation two times a day  clopidogrel Tablet 75 milliGRAM(s) Oral daily  folic acid 1 milliGRAM(s) Oral daily  methotrexate 15 milliGRAM(s) Oral <User Schedule>  metoprolol succinate ER 25 milliGRAM(s) Oral daily  pantoprazole  Injectable 40 milliGRAM(s) IV Push every 12 hours  sacubitril 24 mG/valsartan 26 mG 1 Tablet(s) Oral two times a day  torsemide 20 milliGRAM(s) Oral daily    MEDICATIONS  (PRN):  acetaminophen     Tablet .. 650 milliGRAM(s) Oral every 6 hours PRN Temp greater or equal to 38C (100.4F), Mild Pain (1 - 3)  ALPRAZolam 0.25 milliGRAM(s) Oral three times a day PRN anxiety  aluminum hydroxide/magnesium hydroxide/simethicone Suspension 30 milliLiter(s) Oral every 4 hours PRN Dyspepsia  melatonin 3 milliGRAM(s) Oral at bedtime PRN Insomnia  midodrine 10 milliGRAM(s) Oral every 8 hours PRN hold if sbp>130  ondansetron Injectable 4 milliGRAM(s) IV Push every 8 hours PRN Nausea and/or Vomiting      ALLERGIES:   Allergies    No Known Allergies    Intolerances    morphine (Nausea)      VITAL SIGNS:   Vital Signs Last 24 Hrs  T(C): 36.5 (19 Dec 2023 04:40), Max: 36.6 (18 Dec 2023 17:13)  T(F): 97.7 (19 Dec 2023 04:40), Max: 97.9 (18 Dec 2023 17:13)  HR: 79 (19 Dec 2023 09:38) (68 - 85)  BP: 104/70 (19 Dec 2023 04:40) (102/62 - 116/70)  BP(mean): --  RR: 18 (19 Dec 2023 04:40) (18 - 19)  SpO2: 97% (19 Dec 2023 09:38) (95% - 98%)    Parameters below as of 19 Dec 2023 09:38  Patient On (Oxygen Delivery Method): room air      I&O's Summary      PHYSICAL EXAM:   GENERAL:  Appears stated age, no distress  HEENT:  NC/AT,  conjunctivae clear, sclera -anicteric  CHEST:  Full & symmetric excursion, no increased effort, breath sounds clear  HEART:  Regular rhythm, S1, S2, no murmur/rub/S3/S4,  no edema  ABDOMEN:  Soft, non-tender, non-distended, normoactive bowel sounds,  no masses,  EXTREMITIES: No cyanosis, clubbing or edema  SKIN:  No rash/erythema/ecchymoses/petechiae/wounds/abscess/warm/dry  NEURO:  Alert, oriented    LABS:  CBC Full  -  ( 19 Dec 2023 05:16 )  WBC Count : 7.40 K/uL  RBC Count : 3.08 M/uL  Hemoglobin : 9.4 g/dL  Hematocrit : 28.4 %  Platelet Count - Automated : 240 K/uL  Mean Cell Volume : 92.2 fl  Mean Cell Hemoglobin : 30.5 pg  Mean Cell Hemoglobin Concentration : 33.1 gm/dL  Auto Neutrophil # : 5.51 K/uL  Auto Lymphocyte # : 0.71 K/uL  Auto Monocyte # : 0.83 K/uL  Auto Eosinophil # : 0.30 K/uL  Auto Basophil # : 0.03 K/uL  Auto Neutrophil % : 74.4 %  Auto Lymphocyte % : 9.6 %  Auto Monocyte % : 11.2 %  Auto Eosinophil % : 4.1 %  Auto Basophil % : 0.4 %    12-19    148<H>  |  112<H>  |  16.8  ----------------------------<  91  3.3<L>   |  25.0  |  1.31<H>    Ca    8.4      19 Dec 2023 05:16        PT/INR - ( 18 Dec 2023 05:24 )   PT: 12.7 sec;   INR: 1.15 ratio           Urinalysis Basic - ( 19 Dec 2023 05:16 )    Color: x / Appearance: x / SG: x / pH: x  Gluc: 91 mg/dL / Ketone: x  / Bili: x / Urobili: x   Blood: x / Protein: x / Nitrite: x   Leuk Esterase: x / RBC: x / WBC x   Sq Epi: x / Non Sq Epi: x / Bacteria: x          RADIOLOGY & ADDITIONAL STUDIES (The following images were personally reviewed):

## 2023-12-19 NOTE — DISCHARGE NOTE PROVIDER - CARE PROVIDERS DIRECT ADDRESSES
[email or web address]

## 2023-12-19 NOTE — DISCHARGE NOTE PROVIDER - NSDCCPCAREPLAN_GEN_ALL_CORE_FT
PRINCIPAL DISCHARGE DIAGNOSIS  Diagnosis: Lower GI bleed  Assessment and Plan of Treatment: colonoscopy due to diverticular bleed, now resolved.   high fiber diet.   please follow up with you GI doctor.   you were resumed on asa and plavix and tolerated it without bleeding  please follow up with your outpatient cardiologist to see if one anti-platelet medication can be stopped.

## 2023-12-19 NOTE — DISCHARGE NOTE PROVIDER - ATTENDING DISCHARGE PHYSICAL EXAMINATION:
Vital Signs Last 24 Hrs  T(C): 36.7 (19 Dec 2023 08:40), Max: 36.7 (19 Dec 2023 08:40)  T(F): 98 (19 Dec 2023 08:40), Max: 98 (19 Dec 2023 08:40)  HR: 79 (19 Dec 2023 09:38) (68 - 85)  BP: 123/65 (19 Dec 2023 08:40) (102/62 - 123/65)  BP(mean): --  RR: 18 (19 Dec 2023 08:40) (18 - 19)  SpO2: 97% (19 Dec 2023 09:38) (95% - 98%)    Parameters below as of 19 Dec 2023 09:38  Patient On (Oxygen Delivery Method): room air    PHYSICAL EXAM:  Constitutional: No acute distress, alert and oriented by 3  HEENT: AT/NC, EOMI, PERRLA, Normal conjunctiva, no pharyngeal erythema, moist oral mucosa  Respiratory: CTA BL, equal breath sounds, no crackles or wheezing  Cardiovascular: RRR, no edema  Gastrointestinal: soft, Non-tender, Non-distended + Bowel sounds, no rebound or guarding  Genitourinary: no curry  Musculoskeletal: No joint edema  Neurological: CN 2-12 grossly intact, no focal deficits  Skin: warm, dry and intact  Psychiatric: normal mood and affect

## 2023-12-19 NOTE — DISCHARGE NOTE NURSING/CASE MANAGEMENT/SOCIAL WORK - NSDCPEFALRISK_GEN_ALL_CORE
For information on Fall & Injury Prevention, visit: [email or web address] OR  [email or web address] OR  https://www.cdc.gov/steadi/patient.html For information on Fall & Injury Prevention, visit: [email or web address] OR  [email or web address] OR  https://www.cdc.gov/steadi/patient.html

## 2024-01-01 NOTE — PATIENT PROFILE ADULT - FUNCTIONAL SCREEN CURRENT LEVEL: SWALLOWING (IF SCORE 2 OR MORE FOR ANY ITEM, CONSULT REHAB SERVICES), MLM)
0 = swallows foods/liquids without difficulty CCHD Screen [11-09]: Initial  Pre-Ductal SpO2(%): 97  Post-Ductal SpO2(%): 98  SpO2 Difference(Pre MINUS Post): -1  Extremities Used: Right Hand, Right Foot  Result: Passed  Follow up: Normal Screen- (No follow-up needed)

## 2024-01-08 ENCOUNTER — OUTPATIENT (OUTPATIENT)
Dept: OUTPATIENT SERVICES | Facility: HOSPITAL | Age: 74
LOS: 1 days | Discharge: HOME | End: 2024-01-08

## 2024-01-08 DIAGNOSIS — Z90.49 ACQUIRED ABSENCE OF OTHER SPECIFIED PARTS OF DIGESTIVE TRACT: Chronic | ICD-10-CM

## 2024-01-08 DIAGNOSIS — Z98.890 OTHER SPECIFIED POSTPROCEDURAL STATES: Chronic | ICD-10-CM

## 2024-01-08 DIAGNOSIS — Z90.710 ACQUIRED ABSENCE OF BOTH CERVIX AND UTERUS: Chronic | ICD-10-CM

## 2024-01-08 DIAGNOSIS — D64.9 ANEMIA, UNSPECIFIED: ICD-10-CM

## 2024-01-10 ENCOUNTER — APPOINTMENT (OUTPATIENT)
Dept: HEMATOLOGY ONCOLOGY | Facility: CLINIC | Age: 74
End: 2024-01-10

## 2024-07-18 ENCOUNTER — APPOINTMENT (OUTPATIENT)
Dept: RHEUMATOLOGY | Facility: CLINIC | Age: 74
End: 2024-07-18

## 2024-10-24 ENCOUNTER — APPOINTMENT (OUTPATIENT)
Dept: FAMILY MEDICINE | Facility: CLINIC | Age: 74
End: 2024-10-24

## 2024-10-30 ENCOUNTER — APPOINTMENT (OUTPATIENT)
Dept: FAMILY MEDICINE | Facility: CLINIC | Age: 74
End: 2024-10-30
Payer: MEDICARE

## 2024-10-30 VITALS
BODY MASS INDEX: 23.64 KG/M2 | HEART RATE: 85 BPM | OXYGEN SATURATION: 98 % | SYSTOLIC BLOOD PRESSURE: 98 MMHG | TEMPERATURE: 97.6 F | DIASTOLIC BLOOD PRESSURE: 68 MMHG | HEIGHT: 68 IN | WEIGHT: 156 LBS

## 2024-10-30 DIAGNOSIS — Z09 ENCOUNTER FOR FOLLOW-UP EXAMINATION AFTER COMPLETED TREATMENT FOR CONDITIONS OTHER THAN MALIGNANT NEOPLASM: ICD-10-CM

## 2024-10-30 DIAGNOSIS — D64.9 ANEMIA, UNSPECIFIED: ICD-10-CM

## 2024-10-30 PROCEDURE — 99495 TRANSJ CARE MGMT MOD F2F 14D: CPT

## 2024-11-04 PROBLEM — Z09 HOSPITAL DISCHARGE FOLLOW-UP: Status: ACTIVE | Noted: 2024-11-04

## 2024-12-27 NOTE — ED PROCEDURE NOTE - NS ED ATTENDING STATEMENT MOD
Attending Only You can access the FollowMyHealth Patient Portal offered by [facility] by registering at the following website: [email or web address]. By joining [employer]’s FollowMyHealth portal, you will also be able to view your health information using other applications (apps) compatible with our system.

## 2025-02-10 ENCOUNTER — TRANSCRIPTION ENCOUNTER (OUTPATIENT)
Age: 75
End: 2025-02-10

## 2025-03-12 ENCOUNTER — APPOINTMENT (OUTPATIENT)
Dept: FAMILY MEDICINE | Facility: CLINIC | Age: 75
End: 2025-03-12

## (undated) DEVICE — SYR LUER SLIP TIP 50CC

## (undated) DEVICE — SENSOR O2 FINGER ADULT

## (undated) DEVICE — UNDERPAD LINEN SAVER 23 X 36"

## (undated) DEVICE — SYR IV FLUSH SALINE 10ML 30/TY

## (undated) DEVICE — DRSG CURITY GAUZE SPONGE 4 X 4" 12-PLY NON-STERILE

## (undated) DEVICE — CATH IV SAFE BC 22G X 1" (BLUE)

## (undated) DEVICE — FORCEP RADIAL JAW 4 W NDL 2.4MM 2.8MM 240CM ORANGE DISP

## (undated) DEVICE — PACK IV START WITH CHG

## (undated) DEVICE — SOL BAG NS 0.9% 1000ML

## (undated) DEVICE — TUBING IV EXTENSION MACRO W CLAVE 7"

## (undated) DEVICE — TUBING ALARIS PUMP MODULE NON-DEHP

## (undated) DEVICE — MASK PROCED EARLOOP 50/BX LRC COVID ADD

## (undated) DEVICE — SOL IRR BAG H2O 1000ML

## (undated) DEVICE — DENTURE CUP PINK

## (undated) DEVICE — GOWN IMPERV XL

## (undated) DEVICE — DRSG 2X2

## (undated) DEVICE — SYR SLIP 10CC